# Patient Record
Sex: MALE | Race: WHITE | Employment: OTHER | URBAN - METROPOLITAN AREA
[De-identification: names, ages, dates, MRNs, and addresses within clinical notes are randomized per-mention and may not be internally consistent; named-entity substitution may affect disease eponyms.]

---

## 2017-05-13 ENCOUNTER — HOSPITAL ENCOUNTER (OUTPATIENT)
Dept: MRI IMAGING | Age: 67
Discharge: HOME | End: 2017-05-13
Attending: ORTHOPAEDIC SURGERY
Payer: MEDICARE

## 2017-05-13 DIAGNOSIS — M54.16 LUMBAR RADICULOPATHY: ICD-10-CM

## 2017-05-13 DIAGNOSIS — G60.9 PERIPHERAL NEUROPATHY, IDIOPATHIC: ICD-10-CM

## 2017-05-13 PROCEDURE — 72148 MRI LUMBAR SPINE W/O DYE: CPT

## 2018-02-01 ENCOUNTER — HOSPITAL ENCOUNTER (OUTPATIENT)
Dept: ULTRASOUND IMAGING | Age: 68
Discharge: HOME | End: 2018-02-01
Attending: INTERNAL MEDICINE
Payer: MEDICARE

## 2018-02-01 DIAGNOSIS — K59.03 CONSTIPATION DUE TO OPIOID THERAPY: ICD-10-CM

## 2018-02-01 DIAGNOSIS — R10.9 ABDOMINAL PAIN: ICD-10-CM

## 2018-02-01 DIAGNOSIS — Z12.11 SCREENING FOR COLON CANCER: ICD-10-CM

## 2018-02-01 DIAGNOSIS — T40.2X5A CONSTIPATION DUE TO OPIOID THERAPY: ICD-10-CM

## 2018-02-01 PROCEDURE — 76700 US EXAM ABDOM COMPLETE: CPT

## 2018-04-12 ENCOUNTER — ANESTHESIA (OUTPATIENT)
Dept: ENDOSCOPY | Age: 68
End: 2018-04-12
Payer: MEDICARE

## 2018-04-12 ENCOUNTER — ANESTHESIA EVENT (OUTPATIENT)
Dept: ENDOSCOPY | Age: 68
End: 2018-04-12
Payer: MEDICARE

## 2018-04-12 ENCOUNTER — HOSPITAL ENCOUNTER (OUTPATIENT)
Age: 68
Setting detail: OUTPATIENT SURGERY
Discharge: HOME | End: 2018-04-12
Attending: INTERNAL MEDICINE | Admitting: INTERNAL MEDICINE
Payer: MEDICARE

## 2018-04-12 VITALS
BODY MASS INDEX: 32.28 KG/M2 | DIASTOLIC BLOOD PRESSURE: 84 MMHG | HEART RATE: 69 BPM | OXYGEN SATURATION: 98 % | SYSTOLIC BLOOD PRESSURE: 163 MMHG | HEIGHT: 68 IN | TEMPERATURE: 98.1 F | WEIGHT: 213 LBS | RESPIRATION RATE: 14 BRPM

## 2018-04-12 PROCEDURE — 76040000007: Performed by: INTERNAL MEDICINE

## 2018-04-12 PROCEDURE — 77030027957 HC TBNG IRR ENDOGTR BUSS -B: Performed by: INTERNAL MEDICINE

## 2018-04-12 PROCEDURE — 76060000032 HC ANESTHESIA 0.5 TO 1 HR: Performed by: INTERNAL MEDICINE

## 2018-04-12 PROCEDURE — 74011250636 HC RX REV CODE- 250/636

## 2018-04-12 PROCEDURE — 74011000258 HC RX REV CODE- 258

## 2018-04-12 PROCEDURE — 77030013992 HC SNR POLYP ENDOSC BSC -B: Performed by: INTERNAL MEDICINE

## 2018-04-12 PROCEDURE — 88305 TISSUE EXAM BY PATHOLOGIST: CPT | Performed by: INTERNAL MEDICINE

## 2018-04-12 PROCEDURE — 77030011640 HC PAD GRND REM COVD -A: Performed by: INTERNAL MEDICINE

## 2018-04-12 RX ORDER — SODIUM CHLORIDE 0.9 % (FLUSH) 0.9 %
5-10 SYRINGE (ML) INJECTION EVERY 8 HOURS
Status: DISCONTINUED | OUTPATIENT
Start: 2018-04-12 | End: 2018-04-12 | Stop reason: HOSPADM

## 2018-04-12 RX ORDER — FLUMAZENIL 0.1 MG/ML
0.2 INJECTION INTRAVENOUS
Status: DISCONTINUED | OUTPATIENT
Start: 2018-04-12 | End: 2018-04-12 | Stop reason: HOSPADM

## 2018-04-12 RX ORDER — NALOXONE HYDROCHLORIDE 0.4 MG/ML
0.4 INJECTION, SOLUTION INTRAMUSCULAR; INTRAVENOUS; SUBCUTANEOUS
Status: DISCONTINUED | OUTPATIENT
Start: 2018-04-12 | End: 2018-04-12 | Stop reason: HOSPADM

## 2018-04-12 RX ORDER — SODIUM CHLORIDE 0.9 % (FLUSH) 0.9 %
5-10 SYRINGE (ML) INJECTION AS NEEDED
Status: DISCONTINUED | OUTPATIENT
Start: 2018-04-12 | End: 2018-04-12 | Stop reason: HOSPADM

## 2018-04-12 RX ORDER — MIDAZOLAM HYDROCHLORIDE 1 MG/ML
.25-5 INJECTION, SOLUTION INTRAMUSCULAR; INTRAVENOUS
Status: DISCONTINUED | OUTPATIENT
Start: 2018-04-12 | End: 2018-04-12 | Stop reason: HOSPADM

## 2018-04-12 RX ORDER — FENTANYL CITRATE 50 UG/ML
100 INJECTION, SOLUTION INTRAMUSCULAR; INTRAVENOUS
Status: DISCONTINUED | OUTPATIENT
Start: 2018-04-12 | End: 2018-04-12 | Stop reason: HOSPADM

## 2018-04-12 RX ORDER — ATROPINE SULFATE 0.1 MG/ML
0.5 INJECTION INTRAVENOUS
Status: DISCONTINUED | OUTPATIENT
Start: 2018-04-12 | End: 2018-04-12 | Stop reason: HOSPADM

## 2018-04-12 RX ORDER — SODIUM CHLORIDE 9 MG/ML
INJECTION, SOLUTION INTRAVENOUS
Status: DISCONTINUED | OUTPATIENT
Start: 2018-04-12 | End: 2018-04-12 | Stop reason: HOSPADM

## 2018-04-12 RX ORDER — PROPOFOL 10 MG/ML
INJECTION, EMULSION INTRAVENOUS AS NEEDED
Status: DISCONTINUED | OUTPATIENT
Start: 2018-04-12 | End: 2018-04-12 | Stop reason: HOSPADM

## 2018-04-12 RX ORDER — SODIUM CHLORIDE 9 MG/ML
50 INJECTION, SOLUTION INTRAVENOUS CONTINUOUS
Status: DISCONTINUED | OUTPATIENT
Start: 2018-04-12 | End: 2018-04-12 | Stop reason: HOSPADM

## 2018-04-12 RX ORDER — DEXTROMETHORPHAN/PSEUDOEPHED 2.5-7.5/.8
1.2 DROPS ORAL
Status: DISCONTINUED | OUTPATIENT
Start: 2018-04-12 | End: 2018-04-12 | Stop reason: HOSPADM

## 2018-04-12 RX ORDER — EPINEPHRINE 0.1 MG/ML
1 INJECTION INTRACARDIAC; INTRAVENOUS
Status: DISCONTINUED | OUTPATIENT
Start: 2018-04-12 | End: 2018-04-12 | Stop reason: HOSPADM

## 2018-04-12 RX ADMIN — PROPOFOL 20 MG: 10 INJECTION, EMULSION INTRAVENOUS at 14:01

## 2018-04-12 RX ADMIN — PROPOFOL 20 MG: 10 INJECTION, EMULSION INTRAVENOUS at 13:55

## 2018-04-12 RX ADMIN — PROPOFOL 20 MG: 10 INJECTION, EMULSION INTRAVENOUS at 14:03

## 2018-04-12 RX ADMIN — PROPOFOL 100 MG: 10 INJECTION, EMULSION INTRAVENOUS at 13:53

## 2018-04-12 RX ADMIN — SODIUM CHLORIDE: 9 INJECTION, SOLUTION INTRAVENOUS at 13:30

## 2018-04-12 RX ADMIN — PROPOFOL 30 MG: 10 INJECTION, EMULSION INTRAVENOUS at 13:58

## 2018-04-12 RX ADMIN — PROPOFOL 50 MG: 10 INJECTION, EMULSION INTRAVENOUS at 13:59

## 2018-04-12 NOTE — H&P
[address]  Cindy Ortiz, [address]                 Rock Malcolm is a  77 y.o.   male who presents with no symptoms for screening        Past Medical History:   Diagnosis Date    Back pain     Backache, unspecified 2/8/2010    Blood clots 2004    spine    Cancer [facility]) 2013    prostate    CHF (congestive heart failure) ([facility] 1999    Coronary artery disease     Coronary atherosclerosis of native coronary artery 2/8/2010    DDD (degenerative disc disease)     Essential hypertension, benign 2/8/2010    Gastroenteritis, viral     Heart block     95%    HTN (hypertension), benign     Ill-defined condition     short term memory loss since SDH    Ill-defined condition     urinary incontinence    Ill-defined condition     limited vision R eye    Ill-defined condition 1996    concussion with subdural hematoma    Ill-defined condition 1999 & early 2000s    Mycardial Infarction    Lumbago 2/8/2010    Neuropathy Peripheral     pain in feet    Osteoarthritis     Osteoarthrosis, unspecified whether generalized or localized, unspecified site 2/8/2010    Seizures ([facility] [address] [facility]     1996 after head injury    Skin disease     itching alot    Stroke [facility]) 1996    with head injury    Unspecified hereditary and idiopathic peripheral neuropathy 2/8/2010     Past Surgical History:   Procedure Laterality Date    HX APPENDECTOMY      age 8   Lovette Mince BACK SURGERY  2002    HX BACK SURGERY  2004    HX CATARACT REMOVAL      with lens implant    HX HEART CATHETERIZATION  4474,7758    stents x3    HX PROSTATECTOMY      HX TONSILLECTOMY      as young child   [address]    evacuate SDH    NEUROLOGICAL PROCEDURE UNLISTED      implant neuro stimulator in back for neuropathy in feet     No Known Allergies  Current Facility-Administered Medications   Medication Dose Route Frequency Provider Last Rate Last Dose    0.9% sodium chloride infusion  50 mL/hr IntraVENous CONTINUOUS Kaye Godfrey MD        sodium chloride (NS) flush 5-10 mL  5-10 mL IntraVENous Q8H Kaye Godfrey MD        sodium chloride (NS) flush 5-10 mL  5-10 mL IntraVENous PRN Kaye Godfrey MD        midazolam (VERSED) injection 0.25-5 mg  0.25-5 mg IntraVENous Multiple Kaye Godfrey MD        fentaNYL citrate (PF) injection 100 mcg  100 mcg IntraVENous Multiple Kaye Godfrey MD        naloxone [facility]) injection 0.4 mg  0.4 mg IntraVENous Multiple Kaye Godfrey MD        flumazenil (ROMAZICON) 0.1 mg/mL injection 0.2 mg  0.2 mg IntraVENous Multiple Kaye Godfrey MD        [facility]) 29OR/3.6YI oral drops 80 mg  1.2 mL Oral Alva Godfrey MD        atropine injection 0.5 mg  0.5 mg IntraVENous ONCE PRN Kaye Godfrey MD        EPINEPHrine (ADRENALIN) 0.1 mg/mL syringe 1 mg  1 mg Endoscopically ONCE PRN Kaye Godfrey MD         Facility-Administered Medications Ordered in Other Encounters   Medication Dose Route Frequency Provider Last Rate Last Dose    0.9% sodium chloride infusion   IntraVENous CONTINUOUS Issa Saha CRNA           Visit Vitals    /80    Pulse 62    Temp 98.2 °F (36.8 °C)    Resp 20    Ht 5' 7.75\" (1.721 m)    Wt 96.6 kg (213 lb)    SpO2 100%    BMI 32.63 kg/m2           PHYSICAL EXAM:  General: WD, WN. Alert, cooperative, no acute distress    HEENT: NC, Atraumatic. PERRLA, EOMI. Anicteric sclerae. Mallampati score 2  Lungs:  CTA Bilaterally. No Wheezing/Rhonchi/Rales. Heart:  Regular  rhythm,  No murmur (), No Rubs, No Gallops  Abdomen: Soft, Non distended, Non tender.  +Bowel sounds, no HSM  Extremities: No c/c/e  Neurologic:  CN 2-12 gi, Alert and oriented X 3. No acute neurological distress   Psych:   Good insight. Not anxious nor agitated. Plan:   Endoscopic procedure with MAC.     Kaye Godfrey MD  4/12/2018  1:48 PM

## 2018-04-12 NOTE — IP AVS SNAPSHOT
[facility] [address] [place] [address] 
[phone] Patient: Refugio Lares MRN: [id number] M:[id number] About your hospitalization You were admitted on:  April 12, 2018 You last received care in the:  [facility] ENDOSCOPY You were discharged on:  April 12, 2018 Why you were hospitalized Your primary diagnosis was:  Not on File Follow-up Information None Your Scheduled Appointments Wednesday May 02, 2018 COLONOSCOPY with Depe Aguillon MD  
[facility] ENDOSCOPY (RI OR PRE ASSESSMENT) [address]  
[phone] OP Registration: [facility] Telephone: [phone] Fax: [phone] ** Pt will need to arrive 30 min prior unless appointment prep instructions indicate otherwise** **** Send All ENDO pt to the MAIN Admitting Department, off the [facility] main lobby at [facility] [place]. ****  
  
    
OP Registration: [facility] [employer]- [address] [place] [address] Telephone: [phone] Fax: [phone] ** Pt will need to arrive 30 min prior unless appointment prep instructions indicate otherwise** **** Send All ENDO pt to the MAIN Admitting Department, off the [facility] main lobby at [facility]. **** Discharge Orders None A check alexis indicates which time of day the medication should be taken. My Medications CONTINUE taking these medications Instructions Each Dose to Equal  
 Morning Noon Evening Bedtime  
 acetaminophen 500 mg tablet Commonly known as:  TYLENOL Your last dose was: Your next dose is: Take 500 mg by mouth every six (6) hours as needed for Pain. 500 mg  
    
   
   
   
  
 betamethasone valerate 0.1 % topical cream  
Commonly known as:  Angella Curie Your last dose was: Your next dose is:    
   
   
 APPLY LOCALLY 2 TIMES A DAY  
     
   
   
   
  
 bumetanide 2 mg tablet Commonly known as:  Bao Ferro Your last dose was: Your next dose is: Take 2 mg by mouth daily as needed. 2 mg  
    
   
   
   
  
 calcium-cholecalciferol (d3) 600-125 mg-unit Tab Your last dose was: Your next dose is: Take 1 Tab by mouth nightly. 1 Tab  
    
   
   
   
  
 carvedilol 6.25 mg tablet Commonly known as:  Gopi Soto Your last dose was: Your next dose is: Take 6.25 mg by mouth two (2) times a day. 6.25 mg  
    
   
   
   
  
 diazePAM 5 mg tablet Commonly known as:  VALIUM Your last dose was: Your next dose is: Take 1 Tab by mouth every six (6) hours as needed for Anxiety (spasm). Max Daily Amount: 20 mg.  
 5 mg  
    
   
   
   
  
 diphenhydrAMINE 25 mg tablet Commonly known as:  BENADRYL Your last dose was: Your next dose is: Take 25 mg by mouth every six (6) hours as needed for Sleep. 25 mg  
    
   
   
   
  
 fentaNYL 75 mcg/hr Commonly known as:  Juan M Sow Your last dose was: Your next dose is:    
   
   
 1 Patch by TransDERmal route every seventy-two (72) hours. Max Daily Amount: 1 Patch. 1 Patch FISH OIL 1,000 mg Cap Generic drug:  omega-3 fatty acids-vitamin e Your last dose was: Your next dose is: Take 1,000 g by mouth two (2) times a day. 1000 g  
    
   
   
   
  
 gabapentin 800 mg tablet Commonly known as:  NEURONTIN Your last dose was: Your next dose is: Take 800 mg by mouth two (2) times a day. 800 mg HYDROmorphone 8 mg tablet Commonly known as:  DILAUDID Your last dose was: Your next dose is: Take 1 Tab by mouth every four (4) hours as needed for Pain. Max Daily Amount: 48 mg.  
 8 mg  
    
   
   
   
  
 lisinopril 5 mg tablet Commonly known as:  Helon Estrin  
   
 Your last dose was: Your next dose is: Take  by mouth daily. potassium chloride 20 mEq tablet Commonly known as:  K-DUR, KLOR-CON Your last dose was: Your next dose is: Take 20 mEq by mouth daily as needed. Takes when he takes Bumex 20 mEq  
    
   
   
   
  
 pravastatin 40 mg tablet Commonly known as:  PRAVACHOL Your last dose was: Your next dose is: Take 40 mg by mouth nightly. 40 mg SENNA-MAX PO Your last dose was: Your next dose is: Take 4 Tabs by mouth daily as needed. 4 Tab TRAZODONE PO Your last dose was: Your next dose is: Take 100 mg by mouth nightly as needed. 100 mg Opioid Education Prescription Opioids: What You Need to Know: 
 
Prescription opioids can be used to help relieve moderate-to-severe pain and are often prescribed following a surgery or injury, or for certain health conditions. These medications can be an important part of treatment but also come with serious risks. Opioids are strong pain medicines. Examples include hydrocodone, oxycodone, fentanyl, and morphine. Heroin is an example of an illegal opioid. It is important to work with your health care provider to make sure you are getting the safest, most effective care. WHAT ARE THE RISKS AND SIDE EFFECTS OF OPIOID USE? Prescription opioids carry serious risks of addiction and overdose, especially with prolonged use. An opioid overdose, often marked by slow breathing, can cause sudden death. The use of prescription opioids can have a number of side effects as well, even when taken as directed. · Tolerance-meaning you might need to take more of a medication for the same pain relief · Physical dependence-meaning you have symptoms of withdrawal when the medication is stopped. Withdrawal symptoms can include nausea, sweating, chills, diarrhea, stomach cramps, and muscle aches. Withdrawal can last up to several weeks, depending on which drug you took and how long you took it. · Increased sensitivity to pain · Constipation · Nausea, vomiting, and dry mouth · Sleepiness and dizziness · Confusion · Depression · Low levels of testosterone that can result in lower sex drive, energy, and strength · Itching and sweating RISKS ARE GREATER WITH:      
· History of drug misuse, substance use disorder, or overdose · Mental health conditions (such as depression or anxiety) · Sleep apnea · Older age (72 years or older) · Pregnancy Avoid alcohol while taking prescription opioids. Also, unless specifically advised by your health care provider, medications to avoid include: · Benzodiazepines (such as Xanax or Valium) · Muscle relaxants (such as Soma or Flexeril) · Hypnotics (such as Ambien or Lunesta) · Other prescription opioids KNOW YOUR OPTIONS Talk to your health care provider about ways to manage your pain that don't involve prescription opioids. Some of these options may actually work better and have fewer risks and side effects. Options may include: 
· Pain relievers such as acetaminophen, ibuprofen, and naproxen · Some medications that are also used for depression or seizures · Physical therapy and exercise · Counseling to help patients learn how to cope better with triggers of pain and stress. · Application of heat or cold compress · Massage therapy · Relaxation techniques Be Informed Make sure you know the name of your medication, how much and how often to take it, and its potential risks & side effects. IF YOU ARE PRESCRIBED OPIOIDS FOR PAIN: 
· Never take opioids in greater amounts or more often than prescribed. Remember the goal is not to be pain-free but to manage your pain at a tolerable level. · Follow up with your primary care provider to: · Work together to create a plan on how to manage your pain. · Talk about ways to help manage your pain that don't involve prescription opioids. · Talk about any and all concerns and side effects. · Help prevent misuse and abuse. · Never sell or share prescription opioids · Help prevent misuse and abuse. · Store prescription opioids in a secure place and out of reach of others (this may include visitors, children, friends, and family). · Safely dispose of unused/unwanted prescription opioids: Find your community drug take-back program or your pharmacy mail-back program, or flush them down the toilet, following guidance from the Food and Drug Administration (www.fda.gov/Drugs/ResourcesForYou). · Visit www.cdc.gov/drugoverdose to learn about the risks of opioid abuse and overdose. · If you believe you may be struggling with addiction, tell your health care provider and ask for guidance or call [address] [place] [address] at [phone]. Discharge Instructions [address] [place] [address] [phone] [id number] COLON DISCHARGE INSTRUCTIONS DISCOMFORT: 
Redness at IV site- apply warm compress to area; if redness or soreness persist- contact your physician There may be a slight amount of blood passed from the rectum Gaseous discomfort- walking, belching will help relieve any discomfort You may not operate a vehicle for 12 hours You may not engage in an occupation involving machinery or appliances for rest of today You may not drink alcoholic beverages for at least 12 hours Avoid making any critical decisions for at least 24 hour DIET: 
 Regular diet.  however -  remember your colon is empty and a heavy meal will produce gas.  
 Avoid these foods:  vegetables, fried / greasy foods, carbonated drinks for today ACTIVITY: 
You may resume your normal daily activities it is recommended that you spend the remainder of the day resting -  avoid any strenuous activity. CALL M.D. ANY SIGN OF: Increasing pain, nausea, vomiting Abdominal distension (swelling) New increased bleeding (oral or rectal) Fever (chills) Pain in chest area Bloody discharge from nose or mouth Shortness of breath Follow-up Instructions: 
 Call Dr. Mirza Richards for any questions or problems. Telephone # [phone] Biopsy results will be available in  5 to 7 days Should have a repeat colonoscopy in 3 years Impression:  colon polyp Introducing [facility]! Josephtal Addie introduces [employer] patient portal. Now you can access parts of your medical record, email your doctor's office, and request medication refills online. 1. In your internet browser, go to [email or web address] 2. Click on the First Time User? Click Here link in the Sign In box. You will see the New Member Sign Up page. 3. Enter your [employer] Access Code exactly as it appears below. You will not need to use this code after youve completed the sign-up process. If you do not sign up before the expiration date, you must request a new code. · [employer] Access Code: [email or web address] Expires: 5/1/2018  2:40 PM 
 
4. Enter the last four digits of your Social Security Number (xxxx) and Date of Birth (mm/dd/yyyy) as indicated and click Submit. You will be taken to the next sign-up page. 5. Create a [employer] ID. This will be your [employer] login ID and cannot be changed, so think of one that is secure and easy to remember. 6. Create a [employer] password. You can change your password at any time. 7. Enter your Password Reset Question and Answer. This can be used at a later time if you forget your password. 8. Enter your e-mail address. You will receive e-mail notification when new information is available in [address] 9. Click Sign Up. You can now view and download portions of your medical record. 10. Click the Download Summary menu link to download a portable copy of your medical information. If you have questions, please visit the Frequently Asked Questions section of the [employer] website. Remember, [employer] is NOT to be used for urgent needs. For medical emergencies, dial 911. Now available from your iPhone and Android! Introducing Mulugeta Diaz As a [facility] patient, I wanted to make you aware of our electronic visit tool called Mulugeta Diaz. HernándezSyncing.Net 24/7 allows you to connect within minutes with a medical provider 24 hours a day, seven days a week via a mobile device or tablet or logging into a secure website from your computer. You can access Mulugeta Diaz from anywhere in the [place]. A virtual visit might be right for you when you have a simple condition and feel like you just dont want to get out of bed, or cant get away from work for an appointment, when your regular [facility] provider is not available (evenings, weekends or holidays), or when youre out of town and need minor care. Electronic visits cost only $49 and if the HernándezEBIQUOUS [facility] 24/7 provider determines a prescription is needed to treat your condition, one can be electronically transmitted to a nearby pharmacy*. Please take a moment to enroll today if you have not already done so. The enrollment process is free and takes just a few minutes. To enroll, please download the [employer] 24/7 sharifa to your tablet or phone, or visit [email or web address] to enroll on your computer. And, as an [address] [place] [address] patient with a [employer] account, the results of your visits will be scanned into your electronic medical record and your primary care provider will be able to view the scanned results.    
We urge you to continue to see your regular [facility] provider for your ongoing medical care. And while your primary care provider may not be the one available when you seek a Mulugeta Diaz virtual visit, the peace of mind you get from getting a real diagnosis real time can be priceless. For more information on Mulugeta Diaz, view our Frequently Asked Questions (FAQs) at [email or web address]. Sincerely, 
 
Madhuri Leon MD 
Chief Medical Officer [employer] *:  certain medications cannot be prescribed via Mulugeta Diaz Providers Seen During Your Hospitalization Provider Specialty Primary office phone Melissa Salmeron MD Gastroenterology [phone] Your Primary Care Physician (PCP) Primary Care Physician Office Phone Office Fax Kike Hill [phone] You are allergic to the following No active allergies Recent Documentation Height Weight BMI Smoking Status 1.721 m 96.6 kg 32.63 kg/m2 Former Smoker Emergency Contacts Name Discharge Info Relation Home Work Mobile Nabil 38 CAREGIVER [3] Spouse [3] [phone] Patient Belongings The following personal items are in your possession at time of discharge: 
  Dental Appliances: None  Visual Aid: None Please provide this summary of care documentation to your next provider. Signatures-by signing, you are acknowledging that this After Visit Summary has been reviewed with you and you have received a copy. Patient Signature:  ____________________________________________________________ Date:  ____________________________________________________________  
  
Fayrene Retort Provider Signature:  ____________________________________________________________ Date:  ____________________________________________________________

## 2018-04-12 NOTE — PROCEDURES
[address]  [address]              :  Luana Sierra MD    Referring Provider: Hussain Cordero NP    Sedation:  MAC anesthesia Propofol        Prior to the procedure its objectives, risks, consequences and alternatives were discussed with the patient who then elected to proceed. The patient had the opportunity to ask questions and those questions were answered. A physical exam was performed. The heart, lungs, and mental status were examined prior to the procedure and found to be satisfactory for conscious sedation and for the procedure. Conscious sedation was initiated by the physician. Continuous pulse oximetry and blood pressure monitoring were used throughout the procedure. After appropriate analgesia and rectal exam, the colonoscope was passed into the anus and passed to the cecum without difficulty. The prep was poor. On slow withdrawal of the scope, the cecum was normal. In the ascending colon,there was a 1 cm sessile polyp that was removed with snare and cautery and retrieved. The hepatic flexure, transverse colon, splenic flexure, descending colon, sigmoid and rectum were normal. Retroflexion exam of the rectum was normal. He tolerated the procedure without complication and I recommend a repeat colonoscopy in 5 years. Specimen Polyp, ascending colon    Complications: None. EBL:  None.     Luana Sierra MD  4/12/2018  2:09 PM

## 2018-04-12 NOTE — PERIOP NOTES

## 2018-04-12 NOTE — ANESTHESIA POSTPROCEDURE EVALUATION
Post-Anesthesia Evaluation and Assessment    Patient: Monse Woodall MRN: [id number]  SSN: [id number]    YOB: 1950  Age: 77 y.o. Sex: male       Cardiovascular Function/Vital Signs  Visit Vitals    /84    Pulse 69    Temp 36.7 °C (98.1 °F)    Resp 14    Ht 5' 7.75\" (1.721 m)    Wt 96.6 kg (213 lb)    SpO2 98%    BMI 32.63 kg/m2       Patient is status post MAC anesthesia for Procedure(s):  COLONOSCOPY  ENDOSCOPIC POLYPECTOMY. Nausea/Vomiting: None    Postoperative hydration reviewed and adequate. Pain:  Pain Scale 1: Visual (04/12/18 1416)  Pain Intensity 1: 0 (04/12/18 1416)   Managed    Neurological Status: At baseline    Mental Status and Level of Consciousness: Arousable    Pulmonary Status:   O2 Device: Room air (04/12/18 1416)   Adequate oxygenation and airway patent    Complications related to anesthesia: None    Post-anesthesia assessment completed.  No concerns    Signed By: Cassie Henderson MD     April 12, 2018

## 2018-04-12 NOTE — DISCHARGE INSTRUCTIONS
[address]  [address]          Cammie Coronado  [id number]  1950    COLON DISCHARGE INSTRUCTIONS    DISCOMFORT:  Redness at IV site- apply warm compress to area; if redness or soreness persist- contact your physician  There may be a slight amount of blood passed from the rectum  Gaseous discomfort- walking, belching will help relieve any discomfort  You may not operate a vehicle for 12 hours  You may not engage in an occupation involving machinery or appliances for rest of today  You may not drink alcoholic beverages for at least 12 hours  Avoid making any critical decisions for at least 24 hour  DIET:   Regular diet. - however -  remember your colon is empty and a heavy meal will produce gas. Avoid these foods:  vegetables, fried / greasy foods, carbonated drinks for today         ACTIVITY:  You may resume your normal daily activities it is recommended that you spend the remainder of the day resting -  avoid any strenuous activity. CALL M.D. ANY SIGN OF:   Increasing pain, nausea, vomiting  Abdominal distension (swelling)  New increased bleeding (oral or rectal)  Fever (chills)  Pain in chest area  Bloody discharge from nose or mouth  Shortness of breath     Follow-up Instructions:   Call Dr. Jc Gillespie for any questions or problems.    Telephone # [phone]  Biopsy results will be available in  5 to 7 days  Should have a repeat colonoscopy in 3 years    Impression:  colon polyp

## 2018-04-12 NOTE — ANESTHESIA PREPROCEDURE EVALUATION
Anesthetic History   No history of anesthetic complications            Review of Systems / Medical History  Patient summary reviewed, nursing notes reviewed and pertinent labs reviewed    Pulmonary  Within defined limits                 Neuro/Psych   Within defined limits  seizures  CVA      Comments: Closed head injury Cardiovascular  Within defined limits  Hypertension        Dysrhythmias   CAD and cardiac stents         GI/Hepatic/Renal  Within defined limits              Endo/Other  Within defined limits      Arthritis     Other Findings              Physical Exam    Airway  Mallampati: II  TM Distance: > 6 cm  Neck ROM: normal range of motion   Mouth opening: Normal     Cardiovascular  Regular rate and rhythm,  S1 and S2 normal,  no murmur, click, rub, or gallop             Dental  No notable dental hx       Pulmonary  Breath sounds clear to auscultation               Abdominal  GI exam deferred       Other Findings            Anesthetic Plan    ASA: 3  Anesthesia type: MAC          Induction: Intravenous  Anesthetic plan and risks discussed with: Patient

## 2018-04-12 NOTE — ROUTINE PROCESS
[facility]  1950  [id number]    Situation:  Verbal report received from: Jerrod Duran  Procedure: Procedure(s):  COLONOSCOPY  ENDOSCOPIC POLYPECTOMY    Background:    Preoperative diagnosis: screening  Postoperative diagnosis: colon polyp    :  Dr. Nakul Deshpande  Assistant(s): Endoscopy Technician-1: Sasha Galindo IV  Endoscopy RN-1: Gary Edmondson, JOVITA    Specimens:   ID Type Source Tests Collected by Time Destination   1 : polyp Preservative Colon, Ascending  Rita Butcher MD 4/12/2018 1404 Pathology     H. Pylori  no    Assessment:  Intra-procedure medications   Anesthesia gave intra-procedure sedation and medications, see anesthesia flow sheet yes    Intravenous fluids: NS@ KVO     Vital signs stable     Abdominal assessment: round and soft     Recommendation:  Discharge patient per MD order.   Family or Friend Spouse  Permission to share finding with family or friend yes

## 2020-01-15 ENCOUNTER — ANESTHESIA EVENT (OUTPATIENT)
Dept: ENDOSCOPY | Age: 70
End: 2020-01-15
Payer: MEDICARE

## 2020-01-15 ENCOUNTER — ANESTHESIA (OUTPATIENT)
Dept: ENDOSCOPY | Age: 70
End: 2020-01-15
Payer: MEDICARE

## 2020-01-15 ENCOUNTER — HOSPITAL ENCOUNTER (OUTPATIENT)
Age: 70
Discharge: HOME | End: 2020-01-15
Attending: INTERNAL MEDICINE | Admitting: INTERNAL MEDICINE
Payer: MEDICARE

## 2020-01-15 VITALS
SYSTOLIC BLOOD PRESSURE: 137 MMHG | HEART RATE: 58 BPM | WEIGHT: 158 LBS | OXYGEN SATURATION: 98 % | BODY MASS INDEX: 23.95 KG/M2 | DIASTOLIC BLOOD PRESSURE: 73 MMHG | RESPIRATION RATE: 18 BRPM | TEMPERATURE: 97.8 F | HEIGHT: 68 IN

## 2020-01-15 PROCEDURE — 76060000031 HC ANESTHESIA FIRST 0.5 HR: Performed by: INTERNAL MEDICINE

## 2020-01-15 PROCEDURE — 74011000250 HC RX REV CODE- 250: Performed by: NURSE ANESTHETIST, CERTIFIED REGISTERED

## 2020-01-15 PROCEDURE — 74011250636 HC RX REV CODE- 250/636: Performed by: NURSE ANESTHETIST, CERTIFIED REGISTERED

## 2020-01-15 PROCEDURE — 76040000019: Performed by: INTERNAL MEDICINE

## 2020-01-15 RX ORDER — EPINEPHRINE 0.1 MG/ML
1 INJECTION INTRACARDIAC; INTRAVENOUS
Status: DISCONTINUED | OUTPATIENT
Start: 2020-01-15 | End: 2020-01-15 | Stop reason: HOSPADM

## 2020-01-15 RX ORDER — SODIUM CHLORIDE 0.9 % (FLUSH) 0.9 %
5-40 SYRINGE (ML) INJECTION AS NEEDED
Status: DISCONTINUED | OUTPATIENT
Start: 2020-01-15 | End: 2020-01-15 | Stop reason: HOSPADM

## 2020-01-15 RX ORDER — ATROPINE SULFATE 0.1 MG/ML
0.5 INJECTION INTRAVENOUS
Status: DISCONTINUED | OUTPATIENT
Start: 2020-01-15 | End: 2020-01-15 | Stop reason: HOSPADM

## 2020-01-15 RX ORDER — SODIUM CHLORIDE 0.9 % (FLUSH) 0.9 %
5-40 SYRINGE (ML) INJECTION EVERY 8 HOURS
Status: DISCONTINUED | OUTPATIENT
Start: 2020-01-15 | End: 2020-01-15 | Stop reason: HOSPADM

## 2020-01-15 RX ORDER — SODIUM CHLORIDE 9 MG/ML
50 INJECTION, SOLUTION INTRAVENOUS CONTINUOUS
Status: DISCONTINUED | OUTPATIENT
Start: 2020-01-15 | End: 2020-01-15 | Stop reason: HOSPADM

## 2020-01-15 RX ORDER — SODIUM CHLORIDE 9 MG/ML
INJECTION, SOLUTION INTRAVENOUS
Status: DISCONTINUED | OUTPATIENT
Start: 2020-01-15 | End: 2020-01-15 | Stop reason: HOSPADM

## 2020-01-15 RX ORDER — LIDOCAINE HYDROCHLORIDE 20 MG/ML
INJECTION, SOLUTION EPIDURAL; INFILTRATION; INTRACAUDAL; PERINEURAL AS NEEDED
Status: DISCONTINUED | OUTPATIENT
Start: 2020-01-15 | End: 2020-01-15 | Stop reason: HOSPADM

## 2020-01-15 RX ORDER — PROPOFOL 10 MG/ML
INJECTION, EMULSION INTRAVENOUS AS NEEDED
Status: DISCONTINUED | OUTPATIENT
Start: 2020-01-15 | End: 2020-01-15 | Stop reason: HOSPADM

## 2020-01-15 RX ORDER — NALOXONE HYDROCHLORIDE 0.4 MG/ML
0.4 INJECTION, SOLUTION INTRAMUSCULAR; INTRAVENOUS; SUBCUTANEOUS
Status: DISCONTINUED | OUTPATIENT
Start: 2020-01-15 | End: 2020-01-15 | Stop reason: HOSPADM

## 2020-01-15 RX ORDER — ASPIRIN 81 MG/1
81 TABLET ORAL DAILY
COMMUNITY
End: 2021-09-27

## 2020-01-15 RX ORDER — DEXTROMETHORPHAN/PSEUDOEPHED 2.5-7.5/.8
1.2 DROPS ORAL
Status: DISCONTINUED | OUTPATIENT
Start: 2020-01-15 | End: 2020-01-15 | Stop reason: HOSPADM

## 2020-01-15 RX ORDER — FLUMAZENIL 0.1 MG/ML
0.2 INJECTION INTRAVENOUS
Status: DISCONTINUED | OUTPATIENT
Start: 2020-01-15 | End: 2020-01-15 | Stop reason: HOSPADM

## 2020-01-15 RX ADMIN — PROPOFOL 100 MG: 10 INJECTION, EMULSION INTRAVENOUS at 10:08

## 2020-01-15 RX ADMIN — LIDOCAINE HYDROCHLORIDE 100 MG: 20 INJECTION, SOLUTION EPIDURAL; INFILTRATION; INTRACAUDAL; PERINEURAL at 10:08

## 2020-01-15 RX ADMIN — SODIUM CHLORIDE: 900 INJECTION, SOLUTION INTRAVENOUS at 09:45

## 2020-01-15 NOTE — PERIOP NOTES

## 2020-01-15 NOTE — ANESTHESIA POSTPROCEDURE EVALUATION
Post-Anesthesia Evaluation and Assessment    Patient: Jeison Lu MRN: [id number]  SSN: [id number]    YOB: 1950  Age: 68 y.o. Sex: male      I have evaluated the patient and they are stable and ready for discharge from the PACU. Cardiovascular Function/Vital Signs  Visit Vitals  /73   Pulse (!) 58   Temp 36.6 °C (97.8 °F)   Resp 18   Ht 5' 7.5\" (1.715 m)   Wt 71.7 kg (158 lb)   SpO2 98%   BMI 24.38 kg/m²       Patient is status post MAC anesthesia for Procedure(s):  ESOPHAGOGASTRODUODENOSCOPY (EGD). Nausea/Vomiting: None    Postoperative hydration reviewed and adequate. Pain:  Pain Scale 1: Numeric (0 - 10) (01/15/20 1051)  Pain Intensity 1: 0 (01/15/20 1051)   Managed    Neurological Status: At baseline    Mental Status, Level of Consciousness: Alert and  oriented to person, place, and time    Pulmonary Status:   O2 Device: Room air (01/15/20 1051)   Adequate oxygenation and airway patent    Complications related to anesthesia: None    Post-anesthesia assessment completed. No concerns    Signed By: Geovanna Akhtar MD     January 15, 2020              Procedure(s):  ESOPHAGOGASTRODUODENOSCOPY (EGD).     MAC    <BSHSIANPOST>    Vitals Value Taken Time   /73 1/15/2020 10:51 AM   Temp 36.6 °C (97.8 °F) 1/15/2020 10:42 AM   Pulse 58 1/15/2020 10:51 AM   Resp 18 1/15/2020 10:51 AM   SpO2 98 % 1/15/2020 10:51 AM

## 2020-01-15 NOTE — DISCHARGE INSTRUCTIONS
[address]  [address]           Payam Flowers  [id number]  1950    EGD DISCHARGE INSTRUCTIONS    Discomfort:  Sore throat- throat lozenges or warm salt water gargle  redness at IV site- apply warm compress to area; if redness or soreness persist- contact your physician  Gaseous discomfort- walking, belching will help relieve any discomfort  You may not operate a vehicle for 12 hours  You may not engage in an occupation involving machinery or appliances for rest of today  You may not drink alcoholic beverages for at least 12 hours  Avoid making any critical decisions for at least 24 hour  DIET  You may have anything by mouth- do not eat or drink for two hours. You may eat and drink after you leave. You may resume your regular diet - however -  remember your colon is empty and a heavy meal will produce gas. Avoid these foods:  vegetables, fried / greasy foods, carbonated drinks        ACTIVITY  You may resume your normal daily activities   Spend the remainder of the day resting -  avoid any strenuous activity. CALL M.D. ANY SIGN OF   Increasing pain, nausea, vomiting  Abdominal distension (swelling)  New increased bleeding (oral or rectal)  Fever (chills)  Pain in chest area  Bloody discharge from nose or mouth  Shortness of breath    Follow-up Instructions:   Call Tai Patel for any questions or problems. Telephone # [phone]   Continue same medications.     Impression:  Normal EGD    Sima James MD  1/15/2020  10:17 AM

## 2020-01-15 NOTE — PROCEDURES
[address], [facility]               : Paul Mackey MD    Surgical Assistant: None    Implants: None    Referring Provider: Castro Balderas NP    Sedation:  MAC anesthesia Propofol      Prior to the procedure its objectives, risks, consequences and alternatives were discussed with the patient who then elected to proceed. The patient had the opportunity to ask questions and those questions were answered. A physical exam was performed. The heart, lungs, and mental status were examined prior to the procedure and found to be satisfactory for conscious sedation and for the procedure. Conscious sedation was initiated by the physician. Continuous pulse oximetry and blood pressure monitoring were used throughout the procedure. After appropriate pharyngeal anesthesia, the endoscope was passed into the esophagus without difficulty. The proximal esophagus is normal as is the distal esophagus. The scope was passed into the stomach without difficulty. The fundus, body, antrum, pylorus, bulb and postbulbar area are unremarkable. On slow withdrawal of the scope, no abnormality was noted. Retroflexion revealed normal cardia and fundus. He tolerated the procedure without complications and will be discharged later today. Specimen None    Complications: None. EBL:  None.     Paul Mackey MD  1/15/2020  10:16 AM

## 2020-01-15 NOTE — H&P
[address]  [address] [facility], [address] [place] [address]                 Jeison Lu is a  68 y.o.  male who presents with several months of anorexia, early satiety and 40 pound weight. He had negative colonoscopy in 2018 . Past Medical History:   Diagnosis Date    Back pain     Backache, unspecified 2/8/2010    Blood clots 2004    spine    Cancer [facility]) 2013    prostate    CHF (congestive heart failure) ([facility] [address] 1999    Coronary artery disease     Coronary atherosclerosis of native coronary artery 2/8/2010    DDD (degenerative disc disease)     Essential hypertension, benign 2/8/2010    Gastroenteritis, viral     Heart block     95%    HTN (hypertension), benign     Ill-defined condition     short term memory loss since SDH    Ill-defined condition     urinary incontinence    Ill-defined condition     limited vision R eye    Ill-defined condition 1996    concussion with subdural hematoma    Ill-defined condition 1999 & early 2000s    Mycardial Infarction    Lumbago 2/8/2010    Neuropathy Peripheral     pain in feet    Osteoarthritis     Osteoarthrosis, unspecified whether generalized or localized, unspecified site 2/8/2010    Seizures ([facility] [address]     1996 after head injury    Skin disease     itching alot    Stroke [facility]) 1996    Unspecified hereditary and idiopathic peripheral neuropathy 2/8/2010     Past Surgical History:   Procedure Laterality Date    COLONOSCOPY N/A 4/12/2018    COLONOSCOPY performed by Tutu Medina MD at [facility] ENDOSCOPY    HX APPENDECTOMY      age 8    HX BACK SURGERY  2002    HX BACK SURGERY  2004    HX CATARACT REMOVAL      with lens implant    HX HEART CATHETERIZATION  8130,4855    stents x3    HX PROSTATECTOMY      HX TONSILLECTOMY      as young child   [address]    evacuate SDH    NEUROLOGICAL PROCEDURE UNLISTED      implant neuro stimulator in back for neuropathy in feet.  Pt no longer has this, it was removed. No Known Allergies  Current Facility-Administered Medications   Medication Dose Route Frequency Provider Last Rate Last Dose    0.9% sodium chloride infusion  50 mL/hr IntraVENous CONTINUOUS Natalia De La Rosa MD        sodium chloride (NS) flush 5-40 mL  5-40 mL IntraVENous Q8H Natalia De La Rosa MD        sodium chloride (NS) flush 5-40 mL  5-40 mL IntraVENous PRN Natalia De La Rosa MD        naloxone [facility]) injection 0.4 mg  0.4 mg IntraVENous Multiple Natalia De La Rosa MD        flumazenil (ROMAZICON) 0.1 mg/mL injection 0.2 mg  0.2 mg IntraVENous Alva De La Rosa MD        [facility]) 19WO/1.3SU oral drops 80 mg  1.2 mL Oral Alva De La Rosa MD        atropine injection 0.5 mg  0.5 mg IntraVENous ONCE PRN Natalia De La Rosa MD        EPINEPHrine (ADRENALIN) 0.1 mg/mL syringe 1 mg  1 mg Endoscopically ONCE PRN Natalia De La Rosa MD         Facility-Administered Medications Ordered in Other Encounters   Medication Dose Route Frequency Provider Last Rate Last Dose    0.9% sodium chloride infusion   IntraVENous CONTINUOUS Brianna Primmer CRNA        lidocaine (PF) (XYLOCAINE) 20 mg/mL (2 %) injection   IntraVENous PRN Brianna Primmer, CRNA   100 mg at 01/15/20 1008       Visit Vitals  /74   Pulse 60   Temp 98.2 °F (36.8 °C)   Resp 12   Ht 5' 7.5\" (1.715 m)   Wt 71.7 kg (158 lb)   SpO2 98%   BMI 24.38 kg/m²           PHYSICAL EXAM:  General: WD, WN. Alert, cooperative, no acute distress    HEENT: NC, Atraumatic. PERRLA, EOMI. Anicteric sclerae. Mallampati score 2  Lungs:  CTA Bilaterally. No Wheezing/Rhonchi/Rales. Heart:  Regular  rhythm,  No murmur (), No Rubs, No Gallops  Abdomen: Soft, Non distended, Non tender.  +Bowel sounds, no HSM  Extremities: No c/c/e  Neurologic:  CN 2-12 gi, Alert and oriented X 2  No acute neurological distress , memory loss  Psych:   Good insight. Not anxious nor agitated. Plan:   Endoscopic procedure with DMITRIY Razo MD  1/15/2020  10:09 AM

## 2020-01-15 NOTE — ROUTINE PROCESS
Malcolm Stallings  1950  [id number]    Situation:  Verbal report received from: INESSA Ceja RN  Procedure: Procedure(s):  ESOPHAGOGASTRODUODENOSCOPY (EGD)    Background:    Preoperative diagnosis: HEMO + STOOL  Postoperative diagnosis: Normal EGD    :  Dr. Jazz Hernandez  Assistant(s): Endoscopy Technician-1: Mitzi Pennington IV  Endoscopy RN-1: Lenny De La Fuente RN    Specimens: * No specimens in log *  H. Pylori  no    Assessment:  Intra-procedure medications Anesthesia gave intra-procedure sedation and medications, see anesthesia flow sheet yes    Intravenous fluids: NS@ KVO     Vital signs stable     Abdominal assessment: round and soft     Recommendation:  Discharge patient per MD order.     Family or Friend   Permission to share finding with family or friend yes

## 2020-01-15 NOTE — ANESTHESIA PREPROCEDURE EVALUATION
Relevant Problems   No relevant active problems       Anesthetic History               Review of Systems / Medical History  Patient summary reviewed, nursing notes reviewed and pertinent labs reviewed    Pulmonary                   Neuro/Psych     seizures  CVA  TIA     Cardiovascular    Hypertension        Dysrhythmias   CAD and cardiac stents    Exercise tolerance: >4 METS     GI/Hepatic/Renal                Endo/Other        Arthritis     Other Findings              Physical Exam    Airway  Mallampati: I           Cardiovascular               Dental         Pulmonary                 Abdominal         Other Findings            Anesthetic Plan    ASA: 3  Anesthesia type: MAC          Induction: Intravenous  Anesthetic plan and risks discussed with: Patient

## 2020-05-14 LAB — CREATININE, EXTERNAL: 0.85

## 2020-07-14 VITALS
TEMPERATURE: 97.3 F | BODY MASS INDEX: 21.52 KG/M2 | HEIGHT: 68 IN | SYSTOLIC BLOOD PRESSURE: 138 MMHG | DIASTOLIC BLOOD PRESSURE: 75 MMHG | WEIGHT: 142 LBS

## 2020-07-14 DIAGNOSIS — R31.9 HEMATURIA, UNSPECIFIED TYPE: ICD-10-CM

## 2020-07-14 DIAGNOSIS — R39.15 URGENCY OF URINATION: ICD-10-CM

## 2020-07-14 DIAGNOSIS — N52.9 IMPOTENCE: ICD-10-CM

## 2020-07-14 DIAGNOSIS — C67.9 MALIGNANT NEOPLASM OF URINARY BLADDER, UNSPECIFIED SITE (HCC): ICD-10-CM

## 2020-07-14 DIAGNOSIS — C61 MALIGNANT NEOPLASM OF PROSTATE (HCC): ICD-10-CM

## 2020-07-14 DIAGNOSIS — N31.9 NEUROGENIC DYSFUNCTION OF THE URINARY BLADDER: ICD-10-CM

## 2020-08-20 ENCOUNTER — OP HISTORICAL/CONVERTED ENCOUNTER (OUTPATIENT)
Dept: OTHER | Age: 70
End: 2020-08-20

## 2020-08-24 ENCOUNTER — IP HISTORICAL/CONVERTED ENCOUNTER (OUTPATIENT)
Dept: OTHER | Age: 70
End: 2020-08-24

## 2020-08-24 ENCOUNTER — OFFICE VISIT (OUTPATIENT)
Dept: UROLOGY | Age: 70
End: 2020-08-24

## 2020-09-03 ENCOUNTER — TELEPHONE (OUTPATIENT)
Dept: UROLOGY | Age: 70
End: 2020-09-03

## 2020-09-03 NOTE — TELEPHONE ENCOUNTER
Charline Jacobo RN w/ [facility] called stating they are handling the patients care for his urostomy but the patient is very weak. They suggest he have physical therapy and wanted to see if you would approve for the patient to have that as well.

## 2020-09-04 NOTE — TELEPHONE ENCOUNTER
I did approve PT, it was denied by family per Case Management. Have them fax whatever form they need and I will sign. I think PT is a good idea.

## 2020-09-09 PROBLEM — R39.15 URGENCY OF URINATION: Status: RESOLVED | Noted: 2020-07-14 | Resolved: 2020-09-09

## 2020-09-09 PROBLEM — R31.9 HEMATURIA, UNSPECIFIED: Status: RESOLVED | Noted: 2020-07-14 | Resolved: 2020-09-09

## 2020-09-09 PROBLEM — N52.9 IMPOTENCE: Status: RESOLVED | Noted: 2020-07-14 | Resolved: 2020-09-09

## 2020-09-14 ENCOUNTER — TELEPHONE (OUTPATIENT)
Dept: UROLOGY | Age: 70
End: 2020-09-14

## 2020-09-14 NOTE — TELEPHONE ENCOUNTER
Discussed with patient's wife. She confirms lethargy, inability to ambulate and whisper voice. He is on antidepressants. She does not feel they work. Home health with PT was ordered, but PT is scheduled to start on Wednesday 9/16/2020. We will see patient in office tomorrow.

## 2020-09-14 NOTE — TELEPHONE ENCOUNTER
Petty Ulrich from pts home health [facility] stating pts pcp is very unresponsive and she is very concerned about pt. Said hes on metformin but pcp is not monitoring any labs, also pt is not eating and he is unable to walk. She said he talks in a whisper and seems very depressed.  Wants dr shaikh to give her a call back in regards to his care       [phone]

## 2020-09-15 ENCOUNTER — OFFICE VISIT (OUTPATIENT)
Dept: UROLOGY | Age: 70
End: 2020-09-15
Payer: MEDICARE

## 2020-09-15 VITALS
WEIGHT: 130 LBS | TEMPERATURE: 98.1 F | DIASTOLIC BLOOD PRESSURE: 72 MMHG | HEIGHT: 67 IN | SYSTOLIC BLOOD PRESSURE: 128 MMHG | HEART RATE: 98 BPM | BODY MASS INDEX: 20.4 KG/M2 | OXYGEN SATURATION: 97 %

## 2020-09-15 DIAGNOSIS — F32.A DEPRESSION, UNSPECIFIED DEPRESSION TYPE: ICD-10-CM

## 2020-09-15 DIAGNOSIS — C61 MALIGNANT NEOPLASM OF PROSTATE (HCC): Primary | ICD-10-CM

## 2020-09-15 DIAGNOSIS — C67.9 MALIGNANT NEOPLASM OF URINARY BLADDER, UNSPECIFIED SITE (HCC): ICD-10-CM

## 2020-09-15 DIAGNOSIS — N31.9 NEUROGENIC DYSFUNCTION OF THE URINARY BLADDER: ICD-10-CM

## 2020-09-15 DIAGNOSIS — R53.81 DEBILITY: ICD-10-CM

## 2020-09-15 PROCEDURE — 99024 POSTOP FOLLOW-UP VISIT: CPT | Performed by: UROLOGY

## 2020-09-15 RX ORDER — TRAZODONE HYDROCHLORIDE 100 MG/1
100 TABLET ORAL
COMMUNITY

## 2020-09-15 RX ORDER — BUPROPION HYDROCHLORIDE 150 MG/1
TABLET, EXTENDED RELEASE ORAL 2 TIMES DAILY
COMMUNITY
End: 2021-01-26 | Stop reason: ALTCHOICE

## 2020-09-15 RX ORDER — LEVOTHYROXINE SODIUM 75 UG/1
TABLET ORAL
Status: ON HOLD | COMMUNITY
End: 2021-09-14

## 2020-09-15 RX ORDER — MONTELUKAST SODIUM 10 MG/1
10 TABLET ORAL DAILY
COMMUNITY

## 2020-09-15 RX ORDER — METFORMIN HYDROCHLORIDE 500 MG/1
TABLET ORAL 2 TIMES DAILY WITH MEALS
COMMUNITY
End: 2021-01-26 | Stop reason: ALTCHOICE

## 2020-09-15 NOTE — PROGRESS NOTES
HISTORY OF PRESENT ILLNESS  Tameka Bañuelos is a 65 y.o. male. Chief Complaint   Patient presents with    Post OP Follow Up    Bladder Cancer     He is s/p cystectomy with ileal conduit urinary diversion on 8/24/2020. He had an uncomplicated post-operative course and was discharged on 8/31/2020 with [facility]. 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 PT2bN0. I received a call from the home health nurse yesterday (9/14/2020) regarding the patient's overall progression since discharge. He is not ambulating well, is speaking in a whisper voice, has a decreased appetite and overall appears depressed. This information is confirmed with the patient's wife. He was discharged with [facility] and skilled nursing to help care for the new ostomy and with PT (which does not start apparently until tomorrow, 9/16/2020). He appears to have lost about 10lbs. The wife says he gets depressed after surgeries. He is on antidepressants. He had a brain injury in 1996. Problem List  Date Reviewed: 9/15/2020          Codes Class Noted    Bladder cancer [facility]) ICD-10-CM: C67.9  ICD-9-CM: 188.9  7/14/2020    Overview Addendum 9/9/2020  1:29 PM by Solo Monroe NP     He is s/p TURBT on 6/25/2020. No obvious residual tumor, but tessy and induration at the base. Pathology reads invasive papillary urothelial carcinoma, high grade (bladder) and invasive high grade urothelial carcinoma pT2 (bladder base). He is s/p cystectomy with ileal conduit urinary diversion on 8/24/2020. He had an uncomplicated post-operative course and was discharged on 8/31/2020 with [address] [facility]. 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 PT2bN0.                Malignant neoplasm of prostate [facility]) ICD-10-CM: C61  ICD-9-CM: 230  7/14/2020    Overview Signed 7/14/2020  6:01 PM by Michaela Zaragoza [place]     He is s/p a robotic prostatectomy 10/18/13. He had Corey's 6 disease wtih negative margins and negative nodes. His PSA has been undetectable, last drawn on 5/14/2020. Spondylolisthesis at L2-L3 level ICD-10-CM: M43.16  ICD-9-CM: 756.12  1/25/2016        Lumbar stenosis ICD-10-CM: M48.061  ICD-9-CM: 724.02  1/25/2016        Lumbago ICD-10-CM: M54.5  ICD-9-CM: 724.2  2/8/2010        Unspecified hereditary and idiopathic peripheral neuropathy ICD-10-CM: G60.9  ICD-9-CM: 356.9  2/8/2010        Osteoarthrosis, unspecified whether generalized or localized, unspecified site ICD-10-CM: M19.90  ICD-9-CM: 715.90  2/8/2010        Backache, unspecified ICD-10-CM: M54.9  ICD-9-CM: 724.5  2/8/2010        Congestive heart failure, unspecified ICD-10-CM: I50.9  ICD-9-CM: 428.0  2/8/2010        Essential hypertension, benign ICD-10-CM: I10  ICD-9-CM: 401.1  2/8/2010        Coronary atherosclerosis of native coronary artery ICD-10-CM: I25.10  ICD-9-CM: 414.01  2/8/2010               Review of Systems   HENT:        Speaking in a whisper voice   Gastrointestinal: Negative. Per HPI   Genitourinary:        Per HPI. Still some urethral discharge at night. Musculoskeletal:        Weak, not ambulatory   Neurological: Positive for speech change. Whisper voice   Psychiatric/Behavioral: Positive for depression. All other systems reviewed and are negative. Past Medical History:   Diagnosis Date    Back pain     Backache, unspecified 2/8/2010    Bladder cancer ([facility] [address] 7/14/2020    He is s/p TURBT on 6/25/2020. No obvious residual tumor, but tessy and induration at the base. Pathology reads invasive papillary urothelial carcinoma, high grade (bladder) and invasive high grade urothelial carcinoma pT2 (bladder base). 07- visit He has high grade muscle invasive bladder cancer. I thought it was grossly resected but pathology reveals high grade disease.  He is at high risk o    Blood clots 2004    spine    Cancer [facility]) 2013    prostate    CHF (congestive heart failure) ([facility] [address] 1999    Coronary artery disease     Coronary atherosclerosis of native coronary artery 2/8/2010    DDD (degenerative disc disease)     Diabetes ([facility]     Essential hypertension, benign 2/8/2010    Gastroenteritis, viral     Heart block     95%    Hematuria, unspecified 7/14/2020    He had gross hematuria and clots in his Depends. He notes not pain in the bladder or flank. CT 4/20/20 without renal concerns. Tumor found on cystsocopy on 5/29/2020.  HTN (hypertension), benign     Hypercholesterolemia     Ill-defined condition     short term memory loss since SDH    Ill-defined condition     urinary incontinence    Ill-defined condition     limited vision R eye    Ill-defined condition 1996    concussion with subdural hematoma    Ill-defined condition 1999 & early 2000s    Mycardial Infarction    Impotence 7/14/2020    He has ED. He and his partner are not active. Viagra was no longer helpful.  Lumbago 2/8/2010    Malignant neoplasm of prostate ([facility] 7/14/2020    He is s/p a robotic prostatectomy 10/18/13. He had Marlow's 6 disease wtih negative margins and negative nodes. His PSA has been undetectable, last drawn on 5/14/2020.  Neurogenic dysfunction of the urinary bladder 7/14/2020    He has a hypotonic bladder and is emptying with Valsalva. He has mild ISD and urge incontinence. He has some enuresis.  Neuropathy Peripheral     pain in feet    Osteoarthritis     Osteoarthrosis, unspecified whether generalized or localized, unspecified site 2/8/2010    Seizures ([facility]     1996 after head injury    Skin disease     itching alot    Stroke [facility]) 1996    Unspecified hereditary and idiopathic peripheral neuropathy 2/8/2010    Urgency of urination 7/14/2020    He has occasional urgency. He wears depends for a precaution. He does have an Acticuff clamp. He had urgency and episodic incontinence prior to prostate surgery.  He does not leak with cough or sneeze. He drinks diet Pepsi all day long and less often coffee. He has chronic back problems with back surgery 2004, 1/2016, 12/2017 and 2/2018. He had a spinal stimulator which was removed. He has had thi    Urgency of urination 7/14/2020    He has occasional urgency. He wears depends for a precaution. He does have an Acticuff clamp. He had urgency and episodic incontinence prior to prostate surgery. He does not leak with cough or sneeze. He drinks diet Pepsi all day long and less often coffee. He has chronic back problems with back surgery 2004, 1/2016, 12/2017 and 2/2018. He had a spinal stimulator which was removed. He has had thi      Past Surgical History:   Procedure Laterality Date    COLONOSCOPY N/A 4/12/2018    COLONOSCOPY performed by Filemon Mckeon MD at [address] HX APPENDECTOMY      age 8    [address]  2002    HX BACK SURGERY  2004    HX CATARACT REMOVAL      with lens implant    HX CIRCUMCISION      HX HEART CATHETERIZATION  7390,4993    stents x3    HX PROSTATE SURGERY  08/24/2020    PELVIC LYMPH NODE DISSECTION     HX PROSTATE SURGERY  10/18/2013    PELVIC LYMPH NODE DISSECTION     HX PROSTATECTOMY  10/18/2013    HX TONSILLECTOMY      as young child    HX UROLOGICAL  06/25/2020    TURBT    HX UROLOGICAL  06/25/2020    TURBT    HX UROLOGICAL  05/29/2020    CYSTOSCOPY     HX UROLOGICAL  08/24/2020    ROBOTIC CYSTECTOMY    HX UROLOGICAL  08/24/2020    ILEAL CONDUIT URINARY DIVERSION     NEUROLOGICAL PROCEDURE UNLISTED  1996    evacuate SDH    NEUROLOGICAL PROCEDURE UNLISTED      implant neuro stimulator in back for neuropathy in feet. Pt no longer has this, it was removed. Family History   Adopted: Yes        Physical Exam  Vitals signs reviewed. HENT:      Head: Normocephalic and atraumatic. Eyes:      Extraocular Movements: Extraocular movements intact. Neck:      Musculoskeletal: Normal range of motion.    Cardiovascular:      Rate and Rhythm: Normal rate. Pulmonary:      Effort: Pulmonary effort is normal.      Breath sounds: Normal breath sounds. Abdominal:      General: There is no distension. Palpations: Abdomen is soft. Comments: Ostomy/stoma   Musculoskeletal:      Comments: Not ambulatory   Skin:     General: Skin is warm and dry. Neurological:      Mental Status: He is alert. Motor: Weakness present. Gait: Gait abnormal.   Psychiatric:      Comments: depressed         ASSESSMENT and PLAN  Diagnoses and all orders for this visit:    1. Malignant neoplasm of prostate ([facility] [address]    2. Malignant neoplasm of urinary bladder, unspecified site [facility])  Comments:  S/p cystectomy 2-3 weeks doing well from surgical standpoint. Completely resected. Needs to recover better. Stents remain. 3. Neurogenic dysfunction of the urinary bladder    4. Debility  Comments:  Advised he participate with home PT. Increase PO intake. If not improving, he may need hospitalization and tube feeding. That information seemed to motivate. 5. Depression, unspecified depression type  Comments:  I advised he see his PCP to discuss management of his medications. Follow-up and Dispositions    · Return in about 2 weeks (around 9/29/2020) for NP visit ok - remove visible stent, place on abx.  Betsy Arroyo MD

## 2020-09-22 ENCOUNTER — TELEPHONE (OUTPATIENT)
Dept: UROLOGY | Age: 70
End: 2020-09-22

## 2020-09-22 DIAGNOSIS — C67.9 MALIGNANT NEOPLASM OF URINARY BLADDER, UNSPECIFIED SITE (HCC): Primary | ICD-10-CM

## 2020-09-22 NOTE — TELEPHONE ENCOUNTER
Spoke to wife. Eating some, drinking Ensure with Protein. She feels he looks pale and is dehydrated. Encouraged to go to ER for evaluation and labs. Referral placed to [facility] Nutrition services via [facility] for ongoing evaluation.

## 2020-09-23 NOTE — TELEPHONE ENCOUNTER
Taken to [facility] (closest hospital) diagnosed with UTI. No evidence of dehydration at ER visit. On Keflex. Discussed with wife. PT scheduled, in contact with PCP for antidepressants (on bupropion) but PCP does not want to make changes at this time. He has f/u with me on 10/2/2020. Nutrition services/PT/close follow up with PCP/and consult with Neurologist pending (TBI 1996).

## 2020-10-02 ENCOUNTER — OFFICE VISIT (OUTPATIENT)
Dept: UROLOGY | Age: 70
End: 2020-10-02
Payer: MEDICARE

## 2020-10-02 VITALS — TEMPERATURE: 97.6 F

## 2020-10-02 DIAGNOSIS — C61 MALIGNANT NEOPLASM OF PROSTATE (HCC): ICD-10-CM

## 2020-10-02 DIAGNOSIS — C67.9 MALIGNANT NEOPLASM OF URINARY BLADDER, UNSPECIFIED SITE (HCC): Primary | ICD-10-CM

## 2020-10-02 PROCEDURE — 99024 POSTOP FOLLOW-UP VISIT: CPT | Performed by: NURSE PRACTITIONER

## 2020-10-02 RX ORDER — AMOXICILLIN 250 MG
1 CAPSULE ORAL DAILY
COMMUNITY
End: 2021-01-26 | Stop reason: ALTCHOICE

## 2020-10-02 RX ORDER — CIPROFLOXACIN 500 MG/1
500 TABLET ORAL 2 TIMES DAILY
Qty: 10 TAB | Refills: 0 | Status: SHIPPED | OUTPATIENT
Start: 2020-10-02 | End: 2020-10-07

## 2020-10-02 NOTE — ASSESSMENT & PLAN NOTE
He is s/p prostatectomy in 2013. PSA undetectable on 5/14/2020. Repeat in 1 year- May 2021. Key Oncology Meds     Patient is on no Oncologic meds. Key Pain Meds             fentaNYL (DURAGESIC) 75 mcg/hr (Taking) 1 Patch by TransDERmal route every seventy-two (72) hours. Max Daily Amount: 1 Patch. HYDROmorphone (DILAUDID) 8 mg tablet (Taking) Take 1 Tab by mouth every four (4) hours as needed for Pain. Max Daily Amount: 48 mg.    acetaminophen (TYLENOL) 500 mg tablet (Taking) Take 500 mg by mouth every six (6) hours as needed for Pain.         Lab Results   Component Value Date/Time    Creatinine, External 0.85 05/14/2020

## 2020-10-02 NOTE — PROGRESS NOTES
HISTORY OF PRESENT ILLNESS  Rose Marie Vázquez is a 65 y.o. male. Chief Complaint   Patient presents with    Follow-up     He is here today for f/u from his recent cystectomy and for ureteral stent removal.      His wife has been concerned about his lethargy, inability to ambulate and whisper voice. He is on antidepressants. She does not feel they work. I advised she speak to his PCP again regarding the possible need for adjustments. However, he seems much improved today. He is laughing, joking, participatory in conversation.     Home health with PT was ordered and scheduled to start on Wednesday 9/16/2020. They have been twice to see him. He is ambulating with a walker more frequently now. She took him to [facility] (closest hospital) where he was diagnosed with UTI. She was afraid he was dehydrated because he had not been eating or drinking. However, there was no evidence of dehydration at ER visit. I placed a consult for Nutrition Services to evaluate and help with supplements/protien/calorie intake, etc.  He has been drinking and tolerating his Ensure supplements.     He also had a consult with Neurologist pending (TBI 1996). It seems to be scheduled in December but may be worked in earlier. He has has some recent constipation, though responsive to Senna. I advised he could do Colace daily until he is having regular BM's. Incision sites are well-healed. Stoma is pink and beefy. Problem List  Date Reviewed: 10/2/2020          Codes Class Noted    Bladder cancer [facility]) ICD-10-CM: C67.9  ICD-9-CM: 188.9  7/14/2020    Overview Addendum 9/9/2020  1:29 PM by Blessing Watson NP     He is s/p TURBT on 6/25/2020. No obvious residual tumor, but tessy and induration at the base. Pathology reads invasive papillary urothelial carcinoma, high grade (bladder) and invasive high grade urothelial carcinoma pT2 (bladder base). He is s/p cystectomy with ileal conduit urinary diversion on 8/24/2020.   He had an uncomplicated post-operative course and was discharged on 8/31/2020 with [address] [place] [address] Herb Salvador. 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 PT2bN0. Malignant neoplasm of prostate [facility]) ICD-10-CM: C61  ICD-9-CM: 414  7/14/2020    Overview Signed 7/14/2020  6:01 PM by Fernando Brunson [address] [place], [place] [address]lai     He is s/p a robotic prostatectomy 10/18/13. He had [place]'s 6 disease wtih negative margins and negative nodes. His PSA has been undetectable, last drawn on 5/14/2020. Spondylolisthesis at L2-L3 level ICD-10-CM: M43.16  ICD-9-CM: 756.12  1/25/2016        Lumbar stenosis ICD-10-CM: M48.061  ICD-9-CM: 724.02  1/25/2016        Lumbago ICD-10-CM: M54.5  ICD-9-CM: 724.2  2/8/2010        Unspecified hereditary and idiopathic peripheral neuropathy ICD-10-CM: G60.9  ICD-9-CM: 356.9  2/8/2010        Osteoarthrosis, unspecified whether generalized or localized, unspecified site ICD-10-CM: M19.90  ICD-9-CM: 715.90  2/8/2010        Backache, unspecified ICD-10-CM: M54.9  ICD-9-CM: 724.5  2/8/2010        Congestive heart failure, unspecified ICD-10-CM: I50.9  ICD-9-CM: 428.0  2/8/2010        Essential hypertension, benign ICD-10-CM: I10  ICD-9-CM: 401.1  2/8/2010        Coronary atherosclerosis of native coronary artery ICD-10-CM: I25.10  ICD-9-CM: 414.01  2/8/2010               Review of Systems   Gastrointestinal:        Per HPI   Genitourinary:        Per HPI   Musculoskeletal:        Weak, somewhat deconditioned   All other systems reviewed and are negative. Past Medical History:   Diagnosis Date    Back pain     Backache, unspecified 2/8/2010    Bladder cancer ([facility] [address] 7/14/2020    He is s/p TURBT on 6/25/2020. No obvious residual tumor, but tessy and induration at the base. Pathology reads invasive papillary urothelial carcinoma, high grade (bladder) and invasive high grade urothelial carcinoma pT2 (bladder base).  07- visit He has high grade muscle invasive bladder cancer. I thought it was grossly resected but pathology reveals high grade disease. He is at high risk o    Blood clots 2004    spine    Cancer [facility]) 2013    prostate    CHF (congestive heart failure) ([facility] [address] 1999    Coronary artery disease     Coronary atherosclerosis of native coronary artery 2/8/2010    DDD (degenerative disc disease)     Diabetes ([facility] [address]     Essential hypertension, benign 2/8/2010    Gastroenteritis, viral     Heart block     95%    Hematuria, unspecified 7/14/2020    He had gross hematuria and clots in his Depends. He notes not pain in the bladder or flank. CT 4/20/20 without renal concerns. Tumor found on cystsocopy on 5/29/2020.  HTN (hypertension), benign     Hypercholesterolemia     Ill-defined condition     short term memory loss since SDH    Ill-defined condition     urinary incontinence    Ill-defined condition     limited vision R eye    Ill-defined condition 1996    concussion with subdural hematoma    Ill-defined condition 1999 & early 2000s    Mycardial Infarction    Impotence 7/14/2020    He has ED. He and his partner are not active. Viagra was no longer helpful.  Lumbago 2/8/2010    Malignant neoplasm of prostate ([facility] [address] 7/14/2020    He is s/p a robotic prostatectomy 10/18/13. He had [place]'s 6 disease wtih negative margins and negative nodes. His PSA has been undetectable, last drawn on 5/14/2020.  Neurogenic dysfunction of the urinary bladder 7/14/2020    He has a hypotonic bladder and is emptying with Valsalva. He has mild ISD and urge incontinence. He has some enuresis.     Neuropathy Peripheral     pain in feet    Osteoarthritis     Osteoarthrosis, unspecified whether generalized or localized, unspecified site 2/8/2010    Seizures ([facility] [address]     1996 after head injury    Skin disease     itching alot    Stroke [facility]) 1996    Unspecified hereditary and idiopathic peripheral neuropathy 2/8/2010    Urgency of urination 7/14/2020    He has occasional urgency. He wears depends for a precaution. He does have an Acticuff clamp. He had urgency and episodic incontinence prior to prostate surgery. He does not leak with cough or sneeze. He drinks diet Pepsi all day long and less often coffee. He has chronic back problems with back surgery 2004, 1/2016, 12/2017 and 2/2018. He had a spinal stimulator which was removed. He has had thi    Urgency of urination 7/14/2020    He has occasional urgency. He wears depends for a precaution. He does have an Acticuff clamp. He had urgency and episodic incontinence prior to prostate surgery. He does not leak with cough or sneeze. He drinks diet Pepsi all day long and less often coffee. He has chronic back problems with back surgery 2004, 1/2016, 12/2017 and 2/2018. He had a spinal stimulator which was removed. He has had thi      Past Surgical History:   Procedure Laterality Date    COLONOSCOPY N/A 4/12/2018    COLONOSCOPY performed by Lindsay Crain MD at [address] HX APPENDECTOMY      age 8    [address]  [address]    HX BACK SURGERY  2004    HX CATARACT REMOVAL      with lens implant    HX CIRCUMCISION      HX HEART CATHETERIZATION  2014,[id number]    stents x3    HX PROSTATE SURGERY  08/24/2020    PELVIC LYMPH NODE DISSECTION     HX PROSTATE SURGERY  10/18/2013    PELVIC LYMPH NODE DISSECTION     HX PROSTATECTOMY  10/18/2013    HX TONSILLECTOMY      as young child    HX UROLOGICAL  06/25/2020    TURBT    HX UROLOGICAL  06/25/2020    TURBT    HX UROLOGICAL  05/29/2020    CYSTOSCOPY     HX UROLOGICAL  08/24/2020    ROBOTIC CYSTECTOMY    HX UROLOGICAL  08/24/2020    ILEAL CONDUIT URINARY DIVERSION     NEUROLOGICAL PROCEDURE UNLISTED  1996    evacuate SDH    NEUROLOGICAL PROCEDURE UNLISTED      implant neuro stimulator in back for neuropathy in feet. Pt no longer has this, it was removed.      Family History   Adopted: Yes        Physical Exam  Vitals signs and nursing note reviewed. HENT:      Head: Normocephalic and atraumatic. Right Ear: External ear normal.      Left Ear: External ear normal.      Nose: Nose normal.      Mouth/Throat:      Mouth: Mucous membranes are moist.   Eyes:      Extraocular Movements: Extraocular movements intact. Neck:      Musculoskeletal: Normal range of motion. Cardiovascular:      Rate and Rhythm: Normal rate. Pulmonary:      Effort: Pulmonary effort is normal.      Breath sounds: Normal breath sounds. Abdominal:      General: There is no distension. Palpations: Abdomen is soft. Comments: Stoma beefy and pink, with clear, yellow urine draining. Bag changed and stent removed. Musculoskeletal:      Comments: Weak, deconditioned, now walking with walker, seeing PT   Skin:     General: Skin is warm and dry. Neurological:      Mental Status: He is alert and oriented to person, place, and time. Mental status is at baseline. Psychiatric:         Mood and Affect: Mood normal.       ASSESSMENT and PLAN  Diagnoses and all orders for this visit:    1. Malignant neoplasm of urinary bladder, unspecified site [facility])  Assessment & Plan:  He is s/p cystectomy with ileal conduit urinary diversion on 8/24/2020. 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 PT2bN0. He is recovering well. Ureteral stent removed today. Cipro x 5 days. Plan for follow up in January. We can order surveillance imaging then- CXR and CT abdomen/pelvis. Key Oncology Meds     Patient is on no Oncologic meds. Key Pain Meds             fentaNYL (DURAGESIC) 75 mcg/hr (Taking) 1 Patch by TransDERmal route every seventy-two (72) hours. Max Daily Amount: 1 Patch. HYDROmorphone (DILAUDID) 8 mg tablet (Taking) Take 1 Tab by mouth every four (4) hours as needed for Pain.  Max Daily Amount: 48 mg.    acetaminophen (TYLENOL) 500 mg tablet (Taking) Take 500 mg by mouth every six (6) hours as needed for Pain. Lab Results   Component Value Date/Time    Creatinine, External 0.85 05/14/2020       Orders:  -     ciprofloxacin HCl (CIPRO) 500 mg tablet; Take 1 Tab by mouth two (2) times a day for 5 days. 2. Malignant neoplasm of prostate [facility])  Assessment & Plan:  He is s/p prostatectomy in 2013. PSA undetectable on 5/14/2020. Repeat in 1 year- May 2021. Key Oncology Meds     Patient is on no Oncologic meds. Key Pain Meds             fentaNYL (DURAGESIC) 75 mcg/hr (Taking) 1 Patch by TransDERmal route every seventy-two (72) hours. Max Daily Amount: 1 Patch. HYDROmorphone (DILAUDID) 8 mg tablet (Taking) Take 1 Tab by mouth every four (4) hours as needed for Pain. Max Daily Amount: 48 mg.    acetaminophen (TYLENOL) 500 mg tablet (Taking) Take 500 mg by mouth every six (6) hours as needed for Pain. Lab Results   Component Value Date/Time    Creatinine, External 0.85 05/14/2020              Follow-up and Dispositions    · Return in about 4 months (around 1/24/2021) for order imaging at f/u visit; he is s/p definitive treatment.   Anastasia Chiu NP

## 2020-10-02 NOTE — ASSESSMENT & PLAN NOTE
He is s/p cystectomy with ileal conduit urinary diversion on 8/24/2020. 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 PT2bN0. He is recovering well. Ureteral stent removed today. Cipro x 5 days. Plan for follow up in January. We can order surveillance imaging then- CXR and CT abdomen/pelvis. Key Oncology Meds     Patient is on no Oncologic meds. Key Pain Meds             fentaNYL (DURAGESIC) 75 mcg/hr (Taking) 1 Patch by TransDERmal route every seventy-two (72) hours. Max Daily Amount: 1 Patch. HYDROmorphone (DILAUDID) 8 mg tablet (Taking) Take 1 Tab by mouth every four (4) hours as needed for Pain. Max Daily Amount: 48 mg.    acetaminophen (TYLENOL) 500 mg tablet (Taking) Take 500 mg by mouth every six (6) hours as needed for Pain.         Lab Results   Component Value Date/Time    Creatinine, External 0.85 05/14/2020

## 2020-10-05 DIAGNOSIS — K59.00 CONSTIPATION, UNSPECIFIED CONSTIPATION TYPE: Primary | ICD-10-CM

## 2020-10-05 RX ORDER — FACIAL-BODY WIPES
10 EACH TOPICAL DAILY
Qty: 10 SUPPOSITORY | Refills: 1 | Status: SHIPPED | OUTPATIENT
Start: 2020-10-05 | End: 2020-10-15

## 2020-10-08 ENCOUNTER — TELEPHONE (OUTPATIENT)
Dept: UROLOGY | Age: 70
End: 2020-10-08

## 2020-10-09 NOTE — TELEPHONE ENCOUNTER
If he is not responding to stool softeners or suppositories, I would encourage fiber- Metamucil. However, he is going to need to take that with large amounts of water, which has been a problem. However, I think it is worth a try.   An OTC enema is another option but I would try the fiber first.

## 2020-10-19 ENCOUNTER — TELEPHONE (OUTPATIENT)
Dept: UROLOGY | Age: 70
End: 2020-10-19

## 2020-10-19 NOTE — TELEPHONE ENCOUNTER
Pts wife contacted the office said Gabe Berman removed a stent last time he was seen. Kamille Centeno and now another stent is coming out through that site. .. she wants to know what she needs to do. ..... Kamille Centeno

## 2020-10-21 ENCOUNTER — OFFICE VISIT (OUTPATIENT)
Dept: UROLOGY | Age: 70
End: 2020-10-21
Payer: MEDICARE

## 2020-10-21 VITALS — BODY MASS INDEX: 20.72 KG/M2 | WEIGHT: 132 LBS | HEIGHT: 67 IN | TEMPERATURE: 98.7 F

## 2020-10-21 DIAGNOSIS — C61 MALIGNANT NEOPLASM OF PROSTATE (HCC): ICD-10-CM

## 2020-10-21 DIAGNOSIS — C67.9 MALIGNANT NEOPLASM OF URINARY BLADDER, UNSPECIFIED SITE (HCC): Primary | ICD-10-CM

## 2020-10-21 PROCEDURE — 99024 POSTOP FOLLOW-UP VISIT: CPT | Performed by: NURSE PRACTITIONER

## 2020-10-21 RX ORDER — NYSTATIN 100000 [USP'U]/G
POWDER TOPICAL
Qty: 1 BOTTLE | Refills: 5 | Status: SHIPPED | OUTPATIENT
Start: 2020-10-21 | End: 2020-11-20

## 2020-10-21 RX ORDER — CIPROFLOXACIN 500 MG/1
500 TABLET ORAL 2 TIMES DAILY
Qty: 10 TAB | Refills: 0 | Status: SHIPPED | OUTPATIENT
Start: 2020-10-21 | End: 2020-10-26

## 2020-10-22 NOTE — ASSESSMENT & PLAN NOTE
He is s/p cystectomy and ileal conduit diversion on 8/24/2020. Patho: nested variant of invasive urothelial carcinoma, uninvolved margins and nodes. PT2bN0. Both ureteral stents removed. Abx x  5 days. Plan for follow up in January. Surveillance imaging then. Key Oncology Meds     Patient is on no Oncologic meds. Key Pain Meds             acetaminophen (TYLENOL) 500 mg tablet (Taking) Take 500 mg by mouth every six (6) hours as needed for Pain. fentaNYL (DURAGESIC) 75 mcg/hr 1 Patch by TransDERmal route every seventy-two (72) hours. Max Daily Amount: 1 Patch. HYDROmorphone (DILAUDID) 8 mg tablet Take 1 Tab by mouth every four (4) hours as needed for Pain. Max Daily Amount: 48 mg.         Lab Results   Component Value Date/Time    Creatinine, External 0.85 05/14/2020

## 2020-10-22 NOTE — PROGRESS NOTES
HISTORY OF PRESENT ILLNESS  Julius Alexander is a 65 y.o. male. Chief Complaint   Patient presents with    Follow-up     tube changed     He is here today for f/u from his recent cystectomy and for ureteral stent removal.  He had a stent removed previously. This stent now making it's way out. He seems much improved today. He is laughing, joking, participatory in conversation. He is eating well and having BM regularly now.       Home health with PT was ordered and scheduled to start on Wednesday 9/16/2020. They have been twice to see him. He is ambulating with a walker more frequently now. They would like another referral because his therapy was cut short secondary to his struggles with constipation. He also had a consult with Neurologist pending (TBI 1996). It seems to be scheduled in December but may be worked in earlier. Incision sites are well-healed. Stoma is pink and beefy. Stent is removed now x 2. Bag replaced. Follow-up       Problem List  Date Reviewed: 10/2/2020          Codes Class Noted    Bladder cancer [facility]) ICD-10-CM: C67.9  ICD-9-CM: 188.9  7/14/2020    Overview Addendum 9/9/2020  1:29 PM by Eliu Paul NP     He is s/p TURBT on 6/25/2020. No obvious residual tumor, but tessy and induration at the base. Pathology reads invasive papillary urothelial carcinoma, high grade (bladder) and invasive high grade urothelial carcinoma pT2 (bladder base). He is s/p cystectomy with ileal conduit urinary diversion on 8/24/2020. He had an uncomplicated post-operative course and was discharged on 8/31/2020 with [address] [place] [address]. 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 PT2bN0. Malignant neoplasm of prostate [facility]) ICD-10-CM: C61  ICD-9-CM: 042  7/14/2020    Overview Signed 7/14/2020  6:01 PM by Michaela Su     He is s/p a robotic prostatectomy 10/18/13.  He had [place]'s 6 disease wtih negative margins and negative nodes. His PSA has been undetectable, last drawn on 5/14/2020. Spondylolisthesis at L2-L3 level ICD-10-CM: M43.16  ICD-9-CM: 756.12  1/25/2016        Lumbar stenosis ICD-10-CM: M48.061  ICD-9-CM: 724.02  1/25/2016        Lumbago ICD-10-CM: M54.5  ICD-9-CM: 724.2  2/8/2010        Unspecified hereditary and idiopathic peripheral neuropathy ICD-10-CM: G60.9  ICD-9-CM: 356.9  2/8/2010        Osteoarthrosis, unspecified whether generalized or localized, unspecified site ICD-10-CM: M19.90  ICD-9-CM: 715.90  2/8/2010        Backache, unspecified ICD-10-CM: M54.9  ICD-9-CM: 724.5  2/8/2010        Congestive heart failure, unspecified ICD-10-CM: I50.9  ICD-9-CM: 428.0  2/8/2010        Essential hypertension, benign ICD-10-CM: I10  ICD-9-CM: 401.1  2/8/2010        Coronary atherosclerosis of native coronary artery ICD-10-CM: I25.10  ICD-9-CM: 414.01  2/8/2010               Review of Systems   Gastrointestinal:        Per HPI   Genitourinary:        Per HPI   Musculoskeletal:        Weak, somewhat deconditioned   All other systems reviewed and are negative. Past Medical History:   Diagnosis Date    Back pain     Backache, unspecified 2/8/2010    Bladder cancer ([facility] [address] 7/14/2020    He is s/p TURBT on 6/25/2020. No obvious residual tumor, but tessy and induration at the base. Pathology reads invasive papillary urothelial carcinoma, high grade (bladder) and invasive high grade urothelial carcinoma pT2 (bladder base). 07- visit He has high grade muscle invasive bladder cancer. I thought it was grossly resected but pathology reveals high grade disease.  He is at high risk o    Blood clots 2004    spine    Cancer [facility]) 2013    prostate    CHF (congestive heart failure) ([facility] [address] 1999    Coronary artery disease     Coronary atherosclerosis of native coronary artery 2/8/2010    DDD (degenerative disc disease)     Diabetes ([facility] [address]     Essential hypertension, benign 2/8/2010    Gastroenteritis, viral     Heart block     95%    Hematuria, unspecified 7/14/2020    He had gross hematuria and clots in his Depends. He notes not pain in the bladder or flank. CT 4/20/20 without renal concerns. Tumor found on cystsocopy on 5/29/2020.  HTN (hypertension), benign     Hypercholesterolemia     Ill-defined condition     short term memory loss since SDH    Ill-defined condition     urinary incontinence    Ill-defined condition     limited vision R eye    Ill-defined condition 1996    concussion with subdural hematoma    Ill-defined condition 1999 & early 2000s    Mycardial Infarction    Impotence 7/14/2020    He has ED. He and his partner are not active. Viagra was no longer helpful.  Lumbago 2/8/2010    Malignant neoplasm of prostate ([facility] [address] 7/14/2020    He is s/p a robotic prostatectomy 10/18/13. He had [place]'s 6 disease wtih negative margins and negative nodes. His PSA has been undetectable, last drawn on 5/14/2020.  Neurogenic dysfunction of the urinary bladder 7/14/2020    He has a hypotonic bladder and is emptying with Valsalva. He has mild ISD and urge incontinence. He has some enuresis.  Neuropathy Peripheral     pain in feet    Osteoarthritis     Osteoarthrosis, unspecified whether generalized or localized, unspecified site 2/8/2010    Seizures ([facility] [address]     1996 after head injury    Skin disease     itching alot    Stroke [facility]) 1996    Unspecified hereditary and idiopathic peripheral neuropathy 2/8/2010    Urgency of urination 7/14/2020    He has occasional urgency. He wears depends for a precaution. He does have an Acticuff clamp. He had urgency and episodic incontinence prior to prostate surgery. He does not leak with cough or sneeze. He drinks diet Pepsi all day long and less often coffee. He has chronic back problems with back surgery 2004, 1/2016, 12/2017 and 2/2018. He had a spinal stimulator which was removed.  He has had thi    Urgency of urination 7/14/2020 He has occasional urgency. He wears depends for a precaution. He does have an Acticuff clamp. He had urgency and episodic incontinence prior to prostate surgery. He does not leak with cough or sneeze. He drinks diet Pepsi all day long and less often coffee. He has chronic back problems with back surgery 2004, 1/2016, 12/2017 and 2/2018. He had a spinal stimulator which was removed. He has had thi      Past Surgical History:   Procedure Laterality Date    COLONOSCOPY N/A 4/12/2018    COLONOSCOPY performed by Nader Yeboah MD at [address] HX APPENDECTOMY      age 8    [address] [place]  2002    HX BACK SURGERY  2004    HX CATARACT REMOVAL      with lens implant    HX CIRCUMCISION      HX HEART CATHETERIZATION  4750,6524    stents x3    HX PROSTATE SURGERY  08/24/2020    PELVIC LYMPH NODE DISSECTION     HX PROSTATE SURGERY  10/18/2013    PELVIC LYMPH NODE DISSECTION     HX PROSTATECTOMY  10/18/2013    HX TONSILLECTOMY      as young child    HX UROLOGICAL  06/25/2020    TURBT    HX UROLOGICAL  06/25/2020    TURBT    HX UROLOGICAL  05/29/2020    CYSTOSCOPY     HX UROLOGICAL  08/24/2020    ROBOTIC CYSTECTOMY    HX UROLOGICAL  08/24/2020    ILEAL CONDUIT URINARY DIVERSION     NEUROLOGICAL PROCEDURE UNLISTED  1996    evacuate SDH    NEUROLOGICAL PROCEDURE UNLISTED      implant neuro stimulator in back for neuropathy in feet. Pt no longer has this, it was removed. Family History   Adopted: Yes        Physical Exam  Vitals signs and nursing note reviewed. HENT:      Head: Normocephalic and atraumatic. Right Ear: External ear normal.      Left Ear: External ear normal.      Nose: Nose normal.      Mouth/Throat:      Mouth: Mucous membranes are moist.   Eyes:      Extraocular Movements: Extraocular movements intact. Conjunctiva/sclera: Conjunctivae normal.      Pupils: Pupils are equal, round, and reactive to light. Neck:      Musculoskeletal: Normal range of motion. Cardiovascular:      Rate and Rhythm: Normal rate. Pulmonary:      Effort: Pulmonary effort is normal.      Breath sounds: Normal breath sounds. Abdominal:      General: Abdomen is flat. Bowel sounds are normal. There is no distension. Palpations: Abdomen is soft. Comments: Stoma beefy and pink, with clear, yellow urine draining. Bag changed and stent removed. Musculoskeletal:      Comments: Weak, deconditioned, now walking with walker, seeing PT   Skin:     General: Skin is warm and dry. Neurological:      General: No focal deficit present. Mental Status: He is alert and oriented to person, place, and time. Mental status is at baseline. Psychiatric:         Mood and Affect: Mood normal.         Behavior: Behavior normal.         Thought Content: Thought content normal.         Judgment: Judgment normal.       ASSESSMENT and PLAN  Diagnoses and all orders for this visit:    1. Malignant neoplasm of urinary bladder, unspecified site [facility])  Assessment & Plan:  He is s/p cystectomy and ileal conduit diversion on 8/24/2020. Patho: nested variant of invasive urothelial carcinoma, uninvolved margins and nodes. PT2bN0. Both ureteral stents removed. Abx x  5 days. Plan for follow up in January. Surveillance imaging then. Key Oncology Meds     Patient is on no Oncologic meds. Key Pain Meds             acetaminophen (TYLENOL) 500 mg tablet (Taking) Take 500 mg by mouth every six (6) hours as needed for Pain. fentaNYL (DURAGESIC) 75 mcg/hr 1 Patch by TransDERmal route every seventy-two (72) hours. Max Daily Amount: 1 Patch. HYDROmorphone (DILAUDID) 8 mg tablet Take 1 Tab by mouth every four (4) hours as needed for Pain. Max Daily Amount: 48 mg. Lab Results   Component Value Date/Time    Creatinine, External 0.85 05/14/2020       Orders:  -     ciprofloxacin HCl (CIPRO) 500 mg tablet; Take 1 Tab by mouth two (2) times a day for 5 days.   -     nystatin (MYCOSTATIN) powder; Apply  to affected area daily as needed for Other (around stoma site to irritated areas, dust off and apply bag) for up to 30 days. 2. Malignant neoplasm of prostate [facility])  Assessment & Plan:  Repeat PSA in 5/2021  Key Oncology Meds     Patient is on no Oncologic meds. Key Pain Meds             acetaminophen (TYLENOL) 500 mg tablet (Taking) Take 500 mg by mouth every six (6) hours as needed for Pain. fentaNYL (DURAGESIC) 75 mcg/hr 1 Patch by TransDERmal route every seventy-two (72) hours. Max Daily Amount: 1 Patch. HYDROmorphone (DILAUDID) 8 mg tablet Take 1 Tab by mouth every four (4) hours as needed for Pain. Max Daily Amount: 48 mg.         Lab Results   Component Value Date/Time    Creatinine, External 0.85 05/14/2020                  Gianni Cohen NP

## 2020-10-22 NOTE — ASSESSMENT & PLAN NOTE
Repeat PSA in 5/2021  Key Oncology Meds     Patient is on no Oncologic meds. Key Pain Meds             acetaminophen (TYLENOL) 500 mg tablet (Taking) Take 500 mg by mouth every six (6) hours as needed for Pain. fentaNYL (DURAGESIC) 75 mcg/hr 1 Patch by TransDERmal route every seventy-two (72) hours. Max Daily Amount: 1 Patch. HYDROmorphone (DILAUDID) 8 mg tablet Take 1 Tab by mouth every four (4) hours as needed for Pain. Max Daily Amount: 48 mg.         Lab Results   Component Value Date/Time    Creatinine, External 0.85 05/14/2020

## 2020-12-16 ENCOUNTER — TELEPHONE (OUTPATIENT)
Dept: UROLOGY | Age: 70
End: 2020-12-16

## 2020-12-16 NOTE — TELEPHONE ENCOUNTER
Marco-    Can you (or please give to whoever should) re-fax the home health orders that were signed and faxed on 12/4 to [phone].     Thank you,    Monster

## 2020-12-22 ENCOUNTER — TELEPHONE (OUTPATIENT)
Dept: UROLOGY | Age: 70
End: 2020-12-22

## 2020-12-22 NOTE — TELEPHONE ENCOUNTER
. Claudia Hyde wanted to know if we received a order from [facility] for skin protective wipes for Mr. Claudia Hyde. She said that they faxed over paperwork for it on 12/3/20.

## 2020-12-31 ENCOUNTER — TELEPHONE (OUTPATIENT)
Dept: UROLOGY | Age: 70
End: 2020-12-31

## 2020-12-31 NOTE — TELEPHONE ENCOUNTER
[facility] delivered called about the order to be faxed to them for AMARILIS PERDUE [facility] ID [id number].

## 2021-01-12 ENCOUNTER — TELEPHONE (OUTPATIENT)
Dept: UROLOGY | Age: 71
End: 2021-01-12

## 2021-01-12 NOTE — TELEPHONE ENCOUNTER
Home care delivery called about faxing an order for supplies back to them. She said she faxed it on 12/22 and was awaiting the order approved from us. I do not see any thing in the media from them. [phone] or fax [phone] or e-mail at [email or web address]

## 2021-01-12 NOTE — TELEPHONE ENCOUNTER
. Abida Montelongo called on behalf of Mr. Abida Montelongo due to the fact that she is unable to get Skin Protectant wipes from at home care supply. She said she has been trying to get them since 12/01.

## 2021-01-25 ENCOUNTER — TELEPHONE (OUTPATIENT)
Dept: UROLOGY | Age: 71
End: 2021-01-25

## 2021-01-25 DIAGNOSIS — C61 MALIGNANT NEOPLASM OF PROSTATE (HCC): Primary | ICD-10-CM

## 2021-01-25 NOTE — TELEPHONE ENCOUNTER
Spoke with pts wife to remind pt to have psa drawn prior to apt tomorrow. .. she said she would try to get over there today- order was placed

## 2021-01-26 ENCOUNTER — OFFICE VISIT (OUTPATIENT)
Dept: UROLOGY | Age: 71
End: 2021-01-26
Payer: MEDICARE

## 2021-01-26 VITALS — BODY MASS INDEX: 22.13 KG/M2 | TEMPERATURE: 97.2 F | HEIGHT: 68 IN | WEIGHT: 146 LBS

## 2021-01-26 DIAGNOSIS — C61 MALIGNANT NEOPLASM OF PROSTATE (HCC): ICD-10-CM

## 2021-01-26 DIAGNOSIS — G60.9 HEREDITARY AND IDIOPATHIC PERIPHERAL NEUROPATHY: ICD-10-CM

## 2021-01-26 DIAGNOSIS — C67.9 MALIGNANT NEOPLASM OF URINARY BLADDER, UNSPECIFIED SITE (HCC): Primary | ICD-10-CM

## 2021-01-26 LAB
BILIRUB UR QL STRIP: NEGATIVE
GLUCOSE UR-MCNC: NEGATIVE MG/DL
KETONES P FAST UR STRIP-MCNC: NEGATIVE MG/DL
PH UR STRIP: 6.5 [PH] (ref 4.6–8)
PROT UR QL STRIP: NORMAL
PSA SERPL-MCNC: <0.1 NG/ML (ref 0–4)
SP GR UR STRIP: 1.01 (ref 1–1.03)
UA UROBILINOGEN AMB POC: NORMAL (ref 0.2–1)
URINALYSIS CLARITY POC: CLEAR
URINALYSIS COLOR POC: YELLOW
URINE BLOOD POC: NORMAL
URINE LEUKOCYTES POC: NORMAL
URINE NITRITES POC: POSITIVE

## 2021-01-26 PROCEDURE — 1101F PT FALLS ASSESS-DOCD LE1/YR: CPT | Performed by: UROLOGY

## 2021-01-26 PROCEDURE — 3017F COLORECTAL CA SCREEN DOC REV: CPT | Performed by: UROLOGY

## 2021-01-26 PROCEDURE — G8427 DOCREV CUR MEDS BY ELIG CLIN: HCPCS | Performed by: UROLOGY

## 2021-01-26 PROCEDURE — 99214 OFFICE O/P EST MOD 30 MIN: CPT | Performed by: UROLOGY

## 2021-01-26 PROCEDURE — G8432 DEP SCR NOT DOC, RNG: HCPCS | Performed by: UROLOGY

## 2021-01-26 PROCEDURE — G8420 CALC BMI NORM PARAMETERS: HCPCS | Performed by: UROLOGY

## 2021-01-26 PROCEDURE — G8536 NO DOC ELDER MAL SCRN: HCPCS | Performed by: UROLOGY

## 2021-01-26 PROCEDURE — 81003 URINALYSIS AUTO W/O SCOPE: CPT | Performed by: UROLOGY

## 2021-01-26 PROCEDURE — G8756 NO BP MEASURE DOC: HCPCS | Performed by: UROLOGY

## 2021-01-26 RX ORDER — FLUOXETINE HYDROCHLORIDE 20 MG/1
CAPSULE ORAL DAILY
Status: ON HOLD | COMMUNITY
End: 2021-09-14

## 2021-01-26 NOTE — ASSESSMENT & PLAN NOTE
He has generalized debility related to his neuropathy. He is encouraged to stay active as tolerated.

## 2021-01-26 NOTE — PROGRESS NOTES
HISTORY OF PRESENT ILLNESS  Martha Nuñez is a 65 y.o. male. Chief Complaint   Patient presents with    Follow-up    Bladder Cancer    Prostate Cancer     He is s/p cystectomy with ileal conduit urinary diversion on 8/24/2020. He had an uncomplicated post-operative course and was discharged on 8/31/2020 with [address] [facility]. 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 PT2bN0. Stoma looks pink and viable; urine is clear. He is s/p a robotic prostatectomy 10/18/13. He had Corey's 6 disease wtih negative margins and negative nodes. His PSA has been undetectable, last drawn on 5/14/2020. He worked with PT, but his wife feels that he does not independently engage in his exercises after PT leaves. He is ambulating with a cane at home. He uses a wheelchair when he goes out. They have seen Neurology who thinks he has evidence of old CVA's; nothing new. He was told that his neurologic issues are the main cause for his generalized weakness and coordination issues    Chronic Conditions Addressed Today     1. Bladder cancer [facility]) - Primary     Overview      He is s/p TURBT on 6/25/2020. No obvious residual tumor, but tessy and induration at the base. Pathology reads invasive papillary urothelial carcinoma, high grade (bladder) and invasive high grade urothelial carcinoma pT2 (bladder base). He is s/p cystectomy with ileal conduit urinary diversion on 8/24/2020. He had an uncomplicated post-operative course and was discharged on 8/31/2020 with [address] [facility]. 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 PT2bN0.           2. Malignant neoplasm of prostate ([facility] [address]     Overview      He is s/p a robotic prostatectomy 10/18/13. He had Corey's 6 disease wtih negative margins and negative nodes. His PSA has been undetectable, last drawn on 5/14/2020.                  Review of Systems Gastrointestinal:        Per HPI   Genitourinary:        Per HPI   All other systems reviewed and are negative. Past Medical History:   Diagnosis Date    Back pain     Backache, unspecified 2/8/2010    Bladder cancer ([facility] [address] 7/14/2020    He is s/p TURBT on 6/25/2020. No obvious residual tumor, but tessy and induration at the base. Pathology reads invasive papillary urothelial carcinoma, high grade (bladder) and invasive high grade urothelial carcinoma pT2 (bladder base). 07- visit He has high grade muscle invasive bladder cancer. I thought it was grossly resected but pathology reveals high grade disease. He is at high risk o    Blood clots 2004    spine    Cancer [facility]) 2013    prostate    CHF (congestive heart failure) ([facility] [address] 1999    Coronary artery disease     Coronary atherosclerosis of native coronary artery 2/8/2010    DDD (degenerative disc disease)     Diabetes ([facility] [address]     Essential hypertension, benign 2/8/2010    Gastroenteritis, viral     Heart block     95%    Hematuria, unspecified 7/14/2020    He had gross hematuria and clots in his Depends. He notes not pain in the bladder or flank. CT 4/20/20 without renal concerns. Tumor found on cystsocopy on 5/29/2020.  HTN (hypertension), benign     Hypercholesterolemia     Ill-defined condition     short term memory loss since SDH    Ill-defined condition     urinary incontinence    Ill-defined condition     limited vision R eye    Ill-defined condition 1996    concussion with subdural hematoma    Ill-defined condition 1999 & early 2000s    Mycardial Infarction    Impotence 7/14/2020    He has ED. He and his partner are not active. Viagra was no longer helpful.  Lumbago 2/8/2010    Malignant neoplasm of prostate ([facility] [address] 7/14/2020    He is s/p a robotic prostatectomy 10/18/13. He had [place]'s 6 disease wtih negative margins and negative nodes. His PSA has been undetectable, last drawn on 5/14/2020.     Neurogenic dysfunction of the urinary bladder 7/14/2020    He has a hypotonic bladder and is emptying with Valsalva. He has mild ISD and urge incontinence. He has some enuresis.  Neuropathy Peripheral     pain in feet    Osteoarthritis     Osteoarthrosis, unspecified whether generalized or localized, unspecified site 2/8/2010    Seizures ([address]     1996 after head injury    Skin disease     itching alot    Stroke [facility]) 1996    Unspecified hereditary and idiopathic peripheral neuropathy 2/8/2010    Urgency of urination 7/14/2020    He has occasional urgency. He wears depends for a precaution. He does have an Acticuff clamp. He had urgency and episodic incontinence prior to prostate surgery. He does not leak with cough or sneeze. He drinks diet Pepsi all day long and less often coffee. He has chronic back problems with back surgery 2004, 1/2016, 12/2017 and 2/2018. He had a spinal stimulator which was removed. He has had thi    Urgency of urination 7/14/2020    He has occasional urgency. He wears depends for a precaution. He does have an Acticuff clamp. He had urgency and episodic incontinence prior to prostate surgery. He does not leak with cough or sneeze. He drinks diet Pepsi all day long and less often coffee. He has chronic back problems with back surgery 2004, 1/2016, 12/2017 and 2/2018. He had a spinal stimulator which was removed.  He has had thi      Past Surgical History:   Procedure Laterality Date    COLONOSCOPY N/A 4/12/2018    COLONOSCOPY performed by Rome Mohan MD at [address] HX APPENDECTOMY      age 8    [address]  [address]    HX BACK SURGERY  2004    HX CATARACT REMOVAL      with lens implant    HX CIRCUMCISION      HX HEART CATHETERIZATION  0419,0315    stents x3    HX PROSTATE SURGERY  08/24/2020    PELVIC LYMPH NODE DISSECTION     HX PROSTATE SURGERY  10/18/2013    PELVIC LYMPH NODE DISSECTION     HX PROSTATECTOMY  10/18/2013    HX TONSILLECTOMY      as young child    HX UROLOGICAL 06/25/2020    TURBT    HX UROLOGICAL  06/25/2020    TURBT    HX UROLOGICAL  05/29/2020    CYSTOSCOPY     HX UROLOGICAL  08/24/2020    ROBOTIC CYSTECTOMY    HX UROLOGICAL  08/24/2020    ILEAL CONDUIT URINARY DIVERSION     NEUROLOGICAL PROCEDURE UNLISTED  1996    evacuate SDH    NEUROLOGICAL PROCEDURE UNLISTED      implant neuro stimulator in back for neuropathy in feet. Pt no longer has this, it was removed. Family History   Adopted: Yes        Physical Exam  Vitals signs reviewed. Constitutional:       Appearance: Normal appearance. HENT:      Head: Normocephalic and atraumatic. Eyes:      Extraocular Movements: Extraocular movements intact. Conjunctiva/sclera: Conjunctivae normal.      Pupils: Pupils are equal, round, and reactive to light. Neck:      Musculoskeletal: Normal range of motion. Cardiovascular:      Rate and Rhythm: Normal rate. Pulmonary:      Effort: Pulmonary effort is normal.      Breath sounds: Normal breath sounds. No wheezing. Abdominal:      General: Abdomen is flat. There is no distension. Palpations: Abdomen is soft. Comments: Stoma (pink, viable, flat). Urine is clear, yellow. Musculoskeletal:      Comments: Ambulatory with a cane; wheelchair outside of the home. Skin:     General: Skin is warm and dry. Coloration: Skin is pale. Neurological:      Mental Status: He is alert and oriented to person, place, and time. Mental status is at baseline. Motor: Weakness present. Gait: Gait abnormal (in a temporary wheelchair). Psychiatric:         Mood and Affect: Mood normal.         Behavior: Behavior normal.                     ASSESSMENT and PLAN  Diagnoses and all orders for this visit:    1. Malignant neoplasm of urinary bladder, unspecified site [facility])  Assessment & Plan:  He is s/p cyststectomy with ileal conduit on 8/24/2020. He is due for follow up imaging now; CT abdomen/pelvis WWO.     Orders:  -     CT ABD PELV W WO CONT; Future  -     XR CHEST PA LAT; Future  -     CT ABD PELV W WO CONT; Future  -     XR CHEST PA LAT; Future  -     AMB POC URINALYSIS DIP STICK AUTO W/O MICRO    2. Malignant neoplasm of prostate [facility])  Assessment & Plan:  Repeat PSA due 5/2021. He is s/p prostatectomy on 10/18/13. PSA's have been undetectable. Orders:  -     PSA, DIAGNOSTIC (PROSTATE SPECIFIC AG); Future    3. Hereditary and idiopathic peripheral neuropathy  Assessment & Plan:  He has generalized debility related to his neuropathy. He is encouraged to stay active as tolerated. Follow-up and Dispositions    · Return in about 1 year (around 1/26/2022) for Video visit ok.    Follow-up and Disposition History          Td Ramirez MD

## 2021-01-26 NOTE — ASSESSMENT & PLAN NOTE
He is s/p cyststectomy with ileal conduit on 8/24/2020. He is due for follow up imaging now; CT abdomen/pelvis WWO.

## 2021-01-27 NOTE — PROGRESS NOTES
Undetectable PSA; perfect. Please give patient's wife the result. Can you also let her know I have received no new information or requests for supplies. Thank you.

## 2021-03-03 ENCOUNTER — TELEPHONE (OUTPATIENT)
Dept: UROLOGY | Age: 71
End: 2021-03-03
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
## 2021-04-20 ENCOUNTER — TELEPHONE (OUTPATIENT)
Dept: UROLOGY | Age: 71
End: 2021-04-20

## 2021-04-20 NOTE — TELEPHONE ENCOUNTER
Yes, we can do without contrast if renal function is elevated. Usually the facility calls.   I will send to Sarah Liu to see what is needed (new order? labs?)

## 2021-04-20 NOTE — TELEPHONE ENCOUNTER
If you want to recheck his labs for the CT with & without he would just need a lab order. If you want to just do a CT without contrast, a new order needs put in & scheduling will call his as soon as they receive it.

## 2021-04-20 NOTE — TELEPHONE ENCOUNTER
Pt wife called stating that her  was suppose to get some xrays and CT's done but could not because his kidney count has gone up and she doesn't know if he can get it without or without contrast. She did not know what to do and wanted to speak to either Brent Andersen or Dr. Francisco Romero

## 2021-04-21 DIAGNOSIS — C67.9 MALIGNANT NEOPLASM OF URINARY BLADDER, UNSPECIFIED SITE (HCC): ICD-10-CM

## 2021-04-21 DIAGNOSIS — C61 MALIGNANT NEOPLASM OF PROSTATE (HCC): Primary | ICD-10-CM

## 2021-04-21 NOTE — TELEPHONE ENCOUNTER
We can re order to start. Once the lab order is in I'll just call him to go to [facility]. If they come back normal, we should be able to reschedule.

## 2021-04-21 NOTE — TELEPHONE ENCOUNTER
Preferred is with contrast.    I will re-order labs, we can go from there? Do I need to send them somewhere?

## 2021-05-01 DIAGNOSIS — C61 MALIGNANT NEOPLASM OF PROSTATE (HCC): ICD-10-CM

## 2021-05-21 LAB
BUN SERPL-MCNC: 22 MG/DL (ref 8–27)
BUN/CREAT SERPL: 12 (ref 10–24)
CALCIUM SERPL-MCNC: 10.1 MG/DL (ref 8.6–10.2)
CHLORIDE SERPL-SCNC: 99 MMOL/L (ref 96–106)
CO2 SERPL-SCNC: 22 MMOL/L (ref 20–29)
CREAT SERPL-MCNC: 1.88 MG/DL (ref 0.76–1.27)
GLUCOSE SERPL-MCNC: 88 MG/DL (ref 65–99)
POTASSIUM SERPL-SCNC: 5.7 MMOL/L (ref 3.5–5.2)
PSA SERPL-MCNC: <0.1 NG/ML (ref 0–4)
SODIUM SERPL-SCNC: 137 MMOL/L (ref 134–144)

## 2021-05-24 DIAGNOSIS — C67.9 MALIGNANT NEOPLASM OF URINARY BLADDER, UNSPECIFIED SITE (HCC): Primary | ICD-10-CM

## 2021-05-24 NOTE — PROGRESS NOTES
Please give Ms. Burnie Osler a call and let her know that Mr. White Pay kidney function and potassium is higher than they have been in the past.  We should recheck those values and also hold any potassium replacement if he is taking one. We should also make sure the PCP has a copy of labs. I will re-order.   She should be able to go to any [facility].

## 2021-06-21 ENCOUNTER — TRANSCRIBE ORDER (OUTPATIENT)
Dept: SCHEDULING | Age: 71
End: 2021-06-21

## 2021-06-21 DIAGNOSIS — R80.9 PROTEINURIA: ICD-10-CM

## 2021-06-21 DIAGNOSIS — C67.9 BLADDER CANCER (HCC): ICD-10-CM

## 2021-06-21 DIAGNOSIS — C61 MALIGNANT NEOPLASM OF PROSTATE (HCC): Primary | ICD-10-CM

## 2021-06-21 DIAGNOSIS — R94.4 NONSPECIFIC ABNORMAL RESULTS OF KIDNEY FUNCTION STUDY: ICD-10-CM

## 2021-07-10 ENCOUNTER — HOSPITAL ENCOUNTER (OUTPATIENT)
Dept: CT IMAGING | Age: 71
Discharge: HOME | End: 2021-07-10
Attending: INTERNAL MEDICINE
Payer: MEDICARE

## 2021-07-10 DIAGNOSIS — R94.4 NONSPECIFIC ABNORMAL RESULTS OF KIDNEY FUNCTION STUDY: ICD-10-CM

## 2021-07-10 DIAGNOSIS — C61 MALIGNANT NEOPLASM OF PROSTATE (HCC): ICD-10-CM

## 2021-07-10 DIAGNOSIS — R80.9 PROTEINURIA: ICD-10-CM

## 2021-07-10 DIAGNOSIS — C67.9 BLADDER CANCER (HCC): ICD-10-CM

## 2021-07-10 PROCEDURE — 74176 CT ABD & PELVIS W/O CONTRAST: CPT

## 2021-07-15 ENCOUNTER — TELEPHONE (OUTPATIENT)
Dept: UROLOGY | Age: 71
End: 2021-07-15

## 2021-07-15 NOTE — TELEPHONE ENCOUNTER
Per NP a little bit of hydro may be normal- see if he can do a virtual apt on august 14th at 10am and I will schedule him please

## 2021-07-15 NOTE — TELEPHONE ENCOUNTER
Spoke with patient wife and she said that 8/18 at 10am would be fine for a VV with DR. DAMICO.  The number she wants to use his her cell number which is  [phone]

## 2021-07-15 NOTE — TELEPHONE ENCOUNTER
Spoke with Christie Reyes from [facility] about pt she stated that a CT was done and the results showed pt had hydronephrosis. Dr. Ashtyn Perez just wanted Dr. Angie Arredondo to be aware of it and his wife didn't know if he needed to come in sooner then his appt on 1/2022.  She said she would fax over the CT results for Dr. Angie Arredondo to look over

## 2021-08-15 PROBLEM — N13.30 HYDRONEPHROSIS: Status: ACTIVE | Noted: 2021-08-15

## 2021-08-16 ENCOUNTER — TELEPHONE (OUTPATIENT)
Dept: UROLOGY | Age: 71
End: 2021-08-16

## 2021-08-16 NOTE — TELEPHONE ENCOUNTER
That would be up to Dr. Josee Ybarra. That would be considered a televisit- no video. I'm not sure if he is willing to do that. You would have to check with him or schedule an in-person appointment.

## 2021-08-16 NOTE — TELEPHONE ENCOUNTER
I will follow up with dr. Minnie Jaime when hes back in the office.  Can you let pts wife know - and send it back to me so I remember to ask him pleasee

## 2021-08-16 NOTE — TELEPHONE ENCOUNTER
Oliver if this is a thing. .. I know for hippa reasons the provider has to see them? And if virtual is unavailable they have to come in? What would you advise for this patient. Deep Gonzalez

## 2021-08-17 NOTE — TELEPHONE ENCOUNTER
For you:  I will follow up with dr. Silvina Mojica when hes back in the office.  Can you let pts wife know - and send it back to me so I remember to ask him pleasee

## 2021-08-18 ENCOUNTER — VIRTUAL VISIT (OUTPATIENT)
Dept: UROLOGY | Age: 71
End: 2021-08-18
Payer: MEDICARE

## 2021-08-18 DIAGNOSIS — N18.30 STAGE 3 CHRONIC KIDNEY DISEASE, UNSPECIFIED WHETHER STAGE 3A OR 3B CKD (HCC): ICD-10-CM

## 2021-08-18 DIAGNOSIS — C67.9 MALIGNANT NEOPLASM OF URINARY BLADDER, UNSPECIFIED SITE (HCC): ICD-10-CM

## 2021-08-18 DIAGNOSIS — N13.30 HYDRONEPHROSIS, UNSPECIFIED HYDRONEPHROSIS TYPE: Primary | ICD-10-CM

## 2021-08-18 DIAGNOSIS — C61 MALIGNANT NEOPLASM OF PROSTATE (HCC): ICD-10-CM

## 2021-08-18 PROCEDURE — G8420 CALC BMI NORM PARAMETERS: HCPCS | Performed by: UROLOGY

## 2021-08-18 PROCEDURE — G8427 DOCREV CUR MEDS BY ELIG CLIN: HCPCS | Performed by: UROLOGY

## 2021-08-18 PROCEDURE — G8756 NO BP MEASURE DOC: HCPCS | Performed by: UROLOGY

## 2021-08-18 PROCEDURE — G8432 DEP SCR NOT DOC, RNG: HCPCS | Performed by: UROLOGY

## 2021-08-18 PROCEDURE — 99213 OFFICE O/P EST LOW 20 MIN: CPT | Performed by: UROLOGY

## 2021-08-18 PROCEDURE — G8536 NO DOC ELDER MAL SCRN: HCPCS | Performed by: UROLOGY

## 2021-08-18 RX ORDER — SODIUM BICARBONATE 650 MG/1
650 TABLET ORAL 2 TIMES DAILY
COMMUNITY

## 2021-08-18 RX ORDER — BUDESONIDE, GLYCOPYRROLATE, AND FORMOTEROL FUMARATE 160; 9; 4.8 UG/1; UG/1; UG/1
2 AEROSOL, METERED RESPIRATORY (INHALATION) 2 TIMES DAILY
COMMUNITY

## 2021-08-18 NOTE — PROGRESS NOTES
HISTORY OF PRESENT ILLNESS  Yovanny Graff is a 66 y.o. male. Chief Complaint   Patient presents with    Follow-up    Hydronephrosis     Yovanny Graff, who was evaluated through a synchronous (real-time) audio only encounter, and/or his healthcare decision maker, is aware that it is a billable service, with coverage as determined by his insurance carrier. He provided verbal consent to proceed: Yes, and patient identification was verified. It was conducted pursuant to the emergency declaration under the [address] [place] [address] and the [employer] Act. I was at the office. The patient was at home. He has a history of bladder cancer status post cystectomy and ileal conduit. He has no new symptoms. Has good energy level. He has no flank pain or hematuria. He is trying to hydrate well. CT scan on 7/10/2020 revealed left-sided hydronephrosis without evidence of metastatic disease. He has chronic kidney disease. He is following with a nephrologist, Dr. Berenice Glover. He had an elevated potassium and is no longer on potassium supplements. Chronic Conditions Addressed Today     1. Bladder cancer [facility])     Overview      He is s/p TURBT on 6/25/2020. No obvious residual tumor, but tessy and induration at the base. Pathology reads invasive papillary urothelial carcinoma, high grade (bladder) and invasive high grade urothelial carcinoma pT2 (bladder base). He is s/p cystectomy with ileal conduit urinary diversion on 8/24/2020. He had an uncomplicated post-operative course and was discharged on 8/31/2020 with [address] [place] [address]. 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 PT2bN0. Current Assessment & Plan      He is status post a cystectomy August 2020. CT scan on 7/10/2021 reveals no evidence of metastatic disease.   He has a favorable prognosis. Relevant Orders     NM RENAL SCAN FLOW/FUNC W PHARM SNGL    2. Malignant neoplasm of prostate [facility])     Overview      He is s/p a robotic prostatectomy 10/18/13. He had Clare's 6 disease wtih negative margins and negative nodes. His PSA has been undetectable, last drawn on 5/20/2021. Current Assessment & Plan      He is 8 years status post prostatectomy without evidence of disease. Next PSA due May 2022. 3. Hydronephrosis - Primary     Overview      CT 7/10/21: Moderate left hydronephrosis. Dilation of the ureter leading to the ureteral anastomosis with the ileal conduit. No right hydronephrosis. No stones in either side. Current Assessment & Plan       He has some chronic kidney disease. Hydronephrosis may be physiologic or obstructive. We can evaluate further with a Lasix renogram.          Relevant Orders     NM RENAL SCAN FLOW/FUNC W PHARM SNGL    4. Stage 3 chronic kidney disease ([address]     Current Assessment & Plan       Creatinine in May was 1.88. He is followed by Dr. Berenice Glover. I recheck a BMP.           Relevant Orders     NM RENAL SCAN FLOW/FUNC W PHARM SNGL          Past Medical History:    PMHx (including negatives):  has a past medical history of Back pain, Backache, unspecified (2/8/2010), Bladder cancer ([address] (7/14/2020), Blood clots (2004), Cancer ([address] (2013), CHF (congestive heart failure) ([address] (1999), Coronary artery disease, Coronary atherosclerosis of native coronary artery (2/8/2010), DDD (degenerative disc disease), Diabetes ([address] Essential hypertension, benign (2/8/2010), Gastroenteritis, viral, Heart block, Hematuria, unspecified (7/14/2020), HTN (hypertension), benign, Hypercholesterolemia, Ill-defined condition, Ill-defined condition, Ill-defined condition, Ill-defined condition (1996), Ill-defined condition (1999 & early 2000s), Impotence (7/14/2020), Lumbago (2/8/2010), Malignant neoplasm of prostate ([address] (7/14/2020), Neurogenic dysfunction of the urinary bladder (7/14/2020), Neuropathy Peripheral, Osteoarthritis, Osteoarthrosis, unspecified whether generalized or localized, unspecified site (2/8/2010), Seizures ([facility] Skin disease, Stroke ([facility] (1996), Unspecified hereditary and idiopathic peripheral neuropathy (2/8/2010), Urgency of urination (7/14/2020), and Urgency of urination (7/14/2020). PSurgHx:  has a past surgical history that includes hx heart catheterization (1992,8607); hx appendectomy; hx cataract removal; hx tonsillectomy; colonoscopy (N/A, 4/12/2018); neurological procedure unlisted (1996); hx back surgery (2002); hx back surgery (2004); neurological procedure unlisted; hx circumcision; hx urological (06/25/2020); hx urological (06/25/2020); hx urological (05/29/2020); hx urological (08/24/2020); hx urological (08/24/2020); hx prostatectomy (10/18/2013); hx prostate surgery (08/24/2020); and hx prostate surgery (10/18/2013). PSocHx:  reports that he quit smoking about 22 years ago. He quit after 35.00 years of use. He has never used smokeless tobacco. He reports previous alcohol use. He reports previous drug use. Drugs: Prescription and Opiates. ROS  Physical Exam  Allergies   Allergen Reactions    Lisinopril Unknown (comments)      Prior to Admission medications    Medication Sig Start Date End Date Taking? Authorizing Provider   sodium bicarbonate 650 mg tablet Take  by mouth four (4) times daily. Yes Provider, Historical   budesonide-glycopyr-formoterol (Breztri Aerosphere) 160-9-4.8 mcg/actuation HFAA Take  by inhalation. Yes Provider, Historical   FLUoxetine (PROzac) 20 mg capsule Take  by mouth daily. Yes Provider, Historical   levothyroxine (SYNTHROID) 75 mcg tablet Take  by mouth Daily (before breakfast). Yes Provider, Historical   traZODone (DESYREL) 100 mg tablet Take 100 mg by mouth nightly. Yes Provider, Historical   montelukast (SINGULAIR) 10 mg tablet Take 10 mg by mouth daily.    Yes Provider, Historical   aspirin delayed-release 81 mg tablet Take  by mouth daily. Yes Provider, Historical   carvedilol (COREG) 6.25 mg tablet Take 6.25 mg by mouth two (2) times a day. Yes Provider, Historical   pravastatin (PRAVACHOL) 40 mg tablet Take 40 mg by mouth nightly. Yes Provider, Historical        ASSESSMENT and PLAN  Diagnoses and all orders for this visit:    1. Hydronephrosis, unspecified hydronephrosis type  Assessment & Plan:   He has some chronic kidney disease. Hydronephrosis may be physiologic or obstructive. We can evaluate further with a Lasix renogram.    Orders:  -     NM RENAL SCAN FLOW/FUNC W PHARM SNGL; Future    2. Malignant neoplasm of urinary bladder, unspecified site [facility])  Assessment & Plan:  He is status post a cystectomy August 2020. CT scan on 7/10/2021 reveals no evidence of metastatic disease. He has a favorable prognosis. Orders:  -     NM RENAL SCAN FLOW/FUNC W PHARM SNGL; Future    3. Malignant neoplasm of prostate [facility])  Assessment & Plan:  He is 8 years status post prostatectomy without evidence of disease. Next PSA due May 2022. 4. Stage 3 chronic kidney disease, unspecified whether stage 3a or 3b CKD ([facility]  Assessment & Plan:   Creatinine in May was 1.88. He is followed by Dr. Berenice Glover. I recheck a BMP.     Orders:  -     NM RENAL SCAN FLOW/FUNC W PHARM SNGL; Future         Adriano Mcintyre MD

## 2021-08-18 NOTE — ASSESSMENT & PLAN NOTE
He has some chronic kidney disease. Hydronephrosis may be physiologic or obstructive.   We can evaluate further with a Lasix renogram.

## 2021-08-18 NOTE — ASSESSMENT & PLAN NOTE
He is status post a cystectomy August 2020. CT scan on 7/10/2021 reveals no evidence of metastatic disease. He has a favorable prognosis.

## 2021-08-24 ENCOUNTER — HOSPITAL ENCOUNTER (OUTPATIENT)
Dept: NUCLEAR MEDICINE | Age: 71
Discharge: HOME | End: 2021-08-24
Attending: UROLOGY
Payer: MEDICARE

## 2021-08-24 DIAGNOSIS — N18.30 STAGE 3 CHRONIC KIDNEY DISEASE, UNSPECIFIED WHETHER STAGE 3A OR 3B CKD (HCC): ICD-10-CM

## 2021-08-24 DIAGNOSIS — C67.9 MALIGNANT NEOPLASM OF URINARY BLADDER, UNSPECIFIED SITE (HCC): ICD-10-CM

## 2021-08-24 DIAGNOSIS — N13.30 HYDRONEPHROSIS, UNSPECIFIED HYDRONEPHROSIS TYPE: ICD-10-CM

## 2021-08-24 PROCEDURE — 78708 K FLOW/FUNCT IMAGE W/DRUG: CPT

## 2021-08-24 PROCEDURE — 74011250636 HC RX REV CODE- 250/636: Performed by: UROLOGY

## 2021-08-24 RX ORDER — BETIATIDE 1 MG/1
10.9 INJECTION, POWDER, LYOPHILIZED, FOR SOLUTION INTRAVENOUS
Status: COMPLETED | OUTPATIENT
Start: 2021-08-24 | End: 2021-08-24

## 2021-08-24 RX ORDER — FUROSEMIDE 10 MG/ML
20 INJECTION INTRAMUSCULAR; INTRAVENOUS
Status: COMPLETED | OUTPATIENT
Start: 2021-08-24 | End: 2021-08-24

## 2021-08-24 RX ADMIN — BETIATIDE 10.9 MILLICURIE: 1 INJECTION, POWDER, LYOPHILIZED, FOR SOLUTION INTRAVENOUS at 10:05

## 2021-08-24 RX ADMIN — FUROSEMIDE 20 MG: 10 INJECTION, SOLUTION INTRAMUSCULAR; INTRAVENOUS at 10:08

## 2021-08-25 ENCOUNTER — TELEPHONE (OUTPATIENT)
Dept: UROLOGY | Age: 71
End: 2021-08-25

## 2021-08-25 NOTE — TELEPHONE ENCOUNTER
It is an Xray I Thought it was a ct. I explained he has an appt in 1/25/22 to have it done in Jan. I explained to her that we would call her if Dr Claudia Perry wanted a visit prior to the Jan one. We would also call her in Jan to get the blood work completed.

## 2021-08-25 NOTE — TELEPHONE ENCOUNTER
Those orders are old. Last visit he had a renal scan ordered that was completed yesterday.  He needs a psa prior to next visit

## 2021-08-25 NOTE — TELEPHONE ENCOUNTER
LVM about order for CT already in and the number to call to schedule it. There is a order for KUB in as well. They just need to go to [facility] and have it done.

## 2021-08-31 NOTE — TELEPHONE ENCOUNTER
Spoke with patient wife who said that she needs her husbands orders sent to [facility] because when she called them they said that they don't have it and the Dr. Suleiman Lorenz to put one in

## 2021-09-01 DIAGNOSIS — N13.30 HYDRONEPHROSIS, UNSPECIFIED HYDRONEPHROSIS TYPE: Primary | ICD-10-CM

## 2021-09-01 NOTE — TELEPHONE ENCOUNTER
Orders put in for Central Scheduling to call patient (per angio at [facility]). Patients wife notified & will call to schedule follow up when appointment is made.

## 2021-09-07 ENCOUNTER — HOSPITAL ENCOUNTER (OUTPATIENT)
Dept: INTERVENTIONAL RADIOLOGY/VASCULAR | Age: 71
Discharge: HOME | End: 2021-09-07
Attending: STUDENT IN AN ORGANIZED HEALTH CARE EDUCATION/TRAINING PROGRAM | Admitting: STUDENT IN AN ORGANIZED HEALTH CARE EDUCATION/TRAINING PROGRAM
Payer: MEDICARE

## 2021-09-07 VITALS
DIASTOLIC BLOOD PRESSURE: 56 MMHG | HEART RATE: 55 BPM | WEIGHT: 160 LBS | TEMPERATURE: 98.6 F | RESPIRATION RATE: 21 BRPM | SYSTOLIC BLOOD PRESSURE: 115 MMHG | OXYGEN SATURATION: 98 % | BODY MASS INDEX: 24.33 KG/M2

## 2021-09-07 DIAGNOSIS — N13.30 HYDRONEPHROSIS, UNSPECIFIED HYDRONEPHROSIS TYPE: ICD-10-CM

## 2021-09-07 PROCEDURE — C1769 GUIDE WIRE: HCPCS

## 2021-09-07 PROCEDURE — 50432 PLMT NEPHROSTOMY CATHETER: CPT

## 2021-09-07 PROCEDURE — 77030036609 HC PRPH CRSNG TRIFORCE SET COOK -E

## 2021-09-07 PROCEDURE — C1894 INTRO/SHEATH, NON-LASER: HCPCS

## 2021-09-07 PROCEDURE — 77030020795 HC BG DRN WSTE MRTM -C

## 2021-09-07 PROCEDURE — 77030002996 HC SUT SLK J&J -A

## 2021-09-07 PROCEDURE — 74011000636 HC RX REV CODE- 636: Performed by: STUDENT IN AN ORGANIZED HEALTH CARE EDUCATION/TRAINING PROGRAM

## 2021-09-07 PROCEDURE — 74011000250 HC RX REV CODE- 250: Performed by: STUDENT IN AN ORGANIZED HEALTH CARE EDUCATION/TRAINING PROGRAM

## 2021-09-07 PROCEDURE — C1729 CATH, DRAINAGE: HCPCS

## 2021-09-07 PROCEDURE — 2709999900 HC NON-CHARGEABLE SUPPLY

## 2021-09-07 PROCEDURE — 74011250636 HC RX REV CODE- 250/636: Performed by: STUDENT IN AN ORGANIZED HEALTH CARE EDUCATION/TRAINING PROGRAM

## 2021-09-07 RX ORDER — LIDOCAINE HYDROCHLORIDE 20 MG/ML
20 INJECTION, SOLUTION INFILTRATION; PERINEURAL
Status: COMPLETED | OUTPATIENT
Start: 2021-09-07 | End: 2021-09-07

## 2021-09-07 RX ORDER — LIDOCAINE HYDROCHLORIDE 10 MG/ML
10 INJECTION, SOLUTION EPIDURAL; INFILTRATION; INTRACAUDAL; PERINEURAL
Status: DISCONTINUED | OUTPATIENT
Start: 2021-09-07 | End: 2021-09-07

## 2021-09-07 RX ORDER — MIDAZOLAM HYDROCHLORIDE 1 MG/ML
5 INJECTION, SOLUTION INTRAMUSCULAR; INTRAVENOUS
Status: DISCONTINUED | OUTPATIENT
Start: 2021-09-07 | End: 2021-09-07 | Stop reason: HOSPADM

## 2021-09-07 RX ORDER — SODIUM CHLORIDE 9 MG/ML
500 INJECTION, SOLUTION INTRAVENOUS CONTINUOUS
Status: DISCONTINUED | OUTPATIENT
Start: 2021-09-07 | End: 2021-09-07 | Stop reason: HOSPADM

## 2021-09-07 RX ORDER — LIDOCAINE HYDROCHLORIDE 20 MG/ML
INJECTION, SOLUTION INFILTRATION; PERINEURAL
Status: DISCONTINUED
Start: 2021-09-07 | End: 2021-09-07 | Stop reason: HOSPADM

## 2021-09-07 RX ORDER — CYANOCOBALAMIN 1000 UG/ML
1000 INJECTION, SOLUTION INTRAMUSCULAR; SUBCUTANEOUS ONCE
Status: ON HOLD | COMMUNITY
End: 2021-09-14

## 2021-09-07 RX ORDER — FENTANYL CITRATE 50 UG/ML
200 INJECTION, SOLUTION INTRAMUSCULAR; INTRAVENOUS
Status: DISCONTINUED | OUTPATIENT
Start: 2021-09-07 | End: 2021-09-07 | Stop reason: HOSPADM

## 2021-09-07 RX ORDER — LEVOFLOXACIN 5 MG/ML
750 INJECTION, SOLUTION INTRAVENOUS ONCE
Status: COMPLETED | OUTPATIENT
Start: 2021-09-07 | End: 2021-09-07

## 2021-09-07 RX ADMIN — FENTANYL CITRATE 50 MCG: 0.05 INJECTION, SOLUTION INTRAMUSCULAR; INTRAVENOUS at 11:41

## 2021-09-07 RX ADMIN — MIDAZOLAM HYDROCHLORIDE 0.5 MG: 1 INJECTION, SOLUTION INTRAMUSCULAR; INTRAVENOUS at 12:02

## 2021-09-07 RX ADMIN — FENTANYL CITRATE 25 MCG: 0.05 INJECTION, SOLUTION INTRAMUSCULAR; INTRAVENOUS at 12:12

## 2021-09-07 RX ADMIN — MIDAZOLAM HYDROCHLORIDE 0.5 MG: 1 INJECTION, SOLUTION INTRAMUSCULAR; INTRAVENOUS at 12:31

## 2021-09-07 RX ADMIN — FENTANYL CITRATE 25 MCG: 0.05 INJECTION, SOLUTION INTRAMUSCULAR; INTRAVENOUS at 12:02

## 2021-09-07 RX ADMIN — MIDAZOLAM HYDROCHLORIDE 1 MG: 1 INJECTION, SOLUTION INTRAMUSCULAR; INTRAVENOUS at 11:42

## 2021-09-07 RX ADMIN — IOPAMIDOL 65 ML: 612 INJECTION, SOLUTION INTRAVENOUS at 11:56

## 2021-09-07 RX ADMIN — LEVOFLOXACIN 750 MG: 5 INJECTION, SOLUTION INTRAVENOUS at 11:14

## 2021-09-07 RX ADMIN — SODIUM CHLORIDE 500 ML: 9 INJECTION, SOLUTION INTRAVENOUS at 11:14

## 2021-09-07 RX ADMIN — LIDOCAINE HYDROCHLORIDE 400 MG: 20 INJECTION, SOLUTION INFILTRATION; PERINEURAL at 11:57

## 2021-09-07 NOTE — PROGRESS NOTES
Pt arrives via wheelchair to angio department accompanied by spouse Cesario Gaviria for left ureteral stent procedure. All assessments completed and consent was reviewed. Education given was regarding procedure, moderate sedation, post-procedure care and  management/follow-up. Opportunity for questions was provided and all questions and concerns were addressed. 1400 Dr Shelly Montero placed 8.5 fr left nephrostomy tube. Drainage bag was connected and wife was instructed on tube care and emptying the drain. Cherry-red fluid observed draining into bag. Patient is to return to angio on September 21 to retry ureteral stent placement. Discharge instructions reviewed with patient and wife. Transported via wheelchair to patient discharge area for wife to drive home.

## 2021-09-07 NOTE — H&P
Radiology History and Physical    Patient: Arely Hedrick 66 y.o. male       Chief Complaint: Left hydronephrosis    History of Present Illness: 66year old male with history of bladder cancer and ileal conduit placement. Now with left hydronephrosis. History:    Past Medical History:   Diagnosis Date    Back pain     Backache, unspecified 2/8/2010    Bladder cancer ([address] 7/14/2020    He is s/p TURBT on 6/25/2020. No obvious residual tumor, but tessy and induration at the base. Pathology reads invasive papillary urothelial carcinoma, high grade (bladder) and invasive high grade urothelial carcinoma pT2 (bladder base). 07- visit He has high grade muscle invasive bladder cancer. I thought it was grossly resected but pathology reveals high grade disease. He is at high risk o    Blood clots 2004    spine    Cancer [facility]) 2013    prostate    CHF (congestive heart failure) ([facility] 1999    Coronary artery disease     Coronary atherosclerosis of native coronary artery 2/8/2010    DDD (degenerative disc disease)     Diabetes ([address]     Essential hypertension, benign 2/8/2010    Gastroenteritis, viral     Heart block     95%    Hematuria, unspecified 7/14/2020    He had gross hematuria and clots in his Depends. He notes not pain in the bladder or flank. CT 4/20/20 without renal concerns. Tumor found on cystsocopy on 5/29/2020.  HTN (hypertension), benign     Hypercholesterolemia     Ill-defined condition     short term memory loss since SDH    Ill-defined condition     urinary incontinence    Ill-defined condition     limited vision R eye    Ill-defined condition 1996    concussion with subdural hematoma    Ill-defined condition 1999 & early 2000s    Mycardial Infarction    Impotence 7/14/2020    He has ED. He and his partner are not active. Viagra was no longer helpful.  Lumbago 2/8/2010    Malignant neoplasm of prostate ([address] 7/14/2020    He is s/p a robotic prostatectomy 10/18/13.  He had Corey's 6 disease wtih negative margins and negative nodes. His PSA has been undetectable, last drawn on 2020.  Neurogenic dysfunction of the urinary bladder 2020    He has a hypotonic bladder and is emptying with Valsalva. He has mild ISD and urge incontinence. He has some enuresis.  Neuropathy Peripheral     pain in feet    Osteoarthritis     Osteoarthrosis, unspecified whether generalized or localized, unspecified site 2010    Seizures ([address]      after head injury    Skin disease     itching alot    Stroke [facility])     Unspecified hereditary and idiopathic peripheral neuropathy 2010    Urgency of urination 2020    He has occasional urgency. He wears depends for a precaution. He does have an Acticuff clamp. He had urgency and episodic incontinence prior to prostate surgery. He does not leak with cough or sneeze. He drinks diet Pepsi all day long and less often coffee. He has chronic back problems with back surgery , 2016, 2017 and 2018. He had a spinal stimulator which was removed. He has had thi    Urgency of urination 2020    He has occasional urgency. He wears depends for a precaution. He does have an Acticuff clamp. He had urgency and episodic incontinence prior to prostate surgery. He does not leak with cough or sneeze. He drinks diet Pepsi all day long and less often coffee. He has chronic back problems with back surgery , 2016, 2017 and 2018. He had a spinal stimulator which was removed.  He has had thi     Family History   Adopted: Yes     Social History     Socioeconomic History    Marital status:      Spouse name: Not on file    Number of children: Not on file    Years of education: Not on file    Highest education level: Not on file   Occupational History    Not on file   Tobacco Use    Smoking status: Former Smoker     Years: 35.00     Quit date: 1999     Years since quittin.6    Smokeless tobacco: Never Used   Vaping Use    Vaping Use: Never used   Substance and Sexual Activity    Alcohol use: Not Currently    Drug use: Not Currently     Types: Prescription, Opiates    Sexual activity: Yes     Partners: Female   Other Topics Concern    Not on file   Social History Narrative    Not on file     Social Determinants of Health     Financial Resource Strain:     Difficulty of Paying Living Expenses:    Food Insecurity:     Worried About Running Out of Food in the Last Year:     [address] in the Last Year:    Transportation Needs:     Lack of Transportation (Medical):  Lack of Transportation (Non-Medical):    Physical Activity:     Days of Exercise per Week:     Minutes of Exercise per Session:    Stress:     Feeling of Stress :    Social Connections:     Frequency of Communication with Friends and Family:     Frequency of Social Gatherings with Friends and Family:     Attends Zoro[employer] Services:     Active Member of Clubs or Organizations:     Attends Club or Organization Meetings:     Marital Status:    Intimate Partner Violence:     Fear of Current or Ex-Partner:     Emotionally Abused:     Physically Abused:     Sexually Abused: Allergies:    Allergies   Allergen Reactions    Lisinopril Unknown (comments)       Current Medications:  Current Facility-Administered Medications   Medication Dose Route Frequency    iopamidoL (ISOVUE 300) 61 % contrast injection 100 mL  100 mL IntraCATHeter RAD ONCE    lidocaine (XYLOCAINE) 20 mg/mL (2 %) injection        levoFLOXacin (LEVAQUIN) 750 mg in D5W IVPB  750 mg IntraVENous ONCE    midazolam (VERSED) injection 5 mg  5 mg IntraVENous Rad Multiple    fentaNYL citrate (PF) injection 200 mcg  200 mcg IntraVENous Rad Multiple    0.9% sodium chloride infusion 500 mL  500 mL IntraVENous CONTINUOUS    lidocaine (XYLOCAINE) 20 mg/mL (2 %) injection 400 mg  20 mL SubCUTAneous RAD ONCE        Physical Exam:  Blood pressure (!) 157/68, pulse 63, temperature 98.6 °F (37 °C), resp. rate 18, weight 72.6 kg (160 lb), SpO2 95 %. GENERAL: alert, cooperative, no distress, appears stated age,   LUNG: Nonlabored respiration on room air  HEART: regular rate and rhythm    Alerts:    Hospital Problems  Date Reviewed: 1/26/2021    None          Laboratory:    No results for input(s): HGB, HCT, WBC, PLT, INR, BUN, CREA, K, CRCLT, HGBEXT, HCTEXT, PLTEXT, INREXT in the last 72 hours. No lab exists for component: PTT, PT      Plan of Care/Planned Procedure:  Risks, benefits, and alternatives reviewed with patient and he agrees to proceed with the procedure. Left antegrade renal access with retrograde ureteral stent placement. Possible left nephrostomy tube placement. Deemed appropriate for moderate sedation with versed and fentanyl.     Leti Bay MD  Interventional Radiology  [facility]  11:01 AM, 9/7/2021

## 2021-09-07 NOTE — DISCHARGE INSTRUCTIONS
[address] [facility]  Department of Interventional Radiology  [facility]  [phone]  Nephrostomy Tube Discharge Instructions    General Information:   A nephrostomy is a tube inserted through your skin and into the kidney. The purpose of the tube is to drain urine outside of the body. This is done in the event fo a block or a damaged ureter. Most of these tubes are usually changed every 2-3 months. However, some patients may need to have their tubes changed more often. Home Care Instructions: You can resume your regular diet. Do not drink alcohol, drive, or make any important legal decisions in the next 24 hours. Do not lift anything heavier than a gallon of milk or do anything strenuous for the next 24 hours. You should not shower for 24 hours. You should not bathe or swim while you have this drain in place. Keep the dressing clean and dry. Keep up with how much drainage you get from the tube and report this to your doctor. Call If:   You should call your Physician and/or the Radiology Nurse if you have any bleeding other than a small spot on your bandage. Call if you have any signs of infection, fever, or increased pain at the site of the tube. Call if you should have leakage around the tube, a change in the appearance of the urine draining from the tube, increased pain in the lower back, or no drainage from the tube for 12 hours. Call if the tube has pulled back, or has been pulled out. Follow-Up Instructions:  Please see your ordering doctor as he/she has requested. To Reach Us:  Side effects of sedation medications and other medications used today have been reviewed. Notify us of nausea, itching, hives, dizziness, or anything else out of the ordinary. Should you experience any of these significant changes, please call [phone] between the hours of 7:30 am and 10 pm or [phone] after hours.  After hours, ask the  to page the [address] Technologist, and describe the problem to the technologist.          Patient Signature:  Date: 9/7/2021  Discharging Nurse: Daniel Torres RN

## 2021-09-07 NOTE — PROCEDURES
Interventional Radiology  Procedure Note        9/7/2021 12:50 PM    Patient: Raúl Vázquez     Informed consent obtained    Diagnosis: Left hydronephrosis    Procedure(s): US guided left renal access; antegrade nephrostogram showing severe stenosis. Made numerous attempts with different sheaths and catheters to traverse the stenosis but was unsuccessful. Placed 8Fr nephrostomy tube. Specimens removed:  none    Complications: None    Primary Physician: Shelbie Estrada MD    Recommendations: N/A    Discharge Disposition: patient to return in 2 weeks for repeat attempt at traversing the ureteral stent.     Full dictated report to follow    Shelbie Estrada MD  Interventional Radiology  [facility], [facility].  12:50 PM, 9/7/2021

## 2021-09-08 ENCOUNTER — TELEPHONE (OUTPATIENT)
Dept: UROLOGY | Age: 71
End: 2021-09-08

## 2021-09-08 NOTE — TELEPHONE ENCOUNTER
spouse called stating patient went to [facility], had procedure by Deep Sinha MD but wasn't 100% successful. Will have to go back on 9/21/2021. He has an appointment on 9/115/2021 with Dr. Gaby Quevedo. They want to know if they should keep it or reschedule.

## 2021-09-13 ENCOUNTER — TELEPHONE (OUTPATIENT)
Dept: UROLOGY | Age: 71
End: 2021-09-13

## 2021-09-13 ENCOUNTER — HOSPITAL ENCOUNTER (INPATIENT)
Dept: INTERVENTIONAL RADIOLOGY/VASCULAR | Age: 71
LOS: 9 days | Discharge: HOME | DRG: 673 | End: 2021-09-22
Attending: STUDENT IN AN ORGANIZED HEALTH CARE EDUCATION/TRAINING PROGRAM | Admitting: INTERNAL MEDICINE
Payer: MEDICARE

## 2021-09-13 DIAGNOSIS — N13.30 HYDRONEPHROSIS: ICD-10-CM

## 2021-09-13 PROBLEM — I97.418 ARTERIAL BLEED, INTRAOPERATIVE: Status: ACTIVE | Noted: 2021-09-13

## 2021-09-13 LAB
ALBUMIN SERPL-MCNC: 2.6 G/DL (ref 3.5–5)
ALBUMIN/GLOB SERPL: 0.5 {RATIO} (ref 1.1–2.2)
ALP SERPL-CCNC: 134 U/L (ref 45–117)
ALT SERPL-CCNC: 25 U/L (ref 12–78)
ANION GAP SERPL CALC-SCNC: 10 MMOL/L (ref 5–15)
ANION GAP SERPL CALC-SCNC: 8 MMOL/L (ref 5–15)
AST SERPL-CCNC: 23 U/L (ref 15–37)
BASOPHILS # BLD: 0 K/UL (ref 0–0.1)
BASOPHILS NFR BLD: 0 % (ref 0–1)
BILIRUB SERPL-MCNC: 0.9 MG/DL (ref 0.2–1)
BUN SERPL-MCNC: 25 MG/DL (ref 6–20)
BUN SERPL-MCNC: 27 MG/DL (ref 6–20)
BUN/CREAT SERPL: 14 (ref 12–20)
BUN/CREAT SERPL: 15 (ref 12–20)
CALCIUM SERPL-MCNC: 7.9 MG/DL (ref 8.5–10.1)
CALCIUM SERPL-MCNC: 9.1 MG/DL (ref 8.5–10.1)
CHLORIDE SERPL-SCNC: 103 MMOL/L (ref 97–108)
CHLORIDE SERPL-SCNC: 106 MMOL/L (ref 97–108)
CO2 SERPL-SCNC: 21 MMOL/L (ref 21–32)
CO2 SERPL-SCNC: 24 MMOL/L (ref 21–32)
COMMENT, HOLDF: NORMAL
COVID-19 RAPID TEST, COVR: NOT DETECTED
CREAT SERPL-MCNC: 1.65 MG/DL (ref 0.7–1.3)
CREAT SERPL-MCNC: 1.89 MG/DL (ref 0.7–1.3)
DIFFERENTIAL METHOD BLD: ABNORMAL
EOSINOPHIL # BLD: 0 K/UL (ref 0–0.4)
EOSINOPHIL NFR BLD: 0 % (ref 0–7)
ERYTHROCYTE [DISTWIDTH] IN BLOOD BY AUTOMATED COUNT: 15.3 % (ref 11.5–14.5)
ERYTHROCYTE [DISTWIDTH] IN BLOOD BY AUTOMATED COUNT: 15.3 % (ref 11.5–14.5)
GLOBULIN SER CALC-MCNC: 4.9 G/DL (ref 2–4)
GLUCOSE SERPL-MCNC: 115 MG/DL (ref 65–100)
GLUCOSE SERPL-MCNC: 140 MG/DL (ref 65–100)
HCT VFR BLD AUTO: 30.2 % (ref 36.6–50.3)
HCT VFR BLD AUTO: 31.3 % (ref 36.6–50.3)
HGB BLD-MCNC: 10.1 G/DL (ref 12.1–17)
HGB BLD-MCNC: 9.6 G/DL (ref 12.1–17)
HISTORY CHECKED?,CKHIST: NORMAL
IMM GRANULOCYTES # BLD AUTO: 0.1 K/UL (ref 0–0.04)
IMM GRANULOCYTES NFR BLD AUTO: 1 % (ref 0–0.5)
INR PPP: 1 (ref 0.9–1.1)
LACTATE SERPL-SCNC: 0.7 MMOL/L (ref 0.4–2)
LACTATE SERPL-SCNC: 1 MMOL/L (ref 0.4–2)
LYMPHOCYTES # BLD: 0.6 K/UL (ref 0.8–3.5)
LYMPHOCYTES NFR BLD: 5 % (ref 12–49)
MAGNESIUM SERPL-MCNC: 1.9 MG/DL (ref 1.6–2.4)
MCH RBC QN AUTO: 25.8 PG (ref 26–34)
MCH RBC QN AUTO: 26.2 PG (ref 26–34)
MCHC RBC AUTO-ENTMCNC: 31.8 G/DL (ref 30–36.5)
MCHC RBC AUTO-ENTMCNC: 32.3 G/DL (ref 30–36.5)
MCV RBC AUTO: 81.1 FL (ref 80–99)
MCV RBC AUTO: 81.2 FL (ref 80–99)
MONOCYTES # BLD: 1.1 K/UL (ref 0–1)
MONOCYTES NFR BLD: 9 % (ref 5–13)
NEUTS SEG # BLD: 10.7 K/UL (ref 1.8–8)
NEUTS SEG NFR BLD: 85 % (ref 32–75)
NRBC # BLD: 0 K/UL (ref 0–0.01)
NRBC # BLD: 0 K/UL (ref 0–0.01)
NRBC BLD-RTO: 0 PER 100 WBC
NRBC BLD-RTO: 0 PER 100 WBC
PHOSPHATE SERPL-MCNC: 2.4 MG/DL (ref 2.6–4.7)
PLATELET # BLD AUTO: 296 K/UL (ref 150–400)
PLATELET # BLD AUTO: 361 K/UL (ref 150–400)
PMV BLD AUTO: 10.2 FL (ref 8.9–12.9)
PMV BLD AUTO: 11 FL (ref 8.9–12.9)
POTASSIUM SERPL-SCNC: 4.4 MMOL/L (ref 3.5–5.1)
POTASSIUM SERPL-SCNC: 4.4 MMOL/L (ref 3.5–5.1)
PROT SERPL-MCNC: 7.5 G/DL (ref 6.4–8.2)
PROTHROMBIN TIME: 10.7 SEC (ref 9–11.1)
RBC # BLD AUTO: 3.72 M/UL (ref 4.1–5.7)
RBC # BLD AUTO: 3.86 M/UL (ref 4.1–5.7)
RBC MORPH BLD: ABNORMAL
RBC MORPH BLD: ABNORMAL
SAMPLES BEING HELD,HOLD: NORMAL
SODIUM SERPL-SCNC: 135 MMOL/L (ref 136–145)
SODIUM SERPL-SCNC: 137 MMOL/L (ref 136–145)
SOURCE, COVRS: NORMAL
TROPONIN I SERPL-MCNC: <0.05 NG/ML
WBC # BLD AUTO: 12.5 K/UL (ref 4.1–11.1)
WBC # BLD AUTO: 16.1 K/UL (ref 4.1–11.1)

## 2021-09-13 PROCEDURE — 84484 ASSAY OF TROPONIN QUANT: CPT

## 2021-09-13 PROCEDURE — C1769 GUIDE WIRE: HCPCS

## 2021-09-13 PROCEDURE — 86902 BLOOD TYPE ANTIGEN DONOR EA: CPT

## 2021-09-13 PROCEDURE — 80053 COMPREHEN METABOLIC PANEL: CPT

## 2021-09-13 PROCEDURE — 87040 BLOOD CULTURE FOR BACTERIA: CPT

## 2021-09-13 PROCEDURE — 87077 CULTURE AEROBIC IDENTIFY: CPT

## 2021-09-13 PROCEDURE — 86922 COMPATIBILITY TEST ANTIGLOB: CPT

## 2021-09-13 PROCEDURE — 86920 COMPATIBILITY TEST SPIN: CPT

## 2021-09-13 PROCEDURE — 85610 PROTHROMBIN TIME: CPT

## 2021-09-13 PROCEDURE — 0TP5X0Z REMOVAL OF DRAINAGE DEVICE FROM KIDNEY, EXTERNAL APPROACH: ICD-10-PCS | Performed by: STUDENT IN AN ORGANIZED HEALTH CARE EDUCATION/TRAINING PROGRAM

## 2021-09-13 PROCEDURE — 37244 VASC EMBOLIZE/OCCLUDE BLEED: CPT

## 2021-09-13 PROCEDURE — C1889 IMPLANT/INSERT DEVICE, NOC: HCPCS

## 2021-09-13 PROCEDURE — 74011000250 HC RX REV CODE- 250: Performed by: STUDENT IN AN ORGANIZED HEALTH CARE EDUCATION/TRAINING PROGRAM

## 2021-09-13 PROCEDURE — 77030014021 HC SYR ANGI FIX LOK MRTM -A

## 2021-09-13 PROCEDURE — 86921 COMPATIBILITY TEST INCUBATE: CPT

## 2021-09-13 PROCEDURE — 77030033302 HC HNDL COIL DETACH DISP PENNU -C

## 2021-09-13 PROCEDURE — 77030019698 HC SYR ANGI MDLON MRTM -A

## 2021-09-13 PROCEDURE — 87186 SC STD MICRODIL/AGAR DIL: CPT

## 2021-09-13 PROCEDURE — 65610000006 HC RM INTENSIVE CARE

## 2021-09-13 PROCEDURE — C1894 INTRO/SHEATH, NON-LASER: HCPCS

## 2021-09-13 PROCEDURE — B4171ZZ FLUOROSCOPY OF LEFT RENAL ARTERY USING LOW OSMOLAR CONTRAST: ICD-10-PCS | Performed by: STUDENT IN AN ORGANIZED HEALTH CARE EDUCATION/TRAINING PROGRAM

## 2021-09-13 PROCEDURE — 87147 CULTURE TYPE IMMUNOLOGIC: CPT

## 2021-09-13 PROCEDURE — 85025 COMPLETE CBC W/AUTO DIFF WBC: CPT

## 2021-09-13 PROCEDURE — C1729 CATH, DRAINAGE: HCPCS

## 2021-09-13 PROCEDURE — 99152 MOD SED SAME PHYS/QHP 5/>YRS: CPT

## 2021-09-13 PROCEDURE — 36415 COLL VENOUS BLD VENIPUNCTURE: CPT

## 2021-09-13 PROCEDURE — 86870 RBC ANTIBODY IDENTIFICATION: CPT

## 2021-09-13 PROCEDURE — 83735 ASSAY OF MAGNESIUM: CPT

## 2021-09-13 PROCEDURE — 74011000636 HC RX REV CODE- 636: Performed by: STUDENT IN AN ORGANIZED HEALTH CARE EDUCATION/TRAINING PROGRAM

## 2021-09-13 PROCEDURE — 83605 ASSAY OF LACTIC ACID: CPT

## 2021-09-13 PROCEDURE — 50389 REMOVE RENAL TUBE W/FLUORO: CPT

## 2021-09-13 PROCEDURE — 84100 ASSAY OF PHOSPHORUS: CPT

## 2021-09-13 PROCEDURE — 85018 HEMOGLOBIN: CPT

## 2021-09-13 PROCEDURE — 86901 BLOOD TYPING SEROLOGIC RH(D): CPT

## 2021-09-13 PROCEDURE — 2709999900 HC NON-CHARGEABLE SUPPLY

## 2021-09-13 PROCEDURE — 87635 SARS-COV-2 COVID-19 AMP PRB: CPT

## 2021-09-13 PROCEDURE — 74011250636 HC RX REV CODE- 250/636: Performed by: STUDENT IN AN ORGANIZED HEALTH CARE EDUCATION/TRAINING PROGRAM

## 2021-09-13 PROCEDURE — C1760 CLOSURE DEV, VASC: HCPCS

## 2021-09-13 PROCEDURE — 85027 COMPLETE CBC AUTOMATED: CPT

## 2021-09-13 PROCEDURE — 77030012468 HC VLV BLEEDBK CNTRL ABBT -B

## 2021-09-13 PROCEDURE — 77030002996 HC SUT SLK J&J -A

## 2021-09-13 PROCEDURE — 04VA3DZ RESTRICTION OF LEFT RENAL ARTERY WITH INTRALUMINAL DEVICE, PERCUTANEOUS APPROACH: ICD-10-PCS | Performed by: STUDENT IN AN ORGANIZED HEALTH CARE EDUCATION/TRAINING PROGRAM

## 2021-09-13 PROCEDURE — C1887 CATHETER, GUIDING: HCPCS

## 2021-09-13 RX ORDER — ALBUTEROL SULFATE 0.83 MG/ML
2.5 SOLUTION RESPIRATORY (INHALATION)
Status: DISCONTINUED | OUTPATIENT
Start: 2021-09-13 | End: 2021-09-22 | Stop reason: HOSPADM

## 2021-09-13 RX ORDER — ACETAMINOPHEN 650 MG/1
650 SUPPOSITORY RECTAL
Status: DISCONTINUED | OUTPATIENT
Start: 2021-09-13 | End: 2021-09-22 | Stop reason: HOSPADM

## 2021-09-13 RX ORDER — NALOXONE HYDROCHLORIDE 0.4 MG/ML
0.4 INJECTION, SOLUTION INTRAMUSCULAR; INTRAVENOUS; SUBCUTANEOUS
Status: DISCONTINUED | OUTPATIENT
Start: 2021-09-13 | End: 2021-09-13

## 2021-09-13 RX ORDER — SODIUM CHLORIDE 0.9 % (FLUSH) 0.9 %
5-40 SYRINGE (ML) INJECTION EVERY 8 HOURS
Status: DISCONTINUED | OUTPATIENT
Start: 2021-09-13 | End: 2021-09-22 | Stop reason: HOSPADM

## 2021-09-13 RX ORDER — MIDAZOLAM HYDROCHLORIDE 1 MG/ML
5 INJECTION, SOLUTION INTRAMUSCULAR; INTRAVENOUS
Status: DISCONTINUED | OUTPATIENT
Start: 2021-09-13 | End: 2021-09-13

## 2021-09-13 RX ORDER — LEVOFLOXACIN 5 MG/ML
500 INJECTION, SOLUTION INTRAVENOUS ONCE
Status: COMPLETED | OUTPATIENT
Start: 2021-09-13 | End: 2021-09-13

## 2021-09-13 RX ORDER — SODIUM CHLORIDE 9 MG/ML
25 INJECTION, SOLUTION INTRAVENOUS CONTINUOUS
Status: DISCONTINUED | OUTPATIENT
Start: 2021-09-13 | End: 2021-09-13 | Stop reason: HOSPADM

## 2021-09-13 RX ORDER — FENTANYL CITRATE 50 UG/ML
100 INJECTION, SOLUTION INTRAMUSCULAR; INTRAVENOUS
Status: DISCONTINUED | OUTPATIENT
Start: 2021-09-13 | End: 2021-09-13

## 2021-09-13 RX ORDER — LIDOCAINE HYDROCHLORIDE 20 MG/ML
20 INJECTION, SOLUTION INFILTRATION; PERINEURAL
Status: COMPLETED | OUTPATIENT
Start: 2021-09-13 | End: 2021-09-13

## 2021-09-13 RX ORDER — PROMETHAZINE HYDROCHLORIDE 25 MG/1
12.5 TABLET ORAL
Status: DISCONTINUED | OUTPATIENT
Start: 2021-09-13 | End: 2021-09-22 | Stop reason: HOSPADM

## 2021-09-13 RX ORDER — FLUMAZENIL 0.1 MG/ML
0.5 INJECTION INTRAVENOUS
Status: DISCONTINUED | OUTPATIENT
Start: 2021-09-13 | End: 2021-09-13

## 2021-09-13 RX ORDER — SODIUM CHLORIDE 9 MG/ML
25 INJECTION, SOLUTION INTRAVENOUS
Status: DISCONTINUED | OUTPATIENT
Start: 2021-09-13 | End: 2021-09-13 | Stop reason: HOSPADM

## 2021-09-13 RX ORDER — HYDRALAZINE HYDROCHLORIDE 20 MG/ML
10 INJECTION INTRAMUSCULAR; INTRAVENOUS ONCE
Status: COMPLETED | OUTPATIENT
Start: 2021-09-13 | End: 2021-09-13

## 2021-09-13 RX ORDER — FACIAL-BODY WIPES
10 EACH TOPICAL DAILY PRN
Status: DISCONTINUED | OUTPATIENT
Start: 2021-09-13 | End: 2021-09-22 | Stop reason: HOSPADM

## 2021-09-13 RX ORDER — ONDANSETRON 2 MG/ML
4 INJECTION INTRAMUSCULAR; INTRAVENOUS
Status: DISCONTINUED | OUTPATIENT
Start: 2021-09-13 | End: 2021-09-22 | Stop reason: HOSPADM

## 2021-09-13 RX ORDER — ACETAMINOPHEN 325 MG/1
650 TABLET ORAL
Status: DISCONTINUED | OUTPATIENT
Start: 2021-09-13 | End: 2021-09-22 | Stop reason: HOSPADM

## 2021-09-13 RX ORDER — FENTANYL CITRATE 50 UG/ML
200 INJECTION, SOLUTION INTRAMUSCULAR; INTRAVENOUS
Status: DISCONTINUED | OUTPATIENT
Start: 2021-09-13 | End: 2021-09-13

## 2021-09-13 RX ORDER — SODIUM CHLORIDE 9 MG/ML
250 INJECTION, SOLUTION INTRAVENOUS AS NEEDED
Status: DISCONTINUED | OUTPATIENT
Start: 2021-09-13 | End: 2021-09-22 | Stop reason: HOSPADM

## 2021-09-13 RX ORDER — SODIUM CHLORIDE 0.9 % (FLUSH) 0.9 %
5-40 SYRINGE (ML) INJECTION AS NEEDED
Status: DISCONTINUED | OUTPATIENT
Start: 2021-09-13 | End: 2021-09-22 | Stop reason: HOSPADM

## 2021-09-13 RX ADMIN — FENTANYL CITRATE 25 MCG: 50 INJECTION, SOLUTION INTRAMUSCULAR; INTRAVENOUS at 13:48

## 2021-09-13 RX ADMIN — MIDAZOLAM HYDROCHLORIDE 0.5 MG: 1 INJECTION, SOLUTION INTRAMUSCULAR; INTRAVENOUS at 14:46

## 2021-09-13 RX ADMIN — LIDOCAINE HYDROCHLORIDE 5 ML: 20 INJECTION, SOLUTION INFILTRATION; PERINEURAL at 15:03

## 2021-09-13 RX ADMIN — FENTANYL CITRATE 25 MCG: 50 INJECTION, SOLUTION INTRAMUSCULAR; INTRAVENOUS at 14:17

## 2021-09-13 RX ADMIN — HYDRALAZINE HYDROCHLORIDE 10 MG: 20 INJECTION, SOLUTION INTRAMUSCULAR; INTRAVENOUS at 14:16

## 2021-09-13 RX ADMIN — HYDRALAZINE HYDROCHLORIDE 10 MG: 20 INJECTION, SOLUTION INTRAMUSCULAR; INTRAVENOUS at 14:34

## 2021-09-13 RX ADMIN — LEVOFLOXACIN 500 MG: 500 INJECTION, SOLUTION INTRAVENOUS at 11:23

## 2021-09-13 RX ADMIN — SODIUM CHLORIDE 25 ML/HR: 900 INJECTION, SOLUTION INTRAVENOUS at 11:00

## 2021-09-13 RX ADMIN — MIDAZOLAM HYDROCHLORIDE 0.5 MG: 1 INJECTION, SOLUTION INTRAMUSCULAR; INTRAVENOUS at 14:17

## 2021-09-13 RX ADMIN — FENTANYL CITRATE 25 MCG: 50 INJECTION, SOLUTION INTRAMUSCULAR; INTRAVENOUS at 13:55

## 2021-09-13 RX ADMIN — FENTANYL CITRATE 25 MCG: 50 INJECTION, SOLUTION INTRAMUSCULAR; INTRAVENOUS at 13:42

## 2021-09-13 RX ADMIN — FENTANYL CITRATE 25 MCG: 50 INJECTION, SOLUTION INTRAMUSCULAR; INTRAVENOUS at 14:51

## 2021-09-13 RX ADMIN — IOPAMIDOL 107 ML: 612 INJECTION, SOLUTION INTRAVENOUS at 15:03

## 2021-09-13 RX ADMIN — SODIUM CHLORIDE 25 ML/HR: 900 INJECTION, SOLUTION INTRAVENOUS at 11:23

## 2021-09-13 NOTE — PROGRESS NOTES
Pt arrives via wheelchair to angio department accompanied by wife for left nephrostomy tube change procedure. All assessments completed and consent was reviewed. Education given was regarding procedure, consious sedation, post-procedure care and  management/follow-up. Opportunity for questions was provided and all questions and concerns were addressed.

## 2021-09-13 NOTE — PROGRESS NOTES
This RN spoke with Dr Baldemar Robles, intensivist, on behalf of IR MD Nicki Cervantes. Notified ICU MD pt currently undergoing emergent renal arterial angiogram with embolization r/t renal arterial bleed. Per IR MD pt will need ICU bed.

## 2021-09-13 NOTE — PROGRESS NOTES
Upon initial assessment by Jocelyn Ochoa RN, pt febrile at 101.4, HR > 90 and RR > 30. Pt meets SIRS criteria. MD notified and orders received to draw septic bundle. Blood cultures x 2, lactic acid, CMP, CBC, rapid COVID and hold samples sent to lab. 2 PIV established.

## 2021-09-13 NOTE — PROCEDURES
Interventional Radiology  Procedure Note        9/13/2021 4:28 PM    Patient: Skye Hernandez     Informed consent obtained    Diagnosis: Left ureteral obstruction with left hydronephrosis; arterial bleeding into left collecting system    Procedure(s):   - Injection of contrast in left nephrostomy tube revealed a left renal arteriogram. The tube was slightly retracted and there was immediate return of high volume of pulsatile blood. The tube was immediately pushed back into place in order to tamponade bleeding.  - Left renal arteriogram was performed via right CFA access, showed irregular narrowing of a left 2nd order arterial branch  - The abnormal branch was embolized with coils  - The nephrostomy tube was removed and arteriogram was immediately repeated which showed no active bleeding.  - Right CFA closure with 6Fr angioseal    Specimens removed:  none    Complications: None    Primary Physician: Juan Francisco Montero MD    Recommendations: Needs to return to IR tomorrow for replacement of nephrostomy tube    Discharge Disposition: to ICU for monitoring.     Full dictated report to follow    Juan Francisco Montero MD  Interventional Radiology  [facility], [facility].  4:28 PM, 9/13/2021

## 2021-09-13 NOTE — H&P
Radiology History and Physical    Patient: Abel Black 66 y.o. male       Chief Complaint: Blood in right nephrostomy tube    History of Present Illness: 66year old male with a right nephrostomy tube placed last week after failed attempt at left ureteral stent placement. Has left ureteral obstruction. Blood clots in the nephrostomy tube. Also febrile with borderline tachycardia. History:    Past Medical History:   Diagnosis Date    Back pain     Backache, unspecified 2/8/2010    Bladder cancer ([facility] [address] 7/14/2020    He is s/p TURBT on 6/25/2020. No obvious residual tumor, but tessy and induration at the base. Pathology reads invasive papillary urothelial carcinoma, high grade (bladder) and invasive high grade urothelial carcinoma pT2 (bladder base). 07- visit He has high grade muscle invasive bladder cancer. I thought it was grossly resected but pathology reveals high grade disease. He is at high risk o    Blood clots 2004    spine    Cancer [facility]) 2013    prostate    CHF (congestive heart failure) ([facility] [place] [address] 1999    Coronary artery disease     Coronary atherosclerosis of native coronary artery 2/8/2010    DDD (degenerative disc disease)     Diabetes ([facility] [place] [address]     Essential hypertension, benign 2/8/2010    Gastroenteritis, viral     Heart block     95%    Hematuria, unspecified 7/14/2020    He had gross hematuria and clots in his Depends. He notes not pain in the bladder or flank. CT 4/20/20 without renal concerns. Tumor found on cystsocopy on 5/29/2020.  HTN (hypertension), benign     Hypercholesterolemia     Ill-defined condition     short term memory loss since SDH    Ill-defined condition     urinary incontinence    Ill-defined condition     limited vision R eye    Ill-defined condition 1996    concussion with subdural hematoma    Ill-defined condition 1999 & early 2000s    Mycardial Infarction    Impotence 7/14/2020    He has ED. He and his partner are not active.  Viagra was no longer helpful.  Lumbago 2/8/2010    Malignant neoplasm of prostate ([facility] [address] 7/14/2020    He is s/p a robotic prostatectomy 10/18/13. He had [place]'s 6 disease wtih negative margins and negative nodes. His PSA has been undetectable, last drawn on 5/14/2020.  Neurogenic dysfunction of the urinary bladder 7/14/2020    He has a hypotonic bladder and is emptying with Valsalva. He has mild ISD and urge incontinence. He has some enuresis.  Neuropathy Peripheral     pain in feet    Osteoarthritis     Osteoarthrosis, unspecified whether generalized or localized, unspecified site 2/8/2010    Seizures ([facility] [address]     1996 after head injury    Skin disease     itching alot    Stroke [facility]) 1996    Unspecified hereditary and idiopathic peripheral neuropathy 2/8/2010    Urgency of urination 7/14/2020    He has occasional urgency. He wears depends for a precaution. He does have an Acticuff clamp. He had urgency and episodic incontinence prior to prostate surgery. He does not leak with cough or sneeze. He drinks diet Pepsi all day long and less often coffee. He has chronic back problems with back surgery 2004, 1/2016, 12/2017 and 2/2018. He had a spinal stimulator which was removed. He has had thi    Urgency of urination 7/14/2020    He has occasional urgency. He wears depends for a precaution. He does have an Acticuff clamp. He had urgency and episodic incontinence prior to prostate surgery. He does not leak with cough or sneeze. He drinks diet Pepsi all day long and less often coffee. He has chronic back problems with back surgery 2004, 1/2016, 12/2017 and 2/2018. He had a spinal stimulator which was removed.  He has had thi     Family History   Adopted: Yes     Social History     Socioeconomic History    Marital status:      Spouse name: Not on file    Number of children: Not on file    Years of education: Not on file    Highest education level: Not on file   Occupational History    Not on file   Tobacco Use    Smoking status: Former Smoker     Years: 35.00     Quit date: 1999     Years since quittin.6    Smokeless tobacco: Never Used   Vaping Use    Vaping Use: Never used   Substance and Sexual Activity    Alcohol use: Not Currently    Drug use: Not Currently     Types: Prescription, Opiates    Sexual activity: Yes     Partners: Female   Other Topics Concern    Not on file   Social History Narrative    Not on file     Social Determinants of Health     Financial Resource Strain:     Difficulty of Paying Living Expenses:    Food Insecurity:     Worried About Running Out of Food in the Last Year:     Ran Out of Food in the Last Year:    Transportation Needs:     Lack of Transportation (Medical):  Lack of Transportation (Non-Medical):    Physical Activity:     Days of Exercise per Week:     Minutes of Exercise per Session:    Stress:     Feeling of Stress :    Social Connections:     Frequency of Communication with Friends and Family:     Frequency of Social Gatherings with Friends and Family:     Attends Spiritism Services:     Active Member of Clubs or Organizations:     Attends Club or Organization Meetings:     Marital Status:    Intimate Partner Violence:     Fear of Current or Ex-Partner:     Emotionally Abused:     Physically Abused:     Sexually Abused: Allergies: Allergies   Allergen Reactions    Lisinopril Unknown (comments)       Current Medications:  Current Facility-Administered Medications   Medication Dose Route Frequency    0.9% sodium chloride infusion  25 mL/hr IntraVENous CONTINUOUS    0.9% sodium chloride infusion  25 mL/hr IntraVENous RAD CONTINUOUS    heparin 4000 units/1000 mL (4 units/mL) in NS infusion **FOR ANGIO USE ONLY**   IntraVENous RAD PRN    0.9% sodium chloride infusion 250 mL  250 mL IntraVENous PRN        Physical Exam:  Blood pressure 136/61, pulse 99, temperature 100.1 °F (37.8 °C), resp.  rate 22, height 5' 8\" (1.727 m), weight 72.6 kg (160 lb), SpO2 97 %. GENERAL: alert, cooperative, no distress, appears stated age,   LUNG: Nonlabored respiration on room air  HEART: regular rate and rhythm    Alerts:    Hospital Problems  Date Reviewed: 1/26/2021        Codes Class Noted POA    Arterial bleed, intraoperative ICD-10-CM: I97.418  ICD-9-CM: 998.11  9/13/2021 Unknown              Laboratory:      Recent Labs     09/13/21  1026   HGB 10.1*   HCT 31.3*   WBC 12.5*      INR 1.0   BUN 27*   CREA 1.89*   K 4.4         Plan of Care/Planned Procedure:  Risks, benefits, and alternatives reviewed with patient and he agrees to proceed with the procedure. Left nephrostomy tube exchange and nephrostogram; left renal angiogram with possible embolization    To be admitted due to sepsis and bleeding    Deemed appropriate for moderate sedation with versed and fentanyl.     Jose Guadalupe Garcia MD  Interventional Radiology  [facility]  4:24 PM, 9/13/2021

## 2021-09-13 NOTE — CONSULTS
[facility]    ICU Consult    Name: Lea Hernandez   : 1950   MRN: [id number]   Date: 2021      Subjective:   Progress Note: 2021      Reason for ICU Consult: ICU management     HPI:  Pt is a 67 yo WM with a PMH of prostate cancer, bladder cancer sp TURBT 2020 with ileostomy, he had a recent right nephrostomy tube placed last week after failed attempt for left urethal stent placed du left ureteral obstruction. He was found to have some blood clots in the nephrostomy tube so was taken to IR today for left ureteral obstruction with left hydronephrosis and arterial bleeding into the left collecting system. Pulsatile blood was found during arteriogram, pt was embolized and at high risk for bleeding therefore pt was brought to the ICU for close monitoring post procedure. Plans for left nephrostomy tube placement on the left tomorrow. Interval Events:   2021      POD:  * No surgery found *    S/P:       Assessment and Recommendations:     ICU Problems:  1. Left ureteral obstruction with left hydronephrosis due to arterial bleed sp embolization today   - Post op per IR  - Serial Q4 H and H   - Monitor closely for s/s bleeding  - Plans for OR tomorrow for nephrostomy tube placement  - NPO at [place]   - Transfuse for hgb <7  - CBC pending     2. Leukocytosis   - LA nml, will trend and check in am   - Afebrile   - Will CTM closely   - Procal pending   - Monitor closely for s/s of infection   - Hold on abx for now, will monitor off    3. HTN  - WNL at this time, will resume PTA meds in am       Active Problem List:     Problem List  Date Reviewed: 2021        Codes Class    Arterial bleed, intraoperative ICD-10-CM: I97.418  ICD-9-CM: 998.11         Stage 3 chronic kidney disease ([facility] [address] ICD-10-CM: N18.30  ICD-9-CM: 099. 3         Hydronephrosis ICD-10-CM: N13.30  ICD-9-CM: 937     Overview Signed 8/15/2021 11:31 AM by Jennifer Aden, NP     CT 7/10/21: Moderate left hydronephrosis. Dilation of the ureter leading to the ureteral anastomosis with the ileal conduit. No right hydronephrosis. No stones in either side. Bladder cancer [facility]) ICD-10-CM: C67.9  ICD-9-CM: 188.9     Overview Addendum 9/9/2020  1:29 PM by Jennifer Aden NP     He is s/p TURBT on 6/25/2020. No obvious residual tumor, but tessy and induration at the base. Pathology reads invasive papillary urothelial carcinoma, high grade (bladder) and invasive high grade urothelial carcinoma pT2 (bladder base). He is s/p cystectomy with ileal conduit urinary diversion on 8/24/2020. He had an uncomplicated post-operative course and was discharged on 8/31/2020 with [address] [place] [address]. 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 PT2bN0. Malignant neoplasm of prostate [facility]) ICD-10-CM: C61  ICD-9-CM: 185     Overview Addendum 8/15/2021 11:32 AM by Jennifer Aden NP     He is s/p a robotic prostatectomy 10/18/13. He had [place]ys[place]'s 6 disease wtih negative margins and negative nodes. His PSA has been undetectable, last drawn on 5/20/2021.              Spondylolisthesis at L2-L3 level ICD-10-CM: M43.16  ICD-9-CM: 756.12         Lumbar stenosis ICD-10-CM: M48.061  ICD-9-CM: 724.02         Lumbago ICD-10-CM: M54.5  ICD-9-CM: 724.2         Hereditary and idiopathic peripheral neuropathy ICD-10-CM: G60.9  ICD-9-CM: 356.9         Osteoarthrosis, unspecified whether generalized or localized, unspecified site ICD-10-CM: M19.90  ICD-9-CM: 715.90         Backache, unspecified ICD-10-CM: M54.9  ICD-9-CM: 724.5         Congestive heart failure, unspecified ICD-10-CM: I50.9  ICD-9-CM: 428.0         Essential hypertension, benign ICD-10-CM: I10  ICD-9-CM: 401.1         Coronary atherosclerosis of native coronary artery ICD-10-CM: I25.10  ICD-9-CM: 414.01               Past Medical History:      has a past medical history of Back pain, Backache, unspecified (2/8/2010), Bladder cancer ([facility] [address] (7/14/2020), Blood clots (2004), Cancer [facility]) (2013), CHF (congestive heart failure) ([address] (1999), Coronary artery disease, Coronary atherosclerosis of native coronary artery (2/8/2010), DDD (degenerative disc disease), Diabetes ([address] Essential hypertension, benign (2/8/2010), Gastroenteritis, viral, Heart block, Hematuria, unspecified (7/14/2020), HTN (hypertension), benign, Hypercholesterolemia, Ill-defined condition, Ill-defined condition, Ill-defined condition, Ill-defined condition (1996), Ill-defined condition (1999 & early 2000s), Impotence (7/14/2020), Lumbago (2/8/2010), Malignant neoplasm of prostate ([address] (7/14/2020), Neurogenic dysfunction of the urinary bladder (7/14/2020), Neuropathy Peripheral, Osteoarthritis, Osteoarthrosis, unspecified whether generalized or localized, unspecified site (2/8/2010), Seizures ([address] Skin disease, Stroke ([facility] [address] (1996), Unspecified hereditary and idiopathic peripheral neuropathy (2/8/2010), Urgency of urination (7/14/2020), and Urgency of urination (7/14/2020). Past Surgical History:      has a past surgical history that includes hx heart catheterization (2200,2175); hx appendectomy; hx cataract removal; hx tonsillectomy; colonoscopy (N/A, 4/12/2018); neurological procedure unlisted (1996); hx back surgery (2002); hx back surgery (2004); neurological procedure unlisted; hx circumcision; hx prostatectomy (10/18/2013); hx prostate surgery (08/24/2020); hx prostate surgery (10/18/2013); hx urological (06/25/2020); hx urological (06/25/2020); hx urological (05/29/2020); hx urological (08/24/2020); hx urological (08/24/2020); hx urological (08/26/2021); hx urological (08/26/2021); hx urological (08/26/2021); and ir nephrostomy perc lt plc cath  si (9/7/2021). Home Medications:     Prior to Admission medications    Medication Sig Start Date End Date Taking?  Authorizing Provider   cyanocobalamin (Vitamin B-12) 1,000 mcg/mL injection 1,000 mcg by IntraMUSCular route once. Indications: inadequate vitamin B12    Provider, Historical   sodium bicarbonate 650 mg tablet Take  by mouth four (4) times daily. Provider, Historical   budesonide-glycopyr-formoterol (Breztri Aerosphere) 160-9-4.8 mcg/actuation HFAA Take  by inhalation. Provider, Historical   FLUoxetine (PROzac) 20 mg capsule Take  by mouth daily. Provider, Historical   levothyroxine (SYNTHROID) 75 mcg tablet Take  by mouth Daily (before breakfast). Provider, Historical   traZODone (DESYREL) 100 mg tablet Take 100 mg by mouth nightly. Provider, Historical   montelukast (SINGULAIR) 10 mg tablet Take 10 mg by mouth daily. Provider, Historical   aspirin delayed-release 81 mg tablet Take  by mouth daily. Provider, Historical   carvedilol (COREG) 6.25 mg tablet Take 6.25 mg by mouth two (2) times a day. Provider, Historical   pravastatin (PRAVACHOL) 40 mg tablet Take 40 mg by mouth nightly. Provider, Historical       Allergies/Social/Family History: Allergies   Allergen Reactions    Lisinopril Unknown (comments)      Social History     Tobacco Use    Smoking status: Former Smoker     Years: 35.00     Quit date: 1999     Years since quittin.6    Smokeless tobacco: Never Used   Substance Use Topics    Alcohol use: Not Currently      Family History   Adopted: Yes       Review of Systems:     Review of systems not obtained due to patient factors.     Objective:   Vital Signs:  Visit Vitals  /66 (BP 1 Location: Left upper arm, BP Patient Position: At rest)   Pulse (!) 102   Temp 97.7 °F (36.5 °C)   Resp 23   Ht 5' 8\" (1.727 m)   Wt 72.6 kg (160 lb)   SpO2 99%   BMI 24.33 kg/m²    O2 Flow Rate (L/min): 3 l/min O2 Device: None (Room air) Temp (24hrs), Av.7 °F (37.6 °C), Min:97.7 °F (36.5 °C), Max:101.4 °F (38.6 °C)           Intake/Output:     Intake/Output Summary (Last 24 hours) at 2021 1976  Last data filed at 2021 1717  Gross per 24 hour   Intake -- Output 350 ml   Net -350 ml       Physical Exam:    General:  Alert, cooperative, well noursished, well developed, appears stated age   Eyes:  Sclera anicteric. Pupils equally round and reactive to light. Mouth/Throat: Mucous membranes normal, oral pharynx clear   Neck: Supple   Lungs:   Clear to auscultation bilaterally, good effort   CV:  Regular rate and rhythm,no murmur, click, rub or gallop   Abdomen:   Soft, non-tender. bowel sounds normal. non-distended   Extremities: No cyanosis or edema   Skin: Skin color, texture, turgor normal. no acute rash or lesions   Lymph nodes: Cervical and supraclavicular normal   Musculoskeletal: No swelling or deformity   Lines/Devices:  Intact, no erythema, drainage or tenderness   Psych: Alert and oriented, normal mood affect given the setting       LABS AND  DATA: Personally reviewed  Recent Labs     09/13/21  1026   WBC 12.5*   HGB 10.1*   HCT 31.3*        Recent Labs     09/13/21  1026   *   K 4.4      CO2 24   BUN 27*   CREA 1.89*   *   CA 9.1     Recent Labs     09/13/21  1026   *   TP 7.5   ALB 2.6*   GLOB 4.9*     Recent Labs     09/13/21  1026   INR 1.0   PTP 10.7      No results for input(s): PHI, PCO2I, PO2I, FIO2I in the last 72 hours. No results for input(s): CPK, CKMB, TROIQ, BNPP in the last 72 hours. Hemodynamics:   PAP:   CO:     Wedge:   CI:     CVP:    SVR:       PVR:       Ventilator Settings:  Mode Rate Tidal Volume Pressure FiO2 PEEP                    Peak airway pressure:      Minute ventilation:          MEDS: Reviewed    Chest X-Ray:  CXR Results  (Last 48 hours)    None          SPECIAL EQUIPMENT  None    DISPOSITION  Stay in ICU    CRITICAL CARE CONSULTANT NOTE  I had a face to face encounter with the patient, reviewed and interpreted patient data including clinical events, labs, images, vital signs, I/O's, and examined patient.   I have discussed the case and the plan and management of the patient's care with the consulting services, the bedside nurses and the respiratory therapist.      NOTE OF PERSONAL INVOLVEMENT IN CARE   This patient has a high probability of imminent, clinically significant deterioration, which requires the highest level of preparedness to intervene urgently. I participated in the decision-making and personally managed or directed the management of the following life and organ supporting interventions that required my frequent assessment to treat or prevent imminent deterioration. I personally spent 45 minutes of critical care time. This is time spent at this critically ill patient's bedside actively involved in patient care as well as the coordination of care and discussions with the patient's family. This does not include any procedural time which has been billed separately.     Sayda Linares, DMITRY    [facility]

## 2021-09-14 ENCOUNTER — APPOINTMENT (OUTPATIENT)
Dept: INTERVENTIONAL RADIOLOGY/VASCULAR | Age: 71
DRG: 673 | End: 2021-09-14
Attending: INTERNAL MEDICINE
Payer: MEDICARE

## 2021-09-14 ENCOUNTER — APPOINTMENT (OUTPATIENT)
Dept: ULTRASOUND IMAGING | Age: 71
DRG: 673 | End: 2021-09-14
Attending: RADIOLOGY
Payer: MEDICARE

## 2021-09-14 LAB
ALBUMIN SERPL-MCNC: 2.2 G/DL (ref 3.5–5)
ALBUMIN/GLOB SERPL: 0.6 {RATIO} (ref 1.1–2.2)
ALP SERPL-CCNC: 102 U/L (ref 45–117)
ALT SERPL-CCNC: 15 U/L (ref 12–78)
ANION GAP SERPL CALC-SCNC: 10 MMOL/L (ref 5–15)
APTT PPP: 29.4 SEC (ref 22.1–31)
AST SERPL-CCNC: 6 U/L (ref 15–37)
BILIRUB SERPL-MCNC: 0.7 MG/DL (ref 0.2–1)
BNP SERPL-MCNC: 3180 PG/ML
BUN SERPL-MCNC: 24 MG/DL (ref 6–20)
BUN/CREAT SERPL: 13 (ref 12–20)
CALCIUM SERPL-MCNC: 8 MG/DL (ref 8.5–10.1)
CHLORIDE SERPL-SCNC: 108 MMOL/L (ref 97–108)
CO2 SERPL-SCNC: 22 MMOL/L (ref 21–32)
CREAT SERPL-MCNC: 1.8 MG/DL (ref 0.7–1.3)
ERYTHROCYTE [DISTWIDTH] IN BLOOD BY AUTOMATED COUNT: 15.2 % (ref 11.5–14.5)
GLOBULIN SER CALC-MCNC: 3.4 G/DL (ref 2–4)
GLUCOSE SERPL-MCNC: 114 MG/DL (ref 65–100)
HCT VFR BLD AUTO: 25.4 % (ref 36.6–50.3)
HCT VFR BLD AUTO: 26.5 % (ref 36.6–50.3)
HCT VFR BLD AUTO: 27.2 % (ref 36.6–50.3)
HCT VFR BLD AUTO: 27.6 % (ref 36.6–50.3)
HGB BLD-MCNC: 8 G/DL (ref 12.1–17)
HGB BLD-MCNC: 8.5 G/DL (ref 12.1–17)
HGB BLD-MCNC: 8.5 G/DL (ref 12.1–17)
HGB BLD-MCNC: 8.7 G/DL (ref 12.1–17)
HGB BLD-MCNC: 8.7 G/DL (ref 12.1–17)
HGB BLD-MCNC: 9 G/DL (ref 12.1–17)
LACTATE SERPL-SCNC: 0.7 MMOL/L (ref 0.4–2)
MAGNESIUM SERPL-MCNC: 2.1 MG/DL (ref 1.6–2.4)
MCH RBC QN AUTO: 26.3 PG (ref 26–34)
MCHC RBC AUTO-ENTMCNC: 32 G/DL (ref 30–36.5)
MCV RBC AUTO: 82.2 FL (ref 80–99)
NRBC # BLD: 0 K/UL (ref 0–0.01)
NRBC BLD-RTO: 0 PER 100 WBC
PHOSPHATE SERPL-MCNC: 3.5 MG/DL (ref 2.6–4.7)
PLATELET # BLD AUTO: 291 K/UL (ref 150–400)
PMV BLD AUTO: 9.7 FL (ref 8.9–12.9)
POTASSIUM SERPL-SCNC: 4.2 MMOL/L (ref 3.5–5.1)
PROT SERPL-MCNC: 5.6 G/DL (ref 6.4–8.2)
RBC # BLD AUTO: 3.31 M/UL (ref 4.1–5.7)
SODIUM SERPL-SCNC: 140 MMOL/L (ref 136–145)
THERAPEUTIC RANGE,PTTT: NORMAL SECS (ref 58–77)
WBC # BLD AUTO: 14 K/UL (ref 4.1–11.1)

## 2021-09-14 PROCEDURE — 85730 THROMBOPLASTIN TIME PARTIAL: CPT

## 2021-09-14 PROCEDURE — 77030011229 HC DIL VESL COON COOK -A

## 2021-09-14 PROCEDURE — 85027 COMPLETE CBC AUTOMATED: CPT

## 2021-09-14 PROCEDURE — 77030020795 HC BG DRN WSTE MRTM -C

## 2021-09-14 PROCEDURE — 50432 PLMT NEPHROSTOMY CATHETER: CPT

## 2021-09-14 PROCEDURE — 84100 ASSAY OF PHOSPHORUS: CPT

## 2021-09-14 PROCEDURE — 74011000258 HC RX REV CODE- 258: Performed by: NURSE PRACTITIONER

## 2021-09-14 PROCEDURE — 65610000006 HC RM INTENSIVE CARE

## 2021-09-14 PROCEDURE — 77030002996 HC SUT SLK J&J -A

## 2021-09-14 PROCEDURE — 80053 COMPREHEN METABOLIC PANEL: CPT

## 2021-09-14 PROCEDURE — 74011000250 HC RX REV CODE- 250

## 2021-09-14 PROCEDURE — C1894 INTRO/SHEATH, NON-LASER: HCPCS

## 2021-09-14 PROCEDURE — 83735 ASSAY OF MAGNESIUM: CPT

## 2021-09-14 PROCEDURE — 83605 ASSAY OF LACTIC ACID: CPT

## 2021-09-14 PROCEDURE — 74011250636 HC RX REV CODE- 250/636: Performed by: RADIOLOGY

## 2021-09-14 PROCEDURE — 76770 US EXAM ABDO BACK WALL COMP: CPT

## 2021-09-14 PROCEDURE — 74011000636 HC RX REV CODE- 636: Performed by: INTERNAL MEDICINE

## 2021-09-14 PROCEDURE — 74011250636 HC RX REV CODE- 250/636: Performed by: NURSE PRACTITIONER

## 2021-09-14 PROCEDURE — 74011250637 HC RX REV CODE- 250/637: Performed by: NURSE PRACTITIONER

## 2021-09-14 PROCEDURE — 0T9130Z DRAINAGE OF LEFT KIDNEY WITH DRAINAGE DEVICE, PERCUTANEOUS APPROACH: ICD-10-PCS | Performed by: STUDENT IN AN ORGANIZED HEALTH CARE EDUCATION/TRAINING PROGRAM

## 2021-09-14 PROCEDURE — 83880 ASSAY OF NATRIURETIC PEPTIDE: CPT

## 2021-09-14 PROCEDURE — C1729 CATH, DRAINAGE: HCPCS

## 2021-09-14 PROCEDURE — 36415 COLL VENOUS BLD VENIPUNCTURE: CPT

## 2021-09-14 PROCEDURE — 2709999900 HC NON-CHARGEABLE SUPPLY

## 2021-09-14 PROCEDURE — 85014 HEMATOCRIT: CPT

## 2021-09-14 RX ORDER — FLUMAZENIL 0.1 MG/ML
0.5 INJECTION INTRAVENOUS
Status: DISCONTINUED | OUTPATIENT
Start: 2021-09-14 | End: 2021-09-14

## 2021-09-14 RX ORDER — LEVOTHYROXINE SODIUM 25 UG/1
75 TABLET ORAL
COMMUNITY

## 2021-09-14 RX ORDER — VANCOMYCIN 1.75 GRAM/500 ML IN 0.9 % SODIUM CHLORIDE INTRAVENOUS
1750 ONCE
Status: COMPLETED | OUTPATIENT
Start: 2021-09-14 | End: 2021-09-14

## 2021-09-14 RX ORDER — SODIUM CHLORIDE 9 MG/ML
25 INJECTION, SOLUTION INTRAVENOUS
Status: DISCONTINUED | OUTPATIENT
Start: 2021-09-14 | End: 2021-09-14

## 2021-09-14 RX ORDER — MIDAZOLAM HYDROCHLORIDE 1 MG/ML
5 INJECTION, SOLUTION INTRAMUSCULAR; INTRAVENOUS
Status: DISCONTINUED | OUTPATIENT
Start: 2021-09-14 | End: 2021-09-14

## 2021-09-14 RX ORDER — CALCIUM CARBONATE/VITAMIN D3 600 MG-125
1 TABLET ORAL DAILY
COMMUNITY

## 2021-09-14 RX ORDER — SODIUM CHLORIDE, SODIUM LACTATE, POTASSIUM CHLORIDE, CALCIUM CHLORIDE 600; 310; 30; 20 MG/100ML; MG/100ML; MG/100ML; MG/100ML
75 INJECTION, SOLUTION INTRAVENOUS CONTINUOUS
Status: DISPENSED | OUTPATIENT
Start: 2021-09-14 | End: 2021-09-15

## 2021-09-14 RX ORDER — NALOXONE HYDROCHLORIDE 0.4 MG/ML
0.4 INJECTION, SOLUTION INTRAMUSCULAR; INTRAVENOUS; SUBCUTANEOUS
Status: DISCONTINUED | OUTPATIENT
Start: 2021-09-14 | End: 2021-09-14

## 2021-09-14 RX ORDER — LIDOCAINE HYDROCHLORIDE 20 MG/ML
20 INJECTION, SOLUTION INFILTRATION; PERINEURAL ONCE
Status: COMPLETED | OUTPATIENT
Start: 2021-09-14 | End: 2021-09-14

## 2021-09-14 RX ORDER — FLUOXETINE HYDROCHLORIDE 40 MG/1
40 CAPSULE ORAL DAILY
COMMUNITY
End: 2022-07-29 | Stop reason: ALTCHOICE

## 2021-09-14 RX ORDER — LIDOCAINE HYDROCHLORIDE 20 MG/ML
INJECTION, SOLUTION INFILTRATION; PERINEURAL
Status: COMPLETED
Start: 2021-09-14 | End: 2021-09-14

## 2021-09-14 RX ORDER — FENTANYL CITRATE 50 UG/ML
100 INJECTION, SOLUTION INTRAMUSCULAR; INTRAVENOUS
Status: DISCONTINUED | OUTPATIENT
Start: 2021-09-14 | End: 2021-09-14

## 2021-09-14 RX ADMIN — SODIUM CHLORIDE, POTASSIUM CHLORIDE, SODIUM LACTATE AND CALCIUM CHLORIDE 75 ML/HR: 600; 310; 30; 20 INJECTION, SOLUTION INTRAVENOUS at 17:50

## 2021-09-14 RX ADMIN — LIDOCAINE HYDROCHLORIDE 8 ML: 20 INJECTION, SOLUTION INFILTRATION; PERINEURAL at 15:40

## 2021-09-14 RX ADMIN — ONDANSETRON 4 MG: 2 INJECTION INTRAMUSCULAR; INTRAVENOUS at 09:37

## 2021-09-14 RX ADMIN — ACETAMINOPHEN 650 MG: 650 SUPPOSITORY RECTAL at 00:25

## 2021-09-14 RX ADMIN — Medication 10 ML: at 06:00

## 2021-09-14 RX ADMIN — CEFEPIME HYDROCHLORIDE 2 G: 2 INJECTION, POWDER, FOR SOLUTION INTRAVENOUS at 11:22

## 2021-09-14 RX ADMIN — MIDAZOLAM HYDROCHLORIDE 1 MG: 1 INJECTION, SOLUTION INTRAMUSCULAR; INTRAVENOUS at 15:20

## 2021-09-14 RX ADMIN — FENTANYL CITRATE 50 MCG: 50 INJECTION, SOLUTION INTRAMUSCULAR; INTRAVENOUS at 15:20

## 2021-09-14 RX ADMIN — VANCOMYCIN HYDROCHLORIDE 1750 MG: 10 INJECTION, POWDER, LYOPHILIZED, FOR SOLUTION INTRAVENOUS at 09:20

## 2021-09-14 RX ADMIN — Medication 10 ML: at 00:26

## 2021-09-14 RX ADMIN — IOPAMIDOL 28 ML: 612 INJECTION, SOLUTION INTRAVENOUS at 15:41

## 2021-09-14 NOTE — PROGRESS NOTES
Pharmacist Note - Vancomycin Dosing    Consult provided for this 66 y.o. male for indication of GPC bacteremia. Antibiotic regimen(s): Cefepime + vancomycin  Patient on vancomycin PTA? NO     Recent Labs     21  0433 21  1847 21  1026   WBC 14.0* 16.1* 12.5*   CREA 1.80* 1.65* 1.89*   BUN 24* 25* 27*     Frequency of BMP: Daily until 9/15  Height: 172.7 cm  Weight: 72.6 kg  Est CrCl: ~30-40 ml/min; UO: >1 ml/kg/hr  Temp (24hrs), Av.5 °F (37.5 °C), Min:97.7 °F (36.5 °C), Max:101.7 °F (38.7 °C)    Cultures:   Blood: GPCs in clusters in 3/4 bottles, pending    MRSA Swab ordered (if applicable)? N/A    The plan below is expected to result in a target range of AUC/CARLA 400-600    Therapy will be initiated with a loading dose of 1750 mg IV x 1 to be followed by a maintenance dose of 1000 mg IV every 24 hours. Pharmacy to follow patient daily and order levels / make dose adjustments as appropriate. *Vancomycin has been dosed used Bayesian kinetics software to target an AUC/CARLA of 400-600, which provides adequate exposure for an assumed infection due to MRSA with an CARLA of 1 or less while reducing the risk of nephrotoxicity as seen with traditional trough based dosing goals.

## 2021-09-14 NOTE — H&P
Interventional and Vascular Radiology History and Physical    Patient: Skye Hernandez 66 y.o. male       Chief Complaint: No chief complaint on file. History of Present Illness: left urinary obstruction     History:    Past Medical History:   Diagnosis Date    Back pain     Backache, unspecified 2/8/2010    Bladder cancer ([facility] [address] 7/14/2020    He is s/p TURBT on 6/25/2020. No obvious residual tumor, but tessy and induration at the base. Pathology reads invasive papillary urothelial carcinoma, high grade (bladder) and invasive high grade urothelial carcinoma pT2 (bladder base). 07- visit He has high grade muscle invasive bladder cancer. I thought it was grossly resected but pathology reveals high grade disease. He is at high risk o    Blood clots 2004    spine    Cancer [facility]) 2013    prostate    CHF (congestive heart failure) ([facility] [address] 1999    Coronary artery disease     Coronary atherosclerosis of native coronary artery 2/8/2010    DDD (degenerative disc disease)     Diabetes ([facility] [address]     Essential hypertension, benign 2/8/2010    Gastroenteritis, viral     Heart block     95%    Hematuria, unspecified 7/14/2020    He had gross hematuria and clots in his Depends. He notes not pain in the bladder or flank. CT 4/20/20 without renal concerns. Tumor found on cystsocopy on 5/29/2020.  HTN (hypertension), benign     Hypercholesterolemia     Ill-defined condition     short term memory loss since SDH    Ill-defined condition     urinary incontinence    Ill-defined condition     limited vision R eye    Ill-defined condition 1996    concussion with subdural hematoma    Ill-defined condition 1999 & early 2000s    Mycardial Infarction    Impotence 7/14/2020    He has ED. He and his partner are not active. Viagra was no longer helpful.  Lumbago 2/8/2010    Malignant neoplasm of prostate ([address] 7/14/2020    He is s/p a robotic prostatectomy 10/18/13.  He had Corey's 6 disease wtih negative margins and negative nodes. His PSA has been undetectable, last drawn on 2020.  Neurogenic dysfunction of the urinary bladder 2020    He has a hypotonic bladder and is emptying with Valsalva. He has mild ISD and urge incontinence. He has some enuresis.  Neuropathy Peripheral     pain in feet    Osteoarthritis     Osteoarthrosis, unspecified whether generalized or localized, unspecified site 2010    Seizures ([address]      after head injury    Skin disease     itching alot    Stroke [facility])     Unspecified hereditary and idiopathic peripheral neuropathy 2010    Urgency of urination 2020    He has occasional urgency. He wears depends for a precaution. He does have an Acticuff clamp. He had urgency and episodic incontinence prior to prostate surgery. He does not leak with cough or sneeze. He drinks diet Pepsi all day long and less often coffee. He has chronic back problems with back surgery , 2016, 2017 and 2018. He had a spinal stimulator which was removed. He has had thi    Urgency of urination 2020    He has occasional urgency. He wears depends for a precaution. He does have an Acticuff clamp. He had urgency and episodic incontinence prior to prostate surgery. He does not leak with cough or sneeze. He drinks diet Pepsi all day long and less often coffee. He has chronic back problems with back surgery , 2016, 2017 and 2018. He had a spinal stimulator which was removed.  He has had thi     Family History   Adopted: Yes     Social History     Socioeconomic History    Marital status:      Spouse name: Not on file    Number of children: Not on file    Years of education: Not on file    Highest education level: Not on file   Occupational History    Not on file   Tobacco Use    Smoking status: Former Smoker     Years: 35.00     Quit date: 1999     Years since quittin.6    Smokeless tobacco: Never Used   Vaping Use    Vaping Use: Never used Substance and Sexual Activity    Alcohol use: Not Currently    Drug use: Not Currently     Types: Prescription, Opiates    Sexual activity: Yes     Partners: Female   Other Topics Concern    Not on file   Social History Narrative    Not on file     Social Determinants of Health     Financial Resource Strain:     Difficulty of Paying Living Expenses:    Food Insecurity:     Worried About Running Out of Food in the Last Year:     [address] in the Last Year:    Transportation Needs:     Lack of Transportation (Medical):  Lack of Transportation (Non-Medical):    Physical Activity:     Days of Exercise per Week:     Minutes of Exercise per Session:    Stress:     Feeling of Stress :    Social Connections:     Frequency of Communication with Friends and Family:     Frequency of Social Gatherings with Friends and Family:     Attends Christianity Services:     Active Member of Clubs or Organizations:     Attends Club or Organization Meetings:     Marital Status:    Intimate Partner Violence:     Fear of Current or Ex-Partner:     Emotionally Abused:     Physically Abused:     Sexually Abused: Allergies:    Allergies   Allergen Reactions    Lisinopril Unknown (comments)       Current Medications:  Current Facility-Administered Medications   Medication Dose Route Frequency    Vancomycin - Pharmacy to Dose   Other Rx Dosing/Monitoring    cefepime (MAXIPIME) 2 g in 0.9% sodium chloride (MBP/ADV) 100 mL MBP  2 g IntraVENous Q12H    lidocaine (XYLOCAINE) 20 mg/mL (2 %) injection 400 mg  20 mL SubCUTAneous ONCE    iopamidoL (ISOVUE 300) 61 % contrast injection 100 mL  100 mL IntraCATHeter RAD ONCE    lidocaine (XYLOCAINE) 20 mg/mL (2 %) injection        flumazeniL (ROMAZICON) 0.1 mg/mL injection 0.5 mg  0.5 mg IntraVENous RAD PRN    midazolam (VERSED) injection 5 mg  5 mg IntraVENous Rad Multiple    fentaNYL citrate (PF) injection 100 mcg  100 mcg IntraVENous Rad Multiple    naloxone [facility]) injection 0.4 mg  0.4 mg IntraVENous RAD PRN    0.9% sodium chloride infusion  25 mL/hr IntraVENous RAD CONTINUOUS    [START ON 9/15/2021] vancomycin (VANCOCIN) 1,000 mg in 0.9% sodium chloride 250 mL (VIAL-MATE)  1,000 mg IntraVENous Q24H    0.9% sodium chloride infusion 250 mL  250 mL IntraVENous PRN    sodium chloride (NS) flush 5-40 mL  5-40 mL IntraVENous Q8H    sodium chloride (NS) flush 5-40 mL  5-40 mL IntraVENous PRN    acetaminophen (TYLENOL) tablet 650 mg  650 mg Oral Q6H PRN    Or    acetaminophen (TYLENOL) suppository 650 mg  650 mg Rectal Q6H PRN    promethazine (PHENERGAN) tablet 12.5 mg  12.5 mg Oral Q6H PRN    Or    ondansetron (ZOFRAN) injection 4 mg  4 mg IntraVENous Q6H PRN    albuterol (PROVENTIL VENTOLIN) nebulizer solution 2.5 mg  2.5 mg Nebulization Q4H PRN    bisacodyL (DULCOLAX) suppository 10 mg  10 mg Rectal DAILY PRN        Physical Exam:  Blood pressure (!) 140/71, pulse 92, temperature 99.8 °F (37.7 °C), resp. rate 19, height 5' 8\" (1.727 m), weight 72.6 kg (160 lb), SpO2 100 %. LUNG: clear to auscultation bilaterally, HEART: regular rate and rhythm, S1, S2 normal, no murmur, click, rub or gallop      Alerts:    Hospital Problems  Date Reviewed: 1/26/2021        Codes Class Noted POA    Arterial bleed, intraoperative ICD-10-CM: I97.418  ICD-9-CM: 998.11  9/13/2021 Unknown              Laboratory:      Recent Labs     09/14/21  1253 09/14/21  0820 09/14/21  0433 09/13/21  1847 09/13/21  1026   HGB 8.5*   < > 8.7*   < > 10.1*   HCT 26.5*   < > 27.2*   < > 31.3*   WBC  --   --  14.0*   < > 12.5*   PLT  --   --  291   < > 361   INR  --   --   --   --  1.0   BUN  --   --  24*   < > 27*   CREA  --   --  1.80*   < > 1.89*   K  --   --  4.2   < > 4.4    < > = values in this interval not displayed. Plan of Care/Planned Procedure:  Risks, benefits, and alternatives reviewed with patient and he agrees to proceed with the procedure.  Conscious sedation will be performed with IV fentanyl and versed.  Plan is for left neph tube placement       Keli Palacios MD

## 2021-09-14 NOTE — PROGRESS NOTES
Pt arrives in bed to angio department accompanied by transporter for left neph tube procedure. All assessments completed and consent was reviewed. Education given was regarding procedure, conscious sedation, post-procedure care and  management/follow-up. Opportunity for questions was provided and all questions and concerns were addressed.

## 2021-09-14 NOTE — PROGRESS NOTES
1930: Bedside and Verbal shift change report given to Thedford Ormond, RN (oncoming nurse) by Nel Moura. Vesta Herron RN (offgoing nurse). Report included the following information SBAR, Kardex, ED Summary, Procedure Summary, Intake/Output, MAR, Accordion, Recent Results, Cardiac Rhythm Sinus Tachycardia, Alarm Parameters  and Dual Neuro Assessment. 2025: Updates given to pt's spouse, Nina Osorio, via telephone. 0500: Updates given to pt's spouse, Nina Osorio, via telephone. 0730: Bedside and Verbal shift change report given to Yolie Cruz RN (oncoming nurse) by TRACY Goldman RN (offgoing nurse). Report included the following information SBAR, Kardex, ED Summary, Procedure Summary, Intake/Output, MAR, Accordion, Recent Results, Cardiac Rhythm ST/SR, Alarm Parameters  and Dual Neuro Assessment.

## 2021-09-14 NOTE — PROGRESS NOTES
0730- Bedside shift change report given to Dale Hunt RN (oncoming nurse) by Michelle Hernandez RN (offgoing nurse). Report included the following information SBAR, Kardex, ED Summary, Intake/Output, MAR, Accordion, Recent Results, Med Rec Status, Cardiac Rhythm SR, Alarm Parameters  and Dual Neuro Assessment. SHIFT SUMMARY- Patient is alert & oriented to self and place. VSS after nephrostomy tube placement. Small amount of blood on dressing but not bleeding now, sanguinous drainage in nephrostomy bag, easily flushed with 10mL normal saline (order to flush Q6 hours). Adequate urine output and urostomy draining well.

## 2021-09-14 NOTE — PROGRESS NOTES
Occupational Therapy  09/14/21    Order acknowledged, chart reviewed. Patient MISTY for nephrostomy tube placement, will follow up tomorrow for OT evaluation as able & appropriate.      Thank you,   Mell Burks, OTD, OTR/L

## 2021-09-14 NOTE — PROGRESS NOTES
TRANSFER - OUT REPORT:    Verbal report given to Gregory HUSSEIN(name) on Jori Ramos  being transferred to ICU(unit) for routine post - op       Report consisted of patients Situation, Background, Assessment and   Recommendations(SBAR). Information from the following report(s) SBAR and Procedure Summary was reviewed with the receiving nurse. Lines:   Peripheral IV 09/13/21 Right Arm (Active)   Site Assessment Clean, dry, & intact 09/14/21 0800   Phlebitis Assessment 0 09/14/21 0800   Infiltration Assessment 0 09/14/21 0800   Dressing Status Clean, dry, & intact 09/14/21 0800   Dressing Type Transparent 09/14/21 0800   Hub Color/Line Status Pink;Capped 09/14/21 0800   Action Taken Open ports on tubing capped 09/14/21 0800   Alcohol Cap Used Yes 09/14/21 0800       Peripheral IV 09/13/21 Left Wrist (Active)   Site Assessment Clean, dry, & intact 09/14/21 0800   Phlebitis Assessment 0 09/14/21 0800   Infiltration Assessment 0 09/14/21 0800   Dressing Status Clean, dry, & intact 09/14/21 0800   Dressing Type Transparent 09/14/21 0800   Hub Color/Line Status Blue;Capped 09/14/21 0800   Action Taken Open ports on tubing capped 09/14/21 0800   Alcohol Cap Used Yes 09/14/21 0800        Opportunity for questions and clarification was provided.       Patient transported with:   Monitor  Registered Nurse

## 2021-09-14 NOTE — PROGRESS NOTES
Admission Medication Reconciliation:    Information obtained from:  Patient's wife (Mrs. Malvin Siemens)  RxQuery data available¹:  YES    Comments/Recommendations: Updated PTA meds/reviewed patient's allergies. 1)  Reviewed patient's medication list with his wife who manages his medications at home. Mrs. Malvin Siemens had a list of his current medications available with her. 2)  Medication changes (since last review): Added  - Calcium 600 mg- D3    Adjusted (sig added)  - Aspirin 81 mg- 1 tab PO daily  - Breztri Aerosphere- 2 inhalations BID  - Carvedilol 6.25 mg- 1 tab PO daily  - Sodium bicarbonate 650 mg- 1 tab PO BID  - Fluoxetine 40 mg- 1 cap PO daily    Removed  - Vitamin B12 injection    3)  Mrs. Malvin Siemens confirmed that the patient was taking carvedilol 6.25 mg once daily. She reported that his doctor had reduced his dose. ¹RxQuery pharmacy benefit data reflects medications filled and processed through the patient's insurance, however   this data does NOT capture whether the medication was picked up or is currently being taken by the patient. Allergies:  Lisinopril    Chief Complaint for this Admission:  No chief complaint on file. Prior to Admission Medications:   Prior to Admission Medications   Prescriptions Last Dose Informant Patient Reported? Taking? FLUoxetine (PROzac) 40 mg capsule  Other Yes Yes   Sig: Take 40 mg by mouth daily. aspirin delayed-release 81 mg tablet  Other Yes Yes   Sig: Take 81 mg by mouth daily. budesonide-glycopyr-formoterol (Breztri Aerosphere) 160-9-4.8 mcg/actuation HFAA  Other Yes Yes   Sig: Take 2 Inhalation by inhalation two (2) times a day. calcium-cholecalciferol, d3, (CALCIUM 600 + D) 600-125 mg-unit tab  Other Yes Yes   Sig: Take 1 Tablet by mouth daily. carvedilol (COREG) 6.25 mg tablet  Other Yes Yes   Sig: Take 6.25 mg by mouth daily. levothyroxine (SYNTHROID) 25 mcg tablet  Other Yes Yes   Sig: Take 25 mcg by mouth Daily (before breakfast).    montelukast (SINGULAIR) 10 mg tablet  Other Yes Yes   Sig: Take 10 mg by mouth daily. pravastatin (PRAVACHOL) 40 mg tablet  Other Yes Yes   Sig: Take 40 mg by mouth daily. sodium bicarbonate 650 mg tablet  Other Yes Yes   Sig: Take 650 mg by mouth two (2) times a day. traZODone (DESYREL) 100 mg tablet  Other Yes Yes   Sig: Take 100 mg by mouth nightly. Facility-Administered Medications: None      Please contact the main inpatient pharmacy with any questions or concerns at [phone] and we will direct you to the clinical pharmacist covering this patient's care while in-house.      Reji Jacinto, PharmD Candidate [phone]

## 2021-09-14 NOTE — ROUTINE PROCESS
TRANSFER - OUT REPORT:    Verbal report given to Kristen HUSSEIN(name) on Michelle Murphy  being transferred to ICU(unit) for routine post - op       Report consisted of patients Situation, Background, Assessment and   Recommendations(SBAR). Information from the following report(s) SBAR and Procedure Summary was reviewed with the receiving nurse. Lines:   Peripheral IV 09/13/21 Right Arm (Active)   Site Assessment Clean, dry, & intact 09/14/21 0800   Phlebitis Assessment 0 09/14/21 0800   Infiltration Assessment 0 09/14/21 0800   Dressing Status Clean, dry, & intact 09/14/21 0800   Dressing Type Transparent 09/14/21 0800   Hub Color/Line Status Pink;Capped 09/14/21 0800   Action Taken Open ports on tubing capped 09/14/21 0800   Alcohol Cap Used Yes 09/14/21 0800       Peripheral IV 09/13/21 Left Wrist (Active)   Site Assessment Clean, dry, & intact 09/14/21 0800   Phlebitis Assessment 0 09/14/21 0800   Infiltration Assessment 0 09/14/21 0800   Dressing Status Clean, dry, & intact 09/14/21 0800   Dressing Type Transparent 09/14/21 0800   Hub Color/Line Status Blue;Capped 09/14/21 0800   Action Taken Open ports on tubing capped 09/14/21 0800   Alcohol Cap Used Yes 09/14/21 0800        Opportunity for questions and clarification was provided.       Patient transported with:   Monitor  Registered Nurse

## 2021-09-14 NOTE — PROGRESS NOTES
Reviewed chart for transitions of care, and discussed in rounds. CM met with patient's wife Bebe Blum at bedside to explain role and offer support. Patient is alert and oriented x4, and confirmed demographics. Baseline:   ADLs/IDALS:Independent  Previous Home Health: wife unable to recall the agency name, had [facility] after surgery last year  Previous SNF/IPR:None  ER Contact: Wife Bebe Blum [phone]    Patient lives in a 1 level house with a basement with 7-8  steps to enter. Patient is independent with ADLs/IDALs     Patient uses a cane to ambulate, also has a walker and rollator. Patient's preferred pharmacy is [facility]  located on [address] [facility]. Patient's wife  is expected to transport at discharge. Reason for Admission:  Emergent renal arterial angiogram with embolization r/t renal arterial bleed. RUR Score:   13%                  Plan for utilizing home health:    TBD      PCP: First and Last name:  Isabel Puri NP     Name of Practice:    Are you a current patient: Yes/No: Yes   Approximate date of last visit:    Can you participate in a virtual visit with your PCP: No                    Current Advanced Directive/Advance Care Plan: Full Code      Healthcare Decision Maker:   Click here to complete [address] including selection of the Healthcare Decision Maker Relationship (ie \"Primary\")           Care manager met with patient's wife to introduce self and explain role. Wife confirmed patient's PCP and demographic information. Plan is for patient to have left nephrostomy tube placed today. Per wife patient has become weaker lately. Will have therapy evaluate. Nancy Her RN,Care Management           Care Management Interventions  PCP Verified by CM:  Yes Sally Moy NP)  MyChart Signup: No  Discharge Durable Medical Equipment: No  Physical Therapy Consult: No  Occupational Therapy Consult: No  Speech Therapy Consult: No  Support Systems: Spouse/Significant Other (wife Doe Do [phone])  Confirm Follow Up Transport: Family

## 2021-09-14 NOTE — PROGRESS NOTES
[facility]    ICU Consult    Name: Jenise Mooney   : 1950   MRN: [id number]   Date: 2021      Subjective:   Progress Note: 2021      Reason for ICU Consult: ICU management     HPI:  Pt is a 65 yo WM with a PMH of prostate cancer, bladder cancer sp TURBT 2020 with ileostomy, he had a recent right nephrostomy tube placed last week after failed attempt for left urethal stent placed du left ureteral obstruction. He was found to have some blood clots in the nephrostomy tube so was taken to IR today for left ureteral obstruction with left hydronephrosis and arterial bleeding into the left collecting system. Pulsatile blood was found during arteriogram, pt was embolized and at high risk for bleeding therefore pt was brought to the ICU for close monitoring post procedure. Plans for left nephrostomy tube placement on the left tomorrow. Interval Events:   2021      POD:  * No surgery found *    S/P:       Assessment and Recommendations:     ICU Problems:  1. Left ureteral obstruction with left hydronephrosis due to arterial bleed sp embolization today   - Post op per IR  - Serial Q4 H and H   - Monitor closely for s/s bleeding  - Plans for OR today for nephrostomy tube placement  - NPO at [facility]   - Transfuse for hgb <7  - CBC stable, hgb 8.7    2. Leukocytosis   - LA nml, will trend and check in am   - Afebrile   - Will CTM closely   - Procal pending   - Monitor closely for s/s of infection   - Preliminary BC +3/4 gram + will start vanc, CTM for final and deescalate abx when able   - Cefepime added as well for gram negative coverage     3. HTN  - WNL at this time, will resume PTA meds in am       4.  Generalized weakness   -PT/OT added    Active Problem List:     Problem List  Date Reviewed: 2021        Codes Class    Arterial bleed, intraoperative ICD-10-CM: I97.418  ICD-9-CM: 998.11         Stage 3 chronic kidney disease ([facility] [address] ICD-10-CM: N18.30  ICD-9-CM: 434.9 Hydronephrosis ICD-10-CM: N13.30  ICD-9-CM: 582     Overview Signed 8/15/2021 11:31 AM by Edgardo Wilhelm NP     CT 7/10/21: Moderate left hydronephrosis. Dilation of the ureter leading to the ureteral anastomosis with the ileal conduit. No right hydronephrosis. No stones in either side. Bladder cancer [facility]) ICD-10-CM: C67.9  ICD-9-CM: 188.9     Overview Addendum 9/9/2020  1:29 PM by Edgardo Wilhelm NP     He is s/p TURBT on 6/25/2020. No obvious residual tumor, but tessy and induration at the base. Pathology reads invasive papillary urothelial carcinoma, high grade (bladder) and invasive high grade urothelial carcinoma pT2 (bladder base). He is s/p cystectomy with ileal conduit urinary diversion on 8/24/2020. He had an uncomplicated post-operative course and was discharged on 8/31/2020 with [address]. 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 PT2bN0. Malignant neoplasm of prostate [facility]) ICD-10-CM: C61  ICD-9-CM: 185     Overview Addendum 8/15/2021 11:32 AM by Edgardo Wilhelm NP     He is s/p a robotic prostatectomy 10/18/13. He had Corey's 6 disease wtih negative margins and negative nodes. His PSA has been undetectable, last drawn on 5/20/2021.              Spondylolisthesis at L2-L3 level ICD-10-CM: M43.16  ICD-9-CM: 756.12         Lumbar stenosis ICD-10-CM: M48.061  ICD-9-CM: 724.02         Lumbago ICD-10-CM: M54.5  ICD-9-CM: 724.2         Hereditary and idiopathic peripheral neuropathy ICD-10-CM: G60.9  ICD-9-CM: 356.9         Osteoarthrosis, unspecified whether generalized or localized, unspecified site ICD-10-CM: M19.90  ICD-9-CM: 715.90         Backache, unspecified ICD-10-CM: M54.9  ICD-9-CM: 724.5         Congestive heart failure, unspecified ICD-10-CM: I50.9  ICD-9-CM: 428.0         Essential hypertension, benign ICD-10-CM: I10  ICD-9-CM: 401.1         Coronary atherosclerosis of native coronary artery ICD-10-CM: I25.10  ICD-9-CM: 414.01               Past Medical History:      has a past medical history of Back pain, Backache, unspecified (2/8/2010), Bladder cancer ([facility] [address] (7/14/2020), Blood clots (2004), Cancer ([address] (2013), CHF (congestive heart failure) ([facility] [address] (1999), Coronary artery disease, Coronary atherosclerosis of native coronary artery (2/8/2010), DDD (degenerative disc disease), Diabetes ([address] Essential hypertension, benign (2/8/2010), Gastroenteritis, viral, Heart block, Hematuria, unspecified (7/14/2020), HTN (hypertension), benign, Hypercholesterolemia, Ill-defined condition, Ill-defined condition, Ill-defined condition, Ill-defined condition (1996), Ill-defined condition (1999 & early 2000s), Impotence (7/14/2020), Lumbago (2/8/2010), Malignant neoplasm of prostate ([facility] [address] (7/14/2020), Neurogenic dysfunction of the urinary bladder (7/14/2020), Neuropathy Peripheral, Osteoarthritis, Osteoarthrosis, unspecified whether generalized or localized, unspecified site (2/8/2010), Seizures ([facility] [address] Skin disease, Stroke ([facility] [address] (1996), Unspecified hereditary and idiopathic peripheral neuropathy (2/8/2010), Urgency of urination (7/14/2020), and Urgency of urination (7/14/2020).     Past Surgical History:      has a past surgical history that includes hx heart catheterization (3068,2860); hx appendectomy; hx cataract removal; hx tonsillectomy; colonoscopy (N/A, 4/12/2018); neurological procedure unlisted (1996); hx back surgery (2002); hx back surgery (2004); neurological procedure unlisted; hx circumcision; hx prostatectomy (10/18/2013); hx prostate surgery (08/24/2020); hx prostate surgery (10/18/2013); hx urological (06/25/2020); hx urological (06/25/2020); hx urological (05/29/2020); hx urological (08/24/2020); hx urological (08/24/2020); hx urological (08/26/2021); hx urological (08/26/2021); hx urological (08/26/2021); ir nephrostomy perc lt plc cath  si (9/7/2021); ir remove neph tube rt (9/13/2021); and ir occl natalie [facility] si (2021). Home Medications:     Prior to Admission medications    Medication Sig Start Date End Date Taking? Authorizing Provider   cyanocobalamin (Vitamin B-12) 1,000 mcg/mL injection 1,000 mcg by IntraMUSCular route once. Indications: inadequate vitamin B12    Provider, Historical   sodium bicarbonate 650 mg tablet Take  by mouth four (4) times daily. Provider, Historical   budesonide-glycopyr-formoterol (Breztri Aerosphere) 160-9-4.8 mcg/actuation HFAA Take  by inhalation. Provider, Historical   FLUoxetine (PROzac) 20 mg capsule Take  by mouth daily. Provider, Historical   levothyroxine (SYNTHROID) 75 mcg tablet Take  by mouth Daily (before breakfast). Provider, Historical   traZODone (DESYREL) 100 mg tablet Take 100 mg by mouth nightly. Provider, Historical   montelukast (SINGULAIR) 10 mg tablet Take 10 mg by mouth daily. Provider, Historical   aspirin delayed-release 81 mg tablet Take  by mouth daily. Provider, Historical   carvedilol (COREG) 6.25 mg tablet Take 6.25 mg by mouth two (2) times a day. Provider, Historical   pravastatin (PRAVACHOL) 40 mg tablet Take 40 mg by mouth nightly. Provider, Historical       Allergies/Social/Family History: Allergies   Allergen Reactions    Lisinopril Unknown (comments)      Social History     Tobacco Use    Smoking status: Former Smoker     Years: 35.00     Quit date: 1999     Years since quittin.6    Smokeless tobacco: Never Used   Substance Use Topics    Alcohol use: Not Currently      Family History   Adopted: Yes       Review of Systems:     Review of systems not obtained due to patient factors.     Objective:   Vital Signs:  Visit Vitals  BP (!) 112/52   Pulse 86   Temp 98.2 °F (36.8 °C)   Resp 16   Ht 5' 8\" (1.727 m)   Wt 72.6 kg (160 lb)   SpO2 96%   BMI 24.33 kg/m²    O2 Flow Rate (L/min): 3 l/min O2 Device: None (Room air) Temp (24hrs), Av °F (37.8 °C), Min:97.7 °F (36.5 °C), Max:101.7 °F (38.7 °C)           Intake/Output:     Intake/Output Summary (Last 24 hours) at 9/14/2021 0816  Last data filed at 9/14/2021 0700  Gross per 24 hour   Intake --   Output 1350 ml   Net -1350 ml       Physical Exam:    General:  Alert, cooperative, well noursished, well developed, appears stated age   Eyes:  Sclera anicteric. Pupils equally round and reactive to light. Mouth/Throat: Mucous membranes normal, oral pharynx clear   Neck: Supple   Lungs:   Clear to auscultation bilaterally, good effort   CV:  Regular rate and rhythm,no murmur, click, rub or gallop   Abdomen:   Soft, non-tender. bowel sounds normal. non-distended   Extremities: No cyanosis or edema   Skin: Skin color, texture, turgor normal. no acute rash or lesions   Lymph nodes: Cervical and supraclavicular normal   Musculoskeletal: No swelling or deformity   Lines/Devices:  Intact, no erythema, drainage or tenderness   Psych: Alert and oriented, normal mood affect given the setting       LABS AND  DATA: Personally reviewed  Recent Labs     09/14/21 0433 09/13/21  2317 09/13/21 1847 09/13/21 1847   WBC 14.0*  --   --  16.1*   HGB 8.7* 9.0*   < > 9.6*   HCT 27.2* 27.6*   < > 30.2*     --   --  296    < > = values in this interval not displayed. Recent Labs     09/14/21 0433 09/13/21  1847    137   K 4.2 4.4    106   CO2 22 21   BUN 24* 25*   CREA 1.80* 1.65*   * 115*   CA 8.0* 7.9*   MG 2.1 1.9   PHOS 3.5 2.4*     Recent Labs     09/14/21 0433 09/13/21  1026    134*   TP 5.6* 7.5   ALB 2.2* 2.6*   GLOB 3.4 4.9*     Recent Labs     09/14/21 0433 09/13/21  1026   INR  --  1.0   PTP  --  10.7   APTT 29.4  --       No results for input(s): PHI, PCO2I, PO2I, FIO2I in the last 72 hours.   Recent Labs     09/13/21  1847   TROIQ <0.05       Hemodynamics:   PAP:   CO:     Wedge:   CI:     CVP:    SVR:       PVR:       Ventilator Settings:  Mode Rate Tidal Volume Pressure FiO2 PEEP                    Peak airway pressure:      Minute ventilation:          MEDS: Reviewed    Chest X-Ray:  CXR Results  (Last 48 hours)    None          SPECIAL EQUIPMENT  None    DISPOSITION  Stay in ICU    CRITICAL CARE CONSULTANT NOTE  I had a face to face encounter with the patient, reviewed and interpreted patient data including clinical events, labs, images, vital signs, I/O's, and examined patient. I have discussed the case and the plan and management of the patient's care with the consulting services, the bedside nurses and the respiratory therapist.      NOTE OF PERSONAL INVOLVEMENT IN CARE   This patient has a high probability of imminent, clinically significant deterioration, which requires the highest level of preparedness to intervene urgently. I participated in the decision-making and personally managed or directed the management of the following life and organ supporting interventions that required my frequent assessment to treat or prevent imminent deterioration. I personally spent 45 minutes of critical care time. This is time spent at this critically ill patient's bedside actively involved in patient care as well as the coordination of care and discussions with the patient's family. This does not include any procedural time which has been billed separately.     Marvin Allen NP    [facility]

## 2021-09-15 LAB
ANION GAP SERPL CALC-SCNC: 8 MMOL/L (ref 5–15)
BUN SERPL-MCNC: 30 MG/DL (ref 6–20)
BUN/CREAT SERPL: 16 (ref 12–20)
CALCIUM SERPL-MCNC: 8.4 MG/DL (ref 8.5–10.1)
CHLORIDE SERPL-SCNC: 112 MMOL/L (ref 97–108)
CO2 SERPL-SCNC: 22 MMOL/L (ref 21–32)
CREAT SERPL-MCNC: 1.86 MG/DL (ref 0.7–1.3)
ERYTHROCYTE [DISTWIDTH] IN BLOOD BY AUTOMATED COUNT: 15.5 % (ref 11.5–14.5)
GLUCOSE SERPL-MCNC: 91 MG/DL (ref 65–100)
HCT VFR BLD AUTO: 24.9 % (ref 36.6–50.3)
HCT VFR BLD AUTO: 24.9 % (ref 36.6–50.3)
HCT VFR BLD AUTO: 25.6 % (ref 36.6–50.3)
HGB BLD-MCNC: 7.9 G/DL (ref 12.1–17)
HGB BLD-MCNC: 8 G/DL (ref 12.1–17)
HGB BLD-MCNC: 8.1 G/DL (ref 12.1–17)
LACTATE SERPL-SCNC: 0.8 MMOL/L (ref 0.4–2)
MAGNESIUM SERPL-MCNC: 2.3 MG/DL (ref 1.6–2.4)
MCH RBC QN AUTO: 26.1 PG (ref 26–34)
MCHC RBC AUTO-ENTMCNC: 31.7 G/DL (ref 30–36.5)
MCV RBC AUTO: 82.2 FL (ref 80–99)
NRBC # BLD: 0 K/UL (ref 0–0.01)
NRBC BLD-RTO: 0 PER 100 WBC
PHOSPHATE SERPL-MCNC: 2.6 MG/DL (ref 2.6–4.7)
PLATELET # BLD AUTO: 350 K/UL (ref 150–400)
PMV BLD AUTO: 9.8 FL (ref 8.9–12.9)
POTASSIUM SERPL-SCNC: 4.6 MMOL/L (ref 3.5–5.1)
RBC # BLD AUTO: 3.03 M/UL (ref 4.1–5.7)
SODIUM SERPL-SCNC: 142 MMOL/L (ref 136–145)
WBC # BLD AUTO: 11.5 K/UL (ref 4.1–11.1)

## 2021-09-15 PROCEDURE — 36415 COLL VENOUS BLD VENIPUNCTURE: CPT

## 2021-09-15 PROCEDURE — 97162 PT EVAL MOD COMPLEX 30 MIN: CPT

## 2021-09-15 PROCEDURE — 85018 HEMOGLOBIN: CPT

## 2021-09-15 PROCEDURE — 84100 ASSAY OF PHOSPHORUS: CPT

## 2021-09-15 PROCEDURE — 80048 BASIC METABOLIC PNL TOTAL CA: CPT

## 2021-09-15 PROCEDURE — 74011250637 HC RX REV CODE- 250/637: Performed by: NURSE PRACTITIONER

## 2021-09-15 PROCEDURE — 83605 ASSAY OF LACTIC ACID: CPT

## 2021-09-15 PROCEDURE — 94640 AIRWAY INHALATION TREATMENT: CPT

## 2021-09-15 PROCEDURE — 74011000250 HC RX REV CODE- 250: Performed by: NURSE PRACTITIONER

## 2021-09-15 PROCEDURE — 74011250636 HC RX REV CODE- 250/636: Performed by: NURSE PRACTITIONER

## 2021-09-15 PROCEDURE — 87040 BLOOD CULTURE FOR BACTERIA: CPT

## 2021-09-15 PROCEDURE — 83735 ASSAY OF MAGNESIUM: CPT

## 2021-09-15 PROCEDURE — 97165 OT EVAL LOW COMPLEX 30 MIN: CPT | Performed by: OCCUPATIONAL THERAPIST

## 2021-09-15 PROCEDURE — 85027 COMPLETE CBC AUTOMATED: CPT

## 2021-09-15 PROCEDURE — 65660000000 HC RM CCU STEPDOWN

## 2021-09-15 PROCEDURE — 74011000258 HC RX REV CODE- 258: Performed by: NURSE PRACTITIONER

## 2021-09-15 PROCEDURE — 97535 SELF CARE MNGMENT TRAINING: CPT | Performed by: OCCUPATIONAL THERAPIST

## 2021-09-15 PROCEDURE — 97530 THERAPEUTIC ACTIVITIES: CPT

## 2021-09-15 RX ORDER — SODIUM BICARBONATE 650 MG/1
650 TABLET ORAL 2 TIMES DAILY
Status: DISCONTINUED | OUTPATIENT
Start: 2021-09-15 | End: 2021-09-22 | Stop reason: HOSPADM

## 2021-09-15 RX ORDER — PRAVASTATIN SODIUM 40 MG/1
40 TABLET ORAL
Status: DISCONTINUED | OUTPATIENT
Start: 2021-09-15 | End: 2021-09-22 | Stop reason: HOSPADM

## 2021-09-15 RX ORDER — CARVEDILOL 6.25 MG/1
6.25 TABLET ORAL 2 TIMES DAILY WITH MEALS
Status: DISCONTINUED | OUTPATIENT
Start: 2021-09-15 | End: 2021-09-22 | Stop reason: HOSPADM

## 2021-09-15 RX ORDER — TRAZODONE HYDROCHLORIDE 100 MG/1
100 TABLET ORAL
Status: DISCONTINUED | OUTPATIENT
Start: 2021-09-15 | End: 2021-09-22 | Stop reason: HOSPADM

## 2021-09-15 RX ORDER — BUDESONIDE 0.25 MG/2ML
250 INHALANT ORAL
Status: DISCONTINUED | OUTPATIENT
Start: 2021-09-15 | End: 2021-09-22 | Stop reason: HOSPADM

## 2021-09-15 RX ORDER — MONTELUKAST SODIUM 10 MG/1
10 TABLET ORAL
Status: DISCONTINUED | OUTPATIENT
Start: 2021-09-15 | End: 2021-09-22 | Stop reason: HOSPADM

## 2021-09-15 RX ORDER — GUAIFENESIN 100 MG/5ML
81 LIQUID (ML) ORAL DAILY
Status: DISCONTINUED | OUTPATIENT
Start: 2021-09-15 | End: 2021-09-22 | Stop reason: HOSPADM

## 2021-09-15 RX ORDER — LEVOTHYROXINE SODIUM 25 UG/1
25 TABLET ORAL
Status: DISCONTINUED | OUTPATIENT
Start: 2021-09-15 | End: 2021-09-22 | Stop reason: HOSPADM

## 2021-09-15 RX ORDER — FLUOXETINE HYDROCHLORIDE 20 MG/1
40 CAPSULE ORAL DAILY
Status: DISCONTINUED | OUTPATIENT
Start: 2021-09-15 | End: 2021-09-22 | Stop reason: HOSPADM

## 2021-09-15 RX ADMIN — SODIUM BICARBONATE 650 MG: 650 TABLET ORAL at 18:05

## 2021-09-15 RX ADMIN — Medication 10 ML: at 14:48

## 2021-09-15 RX ADMIN — LEVOTHYROXINE SODIUM 25 MCG: 0.03 TABLET ORAL at 10:12

## 2021-09-15 RX ADMIN — BUDESONIDE 250 MCG: 0.25 INHALANT RESPIRATORY (INHALATION) at 19:57

## 2021-09-15 RX ADMIN — ASPIRIN 81 MG: 81 TABLET, CHEWABLE ORAL at 10:12

## 2021-09-15 RX ADMIN — VANCOMYCIN HYDROCHLORIDE 1000 MG: 1 INJECTION, POWDER, LYOPHILIZED, FOR SOLUTION INTRAVENOUS at 08:31

## 2021-09-15 RX ADMIN — SODIUM BICARBONATE 650 MG: 650 TABLET ORAL at 10:12

## 2021-09-15 RX ADMIN — FLUOXETINE 40 MG: 20 CAPSULE ORAL at 10:12

## 2021-09-15 RX ADMIN — PRAVASTATIN SODIUM 40 MG: 40 TABLET ORAL at 21:00

## 2021-09-15 RX ADMIN — Medication 20 ML: at 22:00

## 2021-09-15 RX ADMIN — CEFEPIME HYDROCHLORIDE 2 G: 2 INJECTION, POWDER, FOR SOLUTION INTRAVENOUS at 10:59

## 2021-09-15 RX ADMIN — CARVEDILOL 6.25 MG: 6.25 TABLET, FILM COATED ORAL at 18:05

## 2021-09-15 RX ADMIN — Medication 10 ML: at 01:31

## 2021-09-15 RX ADMIN — MONTELUKAST 10 MG: 10 TABLET, FILM COATED ORAL at 21:00

## 2021-09-15 RX ADMIN — TRAZODONE HYDROCHLORIDE 100 MG: 50 TABLET ORAL at 21:00

## 2021-09-15 RX ADMIN — CEFEPIME HYDROCHLORIDE 2 G: 2 INJECTION, POWDER, FOR SOLUTION INTRAVENOUS at 01:30

## 2021-09-15 RX ADMIN — CARVEDILOL 6.25 MG: 6.25 TABLET, FILM COATED ORAL at 10:12

## 2021-09-15 NOTE — PROGRESS NOTES
Problem: Mobility Impaired (Adult and Pediatric)  Goal: *Acute Goals and Plan of Care (Insert Text)  Description: FUNCTIONAL STATUS PRIOR TO ADMISSION: Patient with reported worsening mobility status since a surgery on 9/7 and was only able to transfer to the rollator and his wife was pushing him in a seated position. Prior to 9/7 pt was walking with a cane. Pt needed physical assistance up and down the steps to exit the house in wheelchair. HOME SUPPORT PRIOR TO ADMISSION: The patient lived with spouse and son (lives in the basement portion of the home). Physical Therapy Goals  Initiated 9/15/2021  1. Patient will move from supine to sit and sit to supine  in bed with minimal assistance/contact guard assist within 7 day(s). 2.  Patient will transfer from bed to chair and chair to bed with minimal assistance/contact guard assist using the least restrictive device within 7 day(s). 3.  Patient will perform sit to stand with minimal assistance/contact guard assist within 7 day(s). 4.  Patient will ambulate with minimal assistance/contact guard assist for 20 feet with the least restrictive device within 7 day(s). 5.  Patient will ascend/descend 9 stairs with bilateral handrail(s) with moderate assistance  within 7 day(s). Outcome: Progressing Towards Goal   PHYSICAL THERAPY EVALUATION  Patient: Andree Olmedo (70 y.o. male)  Date: 9/15/2021  Primary Diagnosis: Hydronephrosis [N13.30]  Arterial bleed, intraoperative [I97.418]        Precautions:     Fall, Skin      ASSESSMENT  Based on the objective data described below, the patient presents with generalized weakness, decreased functional mobility, impaired balance and gait, decreased tolerance to activity, confusion s/p admission on 9/13 and underwent renal arterial angiogram and embolization. Pt received supine in bed and found to be incontinent of loose stool. Pt required mod A rolling and supine to sit EOB.  Pt performed sit<>stand with mod x 1 and short shuffle steps to the chair with poor eccentric control lowering self into the chair. Pt is at increased risk for falls and will benefit from SNF placement upon discharge to reach highest level of independence and reduce caregiver burden. Current Level of Function Impacting Discharge (mobility/balance): mod A bed mobility, sit<>stand, mod A x 2 short gait shuffle steps    Functional Outcome Measure: The patient scored Total: 30/100 on the Barthel Index which is indicative of 70% impaired ability to care for basic self needs/dependency on others. Other factors to consider for discharge:      Patient will benefit from skilled therapy intervention to address the above noted impairments. PLAN :  Recommendations and Planned Interventions: bed mobility training, transfer training, gait training, therapeutic exercises, neuromuscular re-education, patient and family training/education, and therapeutic activities      Frequency/Duration: Patient will be followed by physical therapy:  5 times a week to address goals. Recommendation for discharge: (in order for the patient to meet his/her long term goals)  Therapy up to 5 days/week in SNF setting    This discharge recommendation:  Has been made in collaboration with the attending provider and/or case management    IF patient discharges home will need the following DME: to be determined (TBD)         SUBJECTIVE:   Patient stated I'm ok.     OBJECTIVE DATA SUMMARY:   HISTORY:    Past Medical History:   Diagnosis Date    Back pain     Backache, unspecified 2/8/2010    Bladder cancer ([facility] 7/14/2020    He is s/p TURBT on 6/25/2020. No obvious residual tumor, but tessy and induration at the base. Pathology reads invasive papillary urothelial carcinoma, high grade (bladder) and invasive high grade urothelial carcinoma pT2 (bladder base). 07- visit He has high grade muscle invasive bladder cancer.  I thought it was grossly resected but pathology reveals high grade disease. He is at high risk o    Blood clots 2004    spine    Cancer ([facility] [address] 2013    prostate    CHF (congestive heart failure) ([facility] [address] 1999    Coronary artery disease     Coronary atherosclerosis of native coronary artery 2/8/2010    DDD (degenerative disc disease)     Diabetes ([facility] [address]     Essential hypertension, benign 2/8/2010    Gastroenteritis, viral     Heart block     95%    Hematuria, unspecified 7/14/2020    He had gross hematuria and clots in his Depends. He notes not pain in the bladder or flank. CT 4/20/20 without renal concerns. Tumor found on cystsocopy on 5/29/2020. HTN (hypertension), benign     Hypercholesterolemia     Ill-defined condition     short term memory loss since SDH    Ill-defined condition     urinary incontinence    Ill-defined condition     limited vision R eye    Ill-defined condition 1996    concussion with subdural hematoma    Ill-defined condition 1999 & early 2000s    Mycardial Infarction    Impotence 7/14/2020    He has ED. He and his partner are not active. Viagra was no longer helpful. Lumbago 2/8/2010    Malignant neoplasm of prostate ([facility] [address] 7/14/2020    He is s/p a robotic prostatectomy 10/18/13. He had [place]'s 6 disease wtih negative margins and negative nodes. His PSA has been undetectable, last drawn on 5/14/2020. Neurogenic dysfunction of the urinary bladder 7/14/2020    He has a hypotonic bladder and is emptying with Valsalva. He has mild ISD and urge incontinence. He has some enuresis. Neuropathy Peripheral     pain in feet    Osteoarthritis     Osteoarthrosis, unspecified whether generalized or localized, unspecified site 2/8/2010    Seizures ([facility] [address]     1996 after head injury    Skin disease     itching alot    Stroke [facility]) 1996    Unspecified hereditary and idiopathic peripheral neuropathy 2/8/2010    Urgency of urination 7/14/2020    He has occasional urgency. He wears depends for a precaution. He does have an Acticuff clamp.  He had urgency and episodic incontinence prior to prostate surgery. He does not leak with cough or sneeze. He drinks diet Pepsi all day long and less often coffee. He has chronic back problems with back surgery 2004, 1/2016, 12/2017 and 2/2018. He had a spinal stimulator which was removed. He has had thi    Urgency of urination 7/14/2020    He has occasional urgency. He wears depends for a precaution. He does have an Acticuff clamp. He had urgency and episodic incontinence prior to prostate surgery. He does not leak with cough or sneeze. He drinks diet Pepsi all day long and less often coffee. He has chronic back problems with back surgery 2004, 1/2016, 12/2017 and 2/2018. He had a spinal stimulator which was removed.  He has had thi     Past Surgical History:   Procedure Laterality Date    COLONOSCOPY N/A 4/12/2018    COLONOSCOPY performed by Car Anders MD at [address]      age 8    HX [address]  2002    HX BACK SURGERY  2004    HX CATARACT REMOVAL      with lens implant    HX CIRCUMCISION      HX HEART CATHETERIZATION  [id number]    stents x3    HX PROSTATE SURGERY  08/24/2020    PELVIC LYMPH NODE DISSECTION     HX PROSTATE SURGERY  10/18/2013    PELVIC LYMPH NODE DISSECTION     HX PROSTATECTOMY  10/18/2013    HX TONSILLECTOMY      as young child    HX UROLOGICAL  06/25/2020    TURBT    HX UROLOGICAL  06/25/2020    TURBT    HX UROLOGICAL  05/29/2020    CYSTOSCOPY     HX UROLOGICAL  08/24/2020    ROBOTIC CYSTECTOMY    HX UROLOGICAL  08/24/2020    ILEAL CONDUIT URINARY DIVERSION     HX UROLOGICAL  08/26/2021    LOOPSCOPY     HX UROLOGICAL  08/26/2021    LOOPOGRAM    HX UROLOGICAL  08/26/2021    URETHRAL CATHERIZATION     IR NEPHROSTOMY PERC LT PLC CATH  SI  9/7/2021    IR NEPHROSTOMY PERC LT PLC CATH  SI  9/14/2021    IR OCCL TXCATH HEMMORAGE W SI  9/13/2021    IR REMOVE NEPH TUBE RT  9/13/2021    NEUROLOGICAL PROCEDURE UNLISTED  1996    evacuate SDH    NEUROLOGICAL PROCEDURE UNLISTED      implant neuro stimulator in back for neuropathy in feet. Pt no longer has this, it was removed. Personal factors and/or comorbidities impacting plan of care:     Home Situation  Home Environment: Private residence  # Steps to Enter: 8  Rails to Enter: Yes  Hand Rails : Bilateral  One/Two Story Residence: Two story, live on 1st floor (basement)  Living Alone: No  Support Systems: Spouse/Significant Other, Child(armida) (wife Michael Madison [phone]; son)  Patient Expects to be Discharged to[de-identified] House  Current DME Used/Available at Home: [place], straight, Walker, rollator, Walker, rolling, Shower chair, Raised toilet seat, Wheelchair (since sx 9/7/21 has been using rollator; transport w/c)  Tub or Shower Type: Tub/Shower combination    EXAMINATION/PRESENTATION/DECISION MAKING:   Critical Behavior:  Neurologic State: Alert  Orientation Level: Oriented to person, Oriented to place, Disoriented to time, Disoriented to situation  Cognition: Decreased attention/concentration, Follows commands  Safety/Judgement: Awareness of environment, Decreased awareness of need for assistance, Decreased awareness of need for safety, Decreased insight into deficits, Fall prevention  Hearing: Auditory  Auditory Impairment: None    Range Of Motion:  AROM: Generally decreased, functional           PROM: Generally decreased, functional           Strength:    Strength: Generally decreased, functional                    Tone & Sensation:   Tone: Normal                              Coordination:  Coordination: Generally decreased, functional  Vision:   Acuity: Able to read clock/calendar on wall without difficulty; Able to read employee name badge without difficulty  Functional Mobility:  Bed Mobility:     Supine to Sit: Moderate assistance; Additional time;Assist x1     Scooting: Moderate assistance; Additional time;Assist x1  Transfers:  Sit to Stand: Moderate assistance; Additional time;Assist x1  Stand to Sit: Moderate assistance; Additional time;Assist x1 (poor control)        Bed to Chair: Moderate assistance; Additional time;Assist x2 (HHA- small, shufling steps)              Balance:   Sitting: Intact  Standing: Impaired; With support  Standing - Static: Fair;Constant support  Standing - Dynamic : Fair;Constant support  Ambulation/Gait Training:  Distance (ft): 2 Feet (ft)  Assistive Device: Gait belt (bilateral HHA)  Ambulation - Level of Assistance: Moderate assistance;Assist x2        Gait Abnormalities: Decreased step clearance;Shuffling gait        Base of Support: Narrowed     Speed/Rita: Shuffled;Pace decreased (<100 feet/min)  Step Length: Right shortened;Left shortened        Functional Measure:  Barthel Index:    Bathin  Bladder: 5  Bowels: 0  Groomin  Dressin  Feedin  Mobility: 5  Stairs: 0  Toilet Use: 5  Transfer (Bed to Chair and Back): 5  Total: 30/100       The Barthel ADL Index: Guidelines  1. The index should be used as a record of what a patient does, not as a record of what a patient could do. 2. The main aim is to establish degree of independence from any help, physical or verbal, however minor and for whatever reason. 3. The need for supervision renders the patient not independent. 4. A patient's performance should be established using the best available evidence. Asking the patient, friends/relatives and nurses are the usual sources, but direct observation and common sense are also important. However direct testing is not needed. 5. Usually the patient's performance over the preceding 24-48 hours is important, but occasionally longer periods will be relevant. 6. Middle categories imply that the patient supplies over 50 per cent of the effort. 7. Use of aids to be independent is allowed. Bakari Matos., Barthel, D.W. ([id number]). Functional evaluation: the Barthel Index. [facility] (14)2. Yaya Lechuga carly EULOGIO Penny, Ninfa Bailey., Soraya Ruvalcaba., Merlin Hippo, [address] [place] [address] ().  Measuring the change indisability after inpatient rehabilitation; comparison of the responsiveness of the Barthel Index and Functional Hooker Measure. Journal of Neurology, Neurosurgery, and Psychiatry, 66(4), 121-249. COLLEEN Broussard.A, ORACIO Mendenhall, & Kimberli Graf M.A. (2004.) Assessment of post-stroke quality of life in cost-effectiveness studies: The usefulness of the Barthel Index and the EuroQoL-5D. Quality of Life Research, 13, 112-51           Based on the above components, the patient evaluation is determined to be of the following complexity level: MEDIUM    Pain Rating:  Pt denied pain    Activity Tolerance:   Good and Fair    After treatment patient left in no apparent distress:   Sitting in chair, Call bell within reach, Bed / chair alarm activated, and Caregiver / family present    COMMUNICATION/EDUCATION:   The patients plan of care was discussed with: Occupational therapist and Registered nurse. Fall prevention education was provided and the patient/caregiver indicated understanding., Patient/family have participated as able in goal setting and plan of care. , and Patient/family agree to work toward stated goals and plan of care.     Thank you for this referral.  Marian Bailey, PT, DPT   Time Calculation: 30 mins

## 2021-09-15 NOTE — PROGRESS NOTES
1930: Bedside and Verbal shift change report given to T. Maryanna Phoenix (oncoming nurse) by Christina Kramer RN (offgoing nurse). Report included the following information SBAR, Kardex, Procedure Summary, Intake/Output, MAR, Accordion, Recent Results, Cardiac Rhythm NSR, Alarm Parameters  and Dual Neuro Assessment. 2145: Updates given to pt's spouse, Idalia Leger, via telephone. 0630: Updates given to pt's spouse, Idalia Leger, via telephone. 0730: Bedside and Verbal shift change report given to Christina Kramer RN (oncoming nurse) by TRACY Carrion RN (offgoing nurse). Report included the following information SBAR, Kardex, Intake/Output, MAR, Accordion, Recent Results, Cardiac Rhythm NSR, Alarm Parameters  and Dual Neuro Assessment.

## 2021-09-15 NOTE — PROGRESS NOTES
[facility] Adult  Hospitalist Group     ICU Transfer/Accept Summary     This patient is being transferred [place] [address] ICU  DATE OF TRANSFER: 9/15/2021       PATIENT ID: Martha Nuñez  MRN: [id number]   YOB: 1950    PRIMARY CARE PROVIDER: Edwar Alba NP   DATE OF ADMISSION: 9/13/2021  9:14 AM    ATTENDING PHYSICIAN: Gaurav Dash MD  CONSULTATIONS:   IP CONSULT TO INTENSIVIST  IP CONSULT TO INFECTIOUS DISEASES    PROCEDURES/SURGERIES:   * No surgery found *    REASON FOR ADMISSION: <principal problem not specified>     HOSPITAL PROBLEM LIST:  Patient Active Problem List   Diagnosis Code    Lumbago M54.5    Hereditary and idiopathic peripheral neuropathy G60.9    Osteoarthrosis, unspecified whether generalized or localized, unspecified site M19.90    Backache, unspecified M54.9    Congestive heart failure, unspecified I50.9    Essential hypertension, benign I10    Coronary atherosclerosis of native coronary artery I25.10    Spondylolisthesis at L2-L3 level M43.16    Lumbar stenosis M48.061    Bladder cancer ([address] C67.9    Malignant neoplasm of prostate ([address] C61    Hydronephrosis N13.30    Stage 3 chronic kidney disease ([address] N18.30    Arterial bleed, intraoperative I97.418         Brief HPI and Hospital Course:      Mr Gemini Hazel is a 67 yo WM with a PMH of prostate cancer, bladder cancer sp TURBT 6/25/2020 with ileostomy, he had a recent right nephrostomy tube placed last week after failed attempt for left urethal stent placed secondary to left ureteral obstruction. He was found to have some blood clots in the nephrostomy tube so was taken to IR 9/13 for left ureteral obstruction with left hydronephrosis and arterial bleeding into the left collecting system. Pulsatile blood was found during arteriogram, pt was embolized and at high risk for bleeding therefore pt was brought to the ICU on 9/13 for close monitoring post procedure.   He then went to IR on 9/14 on for a L nephrostomy tube placement. Blood cultures notable for 3/4 GPC in clusters. He was started on empiric vanc/cefepime. Repeat cultures are ordered and pending. Pt transferred out of ICU on 9/15     Patient is seen and examined in ICU, sitting in chair. He is alert and oriented x2. Explained regarding MRSA bacteremia. He stated that he has metal rods in the back. Denied any new complaints now. Discussed with nursing     Assessment and Plan:    Sepsis   MRSA Bacteremia    L Ureteral obstruction w L hydro   Secondary to arterial bleed s/p embolization on 9/13 with neph tube on 9/14.    - IR following for neph tube   - Blood cx 9/13: 3 of 4 bottles growing MRSA   - Repeat cultures sent 9/15 am.  - Continue IV vanc   - ID consult placed  - Echo ordered      Depression: Continue home meds prozac, trazodone   HLD: Continue statin  Hypothyroidism: Continue synthroid  CKD stage III - cr stable   Hx bladder cancer s/p TURBT 2020, cystectomy and ileal conduit. Hx Prostate ca s/p prostatectomy in 2013     Code status: Full  Dispo: Need SNF               PHYSICAL EXAMINATION:  Visit Vitals  BP (!) 114/53   Pulse 77   Temp 97.7 °F (36.5 °C)   Resp 22   Ht 5' 8\" (1.727 m)   Wt 70.1 kg (154 lb 8.7 oz)   SpO2 99%   BMI 23.50 kg/m²       General:          Alert, cooperative, no distress, ill looking   HEENT:           Atraumatic, MMM            Neck:               Supple, symmetrical,  thyroid: non tender  Lungs:             Clear to auscultation bilaterally. No Wheezing or Rhonchi. No rales. Heart:              Regular  rhythm,  No  murmur   No edema  Abdomen:       Soft, non-tender. Not distended. L neph tube, urostomy RLQ  Extremities:     No cyanosis. No clubbing,  +2 distal pulses  Skin:                Not pale. Not Jaundiced  No rashes   Psych:             Not anxious or agitated.   Neurologic:      Alert, moves all extremities, oriented X 2     Labs:     Recent Labs     09/15/21  0838 09/15/21  0500 09/14/21  0820 09/14/21 0433   WBC  --  11.5*  --  14.0*   HGB 8.1* 7.9*   < > 8.7*   HCT 25.6* 24.9*   < > 27.2*   PLT  --  350  --  291    < > = values in this interval not displayed. Recent Labs     09/15/21  0500 09/14/21 0433 09/13/21  1847    140 137   K 4.6 4.2 4.4   * 108 106   CO2 22 22 21   BUN 30* 24* 25*   CREA 1.86* 1.80* 1.65*   GLU 91 114* 115*   CA 8.4* 8.0* 7.9*   MG 2.3 2.1 1.9   PHOS 2.6 3.5 2.4*     Recent Labs     09/14/21 0433 09/13/21  1026   ALT 15 25    134*   TBILI 0.7 0.9   TP 5.6* 7.5   ALB 2.2* 2.6*   GLOB 3.4 4.9*     Recent Labs     09/14/21 0433 09/13/21  1026   INR  --  1.0   PTP  --  10.7   APTT 29.4  --       No results for input(s): FE, TIBC, PSAT, FERR in the last 72 hours. No results found for: FOL, RBCF   No results for input(s): PH, PCO2, PO2 in the last 72 hours.   Recent Labs     09/13/21  1847   TROIQ <0.05     No results found for: CHOL, CHOLX, CHLST, CHOLV, HDL, HDLP, LDL, LDLC, DLDLP, TGLX, TRIGL, TRIGP, CHHD, CHHDX  No results found for: [facility]  Lab Results   Component Value Date/Time    Color DARK YELLOW 01/11/2016 03:15 PM    Appearance CLEAR 01/11/2016 03:15 PM    Specific gravity 1.025 01/11/2016 03:15 PM    pH (UA) 5.0 01/11/2016 03:15 PM    Protein TRACE (A) 01/11/2016 03:15 PM    Glucose NEGATIVE  01/11/2016 03:15 PM    Ketone NEGATIVE  01/11/2016 03:15 PM    Bilirubin NEGATIVE  01/11/2016 03:15 PM    Urobilinogen 0.2 01/11/2016 03:15 PM    Nitrites NEGATIVE  01/11/2016 03:15 PM    Leukocyte Esterase NEGATIVE  01/11/2016 03:15 PM    Epithelial cells FEW 01/11/2016 03:15 PM    Bacteria NEGATIVE  01/11/2016 03:15 PM    WBC 0-4 01/11/2016 03:15 PM    RBC 0-5 01/11/2016 03:15 PM         CODE STATUS:   Full Code    DNR    Partial    Comfort Care       Signed:   Lindsay Bhatt MD  Date of Service:  9/15/2021  2:49 PM

## 2021-09-15 NOTE — PROGRESS NOTES
[facility]    ICU TEAM Progress Note    Name: Alcon Oakley   : 1950   MRN: [id number]   Date: 9/15/2021      Subjective:   Progress Note: 9/15/2021      Mr Sayda Person is a 67 yo WM with a PMH of prostate cancer, bladder cancer sp TURBT 2020 with ileostomy, he had a recent right nephrostomy tube placed last week after failed attempt for left urethal stent placed secondary to left ureteral obstruction. He was found to have some blood clots in the nephrostomy tube so was taken to IR  for left ureteral obstruction with left hydronephrosis and arterial bleeding into the left collecting system. Pulsatile blood was found during arteriogram, pt was embolized and at high risk for bleeding therefore pt was brought to the ICU on  for close monitoring post procedure. He then went to IR on  on for a L nephrostomy tube placement. Blood cultures notable for 3/4 GPC in clusters. He was started on empiric vanc/cefepime. Repeat cultures are ordered and pending. Active Problem List:     Problem List  Date Reviewed: 2021        Codes Class    Arterial bleed, intraoperative ICD-10-CM: I97.418  ICD-9-CM: 998.11         Stage 3 chronic kidney disease ([facility] [address] ICD-10-CM: N18.30  ICD-9-CM: 866. 3         Hydronephrosis ICD-10-CM: N13.30  ICD-9-CM: 565     Overview Signed 8/15/2021 11:31 AM by Peterson Camacho NP     CT 7/10/21: Moderate left hydronephrosis. Dilation of the ureter leading to the ureteral anastomosis with the ileal conduit. No right hydronephrosis. No stones in either side. Bladder cancer [facility]) ICD-10-CM: C67.9  ICD-9-CM: 188.9     Overview Addendum 2020  1:29 PM by Peterson Camacho NP     He is s/p TURBT on 2020. No obvious residual tumor, but tessy and induration at the base. Pathology reads invasive papillary urothelial carcinoma, high grade (bladder) and invasive high grade urothelial carcinoma pT2 (bladder base).     He is s/p cystectomy with ileal conduit urinary diversion on 8/24/2020. He had an uncomplicated post-operative course and was discharged on 8/31/2020 with [address] [place] [address]. 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 PT2bN0. Malignant neoplasm of prostate [facility]) ICD-10-CM: C61  ICD-9-CM: 185     Overview Addendum 8/15/2021 11:32 AM by Nikki Glover NP     He is s/p a robotic prostatectomy 10/18/13. He had [place]'s 6 disease wtih negative margins and negative nodes. His PSA has been undetectable, last drawn on 5/20/2021.              Spondylolisthesis at L2-L3 level ICD-10-CM: M43.16  ICD-9-CM: 756.12         Lumbar stenosis ICD-10-CM: M48.061  ICD-9-CM: 724.02         Lumbago ICD-10-CM: M54.5  ICD-9-CM: 724.2         Hereditary and idiopathic peripheral neuropathy ICD-10-CM: G60.9  ICD-9-CM: 356.9         Osteoarthrosis, unspecified whether generalized or localized, unspecified site ICD-10-CM: M19.90  ICD-9-CM: 715.90         Backache, unspecified ICD-10-CM: M54.9  ICD-9-CM: 724.5         Congestive heart failure, unspecified ICD-10-CM: I50.9  ICD-9-CM: 428.0         Essential hypertension, benign ICD-10-CM: I10  ICD-9-CM: 401.1         Coronary atherosclerosis of native coronary artery ICD-10-CM: I25.10  ICD-9-CM: 414.01               Past Medical History:      has a past medical history of Back pain, Backache, unspecified (2/8/2010), Bladder cancer ([facility] [address] (7/14/2020), Blood clots (2004), Cancer ([facility] [address] (2013), CHF (congestive heart failure) ([facility] [address] (1999), Coronary artery disease, Coronary atherosclerosis of native coronary artery (2/8/2010), DDD (degenerative disc disease), Diabetes ([facility] [address] Essential hypertension, benign (2/8/2010), Gastroenteritis, viral, Heart block, Hematuria, unspecified (7/14/2020), HTN (hypertension), benign, Hypercholesterolemia, Ill-defined condition, Ill-defined condition, Ill-defined condition, Ill-defined condition (1996), Ill-defined condition (1999 & early 2000s), Impotence (7/14/2020), Lumbago (2/8/2010), Malignant neoplasm of prostate ([facility] [address] (7/14/2020), Neurogenic dysfunction of the urinary bladder (7/14/2020), Neuropathy Peripheral, Osteoarthritis, Osteoarthrosis, unspecified whether generalized or localized, unspecified site (2/8/2010), Seizures ([facility] [address] Skin disease, Stroke ([facility] [address] (1996), Unspecified hereditary and idiopathic peripheral neuropathy (2/8/2010), Urgency of urination (7/14/2020), and Urgency of urination (7/14/2020). Past Surgical History:      has a past surgical history that includes hx heart catheterization (2618,6051); hx appendectomy; hx cataract removal; hx tonsillectomy; colonoscopy (N/A, 4/12/2018); neurological procedure unlisted (1996); hx back surgery (2002); hx back surgery (2004); neurological procedure unlisted; hx circumcision; hx prostatectomy (10/18/2013); hx prostate surgery (08/24/2020); hx prostate surgery (10/18/2013); hx urological (06/25/2020); hx urological (06/25/2020); hx urological (05/29/2020); hx urological (08/24/2020); hx urological (08/24/2020); hx urological (08/26/2021); hx urological (08/26/2021); hx urological (08/26/2021); ir nephrostomy perc lt plc cath  si (9/7/2021); ir remove neph tube rt (9/13/2021); ir occl txcath hemmorage w si (9/13/2021); and ir nephrostomy perc lt plc cath  si (9/14/2021). Home Medications:     Prior to Admission medications    Medication Sig Start Date End Date Taking? Authorizing Provider   FLUoxetine (PROzac) 40 mg capsule Take 40 mg by mouth daily. Yes Provider, Historical   levothyroxine (SYNTHROID) 25 mcg tablet Take 25 mcg by mouth Daily (before breakfast). Yes Provider, Historical   calcium-cholecalciferol, d3, (CALCIUM 600 + D) 600-125 mg-unit tab Take 1 Tablet by mouth daily. Yes Provider, Historical   sodium bicarbonate 650 mg tablet Take 650 mg by mouth two (2) times a day.    Yes Provider, Historical   budesonide-glycopyr-formoterol (Rebbecca Covert) 160-9-4.8 mcg/actuation HFAA Take 2 Inhalation by inhalation two (2) times a day. Yes Provider, Historical   traZODone (DESYREL) 100 mg tablet Take 100 mg by mouth nightly. Yes Provider, Historical   montelukast (SINGULAIR) 10 mg tablet Take 10 mg by mouth daily. Yes Provider, Historical   aspirin delayed-release 81 mg tablet Take 81 mg by mouth daily. Yes Provider, Historical   carvedilol (COREG) 6.25 mg tablet Take 6.25 mg by mouth daily. Yes Provider, Historical   pravastatin (PRAVACHOL) 40 mg tablet Take 40 mg by mouth daily. Yes Provider, Historical       Allergies/Social/Family History:      Allergies   Allergen Reactions    Lisinopril Unknown (comments)      Social History     Tobacco Use    Smoking status: Former Smoker     Years: 35.00     Quit date: 1999     Years since quittin.6    Smokeless tobacco: Never Used   Substance Use Topics    Alcohol use: Not Currently      Family History   Adopted: Yes       Review of Systems:     As per HPI    Objective:   Vital Signs:  Visit Vitals  /68   Pulse 86   Temp 98.2 °F (36.8 °C)   Resp 23   Ht 5' 8\" (1.727 m)   Wt 70.1 kg (154 lb 8.7 oz)   SpO2 96%   BMI 23.50 kg/m²    O2 Flow Rate (L/min): 3 l/min O2 Device: None (Room air) Temp (24hrs), Av.2 °F (37.3 °C), Min:98.2 °F (36.8 °C), Max:100 °F (37.8 °C)           Intake/Output:     Intake/Output Summary (Last 24 hours) at 9/15/2021 0935  Last data filed at 9/15/2021 0800  Gross per 24 hour   Intake 1762.5 ml   Output 1405 ml   Net 357.5 ml       Physical Exam:    General: Awake, alert, oriented  Eye: PERRLA  Neurologic: AO x3, Nonfocal  Neck: Supple, no JVD  Lungs:CTAB  Heart: RRR, 2+ pulses, no JVD  Abdomen:  Soft, nontender, nondistended, L neph tube, urostomy RLQ  Skin: cdi    LABS AND  DATA: Personally reviewed  Recent Labs     09/15/21  0838 09/15/21  0500 21  0820 21  0433   WBC  --  11.5*  --  14.0*   HGB 8.1* 7.9*   < > 8.7*   HCT 25.6* 24.9*   < > 27.2*   PLT  -- 350  --  291    < > = values in this interval not displayed. Recent Labs     09/15/21  0500 09/14/21  0433    140   K 4.6 4.2   * 108   CO2 22 22   BUN 30* 24*   CREA 1.86* 1.80*   GLU 91 114*   CA 8.4* 8.0*   MG 2.3 2.1   PHOS 2.6 3.5     Recent Labs     09/14/21  0433 09/13/21  1026    134*   TP 5.6* 7.5   ALB 2.2* 2.6*   GLOB 3.4 4.9*     Recent Labs     09/14/21  0433 09/13/21  1026   INR  --  1.0   PTP  --  10.7   APTT 29.4  --       No results for input(s): PHI, PCO2I, PO2I, FIO2I in the last 72 hours. Recent Labs     09/13/21  1847   TROIQ <0.05       RA    MEDS: Reviewed    Chest X-Ray: personally reviewed and report checked      Assessment and Plan:     L Ureteral obstruction w L hydro c/b Sepsis: Secondary to arterial bleed s/p embolization on 9/13 with neph tube on 9/14. Admitted to ICU for close monitoring and remained with stable H/H. Started on vanc given blood cultures with GPC. Empiric cefepime added given urine not cutlured. Planned to continue until cultures finalize. Repeat cultures sent 9/15 am.  - Continue vanc/cefepime until cultures finalize  - IR following for neph tube    Depression: Continue home meds    HLD: Continue statin    Hypothyroidism: Continue synthroid    NOTE OF PERSONAL INVOLVEMENT IN CARE   This patient has a high probability of imminent, clinically significant deterioration, which requires the highest level of preparedness to intervene urgently. I participated in the decision-making and personally managed or directed the management of the following life and organ supporting interventions that required my frequent assessment to treat or prevent imminent deterioration.     Level 3      Zuleima Lou NP  [facility]  9/15/2021

## 2021-09-15 NOTE — PROGRESS NOTES
Problem: Self Care Deficits Care Plan (Adult)  Goal: *Acute Goals and Plan of Care (Insert Text)  Description:   FUNCTIONAL STATUS PRIOR TO ADMISSION: Per wife pt was amb with cane and mod I with ADLs until recent sx on 9/7/21. Since then he has been shuffling, poor safety awareness and she has been using rollator to assist him to walk. Pt has been completing dressing without assist and bathing with assist.  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 HOME SUPPORT: The patient lived with wife. Their son lives in their basement and can assist as needed. Occupational Therapy Goals  Initiated 9/15/2021  1. Patient will perform grooming standing at sink with minimal assistance/contact guard assist within 7 day(s). 2.  Patient will perform lower body dressing with minimal assistance/contact guard assist within 7 day(s). 3.  Patient will perform toilet transfers with minimal assistance/contact guard assist within 7 day(s). 4.  Patient will perform all aspects of toileting with minimal assistance/contact guard assist within 7 day(s). 5.  Patient will participate in upper extremity therapeutic exercise/activities with supervision/set-up for 10 minutes within 7 day(s). 6.  Patient will utilize energy conservation techniques during functional activities with verbal and visual cues within 7 day(s). Outcome: Progressing Towards Goal     OCCUPATIONAL THERAPY EVALUATION  Patient: Jori Ramos (43 y.o. male)  Date: 9/15/2021  Primary Diagnosis: Hydronephrosis [N13.30]  Arterial bleed, intraoperative [I97.418]        Precautions: Fall, Skin    ASSESSMENT  Based on the objective data described below, the patient presents with impaired cognition which pt's wife states is worse than baseline, decreased strength, endurance, coordination, balance and safety following arteriogram on 9/13/21 and then went to IR on 9/14 on for a L nephrostomy tube placement.   He requires cognitive and physical assist with ADLs and functional mobility. Recommend SNF at discharge, but wife is not confident pt will agree. If they decline then recommend [facility] therapy with 24/7 assist for safety. Current Level of Function Impacting Discharge (ADLs/self-care): min A UE, mod A LE, max A toileting and mod A functional mobility    Functional Outcome Measure: The patient scored Total: 30/100 on the Barthel Index outcome measure. Other factors to consider for discharge: see above     Patient will benefit from skilled therapy intervention to address the above noted impairments. PLAN :  Recommendations and Planned Interventions: self care training, functional mobility training, therapeutic exercise, balance training, therapeutic activities, cognitive retraining, endurance activities, neuromuscular re-education, patient education, home safety training, and family training/education    Frequency/Duration: Patient will be followed by occupational therapy 5 times a week to address goals. Recommendation for discharge: (in order for the patient to meet his/her long term goals)  Therapy up to 5 days/week in SNF setting or an intensive home health therapy program    This discharge recommendation:  Has not yet been discussed the attending provider and/or case management    IF patient discharges home will need the following DME: TBD       SUBJECTIVE:   Patient stated I am so so.     OBJECTIVE DATA SUMMARY:   HISTORY:   Past Medical History:   Diagnosis Date    Back pain     Backache, unspecified 2/8/2010    Bladder cancer ([facility] [address] 7/14/2020    He is s/p TURBT on 6/25/2020. No obvious residual tumor, but tessy and induration at the base. Pathology reads invasive papillary urothelial carcinoma, high grade (bladder) and invasive high grade urothelial carcinoma pT2 (bladder base). 07- visit He has high grade muscle invasive bladder cancer. I thought it was grossly resected but pathology reveals high grade disease.  He is at high risk o    Blood clots 2004    spine    Cancer ([facility] [address] 2013    prostate    CHF (congestive heart failure) ([facility] [address] 1999    Coronary artery disease     Coronary atherosclerosis of native coronary artery 2/8/2010    DDD (degenerative disc disease)     Diabetes ([facility] [address]     Essential hypertension, benign 2/8/2010    Gastroenteritis, viral     Heart block     95%    Hematuria, unspecified 7/14/2020    He had gross hematuria and clots in his Depends. He notes not pain in the bladder or flank. CT 4/20/20 without renal concerns. Tumor found on cystsocopy on 5/29/2020. HTN (hypertension), benign     Hypercholesterolemia     Ill-defined condition     short term memory loss since SDH    Ill-defined condition     urinary incontinence    Ill-defined condition     limited vision R eye    Ill-defined condition 1996    concussion with subdural hematoma    Ill-defined condition 1999 & early 2000s    Mycardial Infarction    Impotence 7/14/2020    He has ED. He and his partner are not active. Viagra was no longer helpful. Lumbago 2/8/2010    Malignant neoplasm of prostate ([facility] [address] 7/14/2020    He is s/p a robotic prostatectomy 10/18/13. He had Corey's 6 disease wtih negative margins and negative nodes. His PSA has been undetectable, last drawn on 5/14/2020. Neurogenic dysfunction of the urinary bladder 7/14/2020    He has a hypotonic bladder and is emptying with Valsalva. He has mild ISD and urge incontinence. He has some enuresis. Neuropathy Peripheral     pain in feet    Osteoarthritis     Osteoarthrosis, unspecified whether generalized or localized, unspecified site 2/8/2010    Seizures ([facility] [address]     1996 after head injury    Skin disease     itching alot    Stroke [facility]) 1996    Unspecified hereditary and idiopathic peripheral neuropathy 2/8/2010    Urgency of urination 7/14/2020    He has occasional urgency. He wears depends for a precaution. He does have an Acticuff clamp.  He had urgency and episodic incontinence prior to prostate surgery. He does not leak with cough or sneeze. He drinks diet Pepsi all day long and less often coffee. He has chronic back problems with back surgery 2004, 1/2016, 12/2017 and 2/2018. He had a spinal stimulator which was removed. He has had thi    Urgency of urination 7/14/2020    He has occasional urgency. He wears depends for a precaution. He does have an Acticuff clamp. He had urgency and episodic incontinence prior to prostate surgery. He does not leak with cough or sneeze. He drinks diet Pepsi all day long and less often coffee. He has chronic back problems with back surgery 2004, 1/2016, 12/2017 and 2/2018. He had a spinal stimulator which was removed.  He has had thi     Past Surgical History:   Procedure Laterality Date    COLONOSCOPY N/A 4/12/2018    COLONOSCOPY performed by Sara Cat MD at [address]      age 8    HX [address]  2002    HX BACK SURGERY  2004    HX CATARACT REMOVAL      with lens implant    HX CIRCUMCISION      HX HEART CATHETERIZATION  1195,9203    stents x3    HX PROSTATE SURGERY  08/24/2020    PELVIC LYMPH NODE DISSECTION     HX PROSTATE SURGERY  10/18/2013    PELVIC LYMPH NODE DISSECTION     HX PROSTATECTOMY  10/18/2013    HX TONSILLECTOMY      as young child    HX UROLOGICAL  06/25/2020    TURBT    HX UROLOGICAL  06/25/2020    TURBT    HX UROLOGICAL  05/29/2020    CYSTOSCOPY     HX UROLOGICAL  08/24/2020    ROBOTIC CYSTECTOMY    HX UROLOGICAL  08/24/2020    ILEAL CONDUIT URINARY DIVERSION     HX UROLOGICAL  08/26/2021    LOOPSCOPY     HX UROLOGICAL  08/26/2021    LOOPOGRAM    HX UROLOGICAL  08/26/2021    URETHRAL CATHERIZATION     IR NEPHROSTOMY PERC LT PLC CATH  SI  9/7/2021    IR NEPHROSTOMY PERC LT PLC CATH  SI  9/14/2021    IR OCCL TXCATH HEMMORAGE W SI  9/13/2021    IR REMOVE NEPH TUBE RT  9/13/2021    NEUROLOGICAL PROCEDURE UNLISTED  1996    evacuate SDH    NEUROLOGICAL PROCEDURE UNLISTED      implant neuro stimulator in back for neuropathy in feet. Pt no longer has this, it was removed. Expanded or extensive additional review of patient history:     Home Situation  Home Environment: Private residence  # Steps to Enter: 8  Rails to Enter: Yes  Hand Rails : Bilateral  One/Two Story Residence: Two story, live on 1st floor (basement)  Living Alone: No  Support Systems: Spouse/Significant Other, Child(armida) (wife Ora Manriquez [phone]; son)  Patient Expects to be Discharged to[de-identified] House  Current DME Used/Available at Home: Mireya Eduard, straight, Walker, rollator, Walker, rolling, Shower chair, Raised toilet seat, Wheelchair (since sx 9/7/21 has been using rollator; transport w/c)  Tub or Shower Type: Tub/Shower combination    Hand dominance: Right    EXAMINATION OF PERFORMANCE DEFICITS:  Cognitive/Behavioral Status:  Neurologic State: Alert  Orientation Level: Oriented to person;Oriented to place; Disoriented to time;Disoriented to situation  Cognition: Decreased attention/concentration; Follows commands  Perception: Appears intact  Perseveration: No perseveration noted  Safety/Judgement: Awareness of environment;Decreased awareness of need for assistance;Decreased awareness of need for safety;Decreased insight into deficits; Fall prevention    Hearing: Auditory  Auditory Impairment: None    Vision/Perceptual:     Acuity: Able to read clock/calendar on wall without difficulty; Able to read employee name badge without difficulty         Range of Motion:  AROM: Generally decreased, functional  PROM: Generally decreased, functional                      Strength:  Strength: Generally decreased, functional                Coordination:  Coordination: Generally decreased, functional  Fine Motor Skills-Upper: Left Impaired;Right Impaired    Gross Motor Skills-Upper: Left Intact; Right Intact    Tone & Sensation:  Tone: Normal                         Balance:  Sitting: Intact  Standing: Impaired; With support  Standing - Static: Fair;Constant support  Standing - Dynamic : Fair;Constant support    Functional Mobility and Transfers for ADLs:  Bed Mobility:  Supine to Sit: Moderate assistance; Additional time;Assist x1  Scooting: Moderate assistance; Additional time;Assist x1    Transfers:  Sit to Stand: Moderate assistance; Additional time;Assist x1  Stand to Sit: Moderate assistance; Additional time;Assist x1 (poor control)  Bed to Chair: Moderate assistance; Additional time;Assist x2 (HHA- small, shufling steps)  Toilet Transfer : Moderate assistance; Additional time;Assist x2 [facility])  Assistive Device : Gait Belt    ADL Assessment and Intervention:  Feeding: Setup; Additional time (assist to open contaners and cut food)    Oral Facial Hygiene/Grooming: Setup;Supervision; Additional time    Bathing: Moderate assistance; Additional time;Assist x1 (seated- A for LEs, buttocks, imp standing)    Upper Body Dressing: Setup;Supervision; Additional time    Lower Body Dressing: Moderate assistance; Additional time;Assist x1 (A to thread over toes, imp standing)    Toileting: Maximum assistance; Additional time;Assist x1 (A with hygiene and clothing- pt incontinent of bowel)    Cognitive Retraining  Safety/Judgement: Awareness of environment;Decreased awareness of need for assistance;Decreased awareness of need for safety;Decreased insight into deficits; Fall prevention      Functional Measure:  Barthel Index:    Bathin  Bladder: 5  Bowels: 0  Groomin  Dressin  Feedin  Mobility: 5  Stairs: 0  Toilet Use: 5  Transfer (Bed to Chair and Back): 5  Total: 30/100        The Barthel ADL Index: Guidelines  1. The index should be used as a record of what a patient does, not as a record of what a patient could do. 2. The main aim is to establish degree of independence from any help, physical or verbal, however minor and for whatever reason. 3. The need for supervision renders the patient not independent.   4. A patient's performance should be established using the best available evidence. Asking the patient, friends/relatives and nurses are the usual sources, but direct observation and common sense are also important. However direct testing is not needed. 5. Usually the patient's performance over the preceding 24-48 hours is important, but occasionally longer periods will be relevant. 6. Middle categories imply that the patient supplies over 50 per cent of the effort. 7. Use of aids to be independent is allowed. Bertina Aschoff., Barthel, DAdamsW. (1276). Functional evaluation: the Barthel Index. [address] (14)2. Ifeanyi Kirby carly EULOGIO Penny, Mary Araujo., Lalitha Lewis., [place], [address] (). Measuring the change indisability after inpatient rehabilitation; comparison of the responsiveness of the Barthel Index and Functional [place] Measure. Journal of Neurology, Neurosurgery, and Psychiatry, 66(4), 031-018. Donaldo Paz, NAdamsJ.A, ORACIO Mendenhall, & Ora Powell M.A. (2004.) Assessment of post-stroke quality of life in cost-effectiveness studies: The usefulness of the Barthel Index and the EuroQoL-5D. Quality of Life Research, 15, 543-91         Occupational Therapy Evaluation Charge Determination   History Examination Decision-Making   LOW Complexity : Brief history review  HIGH Complexity : 5 or more performance deficits relating to physical, cognitive , or psychosocial skils that result in activity limitations and / or participation restrictions HIGH Complexity : Patient presents with comorbidities that affect occupational performance.  Signifigant modification of tasks or assistance (eg, physical or verbal) with assessment (s) is necessary to enable patient to complete evaluation       Based on the above components, the patient evaluation is determined to be of the following complexity level: LOW   Pain Ratin/10    Activity Tolerance:   Fair and requires frequent rest breaks    After treatment patient left in no apparent distress:    Sitting in chair, Call bell within reach, Bed / chair alarm activated, and Caregiver / family present    COMMUNICATION/EDUCATION:   The patients plan of care was discussed with: Physical therapist and Registered nurse. Home safety education was provided and the patient/caregiver indicated understanding., Patient/family have participated as able in goal setting and plan of care. , and Patient/family agree to work toward stated goals and plan of care. This patients plan of care is appropriate for delegation to [facility].     Thank you for this referral.  Diamond Jensen OT  Time Calculation: 31 mins

## 2021-09-16 LAB
ABO + RH BLD: NORMAL
ANION GAP SERPL CALC-SCNC: 8 MMOL/L (ref 5–15)
ANTIGENS PRESENT RBC DONR: NORMAL
BLD PROD TYP BPU: NORMAL
BLOOD BANK CMNT PATIENT-IMP: NORMAL
BLOOD GROUP ANTIBODIES SERPL: NORMAL
BLOOD GROUP ANTIBODIES SERPL: NORMAL
BPU ID: NORMAL
BUN SERPL-MCNC: 31 MG/DL (ref 6–20)
BUN/CREAT SERPL: 16 (ref 12–20)
CALCIUM SERPL-MCNC: 8.3 MG/DL (ref 8.5–10.1)
CHLORIDE SERPL-SCNC: 106 MMOL/L (ref 97–108)
CO2 SERPL-SCNC: 24 MMOL/L (ref 21–32)
CREAT SERPL-MCNC: 1.92 MG/DL (ref 0.7–1.3)
CROSSMATCH RESULT,%XM: NORMAL
ERYTHROCYTE [DISTWIDTH] IN BLOOD BY AUTOMATED COUNT: 15.6 % (ref 11.5–14.5)
GLUCOSE SERPL-MCNC: 112 MG/DL (ref 65–100)
HCT VFR BLD AUTO: 26.6 % (ref 36.6–50.3)
HGB BLD-MCNC: 8.4 G/DL (ref 12.1–17)
MAGNESIUM SERPL-MCNC: 2.2 MG/DL (ref 1.6–2.4)
MCH RBC QN AUTO: 25.9 PG (ref 26–34)
MCHC RBC AUTO-ENTMCNC: 31.6 G/DL (ref 30–36.5)
MCV RBC AUTO: 82.1 FL (ref 80–99)
NRBC # BLD: 0 K/UL (ref 0–0.01)
NRBC BLD-RTO: 0 PER 100 WBC
PHOSPHATE SERPL-MCNC: 2.1 MG/DL (ref 2.6–4.7)
PLATELET # BLD AUTO: 451 K/UL (ref 150–400)
PMV BLD AUTO: 9.6 FL (ref 8.9–12.9)
POTASSIUM SERPL-SCNC: 3.7 MMOL/L (ref 3.5–5.1)
RBC # BLD AUTO: 3.24 M/UL (ref 4.1–5.7)
SODIUM SERPL-SCNC: 138 MMOL/L (ref 136–145)
SPECIMEN EXP DATE BLD: NORMAL
STATUS OF UNIT,%ST: NORMAL
UNIT DIVISION, %UDIV: 0
WBC # BLD AUTO: 9.3 K/UL (ref 4.1–11.1)

## 2021-09-16 PROCEDURE — 99222 1ST HOSP IP/OBS MODERATE 55: CPT | Performed by: INTERNAL MEDICINE

## 2021-09-16 PROCEDURE — 74011250636 HC RX REV CODE- 250/636: Performed by: NURSE PRACTITIONER

## 2021-09-16 PROCEDURE — 36415 COLL VENOUS BLD VENIPUNCTURE: CPT

## 2021-09-16 PROCEDURE — 85027 COMPLETE CBC AUTOMATED: CPT

## 2021-09-16 PROCEDURE — 65660000000 HC RM CCU STEPDOWN

## 2021-09-16 PROCEDURE — 80048 BASIC METABOLIC PNL TOTAL CA: CPT

## 2021-09-16 PROCEDURE — 83735 ASSAY OF MAGNESIUM: CPT

## 2021-09-16 PROCEDURE — 84100 ASSAY OF PHOSPHORUS: CPT

## 2021-09-16 PROCEDURE — 74011250637 HC RX REV CODE- 250/637: Performed by: NURSE PRACTITIONER

## 2021-09-16 PROCEDURE — 65210000002 HC RM PRIVATE GYN

## 2021-09-16 RX ADMIN — PRAVASTATIN SODIUM 40 MG: 40 TABLET ORAL at 21:19

## 2021-09-16 RX ADMIN — TRAZODONE HYDROCHLORIDE 100 MG: 50 TABLET ORAL at 21:19

## 2021-09-16 RX ADMIN — FLUOXETINE 40 MG: 20 CAPSULE ORAL at 08:47

## 2021-09-16 RX ADMIN — MONTELUKAST 10 MG: 10 TABLET, FILM COATED ORAL at 21:19

## 2021-09-16 RX ADMIN — Medication 10 ML: at 17:08

## 2021-09-16 RX ADMIN — VANCOMYCIN HYDROCHLORIDE 1000 MG: 1 INJECTION, POWDER, LYOPHILIZED, FOR SOLUTION INTRAVENOUS at 09:00

## 2021-09-16 RX ADMIN — Medication 10 ML: at 06:58

## 2021-09-16 RX ADMIN — CARVEDILOL 6.25 MG: 6.25 TABLET, FILM COATED ORAL at 17:08

## 2021-09-16 RX ADMIN — LEVOTHYROXINE SODIUM 25 MCG: 0.03 TABLET ORAL at 06:58

## 2021-09-16 RX ADMIN — SODIUM BICARBONATE 650 MG: 650 TABLET ORAL at 17:08

## 2021-09-16 RX ADMIN — ASPIRIN 81 MG: 81 TABLET, CHEWABLE ORAL at 08:47

## 2021-09-16 RX ADMIN — SODIUM BICARBONATE 650 MG: 650 TABLET ORAL at 08:47

## 2021-09-16 RX ADMIN — CARVEDILOL 6.25 MG: 6.25 TABLET, FILM COATED ORAL at 08:47

## 2021-09-16 NOTE — PROGRESS NOTES
Bedside shift change report given to JOVITA Rosas (oncoming nurse) by Sid Ashraf and Cesar Linda RN (offgoing nurse). Report included the following information SBAR, Kardex, Procedure Summary, Intake/Output, MAR, Recent Results, Med Rec Status and Cardiac Rhythm NSR.

## 2021-09-16 NOTE — PROGRESS NOTES
1930: Bedside and Verbal shift change report given to Mariaelena RN (oncoming nurse) by Nichole Carmona RN (offgoing nurse). Report included the following information SBAR, Kardex, OR Summary, Procedure Summary, Intake/Output, MAR, Recent Results, Cardiac Rhythm NSR and Alarm Parameters . 2125: Updates given to pt wife, Susan Crum, via phone. 0045: TRANSFER - OUT REPORT:    Verbal report given to Sharon RN(name) on Gladys Thorpe  being transferred to 3 N(unit) for routine progression of care       Report consisted of patients Situation, Background, Assessment and   Recommendations(SBAR). Information from the following report(s) SBAR, Kardex, OR Summary, Procedure Summary, Intake/Output, MAR, Recent Results, Cardiac Rhythm NSR and Alarm Parameters  was reviewed with the receiving nurse. Lines:   Peripheral IV 09/13/21 Left Wrist (Active)   Site Assessment Clean, dry, & intact 09/15/21 2000   Phlebitis Assessment 0 09/15/21 2000   Infiltration Assessment 0 09/15/21 2000   Dressing Status Clean, dry, & intact 09/15/21 2000   Dressing Type Transparent;Tape 09/15/21 2000   Hub Color/Line Status Blue;Capped;Flushed 09/15/21 2000   Action Taken Open ports on tubing capped 09/15/21 2000   Alcohol Cap Used Yes 09/15/21 2000       Peripheral IV 09/14/21 Left Hand (Active)   Site Assessment Clean, dry, & intact 09/15/21 2000   Phlebitis Assessment 0 09/15/21 2000   Infiltration Assessment 0 09/15/21 2000   Dressing Status Clean, dry, & intact 09/15/21 2000   Dressing Type Tape;Transparent 09/15/21 2000   Hub Color/Line Status Blue;Capped;Flushed 09/15/21 2000   Action Taken Open ports on tubing capped 09/15/21 2000   Alcohol Cap Used Yes 09/15/21 2000        Opportunity for questions and clarification was provided. Patient transported with:   Monitor  Registered Nurse    6033: Updated wife on pts transfer to new room on 3N.

## 2021-09-16 NOTE — PROGRESS NOTES
[address] [facility] Adult  Hospitalist Group                                                                                          Hospitalist Progress Note  Darion Peterson MD  Answering service: [phone] from in house phone        Date of Service:  2021  NAME:  Giuseppe Bernardo YOB: 1950  MRN:  [id number]      Admission Summary:   Mr Peng Cotto is a 75 yo WM with a PMH of prostate cancer, bladder cancer sp TURBT 2020 with ileostomy, he had a recent right nephrostomy tube placed last week after failed attempt for left urethal stent placed secondary to left ureteral obstruction. He was found to have some blood clots in the nephrostomy tube so was taken to IR  for left ureteral obstruction with left hydronephrosis and arterial bleeding into the left collecting system. Pulsatile blood was found during arteriogram, pt was embolized and at high risk for bleeding therefore pt was brought to the ICU on  for close monitoring post procedure.  He then went to IR on  on for a L nephrostomy tube placement.  Blood cultures notable for 3/4 GPC in clusters. He was started on empiric vanc/cefepime.  Repeat cultures are ordered and pending. Pt transferred out of ICU on 9/15      Patient is seen and examined in ICU, sitting in chair. He is alert and oriented x2. Explained regarding MRSA bacteremia. He stated that he has metal rods in the back. Denied any new complaints now.    Discussed with nursing     Interval history / Subjective:   Patient seen and examined does not speak much does not want to eat either says he is not hungry       Assessment & Plan:     Sepsis   MRSA Bacteremia    L Ureteral obstruction w L hydro   Secondary to arterial bleed s/p embolization on  with neph tube on .    - IR following for neph tube   - Blood cx : 3 of 4 bottles growing MRSA   - Repeat cultures sent 9/15 am.--No growth to date  - Continue IV vanc   - ID consult placed--will follow up recommendation  - Echo ordered --pending     Depression: Continue home meds prozac, trazodone   HLD: Continue statin  Hypothyroidism: Continue synthroid  CKD stage III - cr stable   Hx bladder cancer s/p TURBT 2020, cystectomy and ileal conduit. Hx Prostate ca s/p prostatectomy in 2013        Code status: Full code  DVT prophylaxis: SCD    Care Plan discussed with: Patient/Family and Nurse  Anticipated Disposition: SNF/LTC  Anticipated Discharge: 24 hours to 48 hours     Hospital Problems  Date Reviewed: 1/26/2021        Codes Class Noted POA    Arterial bleed, intraoperative ICD-10-CM: I97.418  ICD-9-CM: 998.11  9/13/2021 Unknown                Review of Systems:   A comprehensive review of systems was negative. Vital Signs:    Last 24hrs VS reviewed since prior progress note. Most recent are:  Visit Vitals  BP (!) 135/90 (BP 1 Location: Right upper arm)   Pulse 69   Temp 97.8 °F (36.6 °C)   Resp 18   Ht 5' 8\" (1.727 m)   Wt 72.3 kg (159 lb 6.3 oz)   SpO2 98%   BMI 24.24 kg/m²         Intake/Output Summary (Last 24 hours) at 9/16/2021 1019  Last data filed at 9/16/2021 0456  Gross per 24 hour   Intake 1260 ml   Output 1230 ml   Net 30 ml        Physical Examination:     I had a face to face encounter with this patient and independently examined them on 9/16/2021 as outlined below:          Constitutional:  No acute distress, cooperative, pleasant    ENT:  Oral mucosa moist, oropharynx benign. Resp:  CTA bilaterally. No wheezing/rhonchi/rales. No accessory muscle use   CV:  Regular rhythm, normal rate, no murmurs, gallops, rubs    GI  Soft, non distended, non tender.   He has a left-sided nephrostomy tube  L neph tube, urostomy RLQ    Musculoskeletal:  No edema, warm, 2+ pulses throughout    Neurologic:  N awake            Data Review:    I personally reviewed  Image and labs      Labs:     Recent Labs     09/16/21  0300 09/15/21  0838 09/15/21  0500 09/15/21  0500   WBC 9.3  --   --  11.5*   HGB 8.4* 8.1* < > 7.9*   HCT 26.6* 25.6*   < > 24.9*   *  --   --  350    < > = values in this interval not displayed. Recent Labs     09/16/21  0300 09/15/21  0500 09/14/21  0433    142 140   K 3.7 4.6 4.2    112* 108   CO2 24 22 22   BUN 31* 30* 24*   CREA 1.92* 1.86* 1.80*   * 91 114*   CA 8.3* 8.4* 8.0*   MG 2.2 2.3 2.1   PHOS 2.1* 2.6 3.5     Recent Labs     09/14/21  0433 09/13/21  1026   ALT 15 25    134*   TBILI 0.7 0.9   TP 5.6* 7.5   ALB 2.2* 2.6*   GLOB 3.4 4.9*     Recent Labs     09/14/21  0433 09/13/21  1026   INR  --  1.0   PTP  --  10.7   APTT 29.4  --       No results for input(s): FE, TIBC, PSAT, FERR in the last 72 hours. No results found for: FOL, RBCF   No results for input(s): PH, PCO2, PO2 in the last 72 hours.   Recent Labs     09/13/21  1847   TROIQ <0.05     No results found for: CHOL, CHOLX, CHLST, CHOLV, HDL, HDLP, LDL, LDLC, DLDLP, TGLX, TRIGL, TRIGP, CHHD, CHHDX  No results found for: [facility]  Lab Results   Component Value Date/Time    Color DARK YELLOW 01/11/2016 03:15 PM    Appearance CLEAR 01/11/2016 03:15 PM    Specific gravity 1.025 01/11/2016 03:15 PM    pH (UA) 5.0 01/11/2016 03:15 PM    Protein TRACE (A) 01/11/2016 03:15 PM    Glucose NEGATIVE  01/11/2016 03:15 PM    Ketone NEGATIVE  01/11/2016 03:15 PM    Bilirubin NEGATIVE  01/11/2016 03:15 PM    Urobilinogen 0.2 01/11/2016 03:15 PM    Nitrites NEGATIVE  01/11/2016 03:15 PM    Leukocyte Esterase NEGATIVE  01/11/2016 03:15 PM    Epithelial cells FEW 01/11/2016 03:15 PM    Bacteria NEGATIVE  01/11/2016 03:15 PM    WBC 0-4 01/11/2016 03:15 PM    RBC 0-5 01/11/2016 03:15 PM         Medications Reviewed:     Current Facility-Administered Medications   Medication Dose Route Frequency    FLUoxetine (PROzac) capsule 40 mg  40 mg Oral DAILY    levothyroxine (SYNTHROID) tablet 25 mcg  25 mcg Oral 6am    sodium bicarbonate tablet 650 mg  650 mg Oral BID    budesonide (PULMICORT) 250 mcg/2ml nebulizer susp 250 mcg Nebulization BID RT    traZODone (DESYREL) tablet 100 mg  100 mg Oral QHS    montelukast (SINGULAIR) tablet 10 mg  10 mg Oral QHS    aspirin chewable tablet 81 mg  81 mg Oral DAILY    carvediloL (COREG) tablet 6.25 mg  6.25 mg Oral BID WITH MEALS    pravastatin (PRAVACHOL) tablet 40 mg  40 mg Oral QHS    Vancomycin - Pharmacy to Dose   Other Rx Dosing/Monitoring    vancomycin (VANCOCIN) 1,000 mg in 0.9% sodium chloride 250 mL (VIAL-MATE)  1,000 mg IntraVENous Q24H    0.9% sodium chloride infusion 250 mL  250 mL IntraVENous PRN    sodium chloride (NS) flush 5-40 mL  5-40 mL IntraVENous Q8H    sodium chloride (NS) flush 5-40 mL  5-40 mL IntraVENous PRN    acetaminophen (TYLENOL) tablet 650 mg  650 mg Oral Q6H PRN    Or    acetaminophen (TYLENOL) suppository 650 mg  650 mg Rectal Q6H PRN    promethazine (PHENERGAN) tablet 12.5 mg  12.5 mg Oral Q6H PRN    Or    ondansetron (ZOFRAN) injection 4 mg  4 mg IntraVENous Q6H PRN    albuterol (PROVENTIL VENTOLIN) nebulizer solution 2.5 mg  2.5 mg Nebulization Q4H PRN    bisacodyL (DULCOLAX) suppository 10 mg  10 mg Rectal DAILY PRN     ______________________________________________________________________  EXPECTED LENGTH OF STAY: 3d 12h  ACTUAL LENGTH OF STAY:          3                 Mattie Ceron MD

## 2021-09-16 NOTE — PROGRESS NOTES
Problem: Pressure Injury - Risk of  Goal: *Prevention of pressure injury  Description: Document Nabeel Scale and appropriate interventions in the flowsheet.   Outcome: Progressing Towards Goal  Note: Pressure Injury Interventions:  Sensory Interventions: Assess changes in LOC, Assess need for specialty bed, Keep linens dry and wrinkle-free, Maintain/enhance activity level    Moisture Interventions: Check for incontinence Q2 hours and as needed, Internal/External urinary devices, Minimize layers    Activity Interventions: Assess need for specialty bed, Increase time out of bed, PT/OT evaluation    Mobility Interventions: Assess need for specialty bed, HOB 30 degrees or less, Pressure redistribution bed/mattress (bed type), PT/OT evaluation    Nutrition Interventions: Document food/fluid/supplement intake, Offer support with meals,snacks and hydration    Friction and Shear Interventions: HOB 30 degrees or less

## 2021-09-16 NOTE — CONSULTS
Infectious Disease Consult Note    Reason for Consult: MRSA bactremia  Date of Consultation: September 16, 2021  Date of Admission: 9/13/2021  Referring Physician: Dr Liz Plata       HPI: Patient is not much verbal and very limited history from the patient. Most history was obtained from discussion with the patient's wife was in the room and from chart review. Patient gave me permission to discuss his medical care with his wife    Mr. Thierry Greenberg is a 64-year-old gentleman with a history of prostate cancer, degenerative joint disease and stenosis of the spine, thoracic laminectomy T10-T11 with dorsal column stimulator in 2010, lumbo sacral fusion, L2-3 lumbar lateral interbody fusion, posterior fusion with pedicle screws removal of the stimulator and lead via laminectomy 2016, bladder cancer status post TURBT in 2020 with ileostomy, recent nephrostogram and left nephrostomy placement on the left on 9/7/2021, indwelling left-sided nephrostomy tube likely eroded into second order branch of the left renal artery status post embolization using coils/removal of the nephrostomy tube on 9/13/2021, another left percutaneous nephrostomy tube placement on 9/14/2020. He was admitted to the ICU postprocedure for monitoring on 9/14/2021. He was found to have MRSA bacteremia on blood cultures from 9/13/2021 with a vancomycin of 1. Patient is a very poor historian and does not talk much at all. His wife tells me that this is not uncommon for him. Patient's wife tells me that he was somewhat more active at home prior to all of these procedures and ambulating with a walker. But lately he has not been ambulating much. She also says that he has some gait shuffling and disturbances that comes on after every procedure. His appetite has not been great. She denies him reporting any history of fevers chills at home prior to presentation in the hospital for his procedures. He denies any cough or upper respiratory symptoms.   He denies any back pain. He denies any diarrhea. He denies any rash. He denies any focal joint pain of his extremities. He denies any lower back pain. From chart review he was febrile up to 101.7 on 9/14/2021. On labs he had leukocytosis that is trending down. His renal function shows a BUN that has been ranging between 24-31 in the last few days and a creatinine of 1.8-1.9. Repeat blood cultures from 9/15/2021 is pending. Rapid COVID-19 test on 9/13/2021 was undetected. He has been started on IV vancomycin on 9/14/2021. Past Medical History:  Past Medical History:   Diagnosis Date    Back pain     Backache, unspecified 2/8/2010    Bladder cancer ([facility] [address] 7/14/2020    He is s/p TURBT on 6/25/2020. No obvious residual tumor, but tessy and induration at the base. Pathology reads invasive papillary urothelial carcinoma, high grade (bladder) and invasive high grade urothelial carcinoma pT2 (bladder base). 07- visit He has high grade muscle invasive bladder cancer. I thought it was grossly resected but pathology reveals high grade disease. He is at high risk o    Blood clots 2004    spine    Cancer [facility]) 2013    prostate    CHF (congestive heart failure) ([facility] [address] 1999    Coronary artery disease     Coronary atherosclerosis of native coronary artery 2/8/2010    DDD (degenerative disc disease)     Diabetes ([facility] [address]     Essential hypertension, benign 2/8/2010    Gastroenteritis, viral     Heart block     95%    Hematuria, unspecified 7/14/2020    He had gross hematuria and clots in his Depends. He notes not pain in the bladder or flank. CT 4/20/20 without renal concerns. Tumor found on cystsocopy on 5/29/2020.     HTN (hypertension), benign     Hypercholesterolemia     Ill-defined condition     short term memory loss since SDH    Ill-defined condition     urinary incontinence    Ill-defined condition     limited vision R eye    Ill-defined condition 1996    concussion with subdural hematoma    Ill-defined condition 1999 & early 2000s    Mycardial Infarction    Impotence 7/14/2020    He has ED. He and his partner are not active. Viagra was no longer helpful.  Lumbago 2/8/2010    Malignant neoplasm of prostate ([facility] [address] 7/14/2020    He is s/p a robotic prostatectomy 10/18/13. 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 His PSA has been undetectable, last drawn on 5/14/2020.  Neurogenic dysfunction of the urinary bladder 7/14/2020    He has a hypotonic bladder and is emptying with Valsalva. He has mild ISD and urge incontinence. He has some enuresis.  Neuropathy Peripheral     pain in feet    Osteoarthritis     Osteoarthrosis, unspecified whether generalized or localized, unspecified site 2/8/2010    Seizures ([facility] [address]     1996 after head injury    Skin disease     itching alot    Stroke [facility]) 1996    Unspecified hereditary and idiopathic peripheral neuropathy 2/8/2010    Urgency of urination 7/14/2020    He has occasional urgency. He wears depends for a precaution. He does have an Acticuff clamp. He had urgency and episodic incontinence prior to prostate surgery. He does not leak with cough or sneeze. He drinks diet Pepsi all day long and less often coffee. He has chronic back problems with back surgery 2004, 1/2016, 12/2017 and 2/2018. He had a spinal stimulator which was removed. He has had thi    Urgency of urination 7/14/2020    He has occasional urgency. He wears depends for a precaution. He does have an Acticuff clamp. He had urgency and episodic incontinence prior to prostate surgery. He does not leak with cough or sneeze. He drinks diet Pepsi all day long and less often coffee. He has chronic back problems with back surgery 2004, 1/2016, 12/2017 and 2/2018. He had a spinal stimulator which was removed.  He has had thi         Surgical History:  Past Surgical History:   Procedure Laterality Date    COLONOSCOPY N/A 4/12/2018    COLONOSCOPY performed by Omar Prado MD at [address] HX APPENDECTOMY      age 8    [address] SURGERY  2002    HX BACK SURGERY  2004    HX CATARACT REMOVAL      with lens implant    HX CIRCUMCISION      HX HEART CATHETERIZATION  4495,0049    stents x3    HX PROSTATE SURGERY  08/24/2020    PELVIC LYMPH NODE DISSECTION     HX PROSTATE SURGERY  10/18/2013    PELVIC LYMPH NODE DISSECTION     HX PROSTATECTOMY  10/18/2013    HX TONSILLECTOMY      as young child    HX UROLOGICAL  06/25/2020    TURBT    HX UROLOGICAL  06/25/2020    TURBT    HX UROLOGICAL  05/29/2020    CYSTOSCOPY     HX UROLOGICAL  08/24/2020    ROBOTIC CYSTECTOMY    HX UROLOGICAL  08/24/2020    ILEAL CONDUIT URINARY DIVERSION     HX UROLOGICAL  08/26/2021    LOOPSCOPY     HX UROLOGICAL  08/26/2021    LOOPOGRAM    HX UROLOGICAL  08/26/2021    URETHRAL CATHERIZATION     IR NEPHROSTOMY PERC LT PLC CATH  SI  9/7/2021    IR NEPHROSTOMY PERC LT PLC CATH  SI  9/14/2021    IR OCCL Franck Redd W SI  9/13/2021    IR REMOVE NEPH TUBE RT  9/13/2021    NEUROLOGICAL PROCEDURE UNLISTED  1996    evacuate SDH    NEUROLOGICAL PROCEDURE UNLISTED      implant neuro stimulator in back for neuropathy in feet. Pt no longer has this, it was removed. Family History:   Family History   Adopted: Yes         Social History:     · Living Situation: at home wt wife  · Tobacco:ex smoker   · Alcohol:denied   · Illicit Drugs:denied   · Travel History denied    Allergies: Allergies   Allergen Reactions    Lisinopril Unknown (comments)         Review of Systems:         Medications:  No current facility-administered medications on file prior to encounter. Current Outpatient Medications on File Prior to Encounter   Medication Sig Dispense Refill    FLUoxetine (PROzac) 40 mg capsule Take 40 mg by mouth daily.  levothyroxine (SYNTHROID) 25 mcg tablet Take 25 mcg by mouth Daily (before breakfast).       calcium-cholecalciferol, d3, (CALCIUM 600 + D) 600-125 mg-unit tab Take 1 Tablet by mouth daily.  sodium bicarbonate 650 mg tablet Take 650 mg by mouth two (2) times a day.  budesonide-glycopyr-formoterol (Breztri Aerosphere) 160-9-4.8 mcg/actuation HFAA Take 2 Inhalation by inhalation two (2) times a day.  traZODone (DESYREL) 100 mg tablet Take 100 mg by mouth nightly.  montelukast (SINGULAIR) 10 mg tablet Take 10 mg by mouth daily.  aspirin delayed-release 81 mg tablet Take 81 mg by mouth daily.  carvedilol (COREG) 6.25 mg tablet Take 6.25 mg by mouth daily.  pravastatin (PRAVACHOL) 40 mg tablet Take 40 mg by mouth daily.              Physical Exam:    Vitals:   Patient Vitals for the past 24 hrs:   Temp Pulse Resp BP SpO2   09/16/21 1400 -- 74 -- -- --   09/16/21 1200 -- 69 -- -- --   09/16/21 1156 98.6 °F (37 °C) 71 24 139/68 95 %   09/16/21 1000 -- 69 -- -- --   09/16/21 0845 97.8 °F (36.6 °C) 76 18 (!) 135/90 --   09/16/21 0800 -- 68 -- -- --   09/16/21 0601 -- 69 -- -- --   09/16/21 0456 98.4 °F (36.9 °C) 73 20 136/61 --   09/16/21 0403 -- 75 -- -- --   09/16/21 0156 -- 74 -- -- --   09/16/21 0111 98.3 °F (36.8 °C) 75 23 126/64 98 %   09/16/21 0000 97.2 °F (36.2 °C) 70 20 (!) 111/56 98 %   09/15/21 2300 -- 69 27 (!) 118/57 99 %   09/15/21 2200 -- 74 (!) 31 110/60 99 %   09/15/21 2100 -- 73 25 106/60 100 %   09/15/21 2000 97.3 °F (36.3 °C) 77 27 110/62 98 %   09/15/21 1900 -- 77 28 115/60 99 %   09/15/21 1800 -- 76 27 126/67 96 %   09/15/21 1700 -- 73 26 121/61 96 %   09/15/21 1600 98.1 °F (36.7 °C) 77 23 (!) 109/55 100 %   ·   · GEN: NAD  · HEENT: No scleral icterus, edentulous, no thrush  · CV: S1, S2 heard regularly,  · Lungs diminished breath  · Abdomen: soft, non , ileostomy tender  · Genitourinary: Nephrostomy on the left  · Extremities: no edema  · Neuro: Alert, oriented to self, follows commands, not much verball   · Skin: no rash on face or extremities, abdomen  · Psych: Flat affect  · MSK no erythema noted of the lumbar, nontender to palpation      Labs:   Recent Results (from the past 24 hour(s))   CBC W/O DIFF    Collection Time: 09/16/21  3:00 AM   Result Value Ref Range    WBC 9.3 4.1 - 11.1 K/uL    RBC 3.24 (L) 4.10 - 5.70 M/uL    HGB 8.4 (L) 12.1 - 17.0 g/dL    HCT 26.6 (L) 36.6 - 50.3 %    MCV 82.1 80.0 - 99.0 FL    MCH 25.9 (L) 26.0 - 34.0 PG    MCHC 31.6 30.0 - 36.5 g/dL    RDW 15.6 (H) 11.5 - 14.5 %    PLATELET 124 (H) 826 - 400 K/uL    MPV 9.6 8.9 - 12.9 FL    NRBC 0.0 0  WBC    ABSOLUTE NRBC 0.00 0.00 - 7.21 K/uL   METABOLIC PANEL, BASIC    Collection Time: 09/16/21  3:00 AM   Result Value Ref Range    Sodium 138 136 - 145 mmol/L    Potassium 3.7 3.5 - 5.1 mmol/L    Chloride 106 97 - 108 mmol/L    CO2 24 21 - 32 mmol/L    Anion gap 8 5 - 15 mmol/L    Glucose 112 (H) 65 - 100 mg/dL    BUN 31 (H) 6 - 20 MG/DL    Creatinine 1.92 (H) 0.70 - 1.30 MG/DL    BUN/Creatinine ratio 16 12 - 20      GFR est AA 42 (L) >60 ml/min/1.73m2    GFR est non-AA 35 (L) >60 ml/min/1.73m2    Calcium 8.3 (L) 8.5 - 10.1 MG/DL   MAGNESIUM    Collection Time: 09/16/21  3:00 AM   Result Value Ref Range    Magnesium 2.2 1.6 - 2.4 mg/dL   PHOSPHORUS    Collection Time: 09/16/21  3:00 AM   Result Value Ref Range    Phosphorus 2.1 (L) 2.6 - 4.7 MG/DL       Microbiology Data:       Blood: 9/13/21  Susceptibility    Staphylococcus aureus Methcillin Resistant    Antibiotic Interpretation Value Method Comment   Clindamycin ($) Resistant >=4 ug/mL CARLA    Daptomycin ($$$$$) Susceptible 0.5 ug/mL CARLA    Erythromycin ($$$$) Resistant >=8 ug/mL CARLA    Gentamicin ($) Susceptible <=0.5 ug/mL CARLA    Linezolid ($$$$$) Susceptible 2 ug/mL CARLA    Oxacillin Resistant >=4 ug/mL CARLA    Rifampin ($$$$) Susceptible <=0.5 ug/mL CARLA    Tetracycline Susceptible 2 ug/mL CARLA    Trimeth/Sulfa Susceptible <=10 ug/mL CARLA    Vancomycin ($) Susceptible 1 ug/mL CARLA    Ciprofloxacin ($) Resistant >=8 ug/mL CARLA    Levofloxacin ($) Resistant >=8 ug/mL CARLA    Doxycycline ($$) Susceptible 1 ug/mL CARLA               Pathology Results: 4/12/2018  Ascending colon, polyp, polypectomy:        Tubular adenoma     Imaging:     US retroperitoneum 9/14/21  FINDINGS:      The kidneys are normal in echotexture.     The right kidney measures 11.9 cm in length. No focal lesion or hydronephrosis.     The left kidney measures 11.3 cm in length. Mild hydronephrosis. No focal  abnormality     The visualized abdominal aorta and common iliac arteries are normal in caliber. The visualized inferior vena cava is unremarkable.       The urinary bladder is not visualized.     IMPRESSION     Mild left-sided hydronephrosis.           7/10/21  FINDINGS:   LOWER THORAX: Left lung base atelectasis. Normal heart size. Trace pericardial  effusion. Diffuse coronary calcifications. LIVER: No mass. BILIARY TREE: Gallbladder is within normal limits. CBD is not dilated. SPLEEN: within normal limits. PANCREAS: No focal abnormality. ADRENALS: Unremarkable. KIDNEYS/URETERS: Moderate left hydronephrosis. Dilation of the ureter leading to  the ureteral anastomosis with the ileal conduit. No right hydronephrosis. No  stones in either side. STOMACH: Unremarkable. SMALL BOWEL: No dilatation or wall thickening. COLON: No dilatation or wall thickening. APPENDIX: Nonvisualized  PERITONEUM: No ascites or pneumoperitoneum. RETROPERITONEUM: No lymphadenopathy or aortic aneurysm. REPRODUCTIVE ORGANS: Status post cystoprostatectomy. URINARY BLADDER: Surgically absent. BONES: Thoracosacral fusion with laminectomies from L2 through L5. Superior  endplate compression deformity L2 appears chronic. ABDOMINAL WALL: Right lower quadrant ileostomy leading to an ileal conduit. ADDITIONAL COMMENTS: N/A     IMPRESSION  1. Moderate left hydroureteronephrosis leading to the ureteral-ileal conduit  anastomosis. Correlation with prior imaging is recommended to determine whether  this is chronic or acute.   2.  Status post cystoprostatectomy. No evidence of metastatic disease in the  abdomen and pelvis. Procedures:   9/14/21  Successful placement of 10.2 Iranian left percutaneous nephrostomy tube. 9/13/21  IMPRESSION  Nephrostogram and arteriograms show that the indwelling left-sided nephrostomy  tube is likely eroded into a second order branch of the left renal artery. This  eroded arterial branch was successfully embolized using coils. Post embolization  arteriograms confirm no additional active arterial branches, and no active  extravasation. The indwelling nephrostomy tube was removed. The patient will  return to the IR department tomorrow for replacement of the nephrostomy tube. 9/7/21  IMPRESSION  Antegrade nephrostogram confirms partial occlusion or stenosis of the distal  left ureter. Attempts to traverse the stenosis were unsuccessful. An 8 Iranian  percutaneous nephrostomy tube was left in place. The patient will return in 2  weeks for another attempt at crossing the stenosis.         Assessment / Plan:         1) MRSA bacteremia 9/13/2021  Recent nephrostomy tube placement and nephrostogram  Repeat blood culture 9/15/2021 pending  On IV vancomycin  If renal function worsens further recommend changing to IV daptomycin  Check tte  Antibiotic final duration to be determined but will likely need at least 2 weeks of IV antibiotics once bacteremia clears  If TTE negative and no concern for osteomyelitis or hardware infection in the spine, plan for 2 weeks of antibiotics  If back pain recommend checking imaging to assess for osteomyelitis, hardware infection if still present  Renally dose antibiotics per pharmacy    2) history of bladder cancer  Status post TURBT and ileostomy    3) degenerative disc disease of the spine  Status postthoracic laminectomy T10-T11 with dorsal column stimulator in 2010, lumbo sacral fusion, L2-3 lumbar lateral interbody fusion, posterior fusion with pedicle screws removal of the stimulator and lead via laminectomy 2016,          Thank for the opportunity to participate in the care of this patient. Please contact with questions or concerns.

## 2021-09-16 NOTE — PROGRESS NOTES
TRANSFER - IN REPORT:    Verbal report received from Mariaelena RN (name) on Charlie Beat  being received from ICU (unit) for routine progression of care      Report consisted of patients Situation, Background, Assessment and   Recommendations(SBAR). Information from the following report(s) SBAR, Kardex, Procedure Summary, Intake/Output, MAR, Recent Results, Med Rec Status and Cardiac Rhythm NSR was reviewed with the receiving nurse. Opportunity for questions and clarification was provided. Assessment completed upon patients arrival to unit and care assumed. Lalo Rodriguez

## 2021-09-17 ENCOUNTER — APPOINTMENT (OUTPATIENT)
Dept: NON INVASIVE DIAGNOSTICS | Age: 71
DRG: 673 | End: 2021-09-17
Attending: INTERNAL MEDICINE
Payer: MEDICARE

## 2021-09-17 LAB
ANION GAP SERPL CALC-SCNC: 7 MMOL/L (ref 5–15)
BUN SERPL-MCNC: 28 MG/DL (ref 6–20)
BUN/CREAT SERPL: 17 (ref 12–20)
CALCIUM SERPL-MCNC: 8.3 MG/DL (ref 8.5–10.1)
CHLORIDE SERPL-SCNC: 106 MMOL/L (ref 97–108)
CO2 SERPL-SCNC: 23 MMOL/L (ref 21–32)
CREAT SERPL-MCNC: 1.64 MG/DL (ref 0.7–1.3)
ERYTHROCYTE [DISTWIDTH] IN BLOOD BY AUTOMATED COUNT: 15.1 % (ref 11.5–14.5)
GLUCOSE SERPL-MCNC: 106 MG/DL (ref 65–100)
HCT VFR BLD AUTO: 28.5 % (ref 36.6–50.3)
HGB BLD-MCNC: 8.7 G/DL (ref 12.1–17)
LACTATE SERPL-SCNC: 0.9 MMOL/L (ref 0.4–2)
MAGNESIUM SERPL-MCNC: 2.2 MG/DL (ref 1.6–2.4)
MCH RBC QN AUTO: 25.5 PG (ref 26–34)
MCHC RBC AUTO-ENTMCNC: 30.5 G/DL (ref 30–36.5)
MCV RBC AUTO: 83.6 FL (ref 80–99)
NRBC # BLD: 0 K/UL (ref 0–0.01)
NRBC BLD-RTO: 0 PER 100 WBC
PHOSPHATE SERPL-MCNC: 2.5 MG/DL (ref 2.6–4.7)
PLATELET # BLD AUTO: 431 K/UL (ref 150–400)
PMV BLD AUTO: 10 FL (ref 8.9–12.9)
POTASSIUM SERPL-SCNC: 3.5 MMOL/L (ref 3.5–5.1)
RBC # BLD AUTO: 3.41 M/UL (ref 4.1–5.7)
SODIUM SERPL-SCNC: 136 MMOL/L (ref 136–145)
VANCOMYCIN SERPL-MCNC: 15.1 UG/ML
WBC # BLD AUTO: 9.8 K/UL (ref 4.1–11.1)

## 2021-09-17 PROCEDURE — 83605 ASSAY OF LACTIC ACID: CPT

## 2021-09-17 PROCEDURE — 80202 ASSAY OF VANCOMYCIN: CPT

## 2021-09-17 PROCEDURE — 84100 ASSAY OF PHOSPHORUS: CPT

## 2021-09-17 PROCEDURE — 80048 BASIC METABOLIC PNL TOTAL CA: CPT

## 2021-09-17 PROCEDURE — 99232 SBSQ HOSP IP/OBS MODERATE 35: CPT | Performed by: INTERNAL MEDICINE

## 2021-09-17 PROCEDURE — 74011000250 HC RX REV CODE- 250: Performed by: NURSE PRACTITIONER

## 2021-09-17 PROCEDURE — 94640 AIRWAY INHALATION TREATMENT: CPT

## 2021-09-17 PROCEDURE — 74011250637 HC RX REV CODE- 250/637: Performed by: NURSE PRACTITIONER

## 2021-09-17 PROCEDURE — 83735 ASSAY OF MAGNESIUM: CPT

## 2021-09-17 PROCEDURE — 65660000000 HC RM CCU STEPDOWN

## 2021-09-17 PROCEDURE — 36415 COLL VENOUS BLD VENIPUNCTURE: CPT

## 2021-09-17 PROCEDURE — 93306 TTE W/DOPPLER COMPLETE: CPT | Performed by: SPECIALIST

## 2021-09-17 PROCEDURE — 93306 TTE W/DOPPLER COMPLETE: CPT

## 2021-09-17 PROCEDURE — 85027 COMPLETE CBC AUTOMATED: CPT

## 2021-09-17 PROCEDURE — 74011250636 HC RX REV CODE- 250/636: Performed by: NURSE PRACTITIONER

## 2021-09-17 RX ADMIN — BUDESONIDE 250 MCG: 0.25 INHALANT RESPIRATORY (INHALATION) at 07:55

## 2021-09-17 RX ADMIN — LEVOTHYROXINE SODIUM 25 MCG: 0.03 TABLET ORAL at 05:34

## 2021-09-17 RX ADMIN — ASPIRIN 81 MG: 81 TABLET, CHEWABLE ORAL at 10:13

## 2021-09-17 RX ADMIN — ACETAMINOPHEN 650 MG: 325 TABLET ORAL at 16:14

## 2021-09-17 RX ADMIN — FLUOXETINE 40 MG: 20 CAPSULE ORAL at 10:12

## 2021-09-17 RX ADMIN — SODIUM BICARBONATE 650 MG: 650 TABLET ORAL at 17:47

## 2021-09-17 RX ADMIN — CARVEDILOL 6.25 MG: 6.25 TABLET, FILM COATED ORAL at 17:46

## 2021-09-17 RX ADMIN — SODIUM BICARBONATE 650 MG: 650 TABLET ORAL at 10:12

## 2021-09-17 RX ADMIN — PRAVASTATIN SODIUM 40 MG: 40 TABLET ORAL at 21:46

## 2021-09-17 RX ADMIN — BUDESONIDE 250 MCG: 0.25 INHALANT RESPIRATORY (INHALATION) at 20:17

## 2021-09-17 RX ADMIN — CARVEDILOL 6.25 MG: 6.25 TABLET, FILM COATED ORAL at 10:13

## 2021-09-17 RX ADMIN — TRAZODONE HYDROCHLORIDE 100 MG: 50 TABLET ORAL at 21:46

## 2021-09-17 RX ADMIN — Medication 10 ML: at 16:17

## 2021-09-17 RX ADMIN — MONTELUKAST 10 MG: 10 TABLET, FILM COATED ORAL at 21:46

## 2021-09-17 RX ADMIN — Medication 10 ML: at 21:46

## 2021-09-17 RX ADMIN — ACETAMINOPHEN 650 MG: 325 TABLET ORAL at 22:39

## 2021-09-17 RX ADMIN — VANCOMYCIN HYDROCHLORIDE 1000 MG: 1 INJECTION, POWDER, LYOPHILIZED, FOR SOLUTION INTRAVENOUS at 10:13

## 2021-09-17 NOTE — PROGRESS NOTES
Infectious Disease Progress Note          HPI: patient was sleeping when I went to see him  Had a difficult night and delirious last night     Physical Exam:    Vitals:   Patient Vitals for the past 24 hrs:   Temp Pulse Resp BP SpO2   09/17/21 0814 97.7 °F (36.5 °C) 72 18 124/62 99 %   09/17/21 0609 -- 73 -- -- --   09/17/21 0400 98 °F (36.7 °C) 67 18 (!) 147/70 100 %   09/17/21 0211 -- 69 -- -- --   09/16/21 2342 97.8 °F (36.6 °C) 74 20 (!) 144/63 94 %   09/16/21 2208 -- 73 -- -- --   09/16/21 2002 -- 74 -- -- --   09/16/21 1828 -- 78 -- -- --   09/16/21 1604 98.6 °F (37 °C) 74 18 132/68 98 %   09/16/21 1400 -- 74 -- -- --   09/16/21 1200 -- 69 -- -- --   09/16/21 1156 98.6 °F (37 °C) 71 24 139/68 95 %     · GEN: NAD  · CV: S1, S2 heard regularly,  · Lungs diminished anteriorly  · Abdomen: soft, non , ileostomy tender  · Genitourinary: Nephrostomy on the left  · Extremities: no edema      Labs:   Recent Results (from the past 24 hour(s))   LACTIC ACID    Collection Time: 09/17/21  4:25 AM   Result Value Ref Range    Lactic acid 0.9 0.4 - 2.0 MMOL/L   MAGNESIUM    Collection Time: 09/17/21  4:25 AM   Result Value Ref Range    Magnesium 2.2 1.6 - 2.4 mg/dL   PHOSPHORUS    Collection Time: 09/17/21  4:25 AM   Result Value Ref Range    Phosphorus 2.5 (L) 2.6 - 4.7 MG/DL   CBC W/O DIFF    Collection Time: 09/17/21  4:25 AM   Result Value Ref Range    WBC 9.8 4.1 - 11.1 K/uL    RBC 3.41 (L) 4.10 - 5.70 M/uL    HGB 8.7 (L) 12.1 - 17.0 g/dL    HCT 28.5 (L) 36.6 - 50.3 %    MCV 83.6 80.0 - 99.0 FL    MCH 25.5 (L) 26.0 - 34.0 PG    MCHC 30.5 30.0 - 36.5 g/dL    RDW 15.1 (H) 11.5 - 14.5 %    PLATELET 473 (H) 638 - 400 K/uL    MPV 10.0 8.9 - 12.9 FL    NRBC 0.0 0  WBC    ABSOLUTE NRBC 0.00 0.00 - 3.21 K/uL   METABOLIC PANEL, BASIC    Collection Time: 09/17/21  4:25 AM   Result Value Ref Range    Sodium 136 136 - 145 mmol/L    Potassium 3.5 3.5 - 5.1 mmol/L    Chloride 106 97 - 108 mmol/L    CO2 23 21 - 32 mmol/L Anion gap 7 5 - 15 mmol/L    Glucose 106 (H) 65 - 100 mg/dL    BUN 28 (H) 6 - 20 MG/DL    Creatinine 1.64 (H) 0.70 - 1.30 MG/DL    BUN/Creatinine ratio 17 12 - 20      GFR est AA 50 (L) >60 ml/min/1.73m2    GFR est non-AA 41 (L) >60 ml/min/1.73m2    Calcium 8.3 (L) 8.5 - 10.1 MG/DL   VANCOMYCIN, RANDOM    Collection Time: 09/17/21  4:25 AM   Result Value Ref Range    Vancomycin, random 15.1 UG/ML       Microbiology Data:       Blood: 9/13/21  Susceptibility    Staphylococcus aureus Methcillin Resistant    Antibiotic Interpretation Value Method Comment   Clindamycin ($) Resistant >=4 ug/mL CARLA    Daptomycin ($$$$$) Susceptible 0.5 ug/mL CARLA    Erythromycin ($$$$) Resistant >=8 ug/mL CARLA    Gentamicin ($) Susceptible <=0.5 ug/mL CARLA    Linezolid ($$$$$) Susceptible 2 ug/mL CARLA    Oxacillin Resistant >=4 ug/mL CARLA    Rifampin ($$$$) Susceptible <=0.5 ug/mL CARLA    Tetracycline Susceptible 2 ug/mL CARLA    Trimeth/Sulfa Susceptible <=10 ug/mL CARLA    Vancomycin ($) Susceptible 1 ug/mL CARLA    Ciprofloxacin ($) Resistant >=8 ug/mL CARLA    Levofloxacin ($) Resistant >=8 ug/mL CARLA    Doxycycline ($$) Susceptible 1 ug/mL CARLA               Pathology Results: 4/12/2018  Ascending colon, polyp, polypectomy:        Tubular adenoma     Imaging:     US retroperitoneum 9/14/21  FINDINGS:      The kidneys are normal in echotexture.     The right kidney measures 11.9 cm in length. No focal lesion or hydronephrosis.     The left kidney measures 11.3 cm in length. Mild hydronephrosis. No focal  abnormality     The visualized abdominal aorta and common iliac arteries are normal in caliber. The visualized inferior vena cava is unremarkable.       The urinary bladder is not visualized.     IMPRESSION     Mild left-sided hydronephrosis.           7/10/21  FINDINGS:   LOWER THORAX: Left lung base atelectasis. Normal heart size. Trace pericardial  effusion. Diffuse coronary calcifications. LIVER: No mass.   BILIARY TREE: Gallbladder is within normal limits. CBD is not dilated. SPLEEN: within normal limits. PANCREAS: No focal abnormality. ADRENALS: Unremarkable. KIDNEYS/URETERS: Moderate left hydronephrosis. Dilation of the ureter leading to  the ureteral anastomosis with the ileal conduit. No right hydronephrosis. No  stones in either side. STOMACH: Unremarkable. SMALL BOWEL: No dilatation or wall thickening. COLON: No dilatation or wall thickening. APPENDIX: Nonvisualized  PERITONEUM: No ascites or pneumoperitoneum. RETROPERITONEUM: No lymphadenopathy or aortic aneurysm. REPRODUCTIVE ORGANS: Status post cystoprostatectomy. URINARY BLADDER: Surgically absent. BONES: Thoracosacral fusion with laminectomies from L2 through L5. Superior  endplate compression deformity L2 appears chronic. ABDOMINAL WALL: Right lower quadrant ileostomy leading to an ileal conduit. ADDITIONAL COMMENTS: N/A     IMPRESSION  1. Moderate left hydroureteronephrosis leading to the ureteral-ileal conduit  anastomosis. Correlation with prior imaging is recommended to determine whether  this is chronic or acute. 2.  Status post cystoprostatectomy. No evidence of metastatic disease in the  abdomen and pelvis. Procedures:   9/14/21  Successful placement of 10.2 Mongolian left percutaneous nephrostomy tube. 9/13/21  IMPRESSION  Nephrostogram and arteriograms show that the indwelling left-sided nephrostomy  tube is likely eroded into a second order branch of the left renal artery. This  eroded arterial branch was successfully embolized using coils. Post embolization  arteriograms confirm no additional active arterial branches, and no active  extravasation. The indwelling nephrostomy tube was removed. The patient will  return to the IR department tomorrow for replacement of the nephrostomy tube. 9/7/21  IMPRESSION  Antegrade nephrostogram confirms partial occlusion or stenosis of the distal  left ureter. Attempts to traverse the stenosis were unsuccessful.  An 8 Kiswahili  percutaneous nephrostomy tube was left in place. The patient will return in 2  weeks for another attempt at crossing the stenosis. Assessment / Plan:         1) MRSA bacteremia 9/13/2021  Recent nephrostomy tube placement and nephrostogram  Repeat blood culture 9/15/2021 so far negative  On IV vancomycin  Check tte  Antibiotic final duration to be determined but will likely need at least 2 weeks of IV antibiotics once bacteremia clears  If TTE negative and no concern for osteomyelitis or hardware infection in the spine, plan for 2 weeks of antibiotics  If back pain recommend checking imaging to assess for osteomyelitis, hardware infection if still present  Renally dose antibiotics per pharmacy    2) history of bladder cancer  Status post TURBT and ileostomy    3) degenerative disc disease of the spine  Status postthoracic laminectomy T10-T11 with dorsal column stimulator in 2010, lumbo sacral fusion, L2-3 lumbar lateral interbody fusion, posterior fusion with pedicle screws removal of the stimulator and lead via laminectomy 2016,          Thank for the opportunity to participate in the care of this patient. Please contact with questions or concerns.        Leia Coon DO  11:23 AM

## 2021-09-17 NOTE — PROGRESS NOTES
2130- pt became confused and wants to leave-wants to speak to his wife to take him home -wife coming to watch the pt tonite

## 2021-09-17 NOTE — ACP (ADVANCE CARE PLANNING)
Advance Care Planning     Advance Care Planning (ACP) Physician/NP/PA Conversation      Date of Conversation: 9/13/2021  Conducted with: Healthcare Decision Maker: Next of Kin by law (only applies in absence of a Healthcare Power of  or Legal Guardian)    Healthcare Decision Maker:     Primary Decision Maker: Rosa Mahan - Spouse - [phone]  Click here to [address] including selection of the Healthcare Decision Maker Relationship (ie \"Primary\")      Today we discussed Healthcare Decision Makers. The patient is considering options. Care Preferences:    Hospitalization: \"If your health worsens and it becomes clear that your chance of recovery is unlikely, what would be your preference regarding hospitalization? \"  The patient would prefer hospitalization. Ventilation: \"If you were unable to breathe on your own and your chance of recovery was unlikely, what would be your preference about the use of a ventilator (breathing machine) if it was available to you? \"   The patient is unsure. Resuscitation: \"In the event your heart stopped as a result of an underlying serious health condition, would you want attempts to be made to restart your heart, or would you prefer a natural death? \"   The patient is unsure.     Additional topics discussed: treatment goals    Conversation Outcomes / Follow-Up Plan:   ACP in process - information provided, considering goals and options  Reviewed DNR/DNI and patient elects Full Code (Attempt Resuscitation)     Length of Voluntary ACP Conversation in minutes:  16 minutes    Verónica Carbone MD

## 2021-09-17 NOTE — PROGRESS NOTES
Sleeping peacefully in bed, wife at bedside. VSS, no c/o pain. 0782- Offered breakfast, refused saying he did not want to eat right now. Asked if there was something that sounded good to eat, he said no.    -1015- Urostomy pouch changed, stoma beefy red, no skin breakdown. Bathed, gown changed, linen changed  - 1030- IV infiltrated, abx paused. -1045- 22G IV placed in right forearm. abx restarted. 1130- VSS, PT on unit to work with Mr. Denisse Chaudhry    1230- resting comfortably in bed, did not want to eat lunch yet, lunch at bedside, encouraged to eat.

## 2021-09-17 NOTE — PROGRESS NOTES
Occupational Therapy Note:    Chart reviewed and pt cleared by RN for therapy. Noted in chart pt was confused last night and RN stating pt very fatigued this am. Pt received supine in bed fast asleep. Pt rousing to name and nodding yes/no but did not open eyes to therapist despite multiple attempts to engage. Will follow up later as able.     Thank you,  Pastor Gomez, OTR/L

## 2021-09-17 NOTE — PROGRESS NOTES
[facility]  Hospitalist Group                                                                                          Hospitalist Progress Note  Jaclyn Mckeon MD  Answering service: [phone] from in house phone        Date of Service:  2021  NAME:  Jori Ramos  :  1950  MRN:  [id number]      Admission Summary:   Mr Renetta Perry is a 75 yo WM with a PMH of prostate cancer, bladder cancer sp TURBT 2020 with ileostomy, he had a recent right nephrostomy tube placed last week after failed attempt for left urethal stent placed secondary to left ureteral obstruction. He was found to have some blood clots in the nephrostomy tube so was taken to IR  for left ureteral obstruction with left hydronephrosis and arterial bleeding into the left collecting system. Pulsatile blood was found during arteriogram, pt was embolized and at high risk for bleeding therefore pt was brought to the ICU on  for close monitoring post procedure.  He then went to IR on  on for a L nephrostomy tube placement.  Blood cultures notable for 3/4 GPC in clusters. He was started on empiric vanc/cefepime.  Repeat cultures are ordered and pending. Pt transferred out of ICU on 9/15      Patient is seen and examined in ICU, sitting in chair. He is alert and oriented x2. Explained regarding MRSA bacteremia. He stated that he has metal rods in the back. Denied any new complaints now.    Discussed with nursing     Interval history / Subjective:   Patient seen and examined discussed with patient's wife at bedside  Patient had a history of subdural hematoma x2 surgery long time back after that patient does not talk much  He can walk with a cane or a walker at home  Discussed with her about future plan of IV antibiotics procedure on next week with urology as well as advanced directive and a separate discussion       Assessment & Plan:     Sepsis  From MRSA Bacteremia    L Ureteral obstruction w L hydro   Secondary to arterial bleed s/p embolization on 9/13 with neph tube on 9/14.    - IR following for neph tube -plan for internalization next week per urology  - Blood cx 9/13: 3 of 4 bottles growing MRSA -need at least 2 weeks of antibiotics get PAVAN if positive for vegetation will require even more longer IV antibiotics  - Repeat cultures sent 9/15 am.--No growth to date  - Continue IV vanc follow creatinine stable now if worsen change to daptomycin  - ID consult placed--will follow up recommendation  - Echo ordered --pending     Depression: Continue home meds prozac, trazodone   HLD: Continue statin  Hypothyroidism: Continue synthroid  CKD stage III - cr stable   Hx bladder cancer s/p TURBT 2020, cystectomy and ileal conduit. Hx Prostate ca s/p prostatectomy in 2013   Hypertension continue beta-blocker and aspirin       Code status: Full code  DVT prophylaxis: SCD    Care Plan discussed with: Patient/Family and Nurse  Anticipated Disposition: SNF/LTC  Anticipated Discharge: 24 hours to 48 hours     Hospital Problems  Date Reviewed: 1/26/2021        Codes Class Noted POA    Arterial bleed, intraoperative ICD-10-CM: I97.418  ICD-9-CM: 998.11  9/13/2021 Unknown                Review of Systems:   A comprehensive review of systems was negative. Vital Signs:    Last 24hrs VS reviewed since prior progress note.  Most recent are:  Visit Vitals  /62 (BP 1 Location: Right upper arm, BP Patient Position: At rest)   Pulse 72   Temp 97.7 °F (36.5 °C)   Resp 18   Ht 5' 8\" (1.727 m)   Wt 72.4 kg (159 lb 9.8 oz)   SpO2 99%   BMI 24.27 kg/m²         Intake/Output Summary (Last 24 hours) at 9/17/2021 1141  Last data filed at 9/17/2021 1154  Gross per 24 hour   Intake 480 ml   Output 1570 ml   Net -1090 ml        Physical Examination:     I had a face to face encounter with this patient and independently examined them on 9/17/2021 as outlined below:          Constitutional:  No acute distress   ENT: MMM   Resp: CTA bilaterally. No wheezing/rhonchi/rales. No accessory muscle use   CV:  Regular rhythm, normal rate, no murmurs, gallops, rubs    GI  Soft, non distended, non tender. He has a left-sided nephrostomy tube  L neph tube, urostomy RLQ    Musculoskeletal:  No edema, warm, 2+ pulses throughout    Neurologic:  N awake            Data Review:    I personally reviewed  Image and labs      Labs:     Recent Labs     09/17/21 0425 09/16/21  0300   WBC 9.8 9.3   HGB 8.7* 8.4*   HCT 28.5* 26.6*   * 451*     Recent Labs     09/17/21 0425 09/16/21  0300 09/15/21  0500    138 142   K 3.5 3.7 4.6    106 112*   CO2 23 24 22   BUN 28* 31* 30*   CREA 1.64* 1.92* 1.86*   * 112* 91   CA 8.3* 8.3* 8.4*   MG 2.2 2.2 2.3   PHOS 2.5* 2.1* 2.6     No results for input(s): ALT, AP, TBIL, TBILI, TP, ALB, GLOB, GGT, AML, LPSE in the last 72 hours. No lab exists for component: SGOT, GPT, AMYP, HLPSE  No results for input(s): INR, PTP, APTT, INREXT, INREXT in the last 72 hours. No results for input(s): FE, TIBC, PSAT, FERR in the last 72 hours. No results found for: FOL, RBCF   No results for input(s): PH, PCO2, PO2 in the last 72 hours. No results for input(s): CPK, CKNDX, TROIQ in the last 72 hours.     No lab exists for component: CPKMB  No results found for: CHOL, CHOLX, CHLST, CHOLV, HDL, HDLP, LDL, LDLC, DLDLP, TGLX, TRIGL, TRIGP, CHHD, CHHDX  No results found for: [facility]  Lab Results   Component Value Date/Time    Color DARK YELLOW 01/11/2016 03:15 PM    Appearance CLEAR 01/11/2016 03:15 PM    Specific gravity 1.025 01/11/2016 03:15 PM    pH (UA) 5.0 01/11/2016 03:15 PM    Protein TRACE (A) 01/11/2016 03:15 PM    Glucose NEGATIVE  01/11/2016 03:15 PM    Ketone NEGATIVE  01/11/2016 03:15 PM    Bilirubin NEGATIVE  01/11/2016 03:15 PM    Urobilinogen 0.2 01/11/2016 03:15 PM    Nitrites NEGATIVE  01/11/2016 03:15 PM    Leukocyte Esterase NEGATIVE  01/11/2016 03:15 PM    Epithelial cells FEW 01/11/2016 03:15 PM    Bacteria NEGATIVE  01/11/2016 03:15 PM    WBC 0-4 01/11/2016 03:15 PM    RBC 0-5 01/11/2016 03:15 PM         Medications Reviewed:     Current Facility-Administered Medications   Medication Dose Route Frequency    FLUoxetine (PROzac) capsule 40 mg  40 mg Oral DAILY    levothyroxine (SYNTHROID) tablet 25 mcg  25 mcg Oral 6am    sodium bicarbonate tablet 650 mg  650 mg Oral BID    budesonide (PULMICORT) 250 mcg/2ml nebulizer susp  250 mcg Nebulization BID RT    traZODone (DESYREL) tablet 100 mg  100 mg Oral QHS    montelukast (SINGULAIR) tablet 10 mg  10 mg Oral QHS    aspirin chewable tablet 81 mg  81 mg Oral DAILY    carvediloL (COREG) tablet 6.25 mg  6.25 mg Oral BID WITH MEALS    pravastatin (PRAVACHOL) tablet 40 mg  40 mg Oral QHS    Vancomycin - Pharmacy to Dose   Other Rx Dosing/Monitoring    vancomycin (VANCOCIN) 1,000 mg in 0.9% sodium chloride 250 mL (VIAL-MATE)  1,000 mg IntraVENous Q24H    0.9% sodium chloride infusion 250 mL  250 mL IntraVENous PRN    sodium chloride (NS) flush 5-40 mL  5-40 mL IntraVENous Q8H    sodium chloride (NS) flush 5-40 mL  5-40 mL IntraVENous PRN    acetaminophen (TYLENOL) tablet 650 mg  650 mg Oral Q6H PRN    Or    acetaminophen (TYLENOL) suppository 650 mg  650 mg Rectal Q6H PRN    promethazine (PHENERGAN) tablet 12.5 mg  12.5 mg Oral Q6H PRN    Or    ondansetron (ZOFRAN) injection 4 mg  4 mg IntraVENous Q6H PRN    albuterol (PROVENTIL VENTOLIN) nebulizer solution 2.5 mg  2.5 mg Nebulization Q4H PRN    bisacodyL (DULCOLAX) suppository 10 mg  10 mg Rectal DAILY PRN     ______________________________________________________________________  EXPECTED LENGTH OF STAY: 3d 12h  ACTUAL LENGTH OF STAY:          4                 Александр Champagne MD

## 2021-09-18 LAB
ANION GAP SERPL CALC-SCNC: 6 MMOL/L (ref 5–15)
BUN SERPL-MCNC: 28 MG/DL (ref 6–20)
BUN/CREAT SERPL: 16 (ref 12–20)
CALCIUM SERPL-MCNC: 9.2 MG/DL (ref 8.5–10.1)
CHLORIDE SERPL-SCNC: 106 MMOL/L (ref 97–108)
CO2 SERPL-SCNC: 26 MMOL/L (ref 21–32)
CREAT SERPL-MCNC: 1.73 MG/DL (ref 0.7–1.3)
ERYTHROCYTE [DISTWIDTH] IN BLOOD BY AUTOMATED COUNT: 15.2 % (ref 11.5–14.5)
GLUCOSE SERPL-MCNC: 96 MG/DL (ref 65–100)
HCT VFR BLD AUTO: 28.8 % (ref 36.6–50.3)
HGB BLD-MCNC: 8.9 G/DL (ref 12.1–17)
LACTATE SERPL-SCNC: 1.1 MMOL/L (ref 0.4–2)
MAGNESIUM SERPL-MCNC: 2.2 MG/DL (ref 1.6–2.4)
MCH RBC QN AUTO: 25.4 PG (ref 26–34)
MCHC RBC AUTO-ENTMCNC: 30.9 G/DL (ref 30–36.5)
MCV RBC AUTO: 82.3 FL (ref 80–99)
NRBC # BLD: 0 K/UL (ref 0–0.01)
NRBC BLD-RTO: 0 PER 100 WBC
PHOSPHATE SERPL-MCNC: 3 MG/DL (ref 2.6–4.7)
PLATELET # BLD AUTO: 486 K/UL (ref 150–400)
PMV BLD AUTO: 9.5 FL (ref 8.9–12.9)
POTASSIUM SERPL-SCNC: 3.5 MMOL/L (ref 3.5–5.1)
RBC # BLD AUTO: 3.5 M/UL (ref 4.1–5.7)
SODIUM SERPL-SCNC: 138 MMOL/L (ref 136–145)
WBC # BLD AUTO: 11.8 K/UL (ref 4.1–11.1)

## 2021-09-18 PROCEDURE — 36415 COLL VENOUS BLD VENIPUNCTURE: CPT

## 2021-09-18 PROCEDURE — 83735 ASSAY OF MAGNESIUM: CPT

## 2021-09-18 PROCEDURE — 94640 AIRWAY INHALATION TREATMENT: CPT

## 2021-09-18 PROCEDURE — 65660000000 HC RM CCU STEPDOWN

## 2021-09-18 PROCEDURE — 74011000250 HC RX REV CODE- 250: Performed by: NURSE PRACTITIONER

## 2021-09-18 PROCEDURE — 85027 COMPLETE CBC AUTOMATED: CPT

## 2021-09-18 PROCEDURE — 83605 ASSAY OF LACTIC ACID: CPT

## 2021-09-18 PROCEDURE — 94760 N-INVAS EAR/PLS OXIMETRY 1: CPT

## 2021-09-18 PROCEDURE — 84100 ASSAY OF PHOSPHORUS: CPT

## 2021-09-18 PROCEDURE — 74011250636 HC RX REV CODE- 250/636: Performed by: NURSE PRACTITIONER

## 2021-09-18 PROCEDURE — 74011250637 HC RX REV CODE- 250/637: Performed by: NURSE PRACTITIONER

## 2021-09-18 PROCEDURE — 80048 BASIC METABOLIC PNL TOTAL CA: CPT

## 2021-09-18 RX ORDER — LANOLIN ALCOHOL/MO/W.PET/CERES
3 CREAM (GRAM) TOPICAL
Status: DISCONTINUED | OUTPATIENT
Start: 2021-09-18 | End: 2021-09-22 | Stop reason: HOSPADM

## 2021-09-18 RX ADMIN — SODIUM BICARBONATE 650 MG: 650 TABLET ORAL at 17:04

## 2021-09-18 RX ADMIN — PRAVASTATIN SODIUM 40 MG: 40 TABLET ORAL at 22:28

## 2021-09-18 RX ADMIN — SODIUM BICARBONATE 650 MG: 650 TABLET ORAL at 08:24

## 2021-09-18 RX ADMIN — BUDESONIDE 250 MCG: 0.25 INHALANT RESPIRATORY (INHALATION) at 09:34

## 2021-09-18 RX ADMIN — CARVEDILOL 6.25 MG: 6.25 TABLET, FILM COATED ORAL at 08:24

## 2021-09-18 RX ADMIN — FLUOXETINE 40 MG: 20 CAPSULE ORAL at 08:24

## 2021-09-18 RX ADMIN — Medication 10 ML: at 22:28

## 2021-09-18 RX ADMIN — VANCOMYCIN HYDROCHLORIDE 1000 MG: 1 INJECTION, POWDER, LYOPHILIZED, FOR SOLUTION INTRAVENOUS at 09:00

## 2021-09-18 RX ADMIN — BUDESONIDE 250 MCG: 0.25 INHALANT RESPIRATORY (INHALATION) at 20:02

## 2021-09-18 RX ADMIN — LEVOTHYROXINE SODIUM 25 MCG: 0.03 TABLET ORAL at 05:14

## 2021-09-18 RX ADMIN — Medication 10 ML: at 05:11

## 2021-09-18 RX ADMIN — CARVEDILOL 6.25 MG: 6.25 TABLET, FILM COATED ORAL at 16:59

## 2021-09-18 RX ADMIN — TRAZODONE HYDROCHLORIDE 100 MG: 50 TABLET ORAL at 22:28

## 2021-09-18 RX ADMIN — ASPIRIN 81 MG: 81 TABLET, CHEWABLE ORAL at 08:24

## 2021-09-18 RX ADMIN — MONTELUKAST 10 MG: 10 TABLET, FILM COATED ORAL at 22:28

## 2021-09-18 RX ADMIN — Medication 10 ML: at 15:03

## 2021-09-18 NOTE — PROGRESS NOTES
Day #5 of Vancomycin  Indication:  MRSA bacteremia  Current regimen:  1000 mg IV Q 24 hr  Abx regimen:  Monotherapy  ID Following ?: YES  Concomitant nephrotoxic drugs (requires more frequent monitoring): Contrast agents  Frequency of BMP?: Daily through     Recent Labs     21  0502 21  0425 21  0300   WBC 11.8* 9.8 9.3   CREA 1.73* 1.64* 1.92*   BUN 28* 28* 31*     Est CrCl: 35-40 ml/min; UO: 0.4 ml/kg/hr  Temp (24hrs), Av °F (36.7 °C), Min:97.6 °F (36.4 °C), Max:98.5 °F (36.9 °C)    Cultures:    Blood: MRSA (by antigen detection) in 3/4 bottles, pending  9/15 Blood: NG x3 days; prelim    MRSA Swab ordered (if applicable)? N/A    Goal target range AUC/CARLA 400-600    Recent level history:  Date/Time Dose & Interval Measured Level (mcg/mL) Associated AUC/CARLA Dose Adjustment     @ 0425 1000 mg Q24H 15.1  477 continue                                         Plan: Continue current regimen. Plan for TTE - if (-)ve for vegetation and no osteo or spinal hardware infection, 2 weeks duration. Will re-assess regimen with a level qweekly or sooner if clinically indicated.

## 2021-09-18 NOTE — PROGRESS NOTES
Problem: Pressure Injury - Risk of  Goal: *Prevention of pressure injury  Description: Document Nabeel Scale and appropriate interventions in the flowsheet. Outcome: Progressing Towards Goal  Note: Pressure Injury Interventions:  Sensory Interventions: Assess changes in LOC, Keep linens dry and wrinkle-free, Minimize linen layers, Pressure redistribution bed/mattress (bed type)    Moisture Interventions: Absorbent underpads, Internal/External urinary devices, Minimize layers    Activity Interventions: Increase time out of bed, Pressure redistribution bed/mattress(bed type)    Mobility Interventions: HOB 30 degrees or less, Pressure redistribution bed/mattress (bed type), PT/OT evaluation    Nutrition Interventions: Document food/fluid/supplement intake    Friction and Shear Interventions: Apply protective barrier, creams and emollients, HOB 30 degrees or less, Minimize layers                Problem: Falls - Risk of  Goal: *Absence of Falls  Description: Document Sherly Fall Risk and appropriate interventions in the flowsheet.   Outcome: Progressing Towards Goal  Note: Fall Risk Interventions:  Mobility Interventions: Bed/chair exit alarm, Communicate number of staff needed for ambulation/transfer, Patient to call before getting OOB    Mentation Interventions: Adequate sleep, hydration, pain control, Bed/chair exit alarm, Reorient patient, Update white board    Medication Interventions: Bed/chair exit alarm, Evaluate medications/consider consulting pharmacy, Patient to call before getting OOB, Teach patient to arise slowly    Elimination Interventions: Bed/chair exit alarm, Call light in reach, Patient to call for help with toileting needs, Stay With Me (per policy), Toileting schedule/hourly rounds

## 2021-09-18 NOTE — PROGRESS NOTES
Bedside and Verbal shift change report given to Cuca Wilson RN (oncoming nurse) by Elinor Stewart RN (offgoing nurse). Report included the following information SBAR, Kardex, ED Summary, Procedure Summary, Intake/Output, MAR, Recent Results and Cardiac Rhythm NSR.

## 2021-09-18 NOTE — PROGRESS NOTES
[address] [facility] Adult  Hospitalist Group                                                                                          Hospitalist Progress Note  Nina Cabrera MD  Answering service: [phone] from in house phone        Date of Service:  2021  NAME:  Charito Jacobs  :  1950  MRN:  [id number]      Admission Summary:   Mr Susan Be is a 77 yo WM with a PMH of prostate cancer, bladder cancer sp TURBT 2020 with ileostomy, he had a recent right nephrostomy tube placed last week after failed attempt for left urethal stent placed secondary to left ureteral obstruction. He was found to have some blood clots in the nephrostomy tube so was taken to IR  for left ureteral obstruction with left hydronephrosis and arterial bleeding into the left collecting system. Pulsatile blood was found during arteriogram, pt was embolized and at high risk for bleeding therefore pt was brought to the ICU on  for close monitoring post procedure.  He then went to IR on  on for a L nephrostomy tube placement.  Blood cultures notable for 3/4 GPC in clusters. He was started on empiric vanc/cefepime.  Repeat cultures are ordered and pending. Pt transferred out of ICU on 9/15      Patient is seen and examined in ICU, sitting in chair. He is alert and oriented x2. Explained regarding MRSA bacteremia. He stated that he has metal rods in the back. Denied any new complaints now.    Discussed with nursing     Interval history / Subjective:   Patient seen and examined he is more alert and awake today  Awaiting internalization procedure of nephrostomy tube next Tuesday  Getting IV antibiotics for MRSA bacteremia will need IV antibiotics for at least 2 weeks  Currently on vancomycin creatinine is stable discussed with wife at bedside yesterday agreed to the plan       Assessment & Plan:     Sepsis  From MRSA Bacteremia    L Ureteral obstruction w L hydro   Secondary to arterial bleed s/p embolization on 9/13 with neph tube on 9/14.    - IR following for neph tube -plan for internalization next week per urology  - Blood cx 9/13: 3 of 4 bottles growing MRSA -need at least 2 weeks of antibiotics get PAVAN if positive for vegetation will require even more longer IV antibiotics  - Repeat cultures sent 9/15 am.--No growth to date  - Continue IV vanc follow creatinine stable now if worsen change to daptomycin  - ID consult placed--will follow up recommendation  - Echo ordered --9/17 no vegetation noted     Depression: Continue home meds prozac, trazodone   HLD: Continue statin  Hypothyroidism: Continue synthroid  CKD stage III - cr stable   Hx bladder cancer s/p TURBT 2020, cystectomy and ileal conduit. Hx Prostate ca s/p prostatectomy in 2013   Hypertension continue beta-blocker and aspirin       Code status: Full code  DVT prophylaxis: SCD    Care Plan discussed with: Patient/Family and Nurse  Anticipated Disposition: SNF/LTC  Anticipated Discharge: 24 hours to 48 hours     Hospital Problems  Date Reviewed: 1/26/2021        Codes Class Noted POA    Arterial bleed, intraoperative ICD-10-CM: I97.418  ICD-9-CM: 998.11  9/13/2021 Unknown                Review of Systems:   A comprehensive review of systems was negative. Vital Signs:    Last 24hrs VS reviewed since prior progress note. Most recent are:  Visit Vitals  BP (!) 147/68 (BP 1 Location: Right arm, BP Patient Position: At rest)   Pulse 73   Temp 98 °F (36.7 °C)   Resp 20   Ht 5' 8\" (1.727 m)   Wt 69.3 kg (152 lb 12.5 oz)   SpO2 98%   BMI 23.23 kg/m²         Intake/Output Summary (Last 24 hours) at 9/18/2021 1151  Last data filed at 9/18/2021 0510  Gross per 24 hour   Intake 240 ml   Output 590 ml   Net -350 ml        Physical Examination:     I had a face to face encounter with this patient and independently examined them on 9/18/2021 as outlined below:          Constitutional:  No acute distress   ENT: MMM   Resp:  CTA bilaterally.  No wheezing/rhonchi/rales. No accessory muscle use   CV:  Regular rhythm, normal rate, no murmurs, gallops, rubs    GI  Soft, non distended, non tender. He has a left-sided nephrostomy tube  L neph tube, urostomy RLQ    Musculoskeletal:  No edema, warm, 2+ pulses throughout    Neurologic:  N awake            Data Review:    I personally reviewed  Image and labs      Labs:     Recent Labs     09/18/21  0502 09/17/21  0425   WBC 11.8* 9.8   HGB 8.9* 8.7*   HCT 28.8* 28.5*   * 431*     Recent Labs     09/18/21  0502 09/17/21  0425 09/16/21  0300    136 138   K 3.5 3.5 3.7    106 106   CO2 26 23 24   BUN 28* 28* 31*   CREA 1.73* 1.64* 1.92*   GLU 96 106* 112*   CA 9.2 8.3* 8.3*   MG 2.2 2.2 2.2   PHOS 3.0 2.5* 2.1*     No results for input(s): ALT, AP, TBIL, TBILI, TP, ALB, GLOB, GGT, AML, LPSE in the last 72 hours. No lab exists for component: SGOT, GPT, AMYP, HLPSE  No results for input(s): INR, PTP, APTT, INREXT, INREXT in the last 72 hours. No results for input(s): FE, TIBC, PSAT, FERR in the last 72 hours. No results found for: FOL, RBCF   No results for input(s): PH, PCO2, PO2 in the last 72 hours. No results for input(s): CPK, CKNDX, TROIQ in the last 72 hours.     No lab exists for component: CPKMB  No results found for: CHOL, CHOLX, CHLST, CHOLV, HDL, HDLP, LDL, LDLC, DLDLP, TGLX, TRIGL, TRIGP, CHHD, CHHDX  No results found for: [facility]  Lab Results   Component Value Date/Time    Color DARK YELLOW 01/11/2016 03:15 PM    Appearance CLEAR 01/11/2016 03:15 PM    Specific gravity 1.025 01/11/2016 03:15 PM    pH (UA) 5.0 01/11/2016 03:15 PM    Protein TRACE (A) 01/11/2016 03:15 PM    Glucose NEGATIVE  01/11/2016 03:15 PM    Ketone NEGATIVE  01/11/2016 03:15 PM    Bilirubin NEGATIVE  01/11/2016 03:15 PM    Urobilinogen 0.2 01/11/2016 03:15 PM    Nitrites NEGATIVE  01/11/2016 03:15 PM    Leukocyte Esterase NEGATIVE  01/11/2016 03:15 PM    Epithelial cells FEW 01/11/2016 03:15 PM    Bacteria NEGATIVE  01/11/2016 03:15 PM    WBC 0-4 01/11/2016 03:15 PM    RBC 0-5 01/11/2016 03:15 PM         Medications Reviewed:     Current Facility-Administered Medications   Medication Dose Route Frequency    FLUoxetine (PROzac) capsule 40 mg  40 mg Oral DAILY    levothyroxine (SYNTHROID) tablet 25 mcg  25 mcg Oral 6am    sodium bicarbonate tablet 650 mg  650 mg Oral BID    budesonide (PULMICORT) 250 mcg/2ml nebulizer susp  250 mcg Nebulization BID RT    traZODone (DESYREL) tablet 100 mg  100 mg Oral QHS    montelukast (SINGULAIR) tablet 10 mg  10 mg Oral QHS    aspirin chewable tablet 81 mg  81 mg Oral DAILY    carvediloL (COREG) tablet 6.25 mg  6.25 mg Oral BID WITH MEALS    pravastatin (PRAVACHOL) tablet 40 mg  40 mg Oral QHS    Vancomycin - Pharmacy to Dose   Other Rx Dosing/Monitoring    vancomycin (VANCOCIN) 1,000 mg in 0.9% sodium chloride 250 mL (VIAL-MATE)  1,000 mg IntraVENous Q24H    0.9% sodium chloride infusion 250 mL  250 mL IntraVENous PRN    sodium chloride (NS) flush 5-40 mL  5-40 mL IntraVENous Q8H    sodium chloride (NS) flush 5-40 mL  5-40 mL IntraVENous PRN    acetaminophen (TYLENOL) tablet 650 mg  650 mg Oral Q6H PRN    Or    acetaminophen (TYLENOL) suppository 650 mg  650 mg Rectal Q6H PRN    promethazine (PHENERGAN) tablet 12.5 mg  12.5 mg Oral Q6H PRN    Or    ondansetron (ZOFRAN) injection 4 mg  4 mg IntraVENous Q6H PRN    albuterol (PROVENTIL VENTOLIN) nebulizer solution 2.5 mg  2.5 mg Nebulization Q4H PRN    bisacodyL (DULCOLAX) suppository 10 mg  10 mg Rectal DAILY PRN     ______________________________________________________________________  EXPECTED LENGTH OF STAY: 3d 12h  ACTUAL LENGTH OF STAY:          5                 Shayla Collier MD

## 2021-09-18 NOTE — PROGRESS NOTES
ID follow up    TTE  Echo Findings    Left Ventricle Normal cavity size. Mild concentric hypertrophy. Moderate hypokinesis of the apical septal wall(s). The calculated EF is 50%. Visually measured ejection fraction. Segmentally reduced systolic function. Low normal systolic function. Left Atrium Normal cavity size. Right Ventricle Normal cavity size, wall thickness and global systolic function. Right Atrium Normal cavity size. Interatrial Septum No interatrial shunt visualized on color doppler. Aortic Valve Normal valve structure, no stenosis and no regurgitation. Mitral Valve Normal valve structure and no stenosis. Mild regurgitation. Tricuspid Valve Normal valve structure, no stenosis and no regurgitation. Pulmonic Valve Pulmonic valve not well visualized, but normal doppler findings. No stenosis. Aorta Normal aortic root. Pulmonary Artery Normal pulmonary arteries. IVC/Hepatic Veins Normal structure.    Pericardium         MRS bacteremia 9/13/21  Blood cx negative 9/15/21  Plan for nephrostomy tube internalization next week  Cr  Stable  On iv vancomycin   If cr worsens, change to daptomycin iv   Dose by levels by pharmacy       Austin Coon,   11:10 AM

## 2021-09-19 LAB
ANION GAP SERPL CALC-SCNC: 6 MMOL/L (ref 5–15)
BACTERIA SPEC CULT: ABNORMAL
BUN SERPL-MCNC: 23 MG/DL (ref 6–20)
BUN/CREAT SERPL: 15 (ref 12–20)
CALCIUM SERPL-MCNC: 8.3 MG/DL (ref 8.5–10.1)
CHLORIDE SERPL-SCNC: 107 MMOL/L (ref 97–108)
CO2 SERPL-SCNC: 24 MMOL/L (ref 21–32)
CREAT SERPL-MCNC: 1.49 MG/DL (ref 0.7–1.3)
ERYTHROCYTE [DISTWIDTH] IN BLOOD BY AUTOMATED COUNT: 15.4 % (ref 11.5–14.5)
GLUCOSE SERPL-MCNC: 104 MG/DL (ref 65–100)
HCT VFR BLD AUTO: 27.8 % (ref 36.6–50.3)
HGB BLD-MCNC: 8.7 G/DL (ref 12.1–17)
LACTATE SERPL-SCNC: 0.9 MMOL/L (ref 0.4–2)
MAGNESIUM SERPL-MCNC: 2.1 MG/DL (ref 1.6–2.4)
MCH RBC QN AUTO: 25.6 PG (ref 26–34)
MCHC RBC AUTO-ENTMCNC: 31.3 G/DL (ref 30–36.5)
MCV RBC AUTO: 81.8 FL (ref 80–99)
NRBC # BLD: 0 K/UL (ref 0–0.01)
NRBC BLD-RTO: 0 PER 100 WBC
PHOSPHATE SERPL-MCNC: 2.5 MG/DL (ref 2.6–4.7)
PLATELET # BLD AUTO: 502 K/UL (ref 150–400)
PMV BLD AUTO: 9.4 FL (ref 8.9–12.9)
POTASSIUM SERPL-SCNC: 3.6 MMOL/L (ref 3.5–5.1)
RBC # BLD AUTO: 3.4 M/UL (ref 4.1–5.7)
SERVICE CMNT-IMP: ABNORMAL
SODIUM SERPL-SCNC: 137 MMOL/L (ref 136–145)
WBC # BLD AUTO: 12.6 K/UL (ref 4.1–11.1)

## 2021-09-19 PROCEDURE — 83605 ASSAY OF LACTIC ACID: CPT

## 2021-09-19 PROCEDURE — 85027 COMPLETE CBC AUTOMATED: CPT

## 2021-09-19 PROCEDURE — 36415 COLL VENOUS BLD VENIPUNCTURE: CPT

## 2021-09-19 PROCEDURE — 84100 ASSAY OF PHOSPHORUS: CPT

## 2021-09-19 PROCEDURE — 80048 BASIC METABOLIC PNL TOTAL CA: CPT

## 2021-09-19 PROCEDURE — 83735 ASSAY OF MAGNESIUM: CPT

## 2021-09-19 PROCEDURE — 74011250636 HC RX REV CODE- 250/636: Performed by: NURSE PRACTITIONER

## 2021-09-19 PROCEDURE — 74011250637 HC RX REV CODE- 250/637: Performed by: NURSE PRACTITIONER

## 2021-09-19 PROCEDURE — 74011250637 HC RX REV CODE- 250/637: Performed by: INTERNAL MEDICINE

## 2021-09-19 PROCEDURE — 65660000000 HC RM CCU STEPDOWN

## 2021-09-19 PROCEDURE — 74011000250 HC RX REV CODE- 250: Performed by: NURSE PRACTITIONER

## 2021-09-19 PROCEDURE — 94640 AIRWAY INHALATION TREATMENT: CPT

## 2021-09-19 RX ADMIN — SODIUM BICARBONATE 650 MG: 650 TABLET ORAL at 08:52

## 2021-09-19 RX ADMIN — BUDESONIDE 250 MCG: 0.25 INHALANT RESPIRATORY (INHALATION) at 21:57

## 2021-09-19 RX ADMIN — BUDESONIDE 250 MCG: 0.25 INHALANT RESPIRATORY (INHALATION) at 08:17

## 2021-09-19 RX ADMIN — FLUOXETINE 40 MG: 20 CAPSULE ORAL at 08:52

## 2021-09-19 RX ADMIN — MONTELUKAST 10 MG: 10 TABLET, FILM COATED ORAL at 21:59

## 2021-09-19 RX ADMIN — VANCOMYCIN HYDROCHLORIDE 1000 MG: 1 INJECTION, POWDER, LYOPHILIZED, FOR SOLUTION INTRAVENOUS at 09:01

## 2021-09-19 RX ADMIN — Medication 10 ML: at 22:00

## 2021-09-19 RX ADMIN — CARVEDILOL 6.25 MG: 6.25 TABLET, FILM COATED ORAL at 08:53

## 2021-09-19 RX ADMIN — CARVEDILOL 6.25 MG: 6.25 TABLET, FILM COATED ORAL at 17:03

## 2021-09-19 RX ADMIN — SODIUM BICARBONATE 650 MG: 650 TABLET ORAL at 17:03

## 2021-09-19 RX ADMIN — PRAVASTATIN SODIUM 40 MG: 40 TABLET ORAL at 21:59

## 2021-09-19 RX ADMIN — ASPIRIN 81 MG: 81 TABLET, CHEWABLE ORAL at 08:52

## 2021-09-19 RX ADMIN — Medication 3 MG: at 21:59

## 2021-09-19 RX ADMIN — Medication 10 ML: at 07:21

## 2021-09-19 RX ADMIN — LEVOTHYROXINE SODIUM 25 MCG: 0.03 TABLET ORAL at 07:20

## 2021-09-19 RX ADMIN — TRAZODONE HYDROCHLORIDE 100 MG: 50 TABLET ORAL at 21:59

## 2021-09-19 RX ADMIN — Medication 10 ML: at 17:04

## 2021-09-19 NOTE — PROGRESS NOTES
Bedside shift change report given to [id number] (oncoming nurse) by BASILIO Menard (offgoing nurse). Report included the following information SBAR, Kardex, Procedure Summary, MAR and Recent Results.

## 2021-09-19 NOTE — PROGRESS NOTES
Bedside and Verbal shift change report given to Cuca Wilson RN (oncoming nurse) by [facility], RN (offgoing nurse). Report included the following information SBAR, Kardex, ED Summary, Procedure Summary, Intake/Output, MAR, Recent Results and Cardiac Rhythm NSR.

## 2021-09-19 NOTE — PROGRESS NOTES
Day #6 of Vancomycin  Indication:  MRSA bacteremia  Current regimen:  1000 mg IV Q 24 hr  Abx regimen:  Monotherapy  ID Following ?: YES  Concomitant nephrotoxic drugs (requires more frequent monitoring): Contrast agents  Frequency of BMP?: Daily    Recent Labs     21  0532 21  0502 21  0425   WBC 12.6* 11.8* 9.8   CREA 1.49* 1.73* 1.64*   BUN 23* 28* 28*     Est CrCl: 40-45 ml/min; UO: 1 ml/kg/hr  Temp (24hrs), Av.6 °F (37 °C), Min:98.1 °F (36.7 °C), Max:98.9 °F (37.2 °C)    Cultures:    Blood: MRSA (by antigen detection) in 3/4 bottles, final  9/15 Blood: NG x4 days; prelim    MRSA Swab ordered (if applicable)? N/A    Goal target range AUC/CARLA 400-600    Recent level history:  Date/Time Dose & Interval Measured Level (mcg/mL) Associated AUC/CARLA Dose Adjustment     @ 0425 1000 mg Q24H 15.1  477 continue                                         Plan: Continue current regimen. TTE (-)ve for vegetation. Scr continues to downtrend. Will re-assess regimen with a level qweekly or sooner if clinically indicated.

## 2021-09-19 NOTE — PROGRESS NOTES
Problem: Patient Education: Go to Patient Education Activity  Goal: Patient/Family Education  Outcome: Progressing Towards Goal     Problem: Falls - Risk of  Goal: *Absence of Falls  Description: Document Paulmiri Cerrato Fall Risk and appropriate interventions in the flowsheet.   Outcome: Progressing Towards Goal  Note: Fall Risk Interventions:  Mobility Interventions: Bed/chair exit alarm, Communicate number of staff needed for ambulation/transfer, Patient to call before getting OOB, Utilize walker, cane, or other assistive device    Mentation Interventions: Adequate sleep, hydration, pain control    Medication Interventions: Bed/chair exit alarm, Evaluate medications/consider consulting pharmacy, Patient to call before getting OOB    Elimination Interventions: Bed/chair exit alarm, Call light in reach, Patient to call for help with toileting needs, Stay With Me (per policy)

## 2021-09-19 NOTE — PROGRESS NOTES
[address] [facility] Adult  Hospitalist Group                                                                                          Hospitalist Progress Note  Valery Benites MD  Answering service: [phone] from in house phone        Date of Service:  2021  NAME:  Ranjan Estes  :  1950  MRN:  [id number]      Admission Summary:   Mr Nilo Gonzales is a 75 yo WM with a PMH of prostate cancer, bladder cancer sp TURBT 2020 with ileostomy, he had a recent right nephrostomy tube placed last week after failed attempt for left urethal stent placed secondary to left ureteral obstruction. He was found to have some blood clots in the nephrostomy tube so was taken to IR  for left ureteral obstruction with left hydronephrosis and arterial bleeding into the left collecting system. Pulsatile blood was found during arteriogram, pt was embolized and at high risk for bleeding therefore pt was brought to the ICU on  for close monitoring post procedure.  He then went to IR on  on for a L nephrostomy tube placement.  Blood cultures notable for 3/4 GPC in clusters. He was started on empiric vanc/cefepime.  Repeat cultures are ordered and pending. Pt transferred out of ICU on 9/15      Patient is seen and examined in ICU, sitting in chair. He is alert and oriented x2. Explained regarding MRSA bacteremia. He stated that he has metal rods in the back. Denied any new complaints now.    Discussed with nursing     Interval history / Subjective:   Patient seen and examined no overnight issues  Awaiting internalization procedure of nephrostomy tube next Tuesday  Getting IV antibiotics for MRSA bacteremia will need IV antibiotics for at least 2 weeks creatinine stable discussed with ID       Assessment & Plan:     Sepsis  From MRSA Bacteremia    L Ureteral obstruction w L hydro   Secondary to arterial bleed s/p embolization on  with neph tube on .    - IR following for neph tube -plan for internalization next week per urology  - Blood cx 9/13: 3 of 4 bottles growing MRSA -need at least 2 weeks of antibiotics get PAVAN if positive for vegetation will require even more longer IV antibiotics  - Repeat cultures sent 9/15 am.--No growth to date  - Continue IV vanc follow creatinine stable now if worsen change to daptomycin  - ID consult placed--will follow up recommendation  - Echo ordered --9/17 no vegetation noted     Depression: Continue home meds prozac, trazodone   HLD: Continue statin  Hypothyroidism: Continue synthroid  CKD stage III - cr stable   Hx bladder cancer s/p TURBT 2020, cystectomy and ileal conduit. Hx Prostate ca s/p prostatectomy in 2013   Hypertension continue beta-blocker and aspirin       Code status: Full code  DVT prophylaxis: SCD    Care Plan discussed with: Patient/Family and Nurse  Anticipated Disposition: SNF/LTC  Anticipated Discharge: 24 hours to 48 hours     Hospital Problems  Date Reviewed: 1/26/2021        Codes Class Noted POA    Arterial bleed, intraoperative ICD-10-CM: I97.418  ICD-9-CM: 998.11  9/13/2021 Unknown                Review of Systems:   A comprehensive review of systems was negative. Vital Signs:    Last 24hrs VS reviewed since prior progress note. Most recent are:  Visit Vitals  BP (!) 148/67 (BP 1 Location: Right arm, BP Patient Position: At rest)   Pulse 76   Temp 98.8 °F (37.1 °C)   Resp 18   Ht 5' 8\" (1.727 m)   Wt 67.9 kg (149 lb 11.1 oz)   SpO2 99%   BMI 22.76 kg/m²         Intake/Output Summary (Last 24 hours) at 9/19/2021 1109  Last data filed at 9/19/2021 0441  Gross per 24 hour   Intake 1470 ml   Output 1550 ml   Net -80 ml        Physical Examination:     I had a face to face encounter with this patient and independently examined them on 9/19/2021 as outlined below:          Constitutional:  No acute distress   ENT: MMM   Resp:  CTA bilaterally. No wheezing/rhonchi/rales.  No accessory muscle use   CV:  Regular rhythm, normal rate, no murmurs, gallops, rubs    GI  Soft, non distended, non tender. He has a left-sided nephrostomy tube  L neph tube, urostomy RLQ    Musculoskeletal:  No edema, warm, 2+ pulses throughout    Neurologic:  N awake            Data Review:    I personally reviewed  Image and labs      Labs:     Recent Labs     09/19/21  0532 09/18/21  0502   WBC 12.6* 11.8*   HGB 8.7* 8.9*   HCT 27.8* 28.8*   * 486*     Recent Labs     09/19/21  0532 09/18/21  0502 09/17/21  0425    138 136   K 3.6 3.5 3.5    106 106   CO2 24 26 23   BUN 23* 28* 28*   CREA 1.49* 1.73* 1.64*   * 96 106*   CA 8.3* 9.2 8.3*   MG 2.1 2.2 2.2   PHOS 2.5* 3.0 2.5*     No results for input(s): ALT, AP, TBIL, TBILI, TP, ALB, GLOB, GGT, AML, LPSE in the last 72 hours. No lab exists for component: SGOT, GPT, AMYP, HLPSE  No results for input(s): INR, PTP, APTT, INREXT, INREXT in the last 72 hours. No results for input(s): FE, TIBC, PSAT, FERR in the last 72 hours. No results found for: FOL, RBCF   No results for input(s): PH, PCO2, PO2 in the last 72 hours. No results for input(s): CPK, CKNDX, TROIQ in the last 72 hours.     No lab exists for component: CPKMB  No results found for: CHOL, CHOLX, CHLST, CHOLV, HDL, HDLP, LDL, LDLC, DLDLP, TGLX, TRIGL, TRIGP, CHHD, CHHDX  No results found for: [facility]  Lab Results   Component Value Date/Time    Color DARK YELLOW 01/11/2016 03:15 PM    Appearance CLEAR 01/11/2016 03:15 PM    Specific gravity 1.025 01/11/2016 03:15 PM    pH (UA) 5.0 01/11/2016 03:15 PM    Protein TRACE (A) 01/11/2016 03:15 PM    Glucose NEGATIVE  01/11/2016 03:15 PM    Ketone NEGATIVE  01/11/2016 03:15 PM    Bilirubin NEGATIVE  01/11/2016 03:15 PM    Urobilinogen 0.2 01/11/2016 03:15 PM    Nitrites NEGATIVE  01/11/2016 03:15 PM    Leukocyte Esterase NEGATIVE  01/11/2016 03:15 PM    Epithelial cells FEW 01/11/2016 03:15 PM    Bacteria NEGATIVE  01/11/2016 03:15 PM    WBC 0-4 01/11/2016 03:15 PM    RBC 0-5 01/11/2016 03:15 PM         Medications Reviewed:     Current Facility-Administered Medications   Medication Dose Route Frequency    melatonin tablet 3 mg  3 mg Oral QHS PRN    FLUoxetine (PROzac) capsule 40 mg  40 mg Oral DAILY    levothyroxine (SYNTHROID) tablet 25 mcg  25 mcg Oral 6am    sodium bicarbonate tablet 650 mg  650 mg Oral BID    budesonide (PULMICORT) 250 mcg/2ml nebulizer susp  250 mcg Nebulization BID RT    traZODone (DESYREL) tablet 100 mg  100 mg Oral QHS    montelukast (SINGULAIR) tablet 10 mg  10 mg Oral QHS    aspirin chewable tablet 81 mg  81 mg Oral DAILY    carvediloL (COREG) tablet 6.25 mg  6.25 mg Oral BID WITH MEALS    pravastatin (PRAVACHOL) tablet 40 mg  40 mg Oral QHS    Vancomycin - Pharmacy to Dose   Other Rx Dosing/Monitoring    vancomycin (VANCOCIN) 1,000 mg in 0.9% sodium chloride 250 mL (VIAL-MATE)  1,000 mg IntraVENous Q24H    0.9% sodium chloride infusion 250 mL  250 mL IntraVENous PRN    sodium chloride (NS) flush 5-40 mL  5-40 mL IntraVENous Q8H    sodium chloride (NS) flush 5-40 mL  5-40 mL IntraVENous PRN    acetaminophen (TYLENOL) tablet 650 mg  650 mg Oral Q6H PRN    Or    acetaminophen (TYLENOL) suppository 650 mg  650 mg Rectal Q6H PRN    promethazine (PHENERGAN) tablet 12.5 mg  12.5 mg Oral Q6H PRN    Or    ondansetron (ZOFRAN) injection 4 mg  4 mg IntraVENous Q6H PRN    albuterol (PROVENTIL VENTOLIN) nebulizer solution 2.5 mg  2.5 mg Nebulization Q4H PRN    bisacodyL (DULCOLAX) suppository 10 mg  10 mg Rectal DAILY PRN     ______________________________________________________________________  EXPECTED LENGTH OF STAY: 3d 12h  ACTUAL LENGTH OF STAY:          6                 Renetta Sosa MD

## 2021-09-20 LAB
ANION GAP SERPL CALC-SCNC: 4 MMOL/L (ref 5–15)
BACTERIA SPEC CULT: NORMAL
BUN SERPL-MCNC: 26 MG/DL (ref 6–20)
BUN/CREAT SERPL: 16 (ref 12–20)
CALCIUM SERPL-MCNC: 9.1 MG/DL (ref 8.5–10.1)
CHLORIDE SERPL-SCNC: 106 MMOL/L (ref 97–108)
CO2 SERPL-SCNC: 25 MMOL/L (ref 21–32)
CREAT SERPL-MCNC: 1.64 MG/DL (ref 0.7–1.3)
GLUCOSE SERPL-MCNC: 100 MG/DL (ref 65–100)
LACTATE SERPL-SCNC: 1 MMOL/L (ref 0.4–2)
MAGNESIUM SERPL-MCNC: 2 MG/DL (ref 1.6–2.4)
PHOSPHATE SERPL-MCNC: 2.7 MG/DL (ref 2.6–4.7)
POTASSIUM SERPL-SCNC: 3.8 MMOL/L (ref 3.5–5.1)
SERVICE CMNT-IMP: NORMAL
SODIUM SERPL-SCNC: 135 MMOL/L (ref 136–145)

## 2021-09-20 PROCEDURE — 74011250636 HC RX REV CODE- 250/636: Performed by: NURSE PRACTITIONER

## 2021-09-20 PROCEDURE — 84100 ASSAY OF PHOSPHORUS: CPT

## 2021-09-20 PROCEDURE — 74011000250 HC RX REV CODE- 250: Performed by: NURSE PRACTITIONER

## 2021-09-20 PROCEDURE — 74011250637 HC RX REV CODE- 250/637: Performed by: NURSE PRACTITIONER

## 2021-09-20 PROCEDURE — 83735 ASSAY OF MAGNESIUM: CPT

## 2021-09-20 PROCEDURE — 83605 ASSAY OF LACTIC ACID: CPT

## 2021-09-20 PROCEDURE — 94760 N-INVAS EAR/PLS OXIMETRY 1: CPT

## 2021-09-20 PROCEDURE — 80048 BASIC METABOLIC PNL TOTAL CA: CPT

## 2021-09-20 PROCEDURE — 99232 SBSQ HOSP IP/OBS MODERATE 35: CPT | Performed by: INTERNAL MEDICINE

## 2021-09-20 PROCEDURE — 36415 COLL VENOUS BLD VENIPUNCTURE: CPT

## 2021-09-20 PROCEDURE — 94640 AIRWAY INHALATION TREATMENT: CPT

## 2021-09-20 PROCEDURE — 65660000000 HC RM CCU STEPDOWN

## 2021-09-20 RX ADMIN — CARVEDILOL 6.25 MG: 6.25 TABLET, FILM COATED ORAL at 17:05

## 2021-09-20 RX ADMIN — CARVEDILOL 6.25 MG: 6.25 TABLET, FILM COATED ORAL at 08:35

## 2021-09-20 RX ADMIN — MONTELUKAST 10 MG: 10 TABLET, FILM COATED ORAL at 22:54

## 2021-09-20 RX ADMIN — ASPIRIN 81 MG: 81 TABLET, CHEWABLE ORAL at 08:35

## 2021-09-20 RX ADMIN — LEVOTHYROXINE SODIUM 25 MCG: 0.03 TABLET ORAL at 06:15

## 2021-09-20 RX ADMIN — Medication 10 ML: at 17:05

## 2021-09-20 RX ADMIN — Medication 10 ML: at 06:15

## 2021-09-20 RX ADMIN — VANCOMYCIN HYDROCHLORIDE 1000 MG: 1 INJECTION, POWDER, LYOPHILIZED, FOR SOLUTION INTRAVENOUS at 08:43

## 2021-09-20 RX ADMIN — FLUOXETINE 40 MG: 20 CAPSULE ORAL at 08:35

## 2021-09-20 RX ADMIN — TRAZODONE HYDROCHLORIDE 100 MG: 50 TABLET ORAL at 22:55

## 2021-09-20 RX ADMIN — PRAVASTATIN SODIUM 40 MG: 40 TABLET ORAL at 22:54

## 2021-09-20 RX ADMIN — SODIUM BICARBONATE 650 MG: 650 TABLET ORAL at 17:05

## 2021-09-20 RX ADMIN — BUDESONIDE 250 MCG: 0.25 INHALANT RESPIRATORY (INHALATION) at 07:55

## 2021-09-20 RX ADMIN — ACETAMINOPHEN 650 MG: 325 TABLET ORAL at 23:02

## 2021-09-20 RX ADMIN — SODIUM BICARBONATE 650 MG: 650 TABLET ORAL at 08:35

## 2021-09-20 RX ADMIN — Medication 10 ML: at 22:53

## 2021-09-20 RX ADMIN — BUDESONIDE 250 MCG: 0.25 INHALANT RESPIRATORY (INHALATION) at 20:43

## 2021-09-20 NOTE — PROGRESS NOTES
Infectious Disease Progress Note          HPI: patient seen  Denied any particular symptoms  Minimally verbal       Physical Exam:    Vitals:   Patient Vitals for the past 24 hrs:   Temp Pulse Resp BP SpO2   09/20/21 1210 -- 66 -- -- --   09/20/21 1142 97.9 °F (36.6 °C) 69 20 (!) 153/70 99 %   09/20/21 1029 -- 62 -- -- --   09/20/21 0800 98.6 °F (37 °C) 76 18 (!) 160/75 99 %   09/20/21 0553 -- 66 -- -- --   09/20/21 0443 97.9 °F (36.6 °C) 63 16 103/87 99 %   09/20/21 0354 -- 64 -- -- --   09/20/21 0158 -- 70 -- -- --   09/20/21 0015 97.9 °F (36.6 °C) 76 22 139/70 100 %   09/19/21 2152 -- 82 -- -- --   09/19/21 2003 98.5 °F (36.9 °C) 83 22 112/61 99 %   09/19/21 2002 -- 80 -- -- --   09/19/21 1810 -- 80 -- -- --   09/19/21 1616 98.6 °F (37 °C) 84 22 (!) 141/64 98 %   09/19/21 1404 -- 76 -- -- --     · GEN: NAD  · CV: S1, S2 heard regularly,  · Lungs diminished anteriorly  · Abdomen: soft, non , ileostomy, non  tender  · Genitourinary: Nephrostomy on the left  · Extremities: no edema      Labs:   Recent Results (from the past 24 hour(s))   LACTIC ACID    Collection Time: 09/20/21  6:18 AM   Result Value Ref Range    Lactic acid 1.0 0.4 - 2.0 MMOL/L   MAGNESIUM    Collection Time: 09/20/21  6:18 AM   Result Value Ref Range    Magnesium 2.0 1.6 - 2.4 mg/dL   PHOSPHORUS    Collection Time: 09/20/21  6:18 AM   Result Value Ref Range    Phosphorus 2.7 2.6 - 4.7 MG/DL   METABOLIC PANEL, BASIC    Collection Time: 09/20/21  6:18 AM   Result Value Ref Range    Sodium 135 (L) 136 - 145 mmol/L    Potassium 3.8 3.5 - 5.1 mmol/L    Chloride 106 97 - 108 mmol/L    CO2 25 21 - 32 mmol/L    Anion gap 4 (L) 5 - 15 mmol/L    Glucose 100 65 - 100 mg/dL    BUN 26 (H) 6 - 20 MG/DL    Creatinine 1.64 (H) 0.70 - 1.30 MG/DL    BUN/Creatinine ratio 16 12 - 20      GFR est AA 50 (L) >60 ml/min/1.73m2    GFR est non-AA 41 (L) >60 ml/min/1.73m2    Calcium 9.1 8.5 - 10.1 MG/DL       Microbiology Data:       Blood: 9/13/21  Susceptibility    Staphylococcus aureus Methcillin Resistant    Antibiotic Interpretation Value Method Comment   Clindamycin ($) Resistant >=4 ug/mL CARLA    Daptomycin ($$$$$) Susceptible 0.5 ug/mL CARLA    Erythromycin ($$$$) Resistant >=8 ug/mL CARLA    Gentamicin ($) Susceptible <=0.5 ug/mL CARLA    Linezolid ($$$$$) Susceptible 2 ug/mL CARLA    Oxacillin Resistant >=4 ug/mL CARLA    Rifampin ($$$$) Susceptible <=0.5 ug/mL CARLA    Tetracycline Susceptible 2 ug/mL CARLA    Trimeth/Sulfa Susceptible <=10 ug/mL CARLA    Vancomycin ($) Susceptible 1 ug/mL CARLA    Ciprofloxacin ($) Resistant >=8 ug/mL CARLA    Levofloxacin ($) Resistant >=8 ug/mL CARLA    Doxycycline ($$) Susceptible 1 ug/mL CARLA               Pathology Results: 4/12/2018  Ascending colon, polyp, polypectomy:        Tubular adenoma     Imaging:     US retroperitoneum 9/14/21  FINDINGS:      The kidneys are normal in echotexture.     The right kidney measures 11.9 cm in length. No focal lesion or hydronephrosis.     The left kidney measures 11.3 cm in length. Mild hydronephrosis. No focal  abnormality     The visualized abdominal aorta and common iliac arteries are normal in caliber. The visualized inferior vena cava is unremarkable.       The urinary bladder is not visualized.     IMPRESSION     Mild left-sided hydronephrosis.           7/10/21  FINDINGS:   LOWER THORAX: Left lung base atelectasis. Normal heart size. Trace pericardial  effusion. Diffuse coronary calcifications. LIVER: No mass. BILIARY TREE: Gallbladder is within normal limits. CBD is not dilated. SPLEEN: within normal limits. PANCREAS: No focal abnormality. ADRENALS: Unremarkable. KIDNEYS/URETERS: Moderate left hydronephrosis. Dilation of the ureter leading to  the ureteral anastomosis with the ileal conduit. No right hydronephrosis. No  stones in either side. STOMACH: Unremarkable. SMALL BOWEL: No dilatation or wall thickening. COLON: No dilatation or wall thickening.   APPENDIX: Nonvisualized  PERITONEUM: No ascites or pneumoperitoneum. RETROPERITONEUM: No lymphadenopathy or aortic aneurysm. REPRODUCTIVE ORGANS: Status post cystoprostatectomy. URINARY BLADDER: Surgically absent. BONES: Thoracosacral fusion with laminectomies from L2 through L5. Superior  endplate compression deformity L2 appears chronic. ABDOMINAL WALL: Right lower quadrant ileostomy leading to an ileal conduit. ADDITIONAL COMMENTS: N/A     IMPRESSION  1. Moderate left hydroureteronephrosis leading to the ureteral-ileal conduit  anastomosis. Correlation with prior imaging is recommended to determine whether  this is chronic or acute. 2.  Status post cystoprostatectomy. No evidence of metastatic disease in the  abdomen and pelvis. Procedures:   9/14/21  Successful placement of 10.2 Mozambican left percutaneous nephrostomy tube. 9/13/21  IMPRESSION  Nephrostogram and arteriograms show that the indwelling left-sided nephrostomy  tube is likely eroded into a second order branch of the left renal artery. This  eroded arterial branch was successfully embolized using coils. Post embolization  arteriograms confirm no additional active arterial branches, and no active  extravasation. The indwelling nephrostomy tube was removed. The patient will  return to the IR department tomorrow for replacement of the nephrostomy tube. 9/7/21  IMPRESSION  Antegrade nephrostogram confirms partial occlusion or stenosis of the distal  left ureter. Attempts to traverse the stenosis were unsuccessful. An 8 Moz[place]ican  percutaneous nephrostomy tube was left in place. The patient will return in 2  weeks for another attempt at crossing the stenosis. TTE  Echo Findings    Left Ventricle Normal cavity size. Mild concentric hypertrophy. Moderate hypokinesis of the apical septal wall(s). The calculated EF is 50%. Visually measured ejection fraction. Segmentally reduced systolic function. Low normal systolic function.    Left Atrium Normal cavity size. Right Ventricle Normal cavity size, wall thickness and global systolic function. Right Atrium Normal cavity size. Interatrial Septum No interatrial shunt visualized on color doppler. Aortic Valve Normal valve structure, no stenosis and no regurgitation. Mitral Valve Normal valve structure and no stenosis. Mild regurgitation. Tricuspid Valve Normal valve structure, no stenosis and no regurgitation. Pulmonic Valve Pulmonic valve not well visualized, but normal doppler findings. No stenosis. Aorta Normal aortic root. Pulmonary Artery Normal pulmonary arteries. IVC/Hepatic Veins Normal structure. Pericardium       Assessment / Plan:         1) MRSA bacteremia 9/13/2021  Recent nephrostomy tube placement and nephrostogram  Repeat blood culture 9/15/2021 so far negative  On IV vancomycin  TTE noted   Plan for IV Vancomycin till 9/29/21. If renal function worsens, change IV Vancomycin to daptomycin . If back pain recommend checking imaging to assess for osteomyelitis, hardware infection if still present  Renally dose antibiotics per pharmacy    2) history of bladder cancer  Status post TURBT and ileostomy    3) degenerative disc disease of the spine  Status postthoracic laminectomy T10-T11 with dorsal column stimulator in 2010, lumbo sacral fusion, L2-3 lumbar lateral interbody fusion, posterior fusion with pedicle screws removal of the stimulator and lead via laminectomy 2016,  Denied pain   Check imaging if back pain         Thank for the opportunity to participate in the care of this patient. Please contact with questions or concerns.        Wash Rasp, DO  1:57 PM

## 2021-09-20 NOTE — PROGRESS NOTES
Transition of Care  1. RUR 14% Low  2. Disposition Home with family and home health  3. DME rollator, walker, wheelchair, shower chair, raised toilet seat, and bedside commode. 4. Transportation Family   5. Follow Up PCP, Specialist      CM spoke with patient's wife (patient has been non-verbal) who confirmed plans to have patient DC home. CM advised that home health would need to be arranged and patient will need to DC on IV ABX. CM to initiate home health referrals via allscripts. CM to monitor. Transition of Care Plan:     The Plan for Transition of Care is related to the following treatment goals: Home Health    The Patient and/or patient representative was provided with a choice of provider and agrees  with the discharge plan. Yes [x] No []    A Freedom of choice list was provided with basic dialogue that supports the patient's individualized plan of care/goals and shares the quality data associated with the providers.        Yes [x] No []        Cristina Garcia MS    3:58 [facility] has accepted patient for Artur HUNTER, PT. CM to update AVS.    Cristina Garcia, MS

## 2021-09-20 NOTE — PROGRESS NOTES
Problem: Pressure Injury - Risk of  Goal: *Prevention of pressure injury  Description: Document Nabeel Scale and appropriate interventions in the flowsheet. Outcome: Progressing Towards Goal  Note: Pressure Injury Interventions:  Sensory Interventions: Assess changes in LOC, Check visual cues for pain, Keep linens dry and wrinkle-free, Minimize linen layers, Maintain/enhance activity level, Pressure redistribution bed/mattress (bed type)    Moisture Interventions: Minimize layers    Activity Interventions: Increase time out of bed, Pressure redistribution bed/mattress(bed type)    Mobility Interventions: HOB 30 degrees or less, Pressure redistribution bed/mattress (bed type)    Nutrition Interventions: Document food/fluid/supplement intake    Friction and Shear Interventions: HOB 30 degrees or less, Minimize layers                Problem: Falls - Risk of  Goal: *Absence of Falls  Description: Document Sherly Fall Risk and appropriate interventions in the flowsheet.   Outcome: Progressing Towards Goal  Note: Fall Risk Interventions:  Mobility Interventions: Bed/chair exit alarm, Communicate number of staff needed for ambulation/transfer, Patient to call before getting OOB, Utilize walker, cane, or other assistive device    Mentation Interventions: Bed/chair exit alarm, Evaluate medications/consider consulting pharmacy, Family/sitter at bedside, Reorient patient    Medication Interventions: Bed/chair exit alarm, Evaluate medications/consider consulting pharmacy, Patient to call before getting OOB    Elimination Interventions: Bed/chair exit alarm, Call light in reach, Elevated toilet seat, Patient to call for help with toileting needs, Stay With Me (per policy)              Problem: Patient Education: Go to Patient Education Activity  Goal: Patient/Family Education  Outcome: Progressing Towards Goal     Problem: Risk for Spread of Infection  Goal: Prevent transmission of infectious organism to others  Description: Prevent the transmission of infectious organisms to other patients, staff members, and visitors.   Outcome: Progressing Towards Goal

## 2021-09-20 NOTE — PROGRESS NOTES
Bedside and Verbal shift change report given to Cuca Wilson RN (oncoming nurse) by Ishmael Humphries RN (offgoing nurse). Report included the following information SBAR, Kardex, ED Summary, Procedure Summary, Intake/Output, MAR, Recent Results and Cardiac Rhythm NSR.

## 2021-09-20 NOTE — PROGRESS NOTES
Bedside shift change report given to [facility] (oncoming nurse) by BASILIO Menard (offgoing nurse). Report included the following information SBAR, Kardex, Procedure Summary, MAR and Recent Results.

## 2021-09-20 NOTE — PROGRESS NOTES
Faculty or Preceptor Review of Student Work    9/20/2021  - Shift times - 0700 to 1300    The student documentation of patient care for Juve Ferreira has been reviewed and approved. All medications have been administered under the direct supervision of the faculty or preceptor.     Echo Ivy RN

## 2021-09-20 NOTE — PROGRESS NOTES
[facility]                                                                                          Hospitalist Progress Note  Gm Orta MD  Answering service: [phone] OR [phone] from in house phone        Date of Service:  2021  NAME:  Jerald Arana  :  1950  MRN:  [id number]      Admission Summary:   Mr Karen Benson is a 75 yo WM with a PMH of prostate cancer, bladder cancer sp TURBT 2020 with ileostomy, he had a recent right nephrostomy tube placed last week after failed attempt for left urethal stent placed secondary to left ureteral obstruction. He was found to have some blood clots in the nephrostomy tube so was taken to IR  for left ureteral obstruction with left hydronephrosis and arterial bleeding into the left collecting system. Pulsatile blood was found during arteriogram, pt was embolized and at high risk for bleeding therefore pt was brought to the ICU on  for close monitoring post procedure.  He then went to IR on  on for a L nephrostomy tube placement.  Blood cultures notable for 3/4 GPC in clusters. He was started on empiric vanc/cefepime.  Repeat cultures are ordered and pending. Pt transferred out of ICU on 9/15      Patient is seen and examined in ICU, sitting in chair. He is alert and oriented x2. Explained regarding MRSA bacteremia. He stated that he has metal rods in the back. Denied any new complaints now.    Discussed with nursing     Interval history / Subjective:   Patient seen and examined no overnight issues resting comfortably  Awaiting internalization procedure of nephrostomy tube next Tuesday  Getting IV antibiotics for MRSA bacteremia on IV vancomycin creatinine trending up may need to switch to Dapto       Assessment & Plan:     Sepsis  From MRSA Bacteremia    L Ureteral obstruction w L hydro   Secondary to arterial bleed s/p embolization on  with neph tube on .    - IR following for neph tube -plan for internalization tomorrow per IR  - Blood cx 9/13: 3 of 4 bottles growing MRSA -need at least 2 weeks of antibiotics   - Repeat cultures sent 9/15 am.--No growth to date  - Continue IV vanc follow creatinine s worsening slightly continue to monitor Vanco by trough  - ID consult placed--will follow up recommendation  - Echo ordered --9/17 no vegetation noted     Depression: Continue home meds prozac, trazodone   HLD: Continue statin  Hypothyroidism: Continue synthroid  CKD stage III - cr stable   Hx bladder cancer s/p TURBT 2020, cystectomy and ileal conduit. Hx Prostate ca s/p prostatectomy in 2013   Hypertension continue beta-blocker and aspirin    Code status: Full code  DVT prophylaxis: SCD    Care Plan discussed with: Patient/Family and Nurse  Anticipated Disposition: SNF/LTC  Anticipated Discharge: 24 hours to 48 hours     Hospital Problems  Date Reviewed: 1/26/2021        Codes Class Noted POA    Arterial bleed, intraoperative ICD-10-CM: I97.418  ICD-9-CM: 998.11  9/13/2021 Unknown                Review of Systems:   A comprehensive review of systems was negative. Vital Signs:    Last 24hrs VS reviewed since prior progress note. Most recent are:  Visit Vitals  BP (!) 153/70 (BP 1 Location: Right upper arm, BP Patient Position: At rest)   Pulse 69   Temp 97.9 °F (36.6 °C)   Resp 20   Ht 5' 8\" (1.727 m)   Wt 69.4 kg (153 lb)   SpO2 99%   BMI 23.26 kg/m²         Intake/Output Summary (Last 24 hours) at 9/20/2021 1155  Last data filed at 9/20/2021 0843  Gross per 24 hour   Intake 480 ml   Output 685 ml   Net -205 ml        Physical Examination:     I had a face to face encounter with this patient and independently examined them on 9/20/2021 as outlined below:          Constitutional:  No acute distress   ENT: MMM   Resp:  CTA bilaterally. CV:  Regular rhythm, normal rate, no murmurs, gallops, rubs    GI  Soft, non distended, non tender.   He has a left-sided nephrostomy tube  L neph tube, urostomy RLQ Musculoskeletal:  No edema, warm, 2+ pulses throughout    Neurologic:  N awake            Data Review:    I personally reviewed  Image and labs      Labs:     Recent Labs     09/19/21  0532 09/18/21  0502   WBC 12.6* 11.8*   HGB 8.7* 8.9*   HCT 27.8* 28.8*   * 486*     Recent Labs     09/20/21  0618 09/19/21  0532 09/18/21  0502   * 137 138   K 3.8 3.6 3.5    107 106   CO2 25 24 26   BUN 26* 23* 28*   CREA 1.64* 1.49* 1.73*    104* 96   CA 9.1 8.3* 9.2   MG 2.0 2.1 2.2   PHOS 2.7 2.5* 3.0     No results for input(s): ALT, AP, TBIL, TBILI, TP, ALB, GLOB, GGT, AML, LPSE in the last 72 hours. No lab exists for component: SGOT, GPT, AMYP, HLPSE  No results for input(s): INR, PTP, APTT, INREXT, INREXT in the last 72 hours. No results for input(s): FE, TIBC, PSAT, FERR in the last 72 hours. No results found for: FOL, RBCF   No results for input(s): PH, PCO2, PO2 in the last 72 hours. No results for input(s): CPK, CKNDX, TROIQ in the last 72 hours.     No lab exists for component: CPKMB  No results found for: CHOL, CHOLX, CHLST, CHOLV, HDL, HDLP, LDL, LDLC, DLDLP, TGLX, TRIGL, TRIGP, CHHD, CHHDX  No results found for: [facility]  Lab Results   Component Value Date/Time    Color DARK YELLOW 01/11/2016 03:15 PM    Appearance CLEAR 01/11/2016 03:15 PM    Specific gravity 1.025 01/11/2016 03:15 PM    pH (UA) 5.0 01/11/2016 03:15 PM    Protein TRACE (A) 01/11/2016 03:15 PM    Glucose NEGATIVE  01/11/2016 03:15 PM    Ketone NEGATIVE  01/11/2016 03:15 PM    Bilirubin NEGATIVE  01/11/2016 03:15 PM    Urobilinogen 0.2 01/11/2016 03:15 PM    Nitrites NEGATIVE  01/11/2016 03:15 PM    Leukocyte Esterase NEGATIVE  01/11/2016 03:15 PM    Epithelial cells FEW 01/11/2016 03:15 PM    Bacteria NEGATIVE  01/11/2016 03:15 PM    WBC 0-4 01/11/2016 03:15 PM    RBC 0-5 01/11/2016 03:15 PM         Medications Reviewed:     Current Facility-Administered Medications   Medication Dose Route Frequency    [START ON 9/21/2021] Vancomycin level 9/21 w/ AM labs   Other ONCE    melatonin tablet 3 mg  3 mg Oral QHS PRN    FLUoxetine (PROzac) capsule 40 mg  40 mg Oral DAILY    levothyroxine (SYNTHROID) tablet 25 mcg  25 mcg Oral 6am    sodium bicarbonate tablet 650 mg  650 mg Oral BID    budesonide (PULMICORT) 250 mcg/2ml nebulizer susp  250 mcg Nebulization BID RT    traZODone (DESYREL) tablet 100 mg  100 mg Oral QHS    montelukast (SINGULAIR) tablet 10 mg  10 mg Oral QHS    aspirin chewable tablet 81 mg  81 mg Oral DAILY    carvediloL (COREG) tablet 6.25 mg  6.25 mg Oral BID WITH MEALS    pravastatin (PRAVACHOL) tablet 40 mg  40 mg Oral QHS    Vancomycin - Pharmacy to Dose   Other Rx Dosing/Monitoring    vancomycin (VANCOCIN) 1,000 mg in 0.9% sodium chloride 250 mL (VIAL-MATE)  1,000 mg IntraVENous Q24H    0.9% sodium chloride infusion 250 mL  250 mL IntraVENous PRN    sodium chloride (NS) flush 5-40 mL  5-40 mL IntraVENous Q8H    sodium chloride (NS) flush 5-40 mL  5-40 mL IntraVENous PRN    acetaminophen (TYLENOL) tablet 650 mg  650 mg Oral Q6H PRN    Or    acetaminophen (TYLENOL) suppository 650 mg  650 mg Rectal Q6H PRN    promethazine (PHENERGAN) tablet 12.5 mg  12.5 mg Oral Q6H PRN    Or    ondansetron (ZOFRAN) injection 4 mg  4 mg IntraVENous Q6H PRN    albuterol (PROVENTIL VENTOLIN) nebulizer solution 2.5 mg  2.5 mg Nebulization Q4H PRN    bisacodyL (DULCOLAX) suppository 10 mg  10 mg Rectal DAILY PRN     ______________________________________________________________________  EXPECTED LENGTH OF STAY: 3d 12h  ACTUAL LENGTH OF STAY:          7                 Tania Alcantar MD

## 2021-09-21 ENCOUNTER — HOSPITAL ENCOUNTER (OUTPATIENT)
Dept: INTERVENTIONAL RADIOLOGY/VASCULAR | Age: 71
Discharge: HOME | End: 2021-09-21
Attending: NURSE PRACTITIONER
Payer: MEDICARE

## 2021-09-21 LAB
ANION GAP SERPL CALC-SCNC: 3 MMOL/L (ref 5–15)
BUN SERPL-MCNC: 23 MG/DL (ref 6–20)
BUN/CREAT SERPL: 15 (ref 12–20)
CALCIUM SERPL-MCNC: 8.9 MG/DL (ref 8.5–10.1)
CHLORIDE SERPL-SCNC: 107 MMOL/L (ref 97–108)
CK SERPL-CCNC: 73 U/L (ref 39–308)
CO2 SERPL-SCNC: 26 MMOL/L (ref 21–32)
COMMENT, HOLDF: NORMAL
CREAT SERPL-MCNC: 1.55 MG/DL (ref 0.7–1.3)
GLUCOSE SERPL-MCNC: 101 MG/DL (ref 65–100)
LACTATE SERPL-SCNC: 1.2 MMOL/L (ref 0.4–2)
MAGNESIUM SERPL-MCNC: 3.1 MG/DL (ref 1.6–2.4)
PHOSPHATE SERPL-MCNC: 2.6 MG/DL (ref 2.6–4.7)
POTASSIUM SERPL-SCNC: 3.7 MMOL/L (ref 3.5–5.1)
SAMPLES BEING HELD,HOLD: NORMAL
SODIUM SERPL-SCNC: 136 MMOL/L (ref 136–145)
VANCOMYCIN SERPL-MCNC: 14.2 UG/ML

## 2021-09-21 PROCEDURE — 80048 BASIC METABOLIC PNL TOTAL CA: CPT

## 2021-09-21 PROCEDURE — 83605 ASSAY OF LACTIC ACID: CPT

## 2021-09-21 PROCEDURE — 74011250637 HC RX REV CODE- 250/637: Performed by: NURSE PRACTITIONER

## 2021-09-21 PROCEDURE — 82550 ASSAY OF CK (CPK): CPT

## 2021-09-21 PROCEDURE — 74011250636 HC RX REV CODE- 250/636: Performed by: STUDENT IN AN ORGANIZED HEALTH CARE EDUCATION/TRAINING PROGRAM

## 2021-09-21 PROCEDURE — 94640 AIRWAY INHALATION TREATMENT: CPT

## 2021-09-21 PROCEDURE — 2709999900 HC NON-CHARGEABLE SUPPLY

## 2021-09-21 PROCEDURE — 74011000250 HC RX REV CODE- 250: Performed by: STUDENT IN AN ORGANIZED HEALTH CARE EDUCATION/TRAINING PROGRAM

## 2021-09-21 PROCEDURE — 84100 ASSAY OF PHOSPHORUS: CPT

## 2021-09-21 PROCEDURE — C1729 CATH, DRAINAGE: HCPCS

## 2021-09-21 PROCEDURE — 65660000000 HC RM CCU STEPDOWN

## 2021-09-21 PROCEDURE — 74011000250 HC RX REV CODE- 250: Performed by: NURSE PRACTITIONER

## 2021-09-21 PROCEDURE — C1894 INTRO/SHEATH, NON-LASER: HCPCS

## 2021-09-21 PROCEDURE — 36415 COLL VENOUS BLD VENIPUNCTURE: CPT

## 2021-09-21 PROCEDURE — 74011000258 HC RX REV CODE- 258: Performed by: INTERNAL MEDICINE

## 2021-09-21 PROCEDURE — 74011250636 HC RX REV CODE- 250/636: Performed by: NURSE PRACTITIONER

## 2021-09-21 PROCEDURE — C1769 GUIDE WIRE: HCPCS

## 2021-09-21 PROCEDURE — 80202 ASSAY OF VANCOMYCIN: CPT

## 2021-09-21 PROCEDURE — 77030036609 HC PRPH CRSNG TRIFORCE SET COOK -E

## 2021-09-21 PROCEDURE — 0T25X0Z CHANGE DRAINAGE DEVICE IN KIDNEY, EXTERNAL APPROACH: ICD-10-PCS | Performed by: STUDENT IN AN ORGANIZED HEALTH CARE EDUCATION/TRAINING PROGRAM

## 2021-09-21 PROCEDURE — 83735 ASSAY OF MAGNESIUM: CPT

## 2021-09-21 PROCEDURE — 74011000636 HC RX REV CODE- 636: Performed by: STUDENT IN AN ORGANIZED HEALTH CARE EDUCATION/TRAINING PROGRAM

## 2021-09-21 PROCEDURE — 77030002996 HC SUT SLK J&J -A

## 2021-09-21 PROCEDURE — 74011250636 HC RX REV CODE- 250/636: Performed by: INTERNAL MEDICINE

## 2021-09-21 RX ORDER — SODIUM CHLORIDE 9 MG/ML
25 INJECTION, SOLUTION INTRAVENOUS
Status: DISCONTINUED | OUTPATIENT
Start: 2021-09-21 | End: 2021-09-21 | Stop reason: HOSPADM

## 2021-09-21 RX ORDER — MIDAZOLAM HYDROCHLORIDE 1 MG/ML
5 INJECTION, SOLUTION INTRAMUSCULAR; INTRAVENOUS
Status: DISCONTINUED | OUTPATIENT
Start: 2021-09-21 | End: 2021-09-21 | Stop reason: HOSPADM

## 2021-09-21 RX ORDER — NALOXONE HYDROCHLORIDE 0.4 MG/ML
0.4 INJECTION, SOLUTION INTRAMUSCULAR; INTRAVENOUS; SUBCUTANEOUS
Status: DISCONTINUED | OUTPATIENT
Start: 2021-09-21 | End: 2021-09-21 | Stop reason: HOSPADM

## 2021-09-21 RX ORDER — FENTANYL CITRATE 50 UG/ML
100 INJECTION, SOLUTION INTRAMUSCULAR; INTRAVENOUS
Status: DISCONTINUED | OUTPATIENT
Start: 2021-09-21 | End: 2021-09-21 | Stop reason: HOSPADM

## 2021-09-21 RX ORDER — FLUMAZENIL 0.1 MG/ML
0.5 INJECTION INTRAVENOUS
Status: DISCONTINUED | OUTPATIENT
Start: 2021-09-21 | End: 2021-09-21 | Stop reason: HOSPADM

## 2021-09-21 RX ORDER — LIDOCAINE HYDROCHLORIDE 20 MG/ML
20 INJECTION, SOLUTION INFILTRATION; PERINEURAL
Status: COMPLETED | OUTPATIENT
Start: 2021-09-21 | End: 2021-09-21

## 2021-09-21 RX ADMIN — IOPAMIDOL 45 ML: 612 INJECTION, SOLUTION INTRAVENOUS at 11:35

## 2021-09-21 RX ADMIN — LIDOCAINE HYDROCHLORIDE 5 ML: 20 INJECTION, SOLUTION INFILTRATION; PERINEURAL at 11:33

## 2021-09-21 RX ADMIN — MIDAZOLAM HYDROCHLORIDE 1 MG: 1 INJECTION, SOLUTION INTRAMUSCULAR; INTRAVENOUS at 10:37

## 2021-09-21 RX ADMIN — Medication 10 ML: at 22:07

## 2021-09-21 RX ADMIN — FENTANYL CITRATE 50 MCG: 50 INJECTION INTRAMUSCULAR; INTRAVENOUS at 10:37

## 2021-09-21 RX ADMIN — CARVEDILOL 6.25 MG: 6.25 TABLET, FILM COATED ORAL at 18:22

## 2021-09-21 RX ADMIN — DAPTOMYCIN 600 MG: 500 INJECTION, POWDER, LYOPHILIZED, FOR SOLUTION INTRAVENOUS at 15:52

## 2021-09-21 RX ADMIN — TRAZODONE HYDROCHLORIDE 100 MG: 50 TABLET ORAL at 22:07

## 2021-09-21 RX ADMIN — MONTELUKAST 10 MG: 10 TABLET, FILM COATED ORAL at 22:07

## 2021-09-21 RX ADMIN — FENTANYL CITRATE 25 MCG: 50 INJECTION INTRAMUSCULAR; INTRAVENOUS at 10:44

## 2021-09-21 RX ADMIN — Medication 10 ML: at 05:07

## 2021-09-21 RX ADMIN — Medication 10 ML: at 13:32

## 2021-09-21 RX ADMIN — VANCOMYCIN HYDROCHLORIDE 1000 MG: 1 INJECTION, POWDER, LYOPHILIZED, FOR SOLUTION INTRAVENOUS at 08:42

## 2021-09-21 RX ADMIN — SODIUM BICARBONATE 650 MG: 650 TABLET ORAL at 18:22

## 2021-09-21 RX ADMIN — MIDAZOLAM HYDROCHLORIDE 1 MG: 1 INJECTION, SOLUTION INTRAMUSCULAR; INTRAVENOUS at 10:44

## 2021-09-21 RX ADMIN — LEVOTHYROXINE SODIUM 25 MCG: 0.03 TABLET ORAL at 05:07

## 2021-09-21 RX ADMIN — FLUOXETINE 40 MG: 20 CAPSULE ORAL at 13:31

## 2021-09-21 RX ADMIN — ASPIRIN 81 MG: 81 TABLET, CHEWABLE ORAL at 13:30

## 2021-09-21 RX ADMIN — BUDESONIDE 250 MCG: 0.25 INHALANT RESPIRATORY (INHALATION) at 20:06

## 2021-09-21 RX ADMIN — SODIUM BICARBONATE 650 MG: 650 TABLET ORAL at 13:31

## 2021-09-21 NOTE — PROGRESS NOTES
ID follow up     Went to see patient but he was off the floor earlier   Today  Received message that unable to internalize the nephrostomy tube  Given fluctuations in Cr , concerned about outpatient Vancomycin dosing, and levels   Will switch to daptomycin IV. Check CK and hold statin while on daptomycin.  Resume statin per primary physician once daptomycin stopped   Recommend ensuring he can tolerate daptomycin before discharge     IV abx on discharge   Daptomycin IV 8 mg/KG daily once ( adjust dose renally ) till 9/29/21  Weekly labs on Thursdays for cbc wt diff, cmp, ck and fax to Dr Maddie Valero line inserted for IV antibiotics once IV therapy completed   Call follow up in office in 4 weeks   Rony Coon DO  1:11 PM

## 2021-09-21 NOTE — PROCEDURES
Interventional Radiology  Procedure Note        9/21/2021 12:03 PM    Patient: Angeles Jordan     Informed consent obtained    Diagnosis: Left ureteral obstruction    Procedure(s): Left ureterogram showing obstruction of the distal left ureter; Failed attempt at traversing the left distal ureter. 10Fr left nephrostomy tube was placed.     Specimens removed:  none    Complications: None    Primary Physician: Maria Alejandra Orozco MD    Recommendations: N/A    Discharge Disposition: return to floor    Full dictated report to follow    Maria Alejandra Orozco MD  Interventional Radiology  [facility]  12:03 PM, 9/21/2021

## 2021-09-21 NOTE — PROGRESS NOTES
Faculty or Preceptor Review of Student Work    9/21/2021  - Shift times - 0700 to 1300    The student documentation of patient care for Sheridan Lis has been reviewed and approved. All medications have been administered under the direct supervision of the faculty or preceptor.     Merrill Wang RN

## 2021-09-21 NOTE — PROGRESS NOTES
Bedside and Verbal shift change report given to [facility] (oncoming nurse) by Marah España RN (offgoing nurse). Report included the following information SBAR, Kardex, Intake/Output, MAR, Accordion, Recent Results and Cardiac Rhythm NSR.

## 2021-09-21 NOTE — PROGRESS NOTES
Occupational Therapy  9/21/2021    Chart reviewed. Pt currently in IR for L nephrostomy tube. Will follow.  Ligia Valero OT

## 2021-09-21 NOTE — ROUTINE PROCESS
TRANSFER - OUT REPORT:    Verbal report given to Tiny RN(name) on Michelle Murphy  being transferred to (unit) for routine post - op       Report consisted of patients Situation, Background, Assessment and   Recommendations(SBAR). Information from the following report(s) SBAR and Procedure Summary was reviewed with the receiving nurse. Lines:   Peripheral IV 09/19/21 Left Hand (Active)   Site Assessment Clean, dry, & intact 09/21/21 0353   Phlebitis Assessment 0 09/21/21 0353   Infiltration Assessment 0 09/21/21 0353   Dressing Status Clean, dry, & intact 09/21/21 0353   Dressing Type Tape;Transparent 09/21/21 0353   Hub Color/Line Status Blue;Capped 09/21/21 0353   Action Taken Open ports on tubing capped 09/21/21 0353   Alcohol Cap Used Yes 09/21/21 0353        Opportunity for questions and clarification was provided.       Patient transported with:   Transporter

## 2021-09-21 NOTE — PROGRESS NOTES
Physical therapy    Attempted to see pt this morning, prepping for transport for sx this morning. Will defer PT at this time an will follow up post sx.      Professionally,     Jarod Sr, PTA

## 2021-09-21 NOTE — PROGRESS NOTES
[address] [facility] Adult  Hospitalist Group                                                                                          Hospitalist Progress Note  Gm Orta MD  Answering service: [phone] from in house phone        Date of Service:  2021  NAME:  Jerald Arana  :  1950  MRN:  [id number]      Admission Summary:   Mr Karen Benson is a 75 yo WM with a PMH of prostate cancer, bladder cancer sp TURBT 2020 with ileostomy, he had a recent right nephrostomy tube placed last week after failed attempt for left urethal stent placed secondary to left ureteral obstruction. He was found to have some blood clots in the nephrostomy tube so was taken to IR  for left ureteral obstruction with left hydronephrosis and arterial bleeding into the left collecting system. Pulsatile blood was found during arteriogram, pt was embolized and at high risk for bleeding therefore pt was brought to the ICU on  for close monitoring post procedure.  He then went to IR on  on for a L nephrostomy tube placement.  Blood cultures notable for 3/4 GPC in clusters. He was started on empiric vanc/cefepime.  Repeat cultures are ordered and pending. Pt transferred out of ICU on 9/15      Patient is seen and examined in ICU, sitting in chair. He is alert and oriented x2. Explained regarding MRSA bacteremia. He stated that he has metal rods in the back. Denied any new complaints now.    Discussed with nursing     Interval history / Subjective:   Patient seen and examined patient will get the internalization of the nephrostomy tube today per IR  Getting IV antibiotics for MRSA bacteremia on IV vancomycin  Trough level and creatinine within normal limit patient will need IV antibiotics at discharge discussed with  and RN       Assessment & Plan:     Sepsis  From MRSA Bacteremia    L Ureteral obstruction w L hydro   Secondary to arterial bleed s/p embolization on  with neph tube on 9/14.    - IR following for neph tube -plan for internalization today  - Blood cx 9/13: 3 of 4 bottles growing MRSA -need at least 2 weeks of antibiotics   - Repeat cultures sent 9/15 am.--No growth to date  - Continue IV vanc follow creatinine s worsening slightly continue to monitor Vanco by trough  - ID consult placed--will follow up recommendation  - Echo ordered --9/17 no vegetation noted     Depression: Continue home meds prozac, trazodone   HLD: Continue statin  Hypothyroidism: Continue synthroid  CKD stage III - cr stable   Hx bladder cancer s/p TURBT 2020, cystectomy and ileal conduit. Hx Prostate ca s/p prostatectomy in 2013   Hypertension continue beta-blocker and aspirin    Code status: Full code  DVT prophylaxis: SCD    Care Plan discussed with: Patient/Family and Nurse  Anticipated Disposition: SNF/LTC  Anticipated Discharge: 24 hours to 48 hours     Will be ready for discharge after the procedure will need home IV antibiotics     Hospital Problems  Date Reviewed: 1/26/2021        Codes Class Noted POA    Arterial bleed, intraoperative ICD-10-CM: E51.533  ICD-9-CM: 998.11  9/13/2021 Unknown                Review of Systems:   A comprehensive review of systems was negative. Vital Signs:    Last 24hrs VS reviewed since prior progress note. Most recent are:  Visit Vitals  BP (!) 140/85   Pulse 70   Temp 98.6 °F (37 °C)   Resp 16   Ht 5' 8\" (1.727 m)   Wt 69.6 kg (153 lb 7 oz)   SpO2 100%   BMI 23.33 kg/m²         Intake/Output Summary (Last 24 hours) at 9/21/2021 1051  Last data filed at 9/21/2021 0509  Gross per 24 hour   Intake 980 ml   Output 1710 ml   Net -730 ml        Physical Examination:     I had a face to face encounter with this patient and independently examined them on 9/21/2021 as outlined below:          Constitutional:  No acute distress   ENT: MMM   Resp:  CTA bilaterally. CV:  Regular rhythm, normal rate, no murmurs, gallops, rubs    GI  Soft, non distended, non tender.   He has a left-sided nephrostomy tube  L neph tube, urostomy RLQ    Musculoskeletal:  No edema, warm, 2+ pulses throughout    Neurologic:  N awake            Data Review:    I personally reviewed  Image and labs      Labs:     Recent Labs     09/19/21  0532   WBC 12.6*   HGB 8.7*   HCT 27.8*   *     Recent Labs     09/21/21  0547 09/20/21  0618 09/19/21  0532    135* 137   K 3.7 3.8 3.6    106 107   CO2 26 25 24   BUN 23* 26* 23*   CREA 1.55* 1.64* 1.49*   * 100 104*   CA 8.9 9.1 8.3*   MG 3.1* 2.0 2.1   PHOS 2.6 2.7 2.5*     No results for input(s): ALT, AP, TBIL, TBILI, TP, ALB, GLOB, GGT, AML, LPSE in the last 72 hours. No lab exists for component: SGOT, GPT, AMYP, HLPSE  No results for input(s): INR, PTP, APTT, INREXT, INREXT in the last 72 hours. No results for input(s): FE, TIBC, PSAT, FERR in the last 72 hours. No results found for: FOL, RBCF   No results for input(s): PH, PCO2, PO2 in the last 72 hours. No results for input(s): CPK, CKNDX, TROIQ in the last 72 hours.     No lab exists for component: CPKMB  No results found for: CHOL, CHOLX, CHLST, CHOLV, HDL, HDLP, LDL, LDLC, DLDLP, TGLX, TRIGL, TRIGP, CHHD, CHHDX  No results found for: [facility]  Lab Results   Component Value Date/Time    Color DARK YELLOW 01/11/2016 03:15 PM    Appearance CLEAR 01/11/2016 03:15 PM    Specific gravity 1.025 01/11/2016 03:15 PM    pH (UA) 5.0 01/11/2016 03:15 PM    Protein TRACE (A) 01/11/2016 03:15 PM    Glucose NEGATIVE  01/11/2016 03:15 PM    Ketone NEGATIVE  01/11/2016 03:15 PM    Bilirubin NEGATIVE  01/11/2016 03:15 PM    Urobilinogen 0.2 01/11/2016 03:15 PM    Nitrites NEGATIVE  01/11/2016 03:15 PM    Leukocyte Esterase NEGATIVE  01/11/2016 03:15 PM    Epithelial cells FEW 01/11/2016 03:15 PM    Bacteria NEGATIVE  01/11/2016 03:15 PM    WBC 0-4 01/11/2016 03:15 PM    RBC 0-5 01/11/2016 03:15 PM         Medications Reviewed:     Current Facility-Administered Medications   Medication Dose Route Frequency    flumazeniL (ROMAZICON) 0.1 mg/mL injection 0.5 mg  0.5 mg IntraVENous RAD PRN    midazolam (VERSED) injection 5 mg  5 mg IntraVENous Rad Multiple    fentaNYL citrate (PF) injection 100 mcg  100 mcg IntraVENous Rad Multiple    naloxone (NARCAN) injection 0.4 mg  0.4 mg IntraVENous RAD PRN    0.9% sodium chloride infusion  25 mL/hr IntraVENous RAD CONTINUOUS    melatonin tablet 3 mg  3 mg Oral QHS PRN    FLUoxetine (PROzac) capsule 40 mg  40 mg Oral DAILY    levothyroxine (SYNTHROID) tablet 25 mcg  25 mcg Oral 6am    sodium bicarbonate tablet 650 mg  650 mg Oral BID    budesonide (PULMICORT) 250 mcg/2ml nebulizer susp  250 mcg Nebulization BID RT    traZODone (DESYREL) tablet 100 mg  100 mg Oral QHS    montelukast (SINGULAIR) tablet 10 mg  10 mg Oral QHS    aspirin chewable tablet 81 mg  81 mg Oral DAILY    carvediloL (COREG) tablet 6.25 mg  6.25 mg Oral BID WITH MEALS    pravastatin (PRAVACHOL) tablet 40 mg  40 mg Oral QHS    Vancomycin - Pharmacy to Dose   Other Rx Dosing/Monitoring    vancomycin (VANCOCIN) 1,000 mg in 0.9% sodium chloride 250 mL (VIAL-MATE)  1,000 mg IntraVENous Q24H    0.9% sodium chloride infusion 250 mL  250 mL IntraVENous PRN    sodium chloride (NS) flush 5-40 mL  5-40 mL IntraVENous Q8H    sodium chloride (NS) flush 5-40 mL  5-40 mL IntraVENous PRN    acetaminophen (TYLENOL) tablet 650 mg  650 mg Oral Q6H PRN    Or    acetaminophen (TYLENOL) suppository 650 mg  650 mg Rectal Q6H PRN    promethazine (PHENERGAN) tablet 12.5 mg  12.5 mg Oral Q6H PRN    Or    ondansetron (ZOFRAN) injection 4 mg  4 mg IntraVENous Q6H PRN    albuterol (PROVENTIL VENTOLIN) nebulizer solution 2.5 mg  2.5 mg Nebulization Q4H PRN    bisacodyL (DULCOLAX) suppository 10 mg  10 mg Rectal DAILY PRN     Facility-Administered Medications Ordered in Other Encounters   Medication Dose Route Frequency    lidocaine (XYLOCAINE) 20 mg/mL (2 %) injection 400 mg  20 mL SubCUTAneous RAD ONCE    iopamidoL (ISOVUE 300) 61 % contrast injection 200 mL  200 mL IntraCATHeter RAD ONCE     ______________________________________________________________________  EXPECTED LENGTH OF STAY: 3d 12h  ACTUAL LENGTH OF STAY:          [facility] MD

## 2021-09-21 NOTE — PROGRESS NOTES
Pharmacist Note - Vancomycin Dosing  Therapy day 8  Indication: MRSA bacteremia  Current regimen: 1000 mg every 24 hours    Recent Labs     09/21/21  0547 09/20/21  0618 09/19/21  0532   WBC  --   --  12.6*   CREA 1.55* 1.64* 1.49*   BUN 23* 26* 23*       A random vancomycin level of 14.2 mcg/mL was obtained and from this level, the patient's AUC24 is calculated to be 446 with the current regimen. Goal target range AUC/CARLA 400-600      Plan: Continue current regimen. Pharmacy will continue to monitor this patient daily for changes in clinical status and renal function. *Random vancomycin levels are used to calculate AUC/CARLA, this level should not be interpreted as a trough. Vancomycin has been dosed using Bayesian kinetics software to target an AUC24:CARLA of 400-600, which provides adequate exposure for as assumed infection due to MRSA with an CARLA of 1 or less while reducing the risk of nephrotoxicity as seen with traditional trough based dosing goals.

## 2021-09-21 NOTE — H&P
Radiology History and Physical    Patient: Eliseo Hines 66 y.o. male       Chief Complaint: Ureteral obstruction    History of Present Illness: 66year old male with ileal conduit, prior bladder cancer. Has left ureteral obstruction requiring nephrostomy tube placement. Prior attempt to traverse the ureteral obstruction failed. Presents today for repeat attempt at antegrade ureteral access for retrograde ureteral stent placement. History:    Past Medical History:   Diagnosis Date    Back pain     Backache, unspecified 2/8/2010    Bladder cancer ([facility] [address] 7/14/2020    He is s/p TURBT on 6/25/2020. No obvious residual tumor, but tessy and induration at the base. Pathology reads invasive papillary urothelial carcinoma, high grade (bladder) and invasive high grade urothelial carcinoma pT2 (bladder base). 07- visit He has high grade muscle invasive bladder cancer. I thought it was grossly resected but pathology reveals high grade disease. He is at high risk o    Blood clots 2004    spine    Cancer [facility]) 2013    prostate    CHF (congestive heart failure) ([facility] [address] 1999    Coronary artery disease     Coronary atherosclerosis of native coronary artery 2/8/2010    DDD (degenerative disc disease)     Diabetes ([facility] [address]     Essential hypertension, benign 2/8/2010    Gastroenteritis, viral     Heart block     95%    Hematuria, unspecified 7/14/2020    He had gross hematuria and clots in his Depends. He notes not pain in the bladder or flank. CT 4/20/20 without renal concerns. Tumor found on cystsocopy on 5/29/2020.  HTN (hypertension), benign     Hypercholesterolemia     Ill-defined condition     short term memory loss since SDH    Ill-defined condition     urinary incontinence    Ill-defined condition     limited vision R eye    Ill-defined condition 1996    concussion with subdural hematoma    Ill-defined condition 1999 & early 2000s    Mycardial Infarction    Impotence 7/14/2020    He has ED.  He and his partner are not active. Viagra was no longer helpful.  Lumbago 2/8/2010    Malignant neoplasm of prostate ([facility] [address] 7/14/2020    He is s/p a robotic prostatectomy 10/18/13. He had [place]'s 6 disease wtih negative margins and negative nodes. His PSA has been undetectable, last drawn on 5/14/2020.  Neurogenic dysfunction of the urinary bladder 7/14/2020    He has a hypotonic bladder and is emptying with Valsalva. He has mild ISD and urge incontinence. He has some enuresis.  Neuropathy Peripheral     pain in feet    Osteoarthritis     Osteoarthrosis, unspecified whether generalized or localized, unspecified site 2/8/2010    Seizures ([facility] [address]     1996 after head injury    Skin disease     itching alot    Stroke [facility]) 1996    Unspecified hereditary and idiopathic peripheral neuropathy 2/8/2010    Urgency of urination 7/14/2020    He has occasional urgency. He wears depends for a precaution. He does have an Acticuff clamp. He had urgency and episodic incontinence prior to prostate surgery. He does not leak with cough or sneeze. He drinks diet Pepsi all day long and less often coffee. He has chronic back problems with back surgery 2004, 1/2016, 12/2017 and 2/2018. He had a spinal stimulator which was removed. He has had thi    Urgency of urination 7/14/2020    He has occasional urgency. He wears depends for a precaution. He does have an Acticuff clamp. He had urgency and episodic incontinence prior to prostate surgery. He does not leak with cough or sneeze. He drinks diet Pepsi all day long and less often coffee. He has chronic back problems with back surgery 2004, 1/2016, 12/2017 and 2/2018. He had a spinal stimulator which was removed.  He has had thi     Family History   Adopted: Yes     Social History     Socioeconomic History    Marital status:      Spouse name: Not on file    Number of children: Not on file    Years of education: Not on file    Highest education level: Not on file Occupational History    Not on file   Tobacco Use    Smoking status: Former Smoker     Years: 35.00     Quit date: 1999     Years since quittin.7    Smokeless tobacco: Never Used   Vaping Use    Vaping Use: Never used   Substance and Sexual Activity    Alcohol use: Not Currently    Drug use: Not Currently     Types: Prescription, Opiates    Sexual activity: Yes     Partners: Female   Other Topics Concern    Not on file   Social History Narrative    Not on file     Social Determinants of Health     Financial Resource Strain:     Difficulty of Paying Living Expenses:    Food Insecurity:     Worried About Running Out of Food in the Last Year:     Ran Out of Food in the Last Year:    Transportation Needs:     Lack of Transportation (Medical):  Lack of Transportation (Non-Medical):    Physical Activity:     Days of Exercise per Week:     Minutes of Exercise per Session:    Stress:     Feeling of Stress :    Social Connections:     Frequency of Communication with Friends and Family:     Frequency of Social Gatherings with Friends and Family:     Attends Rastafarian Services:     Active Member of Clubs or Organizations:     Attends Club or Organization Meetings:     Marital Status:    Intimate Partner Violence:     Fear of Current or Ex-Partner:     Emotionally Abused:     Physically Abused:     Sexually Abused: Allergies: Allergies   Allergen Reactions    Lisinopril Unknown (comments)       Current Medications:  Current Facility-Administered Medications   Medication Dose Route Frequency    lidocaine (XYLOCAINE) 20 mg/mL (2 %) injection 400 mg  20 mL SubCUTAneous RAD ONCE    iopamidoL (ISOVUE 300) 61 % contrast injection 200 mL  200 mL IntraCATHeter RAD ONCE     No current outpatient medications on file.      Facility-Administered Medications Ordered in Other Encounters   Medication Dose Route Frequency    flumazeniL (ROMAZICON) 0.1 mg/mL injection 0.5 mg  0.5 mg IntraVENous RAD PRN    midazolam (VERSED) injection 5 mg  5 mg IntraVENous Rad Multiple    fentaNYL citrate (PF) injection 100 mcg  100 mcg IntraVENous Rad Multiple    naloxone (NARCAN) injection 0.4 mg  0.4 mg IntraVENous RAD PRN    0.9% sodium chloride infusion  25 mL/hr IntraVENous RAD CONTINUOUS    melatonin tablet 3 mg  3 mg Oral QHS PRN    FLUoxetine (PROzac) capsule 40 mg  40 mg Oral DAILY    levothyroxine (SYNTHROID) tablet 25 mcg  25 mcg Oral 6am    sodium bicarbonate tablet 650 mg  650 mg Oral BID    budesonide (PULMICORT) 250 mcg/2ml nebulizer susp  250 mcg Nebulization BID RT    traZODone (DESYREL) tablet 100 mg  100 mg Oral QHS    montelukast (SINGULAIR) tablet 10 mg  10 mg Oral QHS    aspirin chewable tablet 81 mg  81 mg Oral DAILY    carvediloL (COREG) tablet 6.25 mg  6.25 mg Oral BID WITH MEALS    pravastatin (PRAVACHOL) tablet 40 mg  40 mg Oral QHS    Vancomycin - Pharmacy to Dose   Other Rx Dosing/Monitoring    vancomycin (VANCOCIN) 1,000 mg in 0.9% sodium chloride 250 mL (VIAL-MATE)  1,000 mg IntraVENous Q24H    0.9% sodium chloride infusion 250 mL  250 mL IntraVENous PRN    sodium chloride (NS) flush 5-40 mL  5-40 mL IntraVENous Q8H    sodium chloride (NS) flush 5-40 mL  5-40 mL IntraVENous PRN    acetaminophen (TYLENOL) tablet 650 mg  650 mg Oral Q6H PRN    Or    acetaminophen (TYLENOL) suppository 650 mg  650 mg Rectal Q6H PRN    promethazine (PHENERGAN) tablet 12.5 mg  12.5 mg Oral Q6H PRN    Or    ondansetron (ZOFRAN) injection 4 mg  4 mg IntraVENous Q6H PRN    albuterol (PROVENTIL VENTOLIN) nebulizer solution 2.5 mg  2.5 mg Nebulization Q4H PRN    bisacodyL (DULCOLAX) suppository 10 mg  10 mg Rectal DAILY PRN        Physical Exam:  There were no vitals taken for this visit.   GENERAL: alert, cooperative, no distress, appears stated age,   LUNG: Nonlabored respiration on room air  HEART: regular rate and rhythm    Alerts:    Hospital Problems  Date Reviewed: 1/26/2021 None          Laboratory:      Recent Labs     09/21/21  0547 09/20/21  0618 09/19/21  0532   HGB  --   --  8.7*   HCT  --   --  27.8*   WBC  --   --  12.6*   PLT  --   --  502*   BUN 23*   < > 23*   CREA 1.55*   < > 1.49*   K 3.7   < > 3.6    < > = values in this interval not displayed. Plan of Care/Planned Procedure:  Risks, benefits, and alternatives reviewed with patient and he agrees to proceed with the procedure. Antegrade ureterogram and access for placement of retrograde ureteral stent. Deemed appropriate for moderate sedation with versed and fentanyl.     Shelia Olvera MD  Interventional Radiology  [facility]  10:11 AM, 9/21/2021

## 2021-09-21 NOTE — PROGRESS NOTES
Transition of Care  1. RUR 15% Low  2. Disposition Home with Home Health and IV ABX  3. DME rollator, walker, wheelchair, shower chair, raised toilet seat, and bedside commode  4. Transportation Family   5. Follow Up PCP, Specialist      CM received final IV ABX order. Referral placed in allscripts to [place] [id number] for review and assessment of cost. [facility] has accepted patient for SN, PT/OT. CM to monitor.      Karly MendesMS

## 2021-09-22 VITALS
TEMPERATURE: 98.8 F | HEIGHT: 68 IN | WEIGHT: 153.44 LBS | BODY MASS INDEX: 23.26 KG/M2 | OXYGEN SATURATION: 99 % | DIASTOLIC BLOOD PRESSURE: 63 MMHG | SYSTOLIC BLOOD PRESSURE: 134 MMHG | RESPIRATION RATE: 22 BRPM | HEART RATE: 72 BPM

## 2021-09-22 PROBLEM — I97.418 ARTERIAL BLEED, INTRAOPERATIVE: Status: RESOLVED | Noted: 2021-09-13 | Resolved: 2021-09-22

## 2021-09-22 LAB
ANION GAP SERPL CALC-SCNC: 5 MMOL/L (ref 5–15)
ANION GAP SERPL CALC-SCNC: 8 MMOL/L (ref 5–15)
BASOPHILS # BLD: 0 K/UL (ref 0–0.1)
BASOPHILS NFR BLD: 0 % (ref 0–1)
BUN SERPL-MCNC: 23 MG/DL (ref 6–20)
BUN SERPL-MCNC: 23 MG/DL (ref 6–20)
BUN/CREAT SERPL: 14 (ref 12–20)
BUN/CREAT SERPL: 15 (ref 12–20)
CALCIUM SERPL-MCNC: 8.3 MG/DL (ref 8.5–10.1)
CALCIUM SERPL-MCNC: 8.5 MG/DL (ref 8.5–10.1)
CHLORIDE SERPL-SCNC: 103 MMOL/L (ref 97–108)
CHLORIDE SERPL-SCNC: 104 MMOL/L (ref 97–108)
CO2 SERPL-SCNC: 25 MMOL/L (ref 21–32)
CO2 SERPL-SCNC: 26 MMOL/L (ref 21–32)
CREAT SERPL-MCNC: 1.52 MG/DL (ref 0.7–1.3)
CREAT SERPL-MCNC: 1.69 MG/DL (ref 0.7–1.3)
DIFFERENTIAL METHOD BLD: ABNORMAL
EOSINOPHIL # BLD: 0.2 K/UL (ref 0–0.4)
EOSINOPHIL NFR BLD: 2 % (ref 0–7)
ERYTHROCYTE [DISTWIDTH] IN BLOOD BY AUTOMATED COUNT: 15.3 % (ref 11.5–14.5)
GLUCOSE SERPL-MCNC: 101 MG/DL (ref 65–100)
GLUCOSE SERPL-MCNC: 135 MG/DL (ref 65–100)
HCT VFR BLD AUTO: 25.2 % (ref 36.6–50.3)
HGB BLD-MCNC: 8 G/DL (ref 12.1–17)
IMM GRANULOCYTES # BLD AUTO: 0.1 K/UL (ref 0–0.04)
IMM GRANULOCYTES NFR BLD AUTO: 1 % (ref 0–0.5)
LACTATE SERPL-SCNC: 1 MMOL/L (ref 0.4–2)
LYMPHOCYTES # BLD: 0.8 K/UL (ref 0.8–3.5)
LYMPHOCYTES NFR BLD: 7 % (ref 12–49)
MAGNESIUM SERPL-MCNC: 1.9 MG/DL (ref 1.6–2.4)
MCH RBC QN AUTO: 26 PG (ref 26–34)
MCHC RBC AUTO-ENTMCNC: 31.7 G/DL (ref 30–36.5)
MCV RBC AUTO: 81.8 FL (ref 80–99)
MONOCYTES # BLD: 0.9 K/UL (ref 0–1)
MONOCYTES NFR BLD: 8 % (ref 5–13)
NEUTS SEG # BLD: 10.1 K/UL (ref 1.8–8)
NEUTS SEG NFR BLD: 82 % (ref 32–75)
NRBC # BLD: 0 K/UL (ref 0–0.01)
NRBC BLD-RTO: 0 PER 100 WBC
PHOSPHATE SERPL-MCNC: 2.6 MG/DL (ref 2.6–4.7)
PLATELET # BLD AUTO: 494 K/UL (ref 150–400)
PMV BLD AUTO: 10.3 FL (ref 8.9–12.9)
POTASSIUM SERPL-SCNC: 3.7 MMOL/L (ref 3.5–5.1)
POTASSIUM SERPL-SCNC: 4 MMOL/L (ref 3.5–5.1)
RBC # BLD AUTO: 3.08 M/UL (ref 4.1–5.7)
SODIUM SERPL-SCNC: 135 MMOL/L (ref 136–145)
SODIUM SERPL-SCNC: 136 MMOL/L (ref 136–145)
WBC # BLD AUTO: 12.1 K/UL (ref 4.1–11.1)

## 2021-09-22 PROCEDURE — 80048 BASIC METABOLIC PNL TOTAL CA: CPT

## 2021-09-22 PROCEDURE — 83735 ASSAY OF MAGNESIUM: CPT

## 2021-09-22 PROCEDURE — 84100 ASSAY OF PHOSPHORUS: CPT

## 2021-09-22 PROCEDURE — 83605 ASSAY OF LACTIC ACID: CPT

## 2021-09-22 PROCEDURE — 85025 COMPLETE CBC W/AUTO DIFF WBC: CPT

## 2021-09-22 PROCEDURE — 97530 THERAPEUTIC ACTIVITIES: CPT

## 2021-09-22 PROCEDURE — 36415 COLL VENOUS BLD VENIPUNCTURE: CPT

## 2021-09-22 PROCEDURE — 74011250637 HC RX REV CODE- 250/637: Performed by: NURSE PRACTITIONER

## 2021-09-22 PROCEDURE — 36573 INSJ PICC RS&I 5 YR+: CPT | Performed by: HOSPITALIST

## 2021-09-22 PROCEDURE — 05HY33Z INSERTION OF INFUSION DEVICE INTO UPPER VEIN, PERCUTANEOUS APPROACH: ICD-10-PCS | Performed by: INTERNAL MEDICINE

## 2021-09-22 PROCEDURE — 97116 GAIT TRAINING THERAPY: CPT

## 2021-09-22 PROCEDURE — 74011250636 HC RX REV CODE- 250/636: Performed by: INTERNAL MEDICINE

## 2021-09-22 PROCEDURE — 74011000258 HC RX REV CODE- 258: Performed by: INTERNAL MEDICINE

## 2021-09-22 RX ADMIN — FLUOXETINE 40 MG: 20 CAPSULE ORAL at 08:21

## 2021-09-22 RX ADMIN — ACETAMINOPHEN 650 MG: 325 TABLET ORAL at 14:12

## 2021-09-22 RX ADMIN — DAPTOMYCIN 600 MG: 500 INJECTION, POWDER, LYOPHILIZED, FOR SOLUTION INTRAVENOUS at 16:26

## 2021-09-22 RX ADMIN — ASPIRIN 81 MG: 81 TABLET, CHEWABLE ORAL at 08:20

## 2021-09-22 RX ADMIN — Medication 10 ML: at 05:31

## 2021-09-22 RX ADMIN — LEVOTHYROXINE SODIUM 25 MCG: 0.03 TABLET ORAL at 05:31

## 2021-09-22 RX ADMIN — CARVEDILOL 6.25 MG: 6.25 TABLET, FILM COATED ORAL at 08:21

## 2021-09-22 RX ADMIN — SODIUM BICARBONATE 650 MG: 650 TABLET ORAL at 08:26

## 2021-09-22 NOTE — PROGRESS NOTES
Bedside and Verbal shift change report given to Summer (oncoming nurse) by Tony Khoury (offgoing nurse). Report included the following information SBAR, Kardex and MAR.

## 2021-09-22 NOTE — PROGRESS NOTES
Hospital follow-up PCP transitional care appointment has been scheduled with Dr. Lev Ritchie for Thursday, 9/30/21 at 2:40 p.m. Pending patient discharge.   Arnaldo Sierra, Care Management Specialist.

## 2021-09-22 NOTE — PROGRESS NOTES
Problem: Self Care Deficits Care Plan (Adult)  Goal: *Acute Goals and Plan of Care (Insert Text)  Description:   FUNCTIONAL STATUS PRIOR TO ADMISSION: Per wife pt was amb with cane and mod I with ADLs until recent sx on 9/7/21. Since then he has been shuffling, poor safety awareness and she has been using rollator to assist him to walk. Pt has been completing dressing without assist and bathing with assist.  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 HOME SUPPORT: The patient lived with wife. Their son lives in their basement and can assist as needed. Occupational Therapy Goals: continue all goals 9/22/21  Initiated 9/15/2021  1. Patient will perform grooming standing at sink with minimal assistance/contact guard assist within 7 day(s). 2.  Patient will perform lower body dressing with minimal assistance/contact guard assist within 7 day(s). 3.  Patient will perform toilet transfers with minimal assistance/contact guard assist within 7 day(s). 4.  Patient will perform all aspects of toileting with minimal assistance/contact guard assist within 7 day(s). 5.  Patient will participate in upper extremity therapeutic exercise/activities with supervision/set-up for 10 minutes within 7 day(s). 6.  Patient will utilize energy conservation techniques during functional activities with verbal and visual cues within 7 day(s). Outcome: Progressing Towards Goal    OCCUPATIONAL THERAPY TREATMENT/WEEKLY RE-EVALUATION  Patient: Jerald Arana (57 y.o. male)  Date: 9/22/2021  Diagnosis: Hydronephrosis [N13.30]  Arterial bleed, intraoperative [I97.418] <principal problem not specified>       Precautions: Fall, Skin  Chart, occupational therapy assessment, plan of care, and goals were reviewed. ASSESSMENT  Based on the objective data described below, pt presents with delayed processing, flat affect, decreased safety awareness, generalized weakness and risk for falls.  Pt with anticipated discharge home today, wife stated pt will be discharging home as he is not receptive to rehab at discharge. He required mod A for trunk management for supine to sit with increased cueing/coaxing for OOB activity. Upmost min A and cues to stand and utilize RW. Wife present during session and reported necessary DME for home ADLs to include shower chair, tub transfer bench and BSC. If discharging back home, recommend HHOT, PT and 24 hour A for physical transfers and mobility for fall prevention. Current Level of Function Impacting Discharge (ADLs): mod A bed mobility, min A to stand, memory loss, hx of TBI    Other factors to consider for discharge: lives with supportive wife and son         PLAN :  Goals have been updated based on progression since last assessment. Patient continues to benefit from skilled intervention to address the above impairments. Continue to follow patient 5 times a week to address goals. Recommend with staff: OOB to bathroom with RW A x 1, gait belt, chair for meals    Recommend next OT session: standing ADLs in bathroom, dressing    Recommendation for discharge: (in order for the patient to meet his/her long term goals)  Occupational therapy at least 2 days/week in the home AND ensure assist and/or supervision for safety with IADLs, ADLs     This discharge recommendation:  Has been made in collaboration with the attending provider and/or case management    Equipment recommendations for successful discharge (if) home: none       SUBJECTIVE:   Patient stated no.  (when asked if he wanted to get into chair to finish lunch)    OBJECTIVE DATA SUMMARY:   Cognitive/Behavioral Status:  Neurologic State: Alert; Appropriate for age  Orientation Level: Oriented X4;Appropriate for age  Cognition: Follows commands; Appropriate safety awareness  Perception: Appears intact  Perseveration: No perseveration noted  Safety/Judgement: Decreased awareness of need for safety;Decreased awareness of need for assistance    Functional Mobility and Transfers for ADLs:  Bed Mobility:  Supine to Sit: Moderate assistance  Scooting: Moderate assistance    Transfers:  Sit to Stand: Minimum assistance     Bed to Chair: Minimum assistance    Balance:  Sitting: Impaired; Without support  Sitting - Static: Fair (occasional)  Sitting - Dynamic: Fair (occasional)  Standing: Impaired; With support  Standing - Static: Constant support; Fair  Standing - Dynamic : Constant support; Fair    ADL Intervention:     Verbal cue for hand placement with little to no carryover. Pt pulled up on RW to stand, required A for RW management for transfers. Cognitive Retraining  Safety/Judgement: Decreased awareness of need for safety;Decreased awareness of need for assistance    Pain:  none    Activity Tolerance:    WNL    After treatment patient left in no apparent distress:   Supine in bed, Call bell within reach, Bed / chair alarm activated, Caregiver / family present, and Side rails x 3    COMMUNICATION/COLLABORATION:   The patients plan of care was discussed with: Physical Therapist, Registered Nurse, and     Ligia Valero OT  Time Calculation: 26 mins

## 2021-09-22 NOTE — PROGRESS NOTES
Patient A and Ox3, VSS, NAD or c/o pain at time of discharge. RN asked patient's wife if she felt she was capable of taking care of patient at home, considering he has been requiring a 2 person assist with ambulation and toileting. Patient's wife stated her son lives with them and will be helping, says she and son can \"handle\" patient's care at home. RN educated patient on fall and safety precautions for home. RN also reviewed all discharge instructions with patient and patient's wife-all questions answered. Patient taken to discharge lobby in wheelchair by Tatyana Crystal CNA and [facility] RN.

## 2021-09-22 NOTE — PROGRESS NOTES
Transition of Care  1. RUR 13% Low  2. Disposition Home with Family Assistance  3. DME Rollator, walker, wheelchair, shower chair, raised toilet seat, and bedside commode  4. Transportation Family  5. Follow Up PCP, Specialist        Patient is planned for DC today. [employer] estimated cost of Dapto at home to be over $100. Patient and wife have decided on [facility]NF declined. CM notified ID physician via perfect serve and left order form on chart. Patient is currently getting picc placed. Wife to transport home if [facility] can be set up today. CM to monitor. Bethanie Barrios MS    1:59 [facility] order form faxed for scheduling, CM to follow up. Bethanie Barrios MS    2:20 Patient is scheduled for [facility] tomorrow at [place] [address]. CM informed patient/wife and attending. There are no further CM needs at this time. Medicare pt has received, reviewed, and signed 2nd IM letter informing them of their right to appeal the discharge. Signed copy has been placed on pt bedside chart. Bethanei Barrios MS    3:44 [facility] has verified that patient can not receive home health therapy due to going to [facility]. CM has updated attending, patient/wife. Prescription for outpatient therapy will be provided.      Bethanie Barrios MS

## 2021-09-22 NOTE — PROGRESS NOTES
Problem: Mobility Impaired (Adult and Pediatric)  Goal: *Acute Goals and Plan of Care (Insert Text)  Description: FUNCTIONAL STATUS PRIOR TO ADMISSION: Patient with reported worsening mobility status since a surgery on 9/7 and was only able to transfer to the rollator and his wife was pushing him in a seated position. Prior to 9/7 pt was walking with a cane. Pt needed physical assistance up and down the steps to exit the house in wheelchair. HOME SUPPORT PRIOR TO ADMISSION: The patient lived with spouse and son (lives in the basement portion of the home). Physical Therapy Goals  Initiated 9/15/2021  1. Patient will move from supine to sit and sit to supine  in bed with minimal assistance/contact guard assist within 7 day(s). 2.  Patient will transfer from bed to chair and chair to bed with minimal assistance/contact guard assist using the least restrictive device within 7 day(s). 3.  Patient will perform sit to stand with minimal assistance/contact guard assist within 7 day(s). 4.  Patient will ambulate with minimal assistance/contact guard assist for 20 feet with the least restrictive device within 7 day(s). 5.  Patient will ascend/descend 9 stairs with bilateral handrail(s) with moderate assistance  within 7 day(s). Outcome: Progressing Towards Goal   PHYSICAL THERAPY TREATMENT- Weekly  Patient: Jerald Arana (89 y.o. male)  Date: 9/22/2021  Diagnosis: Hydronephrosis [N13.30]  Arterial bleed, intraoperative [I97.418] <principal problem not specified>       Precautions: Fall, Skin  Chart, physical therapy assessment, plan of care and goals were reviewed. ASSESSMENT  Patient continues with skilled PT services and is progressing towards goals. He continues to require physical assistance for all functional mobility. Patient requires assistance to achieve supine to sit with HOB laid flat. He ambulates 15 ft with about 4-5 standing rest breaks CGA and the use of RW.  Wheelchair ride is provided to patient who ascends and descends 3 steps with 1 hand rail and Min Ax2. He demonstrates intermittent posterior LOB requiring Min A when attempting to rush. Current Level of Function Impacting Discharge (mobility/balance): Min Ax2    Other factors to consider for discharge: medical stability, decreased strength/endurance, increased risk for falls, decreased balance         PLAN :  Patient continues to benefit from skilled intervention to address the above impairments. Continue treatment per established plan of care. to address goals. Recommendation for discharge: (in order for the patient to meet his/her long term goals)  [facility] PT with 24/7 assistance ----- Patient declines SNF rehab    This discharge recommendation:  Has been made in collaboration with the attending provider and/or case management    IF patient discharges home will need the following DME: patient owns DME required for discharge       SUBJECTIVE:   Patient stated Jacinto Arteaga.     OBJECTIVE DATA SUMMARY:   Critical Behavior:  Neurologic State: Alert, Appropriate for age  Orientation Level: Oriented X4, Appropriate for age  Cognition: Follows commands, Appropriate safety awareness  Safety/Judgement: Awareness of environment, Decreased awareness of need for assistance, Decreased awareness of need for safety, Decreased insight into deficits, Fall prevention  Functional Mobility Training:  Bed Mobility:     Supine to Sit: Moderate assistance     Scooting: Moderate assistance        Transfers:  Sit to Stand: Minimum assistance  Stand to Sit: Minimum assistance        Bed to Chair: Minimum assistance                    Balance:  Sitting: Impaired; Without support  Sitting - Static: Fair (occasional)  Sitting - Dynamic: Fair (occasional)  Standing: Impaired; With support  Standing - Static: Constant support; Fair  Standing - Dynamic : Constant support; Fair  Ambulation/Gait Training:  Distance (ft): 15 Feet (ft)  Assistive Device: Walker, rolling;Gait belt  Ambulation - Level of Assistance: Minimal assistance        Gait Abnormalities: Decreased step clearance;Shuffling gait        Base of Support: Narrowed     Speed/Rita: Shuffled;Pace decreased (<100 feet/min)  Step Length: Right shortened;Left shortened                    Stairs:  Number of Stairs Trained: 3  Stairs - Level of Assistance: Moderate assistance   Rail Use: Left     Therapeutic Exercises:     Pain Rating:      Activity Tolerance:   Poor and requires frequent rest breaks    After treatment patient left in no apparent distress:   Supine in bed, Call bell within reach, Caregiver / family present, and Side rails x 3    COMMUNICATION/COLLABORATION:   The patients plan of care was discussed with: Occupational therapist and Registered nurse.      Miranda Pinto, PT   Time Calculation: 28 mins

## 2021-09-22 NOTE — DISCHARGE INSTRUCTIONS
Discharge Instructions       PATIENT ID: Soraya Chaudhry  MRN: [id number]   YOB: 1950    DATE OF ADMISSION: 9/13/2021  9:14 AM    DATE OF DISCHARGE: 9/22/2021    PRIMARY CARE PROVIDER: Bryson Pizano NP     ATTENDING PHYSICIAN: Wilder Garcia MD  DISCHARGING PROVIDER: Lewis Garcia MD    To contact this individual call [phone] and ask the  to page. If unavailable ask to be transferred the Adult Hospitalist Department. DISCHARGE DIAGNOSES MRSA bacteremia    CONSULTATIONS: IP CONSULT TO INTENSIVIST  IP CONSULT TO INFECTIOUS DISEASES    PROCEDURES/SURGERIES: * No surgery found *    PENDING TEST RESULTS:   At the time of discharge the following test results are still pending:     FOLLOW UP APPOINTMENTS:   Follow-up Information     Follow up With Specialties Details Why Contact Info    [address] [place] [address]  [address] Pizano NP Nurse Practitioner In 1 week  [facility]  [phone]             ADDITIONAL CARE RECOMMENDATIONS:  IV abx as per Dr. Delores Rahman as out pt infusion center  Please f/u with Dr. Michelle Shay  ( urology ) for the nephrostomy internalization procedure  Stopped Pavastatin - cholesterol medication as you are on Daptomicin and restart when abx course is done     DIET: Cardiac Diet and Diabetic Diet    ACTIVITY: Activity as tolerated    WOUND CARE:     EQUIPMENT needed:       Radiology      DISCHARGE MEDICATIONS:   See Medication Reconciliation Form    · It is important that you take the medication exactly as they are prescribed. · Keep your medication in the bottles provided by the pharmacist and keep a list of the medication names, dosages, and times to be taken in your wallet. · Do not take other medications without consulting your doctor. NOTIFY YOUR PHYSICIAN FOR ANY OF THE FOLLOWING:   Fever over 101 degrees for 24 hours.    Chest pain, shortness of breath, fever, chills, nausea, vomiting, diarrhea, change in mentation, falling, weakness, bleeding. Severe pain or pain not relieved by medications. Or, any other signs or symptoms that you may have questions about.       DISPOSITION:   x Home With:   OT  PT  HH  RN       SNF/Inpatient Rehab/LTAC    Independent/assisted living    Hospice    Other:     CDMP Checked:   Yes x     PROBLEM LIST Updated:  Yes x       Signed:   Quynh Rodriguez MD  9/22/2021  2:25 PM

## 2021-09-22 NOTE — DISCHARGE SUMMARY
Discharge Summary       PATIENT ID: Tori Flores  MRN: [id number]   YOB: 1950    DATE OF ADMISSION: 9/13/2021  9:14 AM    DATE OF DISCHARGE: 9/22/21   PRIMARY CARE PROVIDER: Jhonathan Aden NP     ATTENDING PHYSICIAN: Reanna Brice  DISCHARGING PROVIDER: Beverlee Gowers, MD    To contact this individual call [phone] and ask the  to page. If unavailable ask to be transferred the Adult Hospitalist Department. CONSULTATIONS: IP CONSULT TO INTENSIVIST  IP CONSULT TO INFECTIOUS DISEASES    PROCEDURES/SURGERIES: * No surgery found *    ADMITTING DIAGNOSES & HOSPITAL COURSE:   Mr Irish Dickerson is a 77 yo WM with a PMH of prostate cancer, bladder cancer sp TURBT 6/25/2020 with ileostomy, he had a recent right nephrostomy tube placed last week after failed attempt for left urethal stent placed secondary to left ureteral obstruction. He was found to have some blood clots in the nephrostomy tube so was taken to IR 9/13 for left ureteral obstruction with left hydronephrosis and arterial bleeding into the left collecting system. Pulsatile blood was found during arteriogram, pt was embolized and at high risk for bleeding therefore pt was brought to the ICU on 9/13 for close monitoring post procedure.  He then went to IR on 9/14 on for a L nephrostomy tube placement.  Blood cultures notable for 3/4 GPC in clusters.  He was started on empiric vanc/cefepime.  Repeat cultures are ordered and pending.  Pt transferred out of ICU on 9/15       Assessment & Plan:      Sepsis  From MRSA Bacteremia    L Ureteral obstruction w L hydro   Secondary to arterial bleed s/p embolization on 9/13 with neph tube on 9/14.    -Patient discharged on IV daptomycin to get it till 9/29 for MRSA bacteremia  -Internalization for the nephrostomy tube was attempted by interventional radiology was unsuccessful discussed with patient's wife at bedside will follow up with Dr. Charlie Bravo urologist as an outpatient after finishing the course of antibiotics     Depression: Continue home meds prozac, trazodone   HLD: Holding statin due to daptomycin can resume after finishing daptomycin  Hypothyroidism: Continue synthroid  CKD stage III - cr stable   Hx bladder cancer s/p TURBT 2020, cystectomy and ileal conduit. Hx Prostate ca s/p prostatectomy in 2013   Hypertension continue beta-blocker and aspirin       DISCHARGE DIAGNOSES / PLAN:      1. Discharge home with outpatient infusion center follow-up for completing antibiotics course     ADDITIONAL CARE RECOMMENDATIONS:        PENDING TEST RESULTS:   At the time of discharge the following test results are still pending:     FOLLOW UP APPOINTMENTS:    Follow-up Information     Follow up With Specialties Details Why Contact Info    [address] [place] [address] [address] Christy NP Nurse Practitioner In 1 week  [address] on 9/23/2021 IV Antibiotic @ 3:00 pm [address] [facility] [address], [address]  [phone]             DIET: Cardiac Diet    ACTIVITY: Activity as tolerated    WOUND CARE:     EQUIPMENT needed:       DISCHARGE MEDICATIONS:  Current Discharge Medication List      CONTINUE these medications which have NOT CHANGED    Details   FLUoxetine (PROzac) 40 mg capsule Take 40 mg by mouth daily. levothyroxine (SYNTHROID) 25 mcg tablet Take 25 mcg by mouth Daily (before breakfast). calcium-cholecalciferol, d3, (CALCIUM 600 + D) 600-125 mg-unit tab Take 1 Tablet by mouth daily. sodium bicarbonate 650 mg tablet Take 650 mg by mouth two (2) times a day. budesonide-glycopyr-formoterol (Breztri Aerosphere) 160-9-4.8 mcg/actuation HFAA Take 2 Inhalation by inhalation two (2) times a day. traZODone (DESYREL) 100 mg tablet Take 100 mg by mouth nightly.       montelukast (SINGULAIR) 10 mg tablet Take 10 mg by mouth daily. aspirin delayed-release 81 mg tablet Take 81 mg by mouth daily. carvedilol (COREG) 6.25 mg tablet Take 6.25 mg by mouth daily. STOP taking these medications       pravastatin (PRAVACHOL) 40 mg tablet Comments:   Reason for Stopping:                 NOTIFY YOUR PHYSICIAN FOR ANY OF THE FOLLOWING:   Fever over 101 degrees for 24 hours. Chest pain, shortness of breath, fever, chills, nausea, vomiting, diarrhea, change in mentation, falling, weakness, bleeding. Severe pain or pain not relieved by medications. Or, any other signs or symptoms that you may have questions about. DISPOSITION:  x  Home With:   OT  PT  HH  RN       Long term SNF/Inpatient Rehab    Independent/assisted living    Hospice    Other:       PATIENT CONDITION AT DISCHARGE:     Functional status    Poor    x Deconditioned     Independent      Cognition   x  Lucid     Forgetful     Dementia      Catheters/lines (plus indication)    Montero     PICC     PEG     nephrostomy     Code status   x  Full code     DNR      PHYSICAL EXAMINATION AT DISCHARGE:  General:          Alert, cooperative, no distress, appears stated age. HEENT:           Atraumatic, anicteric sclerae, pink conjunctivae                          No oral ulcers, mucosa moist, throat clear, dentition fair  Neck:               Supple, symmetrical  Lungs:             Clear to auscultation bilaterally. No Wheezing or Rhonchi. No rales. Chest wall:      No tenderness  No Accessory muscle use. Heart:              Regular  rhythm,  No  murmur   No edema  Abdomen:        Soft, non-tender. Not distended. Bowel sounds normal  Extremities:     No cyanosis. No clubbing,                            Skin turgor normal, Capillary refill normal  Skin:                Not pale. Not Jaundiced  No rashes   Psych:             Not anxious or agitated.   Neurologic:      Alert, moves all extremities, answers questions appropriately and responds to commands       CHRONIC MEDICAL DIAGNOSES:  Problem List as of 9/22/2021 Date Reviewed: 1/26/2021        Codes Class Noted - Resolved    Stage 3 chronic kidney disease ([facility] [address] ICD-10-CM: N18.30  ICD-9-CM: 585.3  8/18/2021 - Present        Hydronephrosis ICD-10-CM: N13.30  ICD-9-CM: 895  8/15/2021 - Present    Overview Signed 8/15/2021 11:31 AM by Angelina Rowley NP     CT 7/10/21: Moderate left hydronephrosis. Dilation of the ureter leading to the ureteral anastomosis with the ileal conduit. No right hydronephrosis. No stones in either side. Bladder cancer [facility]) ICD-10-CM: C67.9  ICD-9-CM: 188.9  7/14/2020 - Present    Overview Addendum 9/9/2020  1:29 PM by Angelina Rowley NP     He is s/p TURBT on 6/25/2020. No obvious residual tumor, but tessy and induration at the base. Pathology reads invasive papillary urothelial carcinoma, high grade (bladder) and invasive high grade urothelial carcinoma pT2 (bladder base). He is s/p cystectomy with ileal conduit urinary diversion on 8/24/2020. He had an uncomplicated post-operative course and was discharged on 8/31/2020 with [address] [place] [address]. 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 PT2bN0. Malignant neoplasm of prostate [facility]) ICD-10-CM: C61  ICD-9-CM: 185  7/14/2020 - Present    Overview Addendum 8/15/2021 11:32 AM by Angelina Rowley NP     He is s/p a robotic prostatectomy 10/18/13. He had Corey's 6 disease wtih negative margins and negative nodes. His PSA has been undetectable, last drawn on 5/20/2021.              Spondylolisthesis at L2-L3 level ICD-10-CM: M43.16  ICD-9-CM: 756.12  1/25/2016 - Present        Lumbar stenosis ICD-10-CM: M48.061  ICD-9-CM: 724.02  1/25/2016 - Present        Lumbago ICD-10-CM: M54.5  ICD-9-CM: 724.2  2/8/2010 - Present        Hereditary and idiopathic peripheral neuropathy ICD-10-CM: G60.9  ICD-9-CM: 356.9  2/8/2010 - Present Osteoarthrosis, unspecified whether generalized or localized, unspecified site ICD-10-CM: M19.90  ICD-9-CM: 715.90  2/8/2010 - Present        Backache, unspecified ICD-10-CM: M54.9  ICD-9-CM: 724.5  2/8/2010 - Present        Congestive heart failure, unspecified ICD-10-CM: I50.9  ICD-9-CM: 428.0  2/8/2010 - Present        Essential hypertension, benign ICD-10-CM: I10  ICD-9-CM: 401.1  2/8/2010 - Present        Coronary atherosclerosis of native coronary artery ICD-10-CM: I25.10  ICD-9-CM: 414.01  2/8/2010 - Present        RESOLVED: Arterial bleed, intraoperative ICD-10-CM: I97.418  ICD-9-CM: 998.11  9/13/2021 - 9/22/2021        RESOLVED: Hematuria, unspecified ICD-10-CM: R31.9  ICD-9-CM: 599.70  7/14/2020 - 9/9/2020    Overview Signed 7/14/2020  5:59 PM by Woody Barbour [facility]     He had gross hematuria and clots in his Depends. He notes not pain in the bladder or flank. CT 4/20/20 without renal concerns. Tumor found on cystsocopy on 5/29/2020. RESOLVED: Impotence ICD-10-CM: N52.9  ICD-9-CM: 607.84  7/14/2020 - 9/9/2020    Overview Signed 7/14/2020  5:59 PM by Woody Barbour [facility]     He has ED. He and his partner are not active. Viagra was no longer helpful. RESOLVED: Neurogenic dysfunction of the urinary bladder ICD-10-CM: N31.9  ICD-9-CM: 596.54  7/14/2020 - 9/15/2020    Overview Addendum 9/9/2020  1:30 PM by Violette Meckel, NP     He has a hypotonic bladder and is emptying with Valsalva. He has mild ISD and urge incontinence. He has some enuresis. He is now s/p cystectomy with urinary diversion on 8/24/2020. RESOLVED: Urgency of urination ICD-10-CM: R39.15  ICD-9-CM: 788.63  7/14/2020 - 9/9/2020    Overview Signed 7/14/2020  6:01 PM by Woody Barbour, [address] [place], [place] [address]     He has occasional urgency. He wears depends for a precaution. He does have an Acticuff clamp. He had urgency and episodic incontinence prior to prostate surgery. He does not leak with cough or sneeze.  He drinks diet Pepsi all day long and less often coffee. He has chronic back problems with back surgery 2004, 1/2016, 12/2017 and 2/2018. He had a spinal stimulator which was removed. He has had thigh pain and burning in his legs. He has been on chronic pain medication. He can consider bladder relaxants after cystoscopy.                    Greater than 30  minutes were spent with the patient on counseling and coordination of care    Signed:   Beverlee Gowers, MD  9/22/2021  2:31 PM

## 2021-09-22 NOTE — PROGRESS NOTES
PICC Placement Note    PRE-PROCEDURE VERIFICATION  Correct Procedure: yes  Correct Site:  yes  Temperature: Temp: 98.9 °F (37.2 °C), Temperature Source: Temp Source: Oral  Recent Labs     09/22/21  1352 09/22/21  0518   BUN  --  23*   CREA  --  1.52*   *  --    WBC 12.1*  --      Allergies: Lisinopril  Education materials for PICC Care given: yes. See Patient Education activity for further details. PICC Booklet placed at bedside: yes    PROCEDURE DETAIL  Consent was obtained and all questions were answered related to risks and benefits. A double lumen PICC line was inserted, as a sterile procedure using ultrasound and modified Seldinger technique for Home IV Therapy. The following documentation is in addition to the PICC properties in the lines/airways flowsheet :  Lot #: [id number]  Lidocaine 1% administered intradermally :yes  Internal Catheter Total Length: 40 (cm)  Vein Selection for PICC:right basilic  Central Line Bundle followed yes  Complication Related to Insertion:no    The placement was verified by EKG, MAX P WAVE @ 40 (cm). PER EKG PICC TIP @ C/A junction.          Line is okay to use: yes    Carlyn Motta, RN

## 2021-09-22 NOTE — PROGRESS NOTES
[facility]  Hospitalist Group                                                                                          Hospitalist Progress Note  James Maguire MD  Answering service: [phone] from in house phone        Date of Service:  2021  NAME:  Marshall Shine  :  1950  MRN:  [id number]      Admission Summary:   Mr Letty Mckeon is a 75 yo WM with a PMH of prostate cancer, bladder cancer sp TURBT 2020 with ileostomy, he had a recent right nephrostomy tube placed last week after failed attempt for left urethal stent placed secondary to left ureteral obstruction. He was found to have some blood clots in the nephrostomy tube so was taken to IR  for left ureteral obstruction with left hydronephrosis and arterial bleeding into the left collecting system. Pulsatile blood was found during arteriogram, pt was embolized and at high risk for bleeding therefore pt was brought to the ICU on  for close monitoring post procedure.  He then went to IR on  on for a L nephrostomy tube placement.  Blood cultures notable for 3/4 GPC in clusters. He was started on empiric vanc/cefepime.  Repeat cultures are ordered and pending.    Pt transferred out of ICU on 9/15       Interval history / Subjective:   Patient seen and examined will go home on IV antibiotics internalization of the nephrostomy tube was attempted yesterday by IR unable to do it will be following with urology as an outpatient     Assessment & Plan:     Sepsis  From MRSA Bacteremia    L Ureteral obstruction w L hydro   Secondary to arterial bleed s/p embolization on  with neph tube on .    - IR following for neph tube -plan for internalization today  - Blood cx : 3 of 4 bottles growing MRSA -need at least 2 weeks of antibiotics   - Repeat cultures sent 9/15 am.--No growth to date  - Continue IV vanc follow creatinine s worsening slightly continue to monitor Vanco by trough  - Echo ordered -- no vegetation noted  -IV antibiotics on daptomycin till 9/29 line ordered awaiting infusion set up may be able to discharge today afternoon     Depression: Continue home meds prozac, trazodone   HLD: Continue statin  Hypothyroidism: Continue synthroid  CKD stage III - cr stable   Hx bladder cancer s/p TURBT 2020, cystectomy and ileal conduit. Hx Prostate ca s/p prostatectomy in 2013   Hypertension continue beta-blocker and aspirin    Code status: Full code  DVT prophylaxis: SCD    Care Plan discussed with: Patient/Family and Nurse  Anticipated Disposition: SNF/LTC  Anticipated Discharge: today       Hospital Problems  Date Reviewed: 1/26/2021        Codes Class Noted POA    Arterial bleed, intraoperative ICD-10-CM: I97.418  ICD-9-CM: 998.11  9/13/2021 Unknown                Review of Systems:   A comprehensive review of systems was negative. Vital Signs:    Last 24hrs VS reviewed since prior progress note. Most recent are:  Visit Vitals  BP (!) 147/70 (BP 1 Location: Right upper arm, BP Patient Position: At rest)   Pulse 75   Temp 98.6 °F (37 °C)   Resp 20   Ht 5' 8\" (1.727 m)   Wt 69.6 kg (153 lb 7 oz)   SpO2 97%   BMI 23.33 kg/m²         Intake/Output Summary (Last 24 hours) at 9/22/2021 1157  Last data filed at 9/22/2021 0524  Gross per 24 hour   Intake --   Output 825 ml   Net -825 ml        Physical Examination:     I had a face to face encounter with this patient and independently examined them on 9/22/2021 as outlined below:          Constitutional:  No acute distress   ENT: MMM   Resp:  CTA bilaterally. CV:  Regular rhythm, normal rate, no murmurs, gallops, rubs    GI  Soft, non distended, non tender.   He has a left-sided nephrostomy tube  L neph tube, urostomy RLQ    Musculoskeletal:  No edema, warm, 2+ pulses throughout    Neurologic:  N awake            Data Review:    I personally reviewed  Image and labs      Labs:     No results for input(s): WBC, HGB, HCT, PLT, HGBEXT, HCTEXT, PLTEXT, HGBEXT, HCTEXT, PLTEXT in the last 72 hours. Recent Labs     09/22/21  0518 09/21/21  0547 09/20/21  0618    136 135*   K 3.7 3.7 3.8    107 106   CO2 25 26 25   BUN 23* 23* 26*   CREA 1.52* 1.55* 1.64*   * 101* 100   CA 8.5 8.9 9.1   MG 1.9 3.1* 2.0   PHOS  --  2.6 2.7     No results for input(s): ALT, AP, TBIL, TBILI, TP, ALB, GLOB, GGT, AML, LPSE in the last 72 hours. No lab exists for component: SGOT, GPT, AMYP, HLPSE  No results for input(s): INR, PTP, APTT, INREXT, INREXT in the last 72 hours. No results for input(s): FE, TIBC, PSAT, FERR in the last 72 hours. No results found for: FOL, RBCF   No results for input(s): PH, PCO2, PO2 in the last 72 hours.   Recent Labs     09/21/21  1523   CPK 73     No results found for: CHOL, CHOLX, CHLST, CHOLV, HDL, HDLP, LDL, LDLC, DLDLP, TGLX, TRIGL, TRIGP, CHHD, CHHDX  No results found for: [facility]  Lab Results   Component Value Date/Time    Color DARK YELLOW 01/11/2016 03:15 PM    Appearance CLEAR 01/11/2016 03:15 PM    Specific gravity 1.025 01/11/2016 03:15 PM    pH (UA) 5.0 01/11/2016 03:15 PM    Protein TRACE (A) 01/11/2016 03:15 PM    Glucose NEGATIVE  01/11/2016 03:15 PM    Ketone NEGATIVE  01/11/2016 03:15 PM    Bilirubin NEGATIVE  01/11/2016 03:15 PM    Urobilinogen 0.2 01/11/2016 03:15 PM    Nitrites NEGATIVE  01/11/2016 03:15 PM    Leukocyte Esterase NEGATIVE  01/11/2016 03:15 PM    Epithelial cells FEW 01/11/2016 03:15 PM    Bacteria NEGATIVE  01/11/2016 03:15 PM    WBC 0-4 01/11/2016 03:15 PM    RBC 0-5 01/11/2016 03:15 PM         Medications Reviewed:     Current Facility-Administered Medications   Medication Dose Route Frequency    DAPTOmycin (CUBICIN) 600 mg in 0.9% sodium chloride 50 mL IVPB RF formulation  600 mg IntraVENous Q24H    melatonin tablet 3 mg  3 mg Oral QHS PRN    FLUoxetine (PROzac) capsule 40 mg  40 mg Oral DAILY    levothyroxine (SYNTHROID) tablet 25 mcg  25 mcg Oral 6am    sodium bicarbonate tablet 650 mg  650 mg Oral BID    budesonide (PULMICORT) 250 mcg/2ml nebulizer susp  250 mcg Nebulization BID RT    traZODone (DESYREL) tablet 100 mg  100 mg Oral QHS    montelukast (SINGULAIR) tablet 10 mg  10 mg Oral QHS    aspirin chewable tablet 81 mg  81 mg Oral DAILY    carvediloL (COREG) tablet 6.25 mg  6.25 mg Oral BID WITH MEALS    [Held by provider] pravastatin (PRAVACHOL) tablet 40 mg  40 mg Oral QHS    0.9% sodium chloride infusion 250 mL  250 mL IntraVENous PRN    sodium chloride (NS) flush 5-40 mL  5-40 mL IntraVENous Q8H    sodium chloride (NS) flush 5-40 mL  5-40 mL IntraVENous PRN    acetaminophen (TYLENOL) tablet 650 mg  650 mg Oral Q6H PRN    Or    acetaminophen (TYLENOL) suppository 650 mg  650 mg Rectal Q6H PRN    promethazine (PHENERGAN) tablet 12.5 mg  12.5 mg Oral Q6H PRN    Or    ondansetron (ZOFRAN) injection 4 mg  4 mg IntraVENous Q6H PRN    albuterol (PROVENTIL VENTOLIN) nebulizer solution 2.5 mg  2.5 mg Nebulization Q4H PRN    bisacodyL (DULCOLAX) suppository 10 mg  10 mg Rectal DAILY PRN     ______________________________________________________________________  EXPECTED LENGTH OF STAY: 3d 12h  ACTUAL LENGTH OF STAY:          9                 Александр Champagne MD

## 2021-09-23 ENCOUNTER — HOSPITAL ENCOUNTER (OUTPATIENT)
Dept: INFUSION THERAPY | Age: 71
Discharge: HOME | End: 2021-09-23
Payer: MEDICARE

## 2021-09-23 ENCOUNTER — TELEPHONE (OUTPATIENT)
Dept: UROLOGY | Age: 71
End: 2021-09-23

## 2021-09-23 VITALS
HEART RATE: 74 BPM | TEMPERATURE: 98 F | OXYGEN SATURATION: 97 % | RESPIRATION RATE: 18 BRPM | SYSTOLIC BLOOD PRESSURE: 128 MMHG | DIASTOLIC BLOOD PRESSURE: 71 MMHG

## 2021-09-23 PROCEDURE — 96374 THER/PROPH/DIAG INJ IV PUSH: CPT

## 2021-09-23 PROCEDURE — 74011250636 HC RX REV CODE- 250/636: Performed by: INTERNAL MEDICINE

## 2021-09-23 PROCEDURE — 74011000250 HC RX REV CODE- 250: Performed by: INTERNAL MEDICINE

## 2021-09-23 RX ORDER — SODIUM CHLORIDE 0.9 % (FLUSH) 0.9 %
10 SYRINGE (ML) INJECTION AS NEEDED
Status: DISCONTINUED | OUTPATIENT
Start: 2021-09-23 | End: 2021-09-24 | Stop reason: HOSPADM

## 2021-09-23 RX ORDER — HEPARIN 100 UNIT/ML
500 SYRINGE INTRAVENOUS AS NEEDED
Status: DISCONTINUED | OUTPATIENT
Start: 2021-09-23 | End: 2021-09-24 | Stop reason: HOSPADM

## 2021-09-23 RX ADMIN — Medication 500 UNITS: at 14:20

## 2021-09-23 RX ADMIN — DAPTOMYCIN 550 MG: 500 INJECTION, POWDER, LYOPHILIZED, FOR SOLUTION INTRAVENOUS at 14:17

## 2021-09-23 RX ADMIN — Medication 10 ML: at 14:19

## 2021-09-23 RX ADMIN — Medication 500 UNITS: at 14:21

## 2021-09-23 RX ADMIN — Medication 10 ML: at 14:20

## 2021-09-23 RX ADMIN — Medication 10 ML: at 14:15

## 2021-09-23 NOTE — TELEPHONE ENCOUNTER
I have no clue what pt is talking about. .. I dont see a op note or anything. Do you know anything about this pt ?

## 2021-09-23 NOTE — PROGRESS NOTES
[address] [facility] @ [facility] VISIT NOTE    8811 Patient arrives for Daily IV Daptomycin without acute problems. Please see connect care for complete assessment and education provided. Vital signs stable throughout and prior to discharge, Pt. Tolerated treatment well and discharged without incident. Patient is aware of next [facility] appointment on 9/24/2021. Appointment card given to patient. PICC line to RUE flushed well with positive blood return in both ports. Medications Verified by Meme Butt RN via Versium[employer]:  1. Daptomycin 550mg IV push  2.  NS flush  3. Heparin flush    VITAL SIGNS Patient Vitals for the past 12 hrs:   Temp Pulse Resp BP SpO2   09/23/21 1422 98 °F (36.7 °C) 74 18 128/71    09/23/21 1302 99.6 °F (37.6 °C) 78 18 119/70 97 %       LAB WORK Lab results pending, please see [facility] for results. No results found for this or any previous visit (from the past 12 hour(s)).

## 2021-09-23 NOTE — TELEPHONE ENCOUNTER
See note from 9/8/21; patient was advised to keep upcoming appointment at that time, but was later cancelled as he was still in hospital.    Patient needs new appointment with Dr. Justus Chan. Can be a VV. Needs to be scheduled soon.

## 2021-09-23 NOTE — TELEPHONE ENCOUNTER
pts wife called wanting to have Dr. Shanta Enciso review patients chart. Attempt to have stent put in kidney failed. Needs advisement from Dr. Shanta Enciso. Please advise.

## 2021-09-24 ENCOUNTER — HOSPITAL ENCOUNTER (OUTPATIENT)
Dept: INFUSION THERAPY | Age: 71
Discharge: HOME | End: 2021-09-24
Payer: MEDICARE

## 2021-09-24 VITALS
SYSTOLIC BLOOD PRESSURE: 119 MMHG | DIASTOLIC BLOOD PRESSURE: 74 MMHG | HEART RATE: 75 BPM | OXYGEN SATURATION: 97 % | TEMPERATURE: 99 F | RESPIRATION RATE: 18 BRPM

## 2021-09-24 PROCEDURE — 96374 THER/PROPH/DIAG INJ IV PUSH: CPT

## 2021-09-24 PROCEDURE — 74011000250 HC RX REV CODE- 250: Performed by: INTERNAL MEDICINE

## 2021-09-24 PROCEDURE — 74011250636 HC RX REV CODE- 250/636: Performed by: INTERNAL MEDICINE

## 2021-09-24 RX ORDER — SODIUM CHLORIDE 0.9 % (FLUSH) 0.9 %
10 SYRINGE (ML) INJECTION AS NEEDED
Status: DISCONTINUED | OUTPATIENT
Start: 2021-09-24 | End: 2021-09-25 | Stop reason: HOSPADM

## 2021-09-24 RX ORDER — HEPARIN 100 UNIT/ML
500 SYRINGE INTRAVENOUS AS NEEDED
Status: DISCONTINUED | OUTPATIENT
Start: 2021-09-24 | End: 2021-09-25 | Stop reason: HOSPADM

## 2021-09-24 RX ADMIN — Medication 500 UNITS: at 15:11

## 2021-09-24 RX ADMIN — DAPTOMYCIN 550 MG: 500 INJECTION, POWDER, LYOPHILIZED, FOR SOLUTION INTRAVENOUS at 15:09

## 2021-09-24 RX ADMIN — Medication 500 UNITS: at 15:12

## 2021-09-24 RX ADMIN — Medication 10 ML: at 15:12

## 2021-09-24 RX ADMIN — Medication 10 ML: at 15:11

## 2021-09-24 RX ADMIN — Medication 10 ML: at 15:08

## 2021-09-24 NOTE — PROGRESS NOTES
[address] [facility] VISIT NOTE    1056 Patient arrives for Daily IV Daptomycin without acute problems. Please see connect care for complete assessment and education provided. Vital signs stable throughout and prior to discharge, Pt. Tolerated treatment well and discharged without incident. Patient is aware of next [facility] appointment on 9/25/2021. Appointment card given to patient. Medications Verified by Leonie Berg RN via Freebase:  1. Daptomycin 550mg IV Push  2. NS Flush  3. Heparin Flush    VITAL SIGNS Patient Vitals for the past 12 hrs:   Temp Pulse Resp BP SpO2   09/24/21 1450 99 °F (37.2 °C) 75 18 119/74 97 %       LAB WORK Lab results pending, please see [facility] for results. No results found for this or any previous visit (from the past 12 hour(s)).

## 2021-09-25 ENCOUNTER — HOSPITAL ENCOUNTER (OUTPATIENT)
Dept: INFUSION THERAPY | Age: 71
Discharge: HOME | End: 2021-09-25
Payer: MEDICARE

## 2021-09-25 VITALS
DIASTOLIC BLOOD PRESSURE: 67 MMHG | HEART RATE: 75 BPM | TEMPERATURE: 97.5 F | SYSTOLIC BLOOD PRESSURE: 145 MMHG | RESPIRATION RATE: 18 BRPM

## 2021-09-25 PROCEDURE — 74011250636 HC RX REV CODE- 250/636

## 2021-09-25 PROCEDURE — 74011250636 HC RX REV CODE- 250/636: Performed by: INTERNAL MEDICINE

## 2021-09-25 PROCEDURE — 74011000250 HC RX REV CODE- 250: Performed by: INTERNAL MEDICINE

## 2021-09-25 PROCEDURE — 96374 THER/PROPH/DIAG INJ IV PUSH: CPT

## 2021-09-25 RX ORDER — HEPARIN 100 UNIT/ML
500 SYRINGE INTRAVENOUS AS NEEDED
Status: DISCONTINUED | OUTPATIENT
Start: 2021-09-25 | End: 2021-09-26 | Stop reason: HOSPADM

## 2021-09-25 RX ORDER — SODIUM CHLORIDE 0.9 % (FLUSH) 0.9 %
5-10 SYRINGE (ML) INJECTION AS NEEDED
Status: DISCONTINUED | OUTPATIENT
Start: 2021-09-25 | End: 2021-09-26 | Stop reason: HOSPADM

## 2021-09-25 RX ORDER — SODIUM CHLORIDE 9 MG/ML
25 INJECTION, SOLUTION INTRAVENOUS ONCE
Status: DISPENSED | OUTPATIENT
Start: 2021-09-25 | End: 2021-09-25

## 2021-09-25 RX ADMIN — HEPARIN 500 UNITS: 100 SYRINGE at 10:11

## 2021-09-25 RX ADMIN — Medication 10 ML: at 10:11

## 2021-09-25 RX ADMIN — HEPARIN 500 UNITS: 100 SYRINGE at 10:10

## 2021-09-25 RX ADMIN — Medication 10 ML: at 10:10

## 2021-09-25 RX ADMIN — DAPTOMYCIN 550 MG: 500 INJECTION, POWDER, LYOPHILIZED, FOR SOLUTION INTRAVENOUS at 10:09

## 2021-09-25 NOTE — PROGRESS NOTES
[facility] Short Visit Progress Note    1000 Pt admit to [facility] for Daptomycin via wheelchair in stable condition but weak accompanied by supportive wife. Assessment completed. No new concerns voiced. Visit Vitals  BP (!) 145/67 (BP 1 Location: Left upper arm)   Pulse 75   Temp 97.5 °F (36.4 °C)   Resp 18       Double lumen PICC line in the right arm and both flushed with positive blood return. Lines flushed, heparinized and capped. Medications:  Medications Administered     DAPTOmycin (CUBICIN) 550 mg in sterile water (preservative free) 11 mL IV syringe RF formulation     Admin Date  09/25/2021 Action  Given Dose  550 mg Route  IntraVENous Administered By  Renna Opitz, RN          heparin (porcine) pf 500 Units     Admin Date  09/25/2021 Action  Given Dose  500 Units Route  IntraVENous Administered By  Renna Opitz, RN           Admin Date  09/25/2021 Action  Given Dose  500 Units Route  IntraVENous Administered By  Renna Opitz, RN          sodium chloride (NS) flush 5-10 mL     Admin Date  09/25/2021 Action  Given Dose  10 mL Route  IntraVENous Administered By  Renna Opitz, RN           Admin Date  09/25/2021 Action  Given Dose  10 mL Route  IntraVENous Administered By  Renna Opitz, RN                6716 Pt tolerated treatment well. D/c home via wheelchair in no distress. Pt aware of next appointment scheduled for 9/26/21.

## 2021-09-26 ENCOUNTER — HOSPITAL ENCOUNTER (OUTPATIENT)
Age: 71
Setting detail: OBSERVATION
LOS: 1 days | Discharge: HOME HEALTH | End: 2021-09-27
Attending: STUDENT IN AN ORGANIZED HEALTH CARE EDUCATION/TRAINING PROGRAM | Admitting: HOSPITALIST
Payer: MEDICARE

## 2021-09-26 ENCOUNTER — HOSPITAL ENCOUNTER (OUTPATIENT)
Dept: INFUSION THERAPY | Age: 71
Discharge: HOME | End: 2021-09-26
Payer: MEDICARE

## 2021-09-26 VITALS
TEMPERATURE: 97.3 F | DIASTOLIC BLOOD PRESSURE: 71 MMHG | HEART RATE: 79 BPM | SYSTOLIC BLOOD PRESSURE: 148 MMHG | RESPIRATION RATE: 18 BRPM

## 2021-09-26 DIAGNOSIS — L76.82 BLEEDING AT INSERTION SITE: Primary | ICD-10-CM

## 2021-09-26 DIAGNOSIS — D64.9 ANEMIA, UNSPECIFIED TYPE: ICD-10-CM

## 2021-09-26 PROBLEM — T83.098A MALFUNCTION OF NEPHROSTOMY TUBE (HCC): Status: ACTIVE | Noted: 2021-09-26

## 2021-09-26 LAB
ANION GAP SERPL CALC-SCNC: 4 MMOL/L (ref 5–15)
BASOPHILS # BLD: 0 K/UL (ref 0–0.1)
BASOPHILS NFR BLD: 0 % (ref 0–1)
BUN SERPL-MCNC: 25 MG/DL (ref 6–20)
BUN/CREAT SERPL: 14 (ref 12–20)
CALCIUM SERPL-MCNC: 8.5 MG/DL (ref 8.5–10.1)
CHLORIDE SERPL-SCNC: 103 MMOL/L (ref 97–108)
CO2 SERPL-SCNC: 28 MMOL/L (ref 21–32)
COMMENT, HOLDF: NORMAL
CREAT SERPL-MCNC: 1.76 MG/DL (ref 0.7–1.3)
DIFFERENTIAL METHOD BLD: ABNORMAL
EOSINOPHIL # BLD: 0.4 K/UL (ref 0–0.4)
EOSINOPHIL NFR BLD: 3 % (ref 0–7)
ERYTHROCYTE [DISTWIDTH] IN BLOOD BY AUTOMATED COUNT: 15.3 % (ref 11.5–14.5)
GLUCOSE SERPL-MCNC: 129 MG/DL (ref 65–100)
HCT VFR BLD AUTO: 23.2 % (ref 36.6–50.3)
HGB BLD-MCNC: 7.3 G/DL (ref 12.1–17)
IMM GRANULOCYTES # BLD AUTO: 0.1 K/UL (ref 0–0.04)
IMM GRANULOCYTES NFR BLD AUTO: 1 % (ref 0–0.5)
LYMPHOCYTES # BLD: 1.1 K/UL (ref 0.8–3.5)
LYMPHOCYTES NFR BLD: 9 % (ref 12–49)
MCH RBC QN AUTO: 25.3 PG (ref 26–34)
MCHC RBC AUTO-ENTMCNC: 31.5 G/DL (ref 30–36.5)
MCV RBC AUTO: 80.3 FL (ref 80–99)
MONOCYTES # BLD: 0.9 K/UL (ref 0–1)
MONOCYTES NFR BLD: 8 % (ref 5–13)
NEUTS SEG # BLD: 9.2 K/UL (ref 1.8–8)
NEUTS SEG NFR BLD: 79 % (ref 32–75)
NRBC # BLD: 0 K/UL (ref 0–0.01)
NRBC BLD-RTO: 0 PER 100 WBC
PLATELET # BLD AUTO: 570 K/UL (ref 150–400)
PMV BLD AUTO: 9.1 FL (ref 8.9–12.9)
POTASSIUM SERPL-SCNC: 3.9 MMOL/L (ref 3.5–5.1)
RBC # BLD AUTO: 2.89 M/UL (ref 4.1–5.7)
SAMPLES BEING HELD,HOLD: NORMAL
SODIUM SERPL-SCNC: 135 MMOL/L (ref 136–145)
WBC # BLD AUTO: 11.8 K/UL (ref 4.1–11.1)

## 2021-09-26 PROCEDURE — 74011000258 HC RX REV CODE- 258: Performed by: INTERNAL MEDICINE

## 2021-09-26 PROCEDURE — 99285 EMERGENCY DEPT VISIT HI MDM: CPT

## 2021-09-26 PROCEDURE — 36415 COLL VENOUS BLD VENIPUNCTURE: CPT

## 2021-09-26 PROCEDURE — 80048 BASIC METABOLIC PNL TOTAL CA: CPT

## 2021-09-26 PROCEDURE — 74011250637 HC RX REV CODE- 250/637: Performed by: HOSPITALIST

## 2021-09-26 PROCEDURE — 99218 HC RM OBSERVATION: CPT

## 2021-09-26 PROCEDURE — 96374 THER/PROPH/DIAG INJ IV PUSH: CPT

## 2021-09-26 PROCEDURE — 74011250636 HC RX REV CODE- 250/636: Performed by: INTERNAL MEDICINE

## 2021-09-26 PROCEDURE — 85025 COMPLETE CBC W/AUTO DIFF WBC: CPT

## 2021-09-26 RX ORDER — CARVEDILOL 6.25 MG/1
6.25 TABLET ORAL DAILY
Status: DISCONTINUED | OUTPATIENT
Start: 2021-09-27 | End: 2021-09-27 | Stop reason: HOSPADM

## 2021-09-26 RX ORDER — SODIUM BICARBONATE 650 MG/1
650 TABLET ORAL 2 TIMES DAILY
Status: DISCONTINUED | OUTPATIENT
Start: 2021-09-27 | End: 2021-09-27 | Stop reason: HOSPADM

## 2021-09-26 RX ORDER — POLYETHYLENE GLYCOL 3350 17 G/17G
17 POWDER, FOR SOLUTION ORAL DAILY PRN
Status: DISCONTINUED | OUTPATIENT
Start: 2021-09-26 | End: 2021-09-27 | Stop reason: HOSPADM

## 2021-09-26 RX ORDER — SODIUM CHLORIDE 0.9 % (FLUSH) 0.9 %
5-40 SYRINGE (ML) INJECTION EVERY 8 HOURS
Status: DISCONTINUED | OUTPATIENT
Start: 2021-09-26 | End: 2021-09-27 | Stop reason: HOSPADM

## 2021-09-26 RX ORDER — ONDANSETRON 4 MG/1
4 TABLET, ORALLY DISINTEGRATING ORAL
Status: DISCONTINUED | OUTPATIENT
Start: 2021-09-26 | End: 2021-09-27 | Stop reason: HOSPADM

## 2021-09-26 RX ORDER — ACETAMINOPHEN 650 MG/1
650 SUPPOSITORY RECTAL
Status: DISCONTINUED | OUTPATIENT
Start: 2021-09-26 | End: 2021-09-27 | Stop reason: HOSPADM

## 2021-09-26 RX ORDER — LEVOTHYROXINE SODIUM 25 UG/1
25 TABLET ORAL
Status: DISCONTINUED | OUTPATIENT
Start: 2021-09-27 | End: 2021-09-27 | Stop reason: HOSPADM

## 2021-09-26 RX ORDER — SODIUM CHLORIDE 0.9 % (FLUSH) 0.9 %
5-40 SYRINGE (ML) INJECTION AS NEEDED
Status: DISCONTINUED | OUTPATIENT
Start: 2021-09-26 | End: 2021-09-27 | Stop reason: HOSPADM

## 2021-09-26 RX ORDER — ACETAMINOPHEN 325 MG/1
650 TABLET ORAL
Status: DISCONTINUED | OUTPATIENT
Start: 2021-09-26 | End: 2021-09-27 | Stop reason: HOSPADM

## 2021-09-26 RX ORDER — FLUOXETINE HYDROCHLORIDE 20 MG/1
40 CAPSULE ORAL DAILY
Status: DISCONTINUED | OUTPATIENT
Start: 2021-09-27 | End: 2021-09-27 | Stop reason: HOSPADM

## 2021-09-26 RX ORDER — MONTELUKAST SODIUM 10 MG/1
10 TABLET ORAL DAILY
Status: DISCONTINUED | OUTPATIENT
Start: 2021-09-27 | End: 2021-09-27 | Stop reason: HOSPADM

## 2021-09-26 RX ORDER — TRAZODONE HYDROCHLORIDE 100 MG/1
100 TABLET ORAL
Status: DISCONTINUED | OUTPATIENT
Start: 2021-09-26 | End: 2021-09-27 | Stop reason: HOSPADM

## 2021-09-26 RX ORDER — ONDANSETRON 2 MG/ML
4 INJECTION INTRAMUSCULAR; INTRAVENOUS
Status: DISCONTINUED | OUTPATIENT
Start: 2021-09-26 | End: 2021-09-27 | Stop reason: HOSPADM

## 2021-09-26 RX ADMIN — DAPTOMYCIN 550 MG: 500 INJECTION, POWDER, LYOPHILIZED, FOR SOLUTION INTRAVENOUS at 09:47

## 2021-09-26 RX ADMIN — Medication 10 ML: at 22:29

## 2021-09-26 RX ADMIN — TRAZODONE HYDROCHLORIDE 100 MG: 100 TABLET ORAL at 23:34

## 2021-09-26 NOTE — PROGRESS NOTES
OPIC Short Note                       Date: 2021    Name: Abel Black    MRN: [id number]         : 1950    0945 Pt admit to [facility] for dapto ambulatory in stable condition. Assessment completed. No new concerns voiced. Patient denies SOB, fever, cough, general not feeling well. Patient denies recent exposure to someone who has tested positive for COVID-19. Patient denies having contact with anyone who has a pending COVID test.    Mr. Sri Bernstein vitals were reviewed prior to treatment. Patient Vitals for the past 12 hrs:   Temp Pulse Resp BP   21 0945 97.3 °F (36.3 °C) 79 18 (!) 148/71       PICC with positive blood return flushed, heparinized and capped per protocol. Medications given:   Medications Administered     DAPTOmycin (CUBICIN) 550 mg in 0.9% sodium chloride 11 mL IV Syringe     Admin Date  2021 Action  Given Dose  550 mg Route  IntraVENous Administered By  Roseline Alfaro, RN                   4051 Pt tolerated treatment well. D/c home ambulatory in no distress.     Future Appointments   Date Time Provider Katharina Tong   2021 10:30 AM H1 IRAIS [facility]ACK [facility]   2021  3:00 PM B4 PEDS [facility]   2021  8:20 AM MD TRISTIN Borges   2021  3:00 PM B4 PEDS [facility]   2021  3:00 PM B4 PEDS [facility]   2022 10:00 AM Emanuel Beltran MD [facility] Burnett Dasen, RN  2021  9:52 AM

## 2021-09-27 ENCOUNTER — HOSPITAL ENCOUNTER (OUTPATIENT)
Dept: INFUSION THERAPY | Age: 71
Discharge: HOME | End: 2021-09-27

## 2021-09-27 ENCOUNTER — APPOINTMENT (OUTPATIENT)
Dept: INTERVENTIONAL RADIOLOGY/VASCULAR | Age: 71
End: 2021-09-27
Attending: INTERNAL MEDICINE
Payer: MEDICARE

## 2021-09-27 VITALS
DIASTOLIC BLOOD PRESSURE: 73 MMHG | HEIGHT: 69 IN | BODY MASS INDEX: 22.73 KG/M2 | OXYGEN SATURATION: 98 % | RESPIRATION RATE: 18 BRPM | SYSTOLIC BLOOD PRESSURE: 161 MMHG | HEART RATE: 70 BPM | TEMPERATURE: 98.1 F | WEIGHT: 153.44 LBS

## 2021-09-27 LAB
ANION GAP SERPL CALC-SCNC: 2 MMOL/L (ref 5–15)
BUN SERPL-MCNC: 25 MG/DL (ref 6–20)
BUN/CREAT SERPL: 15 (ref 12–20)
CALCIUM SERPL-MCNC: 8.5 MG/DL (ref 8.5–10.1)
CHLORIDE SERPL-SCNC: 106 MMOL/L (ref 97–108)
CO2 SERPL-SCNC: 28 MMOL/L (ref 21–32)
CREAT SERPL-MCNC: 1.68 MG/DL (ref 0.7–1.3)
ERYTHROCYTE [DISTWIDTH] IN BLOOD BY AUTOMATED COUNT: 15.3 % (ref 11.5–14.5)
GLUCOSE SERPL-MCNC: 89 MG/DL (ref 65–100)
HCT VFR BLD AUTO: 22.9 % (ref 36.6–50.3)
HGB BLD-MCNC: 7.1 G/DL (ref 12.1–17)
MCH RBC QN AUTO: 25 PG (ref 26–34)
MCHC RBC AUTO-ENTMCNC: 31 G/DL (ref 30–36.5)
MCV RBC AUTO: 80.6 FL (ref 80–99)
NRBC # BLD: 0 K/UL (ref 0–0.01)
NRBC BLD-RTO: 0 PER 100 WBC
PLATELET # BLD AUTO: 514 K/UL (ref 150–400)
PMV BLD AUTO: 9.4 FL (ref 8.9–12.9)
POTASSIUM SERPL-SCNC: 4 MMOL/L (ref 3.5–5.1)
RBC # BLD AUTO: 2.84 M/UL (ref 4.1–5.7)
SODIUM SERPL-SCNC: 136 MMOL/L (ref 136–145)
WBC # BLD AUTO: 9.3 K/UL (ref 4.1–11.1)

## 2021-09-27 PROCEDURE — 96374 THER/PROPH/DIAG INJ IV PUSH: CPT

## 2021-09-27 PROCEDURE — 85027 COMPLETE CBC AUTOMATED: CPT

## 2021-09-27 PROCEDURE — 74011250636 HC RX REV CODE- 250/636: Performed by: HOSPITALIST

## 2021-09-27 PROCEDURE — 36415 COLL VENOUS BLD VENIPUNCTURE: CPT

## 2021-09-27 PROCEDURE — 80048 BASIC METABOLIC PNL TOTAL CA: CPT

## 2021-09-27 PROCEDURE — 74011000258 HC RX REV CODE- 258: Performed by: HOSPITALIST

## 2021-09-27 PROCEDURE — 99218 HC RM OBSERVATION: CPT

## 2021-09-27 PROCEDURE — 74011250637 HC RX REV CODE- 250/637: Performed by: HOSPITALIST

## 2021-09-27 RX ORDER — HEPARIN SODIUM (PORCINE) LOCK FLUSH IV SOLN 100 UNIT/ML 100 UNIT/ML
500 SOLUTION INTRAVENOUS AS NEEDED
Status: CANCELLED | OUTPATIENT
Start: 2021-09-27 | End: 2021-09-28

## 2021-09-27 RX ORDER — SODIUM CHLORIDE 9 MG/ML
10 INJECTION, SOLUTION INTRAVENOUS CONTINUOUS
Status: CANCELLED | OUTPATIENT
Start: 2021-09-27 | End: 2021-09-28

## 2021-09-27 RX ORDER — SODIUM CHLORIDE 0.9 % (FLUSH) 0.9 %
10 SYRINGE (ML) INJECTION AS NEEDED
Status: CANCELLED | OUTPATIENT
Start: 2021-09-27

## 2021-09-27 RX ADMIN — Medication 10 ML: at 06:00

## 2021-09-27 RX ADMIN — Medication 5 ML: at 14:00

## 2021-09-27 RX ADMIN — SODIUM BICARBONATE 650 MG: 650 TABLET ORAL at 09:54

## 2021-09-27 RX ADMIN — LEVOTHYROXINE SODIUM 25 MCG: 0.03 TABLET ORAL at 06:08

## 2021-09-27 RX ADMIN — FLUOXETINE 40 MG: 20 CAPSULE ORAL at 09:53

## 2021-09-27 RX ADMIN — ONDANSETRON 4 MG: 4 TABLET, ORALLY DISINTEGRATING ORAL at 09:54

## 2021-09-27 RX ADMIN — CARVEDILOL 6.25 MG: 6.25 TABLET, FILM COATED ORAL at 09:54

## 2021-09-27 RX ADMIN — ACETAMINOPHEN 650 MG: 325 TABLET ORAL at 09:53

## 2021-09-27 RX ADMIN — SODIUM CHLORIDE 550 MG: 9 INJECTION, SOLUTION INTRAVENOUS at 09:58

## 2021-09-27 RX ADMIN — MONTELUKAST 10 MG: 10 TABLET, FILM COATED ORAL at 09:54

## 2021-09-27 NOTE — PROGRESS NOTES
Patient arrived from ED via stretcher. No complaints of pain. Vital signs WNL. Dressing reinforced with tape and drainage marked. Nephrostomy and ileostomy fluid drained and charted. Patient does not know a lot of his own medical history and is only alert to person and place. Was unable to complete the admission questions. Will contact wife in AM. Oriented patient to room and how to use call bell. Patient informed to call before getting out of bed in order to ensure safety. Bed is in lowest position, call bell within reach, door remains open for visualization of patient during rounds.

## 2021-09-27 NOTE — PROGRESS NOTES
Transition of Care: home with resuming his OPIC at [facility] on 9/28 and 9/29 (appts on AVS) and then home health with [facility]    Transport Plan: in car with wife    RUR: 14%    Main contact is wife- Shalom Lucero- [phone]    CM noted discharge order and [facility] order    [phone]: this CM met with patient and wife at bedside; they would like to use [facility]; this CM called Gema Cole at [facility] to inquire; she stated to send the referral via [employer] and fax it to their office; this CM sent referral via [employer] and faxed to [phone]    Transition of Care Plan:     The Plan for Transition of Care is related to the following treatment goals: home health    The Patient and/or patient representative wife was provided with a choice of provider and agrees  with the discharge plan. Yes [x] No []    A Freedom of choice list was provided with basic dialogue that supports the patient's individualized plan of care/goals and shares the quality data associated with the providers. Yes [x] No []        Observation notice provided in writing to patient and/or caregiver as well as verbal explanation of the policy. Patients who are in outpatient status also receive the Observation notice. Patient has received notice and or patient representative has received via secure email, fax, or certified mail based on patient representative's preference.      CM following  Kimo Sosa RN, CRM

## 2021-09-27 NOTE — ED PROVIDER NOTES
Patient is a 77-year-old male with complex past medical history, presents by EMS with complaints of bleeding around his left nephrostomy dressing. Patient was recently hospitalized for left hydronephrosis and left ureteral obstruction, status post removal of a left nephrostomy tube that had eroded into an artery, causing bleeding which was embolized by IR, then a new left nephrostomy tube was placed the following day on September 14. All this is per the medical record. Patient is a poor story given previous history of stroke. Wife is reportedly on the way. Patient denies any current pain. Drainage from Incision          Past Medical History:   Diagnosis Date    Back pain     Backache, unspecified 2/8/2010    Bladder cancer ([facility] [address] 7/14/2020    He is s/p TURBT on 6/25/2020. No obvious residual tumor, but tessy and induration at the base. Pathology reads invasive papillary urothelial carcinoma, high grade (bladder) and invasive high grade urothelial carcinoma pT2 (bladder base). 07- visit He has high grade muscle invasive bladder cancer. I thought it was grossly resected but pathology reveals high grade disease. He is at high risk o    Blood clots 2004    spine    Cancer [facility]) 2013    prostate    CHF (congestive heart failure) ([facility] [address] 1999    Coronary artery disease     Coronary atherosclerosis of native coronary artery 2/8/2010    DDD (degenerative disc disease)     Diabetes ([facility] [address]     Essential hypertension, benign 2/8/2010    Gastroenteritis, viral     Heart block     95%    Hematuria, unspecified 7/14/2020    He had gross hematuria and clots in his Depends. He notes not pain in the bladder or flank. CT 4/20/20 without renal concerns. Tumor found on cystsocopy on 5/29/2020.     HTN (hypertension), benign     Hypercholesterolemia     Ill-defined condition     short term memory loss since SDH    Ill-defined condition     urinary incontinence    Ill-defined condition     limited vision R eye    Ill-defined condition 1996    concussion with subdural hematoma    Ill-defined condition 1999 & early 2000s    Mycardial Infarction    Impotence 7/14/2020    He has ED. He and his partner are not active. Viagra was no longer helpful.  Lumbago 2/8/2010    Malignant neoplasm of prostate ([facility] [address] 7/14/2020    He is s/p a robotic prostatectomy 10/18/13. He had Corey's 6 disease wtih negative margins and negative nodes. His PSA has been undetectable, last drawn on 5/14/2020.  Neurogenic dysfunction of the urinary bladder 7/14/2020    He has a hypotonic bladder and is emptying with Valsalva. He has mild ISD and urge incontinence. He has some enuresis.  Neuropathy Peripheral     pain in feet    Osteoarthritis     Osteoarthrosis, unspecified whether generalized or localized, unspecified site 2/8/2010    Seizures ([facility] [address]     1996 after head injury    Skin disease     itching alot    Stroke [facility]) 1996    Unspecified hereditary and idiopathic peripheral neuropathy 2/8/2010    Urgency of urination 7/14/2020    He has occasional urgency. He wears depends for a precaution. He does have an Acticuff clamp. He had urgency and episodic incontinence prior to prostate surgery. He does not leak with cough or sneeze. He drinks diet Pepsi all day long and less often coffee. He has chronic back problems with back surgery 2004, 1/2016, 12/2017 and 2/2018. He had a spinal stimulator which was removed. He has had thi    Urgency of urination 7/14/2020    He has occasional urgency. He wears depends for a precaution. He does have an Acticuff clamp. He had urgency and episodic incontinence prior to prostate surgery. He does not leak with cough or sneeze. He drinks diet Pepsi all day long and less often coffee. He has chronic back problems with back surgery 2004, 1/2016, 12/2017 and 2/2018. He had a spinal stimulator which was removed.  He has had thi       Past Surgical History:   Procedure Laterality Date    COLONOSCOPY N/A 2018    COLONOSCOPY performed by Ofelia Priest MD at [address] HX APPENDECTOMY      age 8    [address] SURG[address]  2002    HX [address]      HX CATARACT REMOVAL      with lens implant    HX CIRCUMCISION      HX HEART CATHETERIZATION  [id number]    stents x3    HX PROSTATE SURGERY  2020    PELVIC LYMPH NODE DISSECTION     HX PROSTATE SURGERY  10/18/2013    PELVIC LYMPH NODE DISSECTION     HX PROSTATECTOMY  10/18/2013    HX TONSILLECTOMY      as young child    HX UROLOGICAL  2020    TURBT    HX UROLOGICAL  2020    TURBT    HX UROLOGICAL  2020    CYSTOSCOPY     HX UROLOGICAL  2020    ROBOTIC CYSTECTOMY    HX UROLOGICAL  2020    ILEAL CONDUIT URINARY DIVERSION     HX UROLOGICAL  2021    LOOPSCOPY     HX UROLOGICAL  2021    LOOPOGRAM    HX UROLOGICAL  2021    URETHRAL CATHERIZATION     IR EXCHANGE NEPHRO PERC LT SI  2021    IR NEPHROSTOMY PERC LT PLC CATH  SI  2021    IR NEPHROSTOMY PERC LT PLC CATH  SI  2021    IR OCCL Almeta Feller W SI  2021    IR REMOVE NEPH TUBE RT  2021    NEUROLOGICAL PROCEDURE UNLISTED  1996    evacuate SDH    NEUROLOGICAL PROCEDURE UNLISTED      implant neuro stimulator in back for neuropathy in feet. Pt no longer has this, it was removed.          Family History:   Adopted: Yes       Social History     Socioeconomic History    Marital status:      Spouse name: Not on file    Number of children: Not on file    Years of education: Not on file    Highest education level: Not on file   Occupational History    Not on file   Tobacco Use    Smoking status: Former Smoker     Years: 35.00     Quit date: 1999     Years since quittin.7    Smokeless tobacco: Never Used   Vaping Use    Vaping Use: Never used   Substance and Sexual Activity    Alcohol use: Not Currently    Drug use: Not Currently     Types: Prescription, Opiates    Sexual activity: Yes     Partners: Female   Other Topics Concern    Not on file   Social History Narrative    Not on file     Social Determinants of Health     Financial Resource Strain:     Difficulty of Paying Living Expenses:    Food Insecurity:     Worried About Running Out of Food in the Last Year:     [address] in the Last Year:    Transportation Needs:     Lack of Transportation (Medical):  Lack of Transportation (Non-Medical):    Physical Activity:     Days of Exercise per Week:     Minutes of Exercise per Session:    Stress:     Feeling of Stress :    Social Connections:     Frequency of Communication with Friends and Family:     Frequency of Social Gatherings with Friends and Family:     Attends Jewish Services:     Active Member of Clubs or Organizations:     Attends Club or Organization Meetings:     Marital Status:    Intimate Partner Violence:     Fear of Current or Ex-Partner:     Emotionally Abused:     Physically Abused:     Sexually Abused: ALLERGIES: Lisinopril    Review of Systems   Unable to perform ROS: Patient nonverbal (2/2 stroke)       Vitals:    09/26/21 2009   BP: (!) 138/59   Pulse: 74   Temp: 99.5 °F (37.5 °C)   SpO2: 98%   Weight: 69.6 kg (153 lb 7 oz)   Height: 5' 9\" (1.753 m)            Physical Exam  Vitals reviewed. Constitutional:       General: He is not in acute distress. Appearance: He is well-developed. HENT:      Head: Normocephalic and atraumatic. Eyes:      Conjunctiva/sclera: Conjunctivae normal.   Cardiovascular:      Rate and Rhythm: Normal rate and regular rhythm. Heart sounds: Normal heart sounds. Pulmonary:      Effort: Pulmonary effort is normal. No respiratory distress. Abdominal:      Palpations: Abdomen is soft. Tenderness: There is no abdominal tenderness. There is no guarding. Comments: Right ileostomy noted   Musculoskeletal:      Cervical back: Normal range of motion and neck supple. Back:       Comments: Left nephrostomy tube with clear yellow urine, the dressing is soaked with blood. No CVA tenderness. Skin:     General: Skin is warm and dry. Neurological:      Mental Status: He is alert. Mental status is at baseline. Comments: +asphasic          MDM  ED Course as of Sep 26 2159   Sun Sep 26, 2021   2120 Dressing change by myself and RN. No active bleeding noted. No tenderness palpation over the area. Scattered, nonblanching, purpuric lesions around the left buttock noted. [GL]      ED Course User Index  [GL] Luis Angel Pearson MD       Procedures    A/P: This is a 68-year-old male with complex past medical history, recent left nephrostomy exchange which is complicated by arterial erosion which required coil embolization on September 13, and a new nephrostomy tube placed on September 14, here with complaints of bleeding from the dressing. Denies any pain. Vital signs are reassuring. My main concern is possible rebleeding of the artery that was coiled versus new arterial injury. Plan to examine nephrostomy site more closely during a dressing change. Will likely need to call IR for consultation. Possible admission to the hospital.  Disposition depends on results and clinical course. 9:59 PM  I spoke with Dr. Amando Suarez, the interventional radiologist on call. Discussed the case and available results. Recommends observing the patient overnight, trending hemoglobin, may ultimately require arteriogram if symptoms continue or recur. Perfect Serve Consult for Admission  10:00 PM    ED Room Number: CJ37/03  Patient Name and age:  Nahid Mehta 66 y.o.  male  Working Diagnosis:   1. Bleeding at insertion site    2.  Anemia, unspecified type        COVID-19 Suspicion:  no  Sepsis present:  no  Reassessment needed: no  Code Status:  Full Code  Readmission: yes  Isolation Requirements:  no  Recommended Level of Care:  med/surg  Department:[facility] Adult ED - 21   Other: Consulted JESIKA

## 2021-09-27 NOTE — DISCHARGE INSTRUCTIONS
Patient Education        Anemia: Care Instructions  Your Care Instructions     Anemia is a low level of red blood cells, which carry oxygen throughout your body. Many things can cause anemia. Lack of iron is one of the most common causes. Your body needs iron to make hemoglobin, a substance in red blood cells that carries oxygen from the lungs to your body's cells. Without enough iron, the body produces fewer and smaller red blood cells. As a result, your body's cells do not get enough oxygen, and you feel tired and weak. And you may have trouble concentrating. Bleeding is the most common cause of a lack of iron. You may have heavy menstrual bleeding or bleeding caused by conditions such as ulcers, hemorrhoids, or cancer. Regular use of aspirin or other anti-inflammatory medicines (such as ibuprofen) also can cause bleeding in some people. A lack of iron in your diet also can cause anemia, especially at times when the body needs more iron, such as during pregnancy, infancy, and the teen years. Your doctor may have prescribed iron pills. It may take several months of treatment for your iron levels to return to normal. Your doctor also may suggest that you eat foods that are rich in iron, such as meat and beans. There are many other causes of anemia. It is not always due to a lack of iron. Finding the specific cause of your anemia will help your doctor find the right treatment for you. Follow-up care is a key part of your treatment and safety. Be sure to make and go to all appointments, and call your doctor if you are having problems. It's also a good idea to know your test results and keep a list of the medicines you take. How can you care for yourself at home? · Take your medicines exactly as prescribed. Call your doctor if you think you are having a problem with your medicine.   · If your doctor recommends iron pills, take them as directed:  ? Try to take the pills on an empty stomach about 1 hour before or 2 hours after meals. But you may need to take iron with food to avoid an upset stomach. ? Do not take antacids or drink milk or caffeine drinks (such as coffee, tea, or cola) at the same time or within 2 hours of the time that you take your iron. They can make it hard for your body to absorb the iron. ? Vitamin C (from food or supplements) helps your body absorb iron. Try taking iron pills with a glass of orange juice or some other food that is high in vitamin C, such as citrus fruits. ? Iron pills may cause stomach problems, such as heartburn, nausea, diarrhea, constipation, and cramps. Be sure to drink plenty of fluids, and include fruits, vegetables, and fiber in your diet each day. Iron pills often make your bowel movements dark or green. ? If you forget to take an iron pill, do not take a double dose of iron the next time you take a pill. ? Keep iron pills out of the reach of small children. An overdose of iron can be very dangerous. · Follow your doctor's advice about eating iron-rich foods. These include red meat, shellfish, poultry, eggs, beans, raisins, whole-grain bread, and leafy green vegetables. · Steam vegetables to help them keep their iron content. When should you call for help? Call 911 anytime you think you may need emergency care. For example, call if:    · You have symptoms of a heart attack. These may include:  ? Chest pain or pressure, or a strange feeling in the chest.  ? Sweating. ? Shortness of breath. ? Nausea or vomiting. ? Pain, pressure, or a strange feeling in the back, neck, jaw, or upper belly or in one or both shoulders or arms. ? Lightheadedness or sudden weakness. ? A fast or irregular heartbeat. After you call 911, the  may tell you to chew 1 adult-strength or 2 to 4 low-dose aspirin. Wait for an ambulance. Do not try to drive yourself.     · You passed out (lost consciousness).    Call your doctor now or seek immediate medical care if:    · You have new or increased shortness of breath.     · You are dizzy or lightheaded, or you feel like you may faint.     · Your fatigue and weakness continue or get worse.     · You have any abnormal bleeding, such as:  ? Nosebleeds. ? Vaginal bleeding that is different (heavier, more frequent, at a different time of the month) than what you are used to.  ? Bloody or black stools, or rectal bleeding. ? Bloody or pink urine. Watch closely for changes in your health, and be sure to contact your doctor if:    · You do not get better as expected. Where can you learn more? Go to http://www.martinez.com/  Enter R301 in the search box to learn more about \"Anemia: Care Instructions. \"  Current as of: April 29, 2021               Content Version: 13.0  © 2006-2021 [employer]. Care instructions adapted under license by [employer] (which disclaims liability or warranty for this information). If you have questions about a medical condition or this instruction, always ask your healthcare professional. [place] [address] any warranty or liability for your use of this information. Patient Education        Anemia From Heavy Bleeding: Care Instructions  Your Care Instructions     Anemia means that your body does not have enough red blood cells. Red blood cells carry oxygen around the body. When you have anemia, you may feel dizzy, tired, and weak. You may also feel your heart pounding. For some people, it's hard to focus and think clearly. One common cause of anemia is bleeding. Bleeding from ulcers, hemorrhoids, cancer, or other problems can cause anemia. It may also be caused by heavy menstrual periods. Your treatment may include iron pills. Iron helps your body make hemoglobin. Hemoglobin is the part of the red blood cell that carries oxygen. If you have severe anemia, you may need a blood transfusion to give you red blood cells as quickly as possible.   Sometimes it takes several months to get iron levels back to normal.  Follow-up care is a key part of your treatment and safety. Be sure to make and go to all appointments, and call your doctor if you are having problems. It's also a good idea to know your test results and keep a list of the medicines you take. How can you care for yourself at home? · Be safe with medicines. Take your medicines exactly as prescribed. Call your doctor if you think you are having a problem with your medicine. · Follow your doctor's advice about eating foods that have a lot of iron in them. These include red meat, shellfish, poultry, and eggs. They also include beans, raisins, whole-grain bread, and leafy green vegetables. · Steam your vegetables. This is the best way to prepare them if you want to get as much iron as possible. · Iron pills can cause constipation. If you take them, there are things you can do to avoid constipation. Drink plenty of fluids, eat foods with a lot of fiber, and exercise every day. When should you call for help? Call 911 anytime you think you may need emergency care. For example, call if:    · You passed out (lost consciousness).     · Your stools are maroon or very bloody. Call your doctor now or seek immediate medical care if:    · You are short of breath.     · You have new or worse bleeding.     · You are dizzy or light-headed, or you feel like you may faint. Watch closely for changes in your health, and be sure to contact your doctor if:    · You feel weaker or more tired than usual.     · You do not get better as expected. Where can you learn more? Go to http://www.gray.com/  Enter I435 in the search box to learn more about \"Anemia From Heavy Bleeding: Care Instructions. \"  Current as of: April 29, 2021               Content Version: 13.0  © 2514-4425 [employer].    Care instructions adapted under license by [employer] (which disclaims liability or warranty for this information). If you have questions about a medical condition or this instruction, always ask your healthcare professional. [address] any warranty or liability for your use of this information. Patient Education        Learning About Blood Transfusions  What is a blood transfusion? Blood transfusion is a medical treatment to replace the blood or parts of blood that your body has lost. The blood goes through a tube from a bag to an intravenous (IV) catheter and into your vein. You may need a blood transfusion after losing blood from an injury, a major surgery, an illness that causes bleeding, or an illness that destroys blood cells. Transfusions are also used to give you the parts of blood--such as platelets, plasma, or substances that cause clotting--that your body needs to fight an illness or stop bleeding. How is a blood transfusion done? Before you receive a blood transfusion, your blood is tested to find out what your blood type is. Blood or blood parts that are a match with your blood type are ordered by your doctor. Blood is typed as A, B, AB, or O. It is also typed as Rh-positive or Rh-negative. Your blood is also screened to look for antibodies that might react with the blood that is given to you. The blood you are getting is checked and rechecked to make sure that it's the right type for you. A sample of your blood is mixed with a sample of the blood you will receive to check for problems. Before actually giving you the transfusion, a doctor and nurses will look at the label on the package of blood and compare it to your hospital ID bracelet and medical records. The transfusion begins only when all agree that this is the correct blood and that you are the correct person to receive it. To receive the transfusion, you will have an intravenous (IV) catheter inserted into a vein.  A tube connects the catheter to the bag containing the blood, which is placed higher than your body. The blood then flows slowly into your vein. A doctor or nurse will check you several times during the transfusion to watch for a reaction or other problems. What are the possible risks? Blood transfusions have many benefits and are often life-saving. But they also have a few risks. Possible risks include:  · Your body's reaction to receiving new blood. This may include:  ? Fever. ? Breathing problems. ? Allergic reaction, such as hives, swelling, or a new rash. · An infection from the blood. This risk is small because of the strict rules placed on handling and storing blood. Getting a viral infection, such as HIV or hepatitis B or C, through blood transfusions has become very rare. The U[place] Food and Drug Administration (FDA) enforces strict guidelines on the collection, testing, storage, and use of blood. · Getting the wrong blood type by accident. Severe reactions, which can be life-threatening, are very rare. · An infection at the transfusion site, such as redness, swelling, pain, bleeding, or pus. How can you care for yourself at home? To prevent infection at the transfusion site  · Wash the area daily with warm, soapy water, and pat it dry. Don't use hydrogen peroxide or alcohol, which can slow healing. You may cover the area with a gauze bandage if it weeps or rubs against clothing. Change the bandage every day. · Keep the area clean and dry. When should you call for help? Call 911 anytime you think you may need emergency care. For example, call if:  · You have severe trouble breathing. Call your doctor now or seek immediate medical care if:  · You have signs of an allergic reaction, such as hives, swelling, or a new rash. · You have a fever. · You feel weaker or more tired than usual.  · You have a yellow tint to your skin or the whites of your eyes. · You have signs of an infection at the transfusion site, such as redness, swelling, pain, bleeding, or pus.   Watch closely for changes in your health, and be sure to contact your doctor if you have any problems. Follow-up care is a key part of your treatment and safety. Be sure to make and go to all appointments, and call your doctor if you are having problems. It's also a good idea to know your test results and keep a list of the medicines you take. Where can you learn more? Go to http://www.gray.com/  Enter V588 in the search box to learn more about \"Learning About Blood Transfusions. \"  Current as of: April 29, 2021               Content Version: 13.0  © 2410-7234 [employer]. Care instructions adapted under license by [employer] (which disclaims liability or warranty for this information). If you have questions about a medical condition or this instruction, always ask your healthcare professional. [address] any warranty or liability for your use of this information. Please bring this form with you to show your primary care provider at your follow-up appointment. Primary care provider:  Dr. Amanda Salcido NP    Discharging provider:  April Blair MD    You have been admitted to the hospital with the following diagnoses:  · Malfunction of nephrostomy tube [facility]) [J59.430L]    FOLLOW-UP CARE RECOMMENDATIONS:    APPOINTMENTS:  · Follow-up with primary care provider, Dr. Amanda Salcido NP  -  Please call [phone] shortly after discharge to set up an appointment to be seen in  1 week    · Infectious disease as needed  · Interventional radiology as needed     FOLLOW-UP TESTS recommended: cbc in 3-5 days     PENDING TEST RESULTS:  At the time of your discharge the following test results are still pending: none   Please make sure you review these results with your outpatient follow-up provider(s).     SYMPTOMS to watch for: chest pain, shortness of breath, fever, chills, nausea, vomiting, diarrhea, change in mentation, falling, weakness, bleeding. DIET/what to eat:  Resume previous diet    ACTIVITY:  Activity as tolerated    What to do if new or unexpected symptoms occur? If you experience any of the above symptoms (or should other concerns or questions arise after discharge) please call your primary care physician. Return to the emergency room if you cannot get hold of your doctor. · It is very important that you keep your follow-up appointment(s). · Please bring discharge papers, medication list (and/or medication bottles) to your follow-up appointments for review by your outpatient provider(s). · Please check the list of medications and be sure it includes every medication (even non-prescription medications) that your provider wants you to take. · It is important that you take the medication exactly as they are prescribed. · Keep your medication in the bottles provided by the pharmacist and keep a list of the medication names, dosages, and times to be taken in your wallet. · Do not take other medications without consulting your doctor. · If you have any questions about your medications or other instructions, please talk to your nurse or care provider before you leave the hospital.    I understand that if any problems occur once I am at home I am to contact my physician. These instructions were explained to me and I had the opportunity to ask questions.

## 2021-09-27 NOTE — PROGRESS NOTES
D/c teaching provided to the patient's wife, as the patient is confused and unable to comprehend/retain information. Patient's Double Lumen PICC will remain patient after discharge along with his Left Nephrostomy tube. Patient's wife understood teaching about nephrostomy tube site care along with signs to look out for.  Patient was d/c via wheelchair at

## 2021-09-27 NOTE — H&P
HISTORY AND PHYSICAL      PCP: Rolly Horner NP  History source: the patient      CC: bleeding from nephrostomy site      HPI: 66 y.o man w/ prostate and bladder CA s/p TURBT and ileostomy, L nephrostomy, recent admission here for L hydronephrosis due to bleeding that was embolized by IR. He's been bleeding through the bandage overlying the nephrostomy. He denies pain, fever, n/v, changes in nephrostomy urine output. PMH/PSH:  Past Medical History:   Diagnosis Date    Back pain     Backache, unspecified 2/8/2010    Bladder cancer ([facility] [address] [facility] 7/14/2020    He is s/p TURBT on 6/25/2020. No obvious residual tumor, but tessy and induration at the base. Pathology reads invasive papillary urothelial carcinoma, high grade (bladder) and invasive high grade urothelial carcinoma pT2 (bladder base). 07- visit He has high grade muscle invasive bladder cancer. I thought it was grossly resected but pathology reveals high grade disease. He is at high risk o    Blood clots 2004    spine    Cancer [facility]) 2013    prostate    CHF (congestive heart failure) ([facility] [address] [facility] 1999    Coronary artery disease     Coronary atherosclerosis of native coronary artery 2/8/2010    DDD (degenerative disc disease)     Diabetes ([facility] [address] [facility]     Essential hypertension, benign 2/8/2010    Gastroenteritis, viral     Heart block     95%    Hematuria, unspecified 7/14/2020    He had gross hematuria and clots in his Depends. He notes not pain in the bladder or flank. CT 4/20/20 without renal concerns. Tumor found on cystsocopy on 5/29/2020.  HTN (hypertension), benign     Hypercholesterolemia     Ill-defined condition     short term memory loss since SDH    Ill-defined condition     urinary incontinence    Ill-defined condition     limited vision R eye    Ill-defined condition 1996    concussion with subdural hematoma    Ill-defined condition 1999 & early 2000s    Mycardial Infarction    Impotence 7/14/2020    He has ED.  He and his partner are not active. Viagra was no longer helpful.  Lumbago 2/8/2010    Malignant neoplasm of prostate ([facility] [address] 7/14/2020    He is s/p a robotic prostatectomy 10/18/13. He had Orrum's 6 disease wtih negative margins and negative nodes. His PSA has been undetectable, last drawn on 5/14/2020.  Neurogenic dysfunction of the urinary bladder 7/14/2020    He has a hypotonic bladder and is emptying with Valsalva. He has mild ISD and urge incontinence. He has some enuresis.  Neuropathy Peripheral     pain in feet    Osteoarthritis     Osteoarthrosis, unspecified whether generalized or localized, unspecified site 2/8/2010    Seizures ([facility] [address]     1996 after head injury    Skin disease     itching alot    Stroke [facility]) 1996    Unspecified hereditary and idiopathic peripheral neuropathy 2/8/2010    Urgency of urination 7/14/2020    He has occasional urgency. He wears depends for a precaution. He does have an Acticuff clamp. He had urgency and episodic incontinence prior to prostate surgery. He does not leak with cough or sneeze. He drinks diet Pepsi all day long and less often coffee. He has chronic back problems with back surgery 2004, 1/2016, 12/2017 and 2/2018. He had a spinal stimulator which was removed. He has had thi    Urgency of urination 7/14/2020    He has occasional urgency. He wears depends for a precaution. He does have an Acticuff clamp. He had urgency and episodic incontinence prior to prostate surgery. He does not leak with cough or sneeze. He drinks diet Pepsi all day long and less often coffee. He has chronic back problems with back surgery 2004, 1/2016, 12/2017 and 2/2018. He had a spinal stimulator which was removed.  He has had thi     Past Surgical History:   Procedure Laterality Date    COLONOSCOPY N/A 4/12/2018    COLONOSCOPY performed by Rosa Arreguin MD at [address] HX APPENDECTOMY      age 8    [address]  2002    HX BACK SURGERY  2004    HX CATARACT REMOVAL with lens implant    HX CIRCUMCISION      HX HEART CATHETERIZATION  [id number]    stents x3    HX PROSTATE SURGERY  08/24/2020    PELVIC LYMPH NODE DISSECTION     HX PROSTATE SURGERY  10/18/2013    PELVIC LYMPH NODE DISSECTION     HX PROSTATECTOMY  10/18/2013    HX TONSILLECTOMY      as young child    HX UROLOGICAL  06/25/2020    TURBT    HX UROLOGICAL  06/25/2020    TURBT    HX UROLOGICAL  05/29/2020    CYSTOSCOPY     HX UROLOGICAL  08/24/2020    ROBOTIC CYSTECTOMY    HX UROLOGICAL  08/24/2020    ILEAL CONDUIT URINARY DIVERSION     HX UROLOGICAL  08/26/2021    LOOPSCOPY     HX UROLOGICAL  08/26/2021    LOOPOGRAM    HX UROLOGICAL  08/26/2021    URETHRAL CATHERIZATION     IR EXCHANGE NEPHRO PERC LT SI  9/21/2021    IR NEPHROSTOMY PERC LT PLC CATH  SI  9/7/2021    IR NEPHROSTOMY PERC LT PLC CATH  SI  9/14/2021    IR OCCL Albertine Rhein W SI  9/13/2021    IR REMOVE NEPH TUBE RT  9/13/2021    NEUROLOGICAL PROCEDURE UNLISTED  1996    evacuate SDH    NEUROLOGICAL PROCEDURE UNLISTED      implant neuro stimulator in back for neuropathy in feet. Pt no longer has this, it was removed. Home meds:   Prior to Admission medications    Medication Sig Start Date End Date Taking? Authorizing Provider   FLUoxetine (PROzac) 40 mg capsule Take 40 mg by mouth daily. Provider, Historical   levothyroxine (SYNTHROID) 25 mcg tablet Take 25 mcg by mouth Daily (before breakfast). Provider, Historical   calcium-cholecalciferol, d3, (CALCIUM 600 + D) 600-125 mg-unit tab Take 1 Tablet by mouth daily. Provider, Historical   sodium bicarbonate 650 mg tablet Take 650 mg by mouth two (2) times a day. Provider, Historical   budesonide-glycopyr-formoterol (Breztri Aerosphere) 160-9-4.8 mcg/actuation HFAA Take 2 Inhalation by inhalation two (2) times a day. Provider, Historical   traZODone (DESYREL) 100 mg tablet Take 100 mg by mouth nightly.     Provider, Historical   montelukast (SINGULAIR) 10 mg tablet Take 10 mg by mouth daily. Provider, Historical   aspirin delayed-release 81 mg tablet Take 81 mg by mouth daily. Provider, Historical   carvedilol (COREG) 6.25 mg tablet Take 6.25 mg by mouth daily. Provider, Historical       Allergies: Allergies   Allergen Reactions    Lisinopril Unknown (comments)       FH:  Family History   Adopted: Yes       SH:  Social History     Tobacco Use    Smoking status: Former Smoker     Years: 35.00     Quit date: 1999     Years since quittin.7    Smokeless tobacco: Never Used   Substance Use Topics    Alcohol use: Not Currently       ROS: A comprehensive review of systems was negative except for that written in the HPI.       PHYSICAL EXAM:  Visit Vitals  /61   Pulse 73   Temp 99.5 °F (37.5 °C)   Resp 24   Ht 5' 9\" (1.753 m)   Wt 69.6 kg (153 lb 7 oz)   SpO2 98%   BMI 22.66 kg/m²       Gen: NAD, non-toxic  HEENT: anicteric sclerae, normal conjunctiva  Neck: supple, trachea midline, no adenopathy  Heart: RRR, no MRG, no JVD, no peripheral edema  Lungs: CTA b/l, non-labored respirations  Abd: soft, NT, ND, BS+, L nephrostomy site bandage changed by the ED no bleeding seen however there is some swelling surrounding the area  Extr: warm  Skin: dry, no rash  Neuro: CN II-XII grossly intact, normal speech, moves all extremities  Psych: normal mood, appropriate affect      Labs/Imaging:  Recent Results (from the past 24 hour(s))   CBC WITH AUTOMATED DIFF    Collection Time: 21  8:43 PM   Result Value Ref Range    WBC 11.8 (H) 4.1 - 11.1 K/uL    RBC 2.89 (L) 4.10 - 5.70 M/uL    HGB 7.3 (L) 12.1 - 17.0 g/dL    HCT 23.2 (L) 36.6 - 50.3 %    MCV 80.3 80.0 - 99.0 FL    MCH 25.3 (L) 26.0 - 34.0 PG    MCHC 31.5 30.0 - 36.5 g/dL    RDW 15.3 (H) 11.5 - 14.5 %    PLATELET 037 (H) 604 - 400 K/uL    MPV 9.1 8.9 - 12.9 FL    NRBC 0.0 0  WBC    ABSOLUTE NRBC 0.00 0.00 - 0.01 K/uL    NEUTROPHILS 79 (H) 32 - 75 %    LYMPHOCYTES 9 (L) 12 - 49 %    MONOCYTES 8 5 - 13 % EOSINOPHILS 3 0 - 7 %    BASOPHILS 0 0 - 1 %    IMMATURE GRANULOCYTES 1 (H) 0.0 - 0.5 %    ABS. NEUTROPHILS 9.2 (H) 1.8 - 8.0 K/UL    ABS. LYMPHOCYTES 1.1 0.8 - 3.5 K/UL    ABS. MONOCYTES 0.9 0.0 - 1.0 K/UL    ABS. EOSINOPHILS 0.4 0.0 - 0.4 K/UL    ABS. BASOPHILS 0.0 0.0 - 0.1 K/UL    ABS. IMM. GRANS. 0.1 (H) 0.00 - 0.04 K/UL    DF AUTOMATED     METABOLIC PANEL, BASIC    Collection Time: 09/26/21  8:43 PM   Result Value Ref Range    Sodium 135 (L) 136 - 145 mmol/L    Potassium 3.9 3.5 - 5.1 mmol/L    Chloride 103 97 - 108 mmol/L    CO2 28 21 - 32 mmol/L    Anion gap 4 (L) 5 - 15 mmol/L    Glucose 129 (H) 65 - 100 mg/dL    BUN 25 (H) 6 - 20 MG/DL    Creatinine 1.76 (H) 0.70 - 1.30 MG/DL    BUN/Creatinine ratio 14 12 - 20      GFR est AA 46 (L) >60 ml/min/1.73m2    GFR est non-AA 38 (L) >60 ml/min/1.73m2    Calcium 8.5 8.5 - 10.1 MG/DL   SAMPLES BEING HELD    Collection Time: 09/26/21  8:43 PM   Result Value Ref Range    SAMPLES BEING HELD 1RED,1BLUE     COMMENT        Add-on orders for these samples will be processed based on acceptable specimen integrity and analyte stability, which may vary by analyte. Recent Labs     09/26/21 2043   WBC 11.8*   HGB 7.3*   HCT 23.2*   *     Recent Labs     09/26/21 2043   *   K 3.9      CO2 28   BUN 25*   CREA 1.76*   *   CA 8.5     No results for input(s): ALT, AP, TBIL, TBILI, TP, ALB, GLOB, GGT, AML, LPSE in the last 72 hours. No lab exists for component: SGOT, GPT, AMYP, HLPSE    No results for input(s): CPK, CKNDX, TROIQ in the last 72 hours. No lab exists for component: CPKMB    No results for input(s): INR, PTP, APTT, INREXT in the last 72 hours. No results for input(s): PH, PCO2, PO2 in the last 72 hours. Assessment & Plan:     Bleeding nephrostomy site: appears to have resolved in the ED. IR consulted and recommended obs overnight. Monitor H/H. Holding aspirin.     MRSA bacteremia last admission:  -continue IV daptomycin, scheduled to stop 9/29    Depression    CKD III    History of prostate and bladder CA s/p TURBT cystectomy and ileal conduit    HTN    Hyperlipidemia    DVT ppx: SCDs  Code status: full  Disposition: home when ready    Signed By: Bela Stokes MD     September 26, 2021

## 2021-09-27 NOTE — DISCHARGE SUMMARY
Discharge Summary     Patient:  Mayo Jordan       MRN: [id number]       YOB: 1950       Age: 66 y.o. Date of admission:  9/26/2021    Date of discharge:  9/27/2021    Primary care provider: Dr. Michael Ann, NP    Admitting provider:  Boy Zayas MD    Discharging provider:  Gennaro Li 91.: [phone]. If unavailable, call [phone] and ask the  to page the triage hospitalist.    Consultations  · None    Procedures  · * No surgery found *    Discharge destination: home with [facility]. The patient is stable for discharge. Admission diagnosis  · Malfunction of nephrostomy tube ([facility] [address] [T83.098A]    Current Discharge Medication List      START taking these medications    Details   DAPTOmycin (CUBICIN) IVPB 550 mg by IntraVENous route every twenty-four (24) hours. Qty: 2 Dose, Refills: 0  Start date: 9/28/2021         CONTINUE these medications which have NOT CHANGED    Details   FLUoxetine (PROzac) 40 mg capsule Take 40 mg by mouth daily. levothyroxine (SYNTHROID) 25 mcg tablet Take 25 mcg by mouth Daily (before breakfast). calcium-cholecalciferol, d3, (CALCIUM 600 + D) 600-125 mg-unit tab Take 1 Tablet by mouth daily. sodium bicarbonate 650 mg tablet Take 650 mg by mouth two (2) times a day. budesonide-glycopyr-formoterol (Breztri Aerosphere) 160-9-4.8 mcg/actuation HFAA Take 2 Inhalation by inhalation two (2) times a day. traZODone (DESYREL) 100 mg tablet Take 100 mg by mouth nightly. montelukast (SINGULAIR) 10 mg tablet Take 10 mg by mouth daily. carvedilol (COREG) 6.25 mg tablet Take 6.25 mg by mouth daily. STOP taking these medications       aspirin delayed-release 81 mg tablet Comments:   Reason for Stopping:                Follow-up Information     Follow up With Specialties Details Why Contact Info    Oziel Tipton NP Nurse Practitioner In 1 week  [address] [facility]  [phone]      Jon Marquez DO Infectious Disease  As needed [facility] [address] [facility]      Farzad Quintero MD Radiology  As needed [address] [place] [address]  [phone]            Final discharge diagnoses and brief hospital course  66 y.o man w/ prostate and bladder CA s/p TURBT and ileostomy, L nephrostomy, recent admission here for L hydronephrosis due to bleeding that was embolized by IR. He's been bleeding through the bandage overlying the nephrostomy. He denies pain, fever, n/v, changes in nephrostomy urine output. Bleeding nephrostomy site: appears to have resolved in the ED.  -  IR consulted and recommended obs overnight. - Monitor H/H.   - Holding aspirin.  - Discussed with IR Dr. Rajiv Joel - as there is no more bleeding, ok to DC     MRSA bacteremia last admission:  -continue IV daptomycin, last dose 9/29     History of prostate and bladder CA s/p TURBT cystectomy and ileal conduit     Depression: Continue home meds prozac, trazodone   HLD: statin on hold while on dapto  Hypothyroidism: Continue synthroid  CKD stage III - cr stable   Hx bladder cancer s/p TURBT 2020, cystectomy and ileal conduit. Hx Prostate ca s/p prostatectomy in 2013     Discharge recommendations:  PCP f/u in 1 week  IR and ID as needed  Cbc in 1 week    Discharge plan discussed in detail with patient's wife on phone.  She understood and agreed     Physical examination at discharge  Visit Vitals  BP (!) 161/73   Pulse 70   Temp 98.1 °F (36.7 °C)   Resp 18   Ht 5' 9\" (1.753 m)   Wt 69.6 kg (153 lb 7 oz)   SpO2 98%   BMI 22.66 kg/m²     Gen: NAD  HEENT: anicteric sclerae, normal conjunctiva  Neck: supple, trachea midline, no adenopathy  Heart: RRR, no MRG, no JVD, no peripheral edema  Lungs: CTA b/l, non-labored respirations  Abd: soft, NT, ND, BS+, L nephrostomy site bandage changed by the ED no bleeding seen however there is some swelling surrounding the area  Extr: warm  Skin: dry, no rash  Neuro:  normal speech, moves all extremities  Psych: normal mood, appropriate affect    Pertinent imaging studies:    Per EMR     Recent Labs     09/27/21 0438 09/26/21 2043   WBC 9.3 11.8*   HGB 7.1* 7.3*   HCT 22.9* 23.2*   * 570*     Recent Labs     09/27/21 0438 09/26/21 2043    135*   K 4.0 3.9    103   CO2 28 28   BUN 25* 25*   CREA 1.68* 1.76*   GLU 89 129*   CA 8.5 8.5     No results for input(s): AP, TBIL, TP, ALB, GLOB, GGT, AML, LPSE in the last 72 hours. No lab exists for component: SGOT, GPT, AMYP, HLPSE  No results for input(s): INR, PTP, APTT, INREXT in the last 72 hours. No results for input(s): FE, TIBC, PSAT, FERR in the last 72 hours. No results for input(s): PH, PCO2, PO2 in the last 72 hours. No results for input(s): CPK, CKMB in the last 72 hours. No lab exists for component: TROPONINI  No components found for: Viral Point    Chronic Diagnoses:    Problem List as of 9/27/2021 Date Reviewed: 1/26/2021        Codes Class Noted - Resolved    Malfunction of nephrostomy tube [facility]) ICD-10-CM: T52.626C  ICD-9-CM: 997.5  9/26/2021 - Present        Stage 3 chronic kidney disease ([facility] [address] ICD-10-CM: N18.30  ICD-9-CM: 585.3  8/18/2021 - Present        Hydronephrosis ICD-10-CM: N13.30  ICD-9-CM: 489  8/15/2021 - Present    Overview Signed 8/15/2021 11:31 AM by Susanne Kramer NP     CT 7/10/21: Moderate left hydronephrosis. Dilation of the ureter leading to the ureteral anastomosis with the ileal conduit. No right hydronephrosis. No stones in either side. Bladder cancer [facility]) ICD-10-CM: C67.9  ICD-9-CM: 188.9  7/14/2020 - Present    Overview Addendum 9/9/2020  1:29 PM by Susanne Kramer NP     He is s/p TURBT on 6/25/2020. No obvious residual tumor, but tessy and induration at the base.   Pathology reads invasive papillary urothelial carcinoma, high grade (bladder) and invasive high grade urothelial carcinoma pT2 (bladder base). He is s/p cystectomy with ileal conduit urinary diversion on 8/24/2020. He had an uncomplicated post-operative course and was discharged on 8/31/2020 with [address] [place] [address]. 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 PT2bN0. Malignant neoplasm of prostate [facility]) ICD-10-CM: C61  ICD-9-CM: 185  7/14/2020 - Present    Overview Addendum 8/15/2021 11:32 AM by Toñito Bruner NP     He is s/p a robotic prostatectomy 10/18/13. He had Corey's 6 disease wtih negative margins and negative nodes. His PSA has been undetectable, last drawn on 5/20/2021. Spondylolisthesis at L2-L3 level ICD-10-CM: M43.16  ICD-9-CM: 756.12  1/25/2016 - Present        Lumbar stenosis ICD-10-CM: M48.061  ICD-9-CM: 724.02  1/25/2016 - Present        Lumbago ICD-10-CM: M54.5  ICD-9-CM: 724.2  2/8/2010 - Present        Hereditary and idiopathic peripheral neuropathy ICD-10-CM: G60.9  ICD-9-CM: 356.9  2/8/2010 - Present        Osteoarthrosis, unspecified whether generalized or localized, unspecified site ICD-10-CM: M19.90  ICD-9-CM: 715.90  2/8/2010 - Present        Backache, unspecified ICD-10-CM: M54.9  ICD-9-CM: 724.5  2/8/2010 - Present        Congestive heart failure, unspecified ICD-10-CM: I50.9  ICD-9-CM: 428.0  2/8/2010 - Present        Essential hypertension, benign ICD-10-CM: I10  ICD-9-CM: 401.1  2/8/2010 - Present        Coronary atherosclerosis of native coronary artery ICD-10-CM: I25.10  ICD-9-CM: 414.01  2/8/2010 - Present        RESOLVED: Arterial bleed, intraoperative ICD-10-CM: I97.418  ICD-9-CM: 998.11  9/13/2021 - 9/22/2021        RESOLVED: Hematuria, unspecified ICD-10-CM: R31.9  ICD-9-CM: 599.70  7/14/2020 - 9/9/2020    Overview Signed 7/14/2020  5:59 PM by Amor Angel, [address] [place] [address]     He had gross hematuria and clots in his Depends. He notes not pain in the bladder or flank.  CT 4/20/20 without renal concerns. Tumor found on cystsocopy on 5/29/2020. RESOLVED: Impotence ICD-10-CM: N52.9  ICD-9-CM: 607.84  7/14/2020 - 9/9/2020    Overview Signed 7/14/2020  5:59 PM by Cinthia Martinez, [address]     He has ED. He and his partner are not active. Viagra was no longer helpful. RESOLVED: Neurogenic dysfunction of the urinary bladder ICD-10-CM: N31.9  ICD-9-CM: 596.54  7/14/2020 - 9/15/2020    Overview Addendum 9/9/2020  1:30 PM by Flip Arvizu NP     He has a hypotonic bladder and is emptying with Valsalva. He has mild ISD and urge incontinence. He has some enuresis. He is now s/p cystectomy with urinary diversion on 8/24/2020. RESOLVED: Urgency of urination ICD-10-CM: R39.15  ICD-9-CM: 788.63  7/14/2020 - 9/9/2020    Overview Signed 7/14/2020  6:01 PM by Cinthia Martinez, [address]     He has occasional urgency. He wears depends for a precaution. He does have an Acticuff clamp. He had urgency and episodic incontinence prior to prostate surgery. He does not leak with cough or sneeze. He drinks diet Pepsi all day long and less often coffee. He has chronic back problems with back surgery 2004, 1/2016, 12/2017 and 2/2018. He had a spinal stimulator which was removed. He has had thigh pain and burning in his legs. He has been on chronic pain medication. He can consider bladder relaxants after cystoscopy. Time spent on discharge related activities today greater than 30 minutes.       Signed:  Shiela Huizar MD                 Hospitalist, Internal Medicine      Cc: Bonita Gibbons NP

## 2021-09-27 NOTE — ED NOTES
MD at bedside to assess surgical site. Dressing change performed. Sterile sponge and mepilex dressing utilized.

## 2021-09-27 NOTE — ED TRIAGE NOTES
Pt arrives via EMS from home with c/o bleeding around nephrostomy site. Pt denies SOB, CP, or pain. Pt A&O x 3; disoriented to time. Pt arrives with PICC line in RIGHT AC. Ileostomy RLQ; draining. Nephrostomy LEFT side; draining.

## 2021-09-27 NOTE — ROUTINE PROCESS
TRANSFER - OUT REPORT:    Verbal report given to 5E RN(name) on Jed Jacob  being transferred to 515(unit) for routine progression of care       Report consisted of patients Situation, Background, Assessment and   Recommendations(SBAR). Information from the following report(s) SBAR, ED Summary, Intake/Output, MAR and Recent Results was reviewed with the receiving nurse. Lines:   PICC Double Lumen 45/20/84 Right;Basilic (Active)   Central Line Being Utilized Yes 09/26/21 0948   Criteria for Appropriate Use Long term IV/antibiotic administration 09/26/21 0948   Dressing Type Disk with Chlorhexadine gluconate (CHG) 09/26/21 0948   Hub Color/Line Status Purple 09/26/21 0948   Positive Blood Return (Site #1) Yes 09/26/21 0948   Hub Color/Line Status Red 09/26/21 0948   Positive Blood Return (Site #2) Yes 09/26/21 0948   Alcohol Cap Used Yes 09/26/21 0948        Opportunity for questions and clarification was provided.       Patient transported with:   Fungos

## 2021-09-28 ENCOUNTER — VIRTUAL VISIT (OUTPATIENT)
Dept: UROLOGY | Age: 71
End: 2021-09-28
Payer: MEDICARE

## 2021-09-28 ENCOUNTER — HOSPITAL ENCOUNTER (OUTPATIENT)
Dept: INFUSION THERAPY | Age: 71
Discharge: HOME | End: 2021-09-28
Payer: MEDICARE

## 2021-09-28 VITALS
HEART RATE: 71 BPM | RESPIRATION RATE: 18 BRPM | SYSTOLIC BLOOD PRESSURE: 136 MMHG | OXYGEN SATURATION: 97 % | DIASTOLIC BLOOD PRESSURE: 61 MMHG | TEMPERATURE: 99.2 F

## 2021-09-28 DIAGNOSIS — N13.30 HYDRONEPHROSIS, UNSPECIFIED HYDRONEPHROSIS TYPE: ICD-10-CM

## 2021-09-28 DIAGNOSIS — D63.1 ANEMIA DUE TO STAGE 3A CHRONIC KIDNEY DISEASE (HCC): ICD-10-CM

## 2021-09-28 DIAGNOSIS — T83.098A MALFUNCTION OF NEPHROSTOMY TUBE (HCC): ICD-10-CM

## 2021-09-28 DIAGNOSIS — N18.31 ANEMIA DUE TO STAGE 3A CHRONIC KIDNEY DISEASE (HCC): ICD-10-CM

## 2021-09-28 DIAGNOSIS — N28.9 NONFUNCTIONING KIDNEY: Primary | ICD-10-CM

## 2021-09-28 DIAGNOSIS — N18.30 STAGE 3 CHRONIC KIDNEY DISEASE, UNSPECIFIED WHETHER STAGE 3A OR 3B CKD (HCC): ICD-10-CM

## 2021-09-28 DIAGNOSIS — C67.9 MALIGNANT NEOPLASM OF URINARY BLADDER, UNSPECIFIED SITE (HCC): ICD-10-CM

## 2021-09-28 PROCEDURE — 74011250636 HC RX REV CODE- 250/636: Performed by: INTERNAL MEDICINE

## 2021-09-28 PROCEDURE — G8427 DOCREV CUR MEDS BY ELIG CLIN: HCPCS | Performed by: UROLOGY

## 2021-09-28 PROCEDURE — 74011000250 HC RX REV CODE- 250: Performed by: INTERNAL MEDICINE

## 2021-09-28 PROCEDURE — 96374 THER/PROPH/DIAG INJ IV PUSH: CPT

## 2021-09-28 PROCEDURE — G8756 NO BP MEASURE DOC: HCPCS | Performed by: UROLOGY

## 2021-09-28 PROCEDURE — G8420 CALC BMI NORM PARAMETERS: HCPCS | Performed by: UROLOGY

## 2021-09-28 PROCEDURE — G8536 NO DOC ELDER MAL SCRN: HCPCS | Performed by: UROLOGY

## 2021-09-28 PROCEDURE — G8432 DEP SCR NOT DOC, RNG: HCPCS | Performed by: UROLOGY

## 2021-09-28 PROCEDURE — 99215 OFFICE O/P EST HI 40 MIN: CPT | Performed by: UROLOGY

## 2021-09-28 RX ORDER — SODIUM CHLORIDE 9 MG/ML
25 INJECTION, SOLUTION INTRAVENOUS ONCE
Status: DISCONTINUED | OUTPATIENT
Start: 2021-09-28 | End: 2021-09-29 | Stop reason: HOSPADM

## 2021-09-28 RX ORDER — HEPARIN 100 UNIT/ML
500 SYRINGE INTRAVENOUS AS NEEDED
Status: DISCONTINUED | OUTPATIENT
Start: 2021-09-28 | End: 2021-09-29 | Stop reason: HOSPADM

## 2021-09-28 RX ORDER — ASPIRIN 81 MG/1
81 TABLET ORAL DAILY
COMMUNITY
End: 2021-12-09

## 2021-09-28 RX ORDER — SODIUM CHLORIDE 0.9 % (FLUSH) 0.9 %
10 SYRINGE (ML) INJECTION AS NEEDED
Status: DISCONTINUED | OUTPATIENT
Start: 2021-09-28 | End: 2021-09-29 | Stop reason: HOSPADM

## 2021-09-28 RX ADMIN — Medication 500 UNITS: at 15:07

## 2021-09-28 RX ADMIN — SODIUM CHLORIDE 550 MG: 9 INJECTION INTRAMUSCULAR; INTRAVENOUS; SUBCUTANEOUS at 15:03

## 2021-09-28 RX ADMIN — Medication 10 ML: at 15:06

## 2021-09-28 RX ADMIN — Medication 500 UNITS: at 15:06

## 2021-09-28 RX ADMIN — Medication 10 ML: at 15:00

## 2021-09-28 RX ADMIN — Medication 10 ML: at 15:05

## 2021-09-28 NOTE — ASSESSMENT & PLAN NOTE
He has a nonfunctioning, infected kidney on IV antibiotics. His renal function is not improved significantly with nephrostomy tube placement. He makes minimal urine output from his kidney. A chronically infected kidney poses significant risk to him. Removal of his nephrostomy tube may lead to undrained infection. This poses a risk to his health. We discussed management with nephroureterectomy, laparoscopically or open. The patient was counseled on the risks, benefits and expected course of surgery. Surgery has risks of bleeding, infection, injury, pain, death or other consequences. Perioperative medications and antibiotic use were discussed including the potential for reactions and side effects. Some specific risks of surgery were discussed as well. He has multiple comorbidities which increase the risk of his surgery. They wish to proceed. We will plan admission day before to address his metabolic issues and anemia.

## 2021-09-28 NOTE — ASSESSMENT & PLAN NOTE
He is status post cystectomy on 8/24/2020. He is doing ok from a cancer standpoint. No obvious recurrence.

## 2021-09-28 NOTE — ASSESSMENT & PLAN NOTE
He has left ureteral conduit stricturing. It appears completely occluded. He underwent a left nephrostomy tube 8/7/5863 complicated by hematuria and clot obstruction. 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 He underwent embolization in 13 2021 and tube change 9/14/2021. His postprocedural course was complicated by MRSA bacteremia. He is finishing a course of daptomycin.

## 2021-09-28 NOTE — ASSESSMENT & PLAN NOTE
He has chronic kidney disease. He has had mild improvement in his creatinine with nephrostomy tube placement. It seems he is making minimal amounts of urine from the nephrostomy tube, less than 200 cc a day. He essentially has a nonfunctioning left kidney, infected and colonized.

## 2021-09-28 NOTE — PROGRESS NOTES
HISTORY OF PRESENT ILLNESS  Angeles Jordan is a 66 y.o. male. Chief Complaint   Patient presents with    Follow-up    Hydronephrosis    Bladder Cancer   Angeles Jordan, who was evaluated through a synchronous (real-time) audio only encounter, and/or his healthcare decision maker, is aware that it is a billable service, with coverage as determined by his insurance carrier. He provided verbal consent to proceed: Yes, and patient identification was verified. It was conducted pursuant to the emergency declaration under the [facility], [address] [place] [address] and the [employer] Act. I was at the office. The patient was at home. His wife was on the line. HPI  Chronic Conditions Addressed Today     1. Bladder cancer [facility])     Overview      He is s/p TURBT on 6/25/2020. No obvious residual tumor, but tessy and induration at the base. Pathology reads invasive papillary urothelial carcinoma, high grade (bladder) and invasive high grade urothelial carcinoma pT2 (bladder base). He is s/p cystectomy with ileal conduit urinary diversion on 8/24/2020. He had an uncomplicated post-operative course and was discharged on 8/31/2020 with [address] [place] [address]. 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 PT2bN0. CT scan on 7/10/21 revealed no metastatic disease. Current Assessment & Plan      He is status post cystectomy on 8/24/2020. He is doing ok from a cancer standpoint. No obvious recurrence. 2. Hydronephrosis     Overview      CT 7/10/21: Moderate left hydronephrosis. Dilation of the ureter leading to the ureteral anastomosis with the ileal conduit. No right hydronephrosis. No stones in either side. Current Assessment & Plan       He has left ureteral conduit stricturing. It appears completely occluded.   He underwent a left nephrostomy tube 2/8/9086 complicated by hematuria and clot obstruction. 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 He underwent embolization in 13 2021 and tube change 9/14/2021. His postprocedural course was complicated by MRSA bacteremia. He is finishing a course of daptomycin. 3. Stage 3 chronic kidney disease ([address]     Overview      He has left-sided hydronephrosis with stricturing. Prior to cystectomy on 5/20/2021 his creatinine was 1.88.  9/13/2021 creatinine was 1.89. It has been as low as 1.52 on 9/22/2021.  9/27/2021 creatinine was 1.68. Current Assessment & Plan       He has chronic kidney disease. He has had mild improvement in his creatinine with nephrostomy tube placement. It seems he is making minimal amounts of urine from the nephrostomy tube, less than 200 cc a day. He essentially has a nonfunctioning left kidney, infected and colonized. 4. Malfunction of nephrostomy tube ([address]    5. Nonfunctioning kidney - Primary     Current Assessment & Plan       He has a nonfunctioning, infected kidney on IV antibiotics. His renal function is not improved significantly with nephrostomy tube placement. He makes minimal urine output from his kidney. We discussed management with nephroureterectomy, laparoscopically or open. The patient was counseled on the risks, benefits and expected course of surgery. Surgery has risks of bleeding, infection, injury, pain, death or other consequences. Perioperative medications and antibiotic use were discussed including the potential for reactions and side effects. Some specific risks of surgery were discussed as well. They wish to proceed. We will plan admission day before to address his metabolic issues and anemia. 6. Anemia due to stage 3a chronic kidney disease ([address]     Current Assessment & Plan       He is not currently on iron. Plan on preadmission and transfusion as needed prior to surgery.            He has prostate cancer and bladder cancer status post cystectomy. He underwent attempted ureteral stenting on 8/26/2021. He was unable to be stented. He underwent a left nephrostomy tube on 9/7/2021. They were unable to place a ureteral stent due to stricturing/ occclusion. The wife notes he had bloody drainage afterward. There are notes from 9/13/2021 where the patient was febrile and had evidence of arterial bleeding into the collecting system. He underwent embolization. The notes indirectly state he had a nephrostomy tube change the following day 9/14. He was in the ICU with positive blood cultures. MRSA bacteremia. He had some blood on the nephrostomy bandage the last few days, but it may have been a blood blister on the skin. He has a left nephrostomy tube and was discharged yesterday   He has a PICC line and finishing daptomycin tomorrow. His nephrostomy is not putting out much per the wife. Yesterday night it was emptied of clear urine around 7PM, and now at 850AM there is about 75cc.       Past Medical History:    PMHx (including negatives):  has a past medical history of Back pain, Backache, unspecified (2/8/2010), Bladder cancer ([address] (7/14/2020), Blood clots (2004), Cancer ([address] (2013), CHF (congestive heart failure) ([address] (1999), Coronary artery disease, Coronary atherosclerosis of native coronary artery (2/8/2010), DDD (degenerative disc disease), Diabetes ([address] Essential hypertension, benign (2/8/2010), Gastroenteritis, viral, Heart block, Hematuria, unspecified (7/14/2020), HTN (hypertension), benign, Hypercholesterolemia, Ill-defined condition, Ill-defined condition, Ill-defined condition, Ill-defined condition (1996), Ill-defined condition (1999 & early 2000s), Impotence (7/14/2020), Lumbago (2/8/2010), Malignant neoplasm of prostate ([address] (7/14/2020), Neurogenic dysfunction of the urinary bladder (7/14/2020), Neuropathy Peripheral, Osteoarthritis, Osteoarthrosis, unspecified whether generalized or localized, unspecified site (2/8/2010), Seizures ([facility] Skin disease, Stroke ([facility] (1996), Unspecified hereditary and idiopathic peripheral neuropathy (2/8/2010), Urgency of urination (7/14/2020), and Urgency of urination (7/14/2020). PSurgHx:  has a past surgical history that includes hx heart catheterization (1528,0454); hx appendectomy; hx cataract removal; hx tonsillectomy; colonoscopy (N/A, 4/12/2018); neurological procedure unlisted (1996); hx back surgery (2002); hx back surgery (2004); neurological procedure unlisted; hx circumcision; hx prostatectomy (10/18/2013); hx prostate surgery (08/24/2020); hx prostate surgery (10/18/2013); hx urological (06/25/2020); hx urological (06/25/2020); hx urological (05/29/2020); hx urological (08/24/2020); hx urological (08/24/2020); hx urological (08/26/2021); hx urological (08/26/2021); hx urological (08/26/2021); ir nephrostomy perc lt plc cath  si (9/7/2021); ir remove neph tube rt (9/13/2021); ir occl txcath hemmorage w si (9/13/2021); ir nephrostomy perc lt plc cath  si (9/14/2021); and ir exchange nephro perc lt si (9/21/2021). PSocHx:  reports that he quit smoking about 22 years ago. He quit after 35.00 years of use. He has never used smokeless tobacco. He reports previous alcohol use. He reports previous drug use. Drugs: Prescription and Opiates. Review of Systems   All other systems reviewed and are negative. Physical Exam  Allergies   Allergen Reactions    Lisinopril Unknown (comments)      Prior to Admission medications    Medication Sig Start Date End Date Taking? Authorizing Provider   aspirin delayed-release 81 mg tablet Take  by mouth daily. Yes Provider, Historical   DAPTOmycin (CUBICIN) IVPB 550 mg by IntraVENous route every twenty-four (24) hours. 9/28/21  Yes Kenan Calderón MD   FLUoxetine (PROzac) 40 mg capsule Take 40 mg by mouth daily.    Yes Provider, Historical   levothyroxine (SYNTHROID) 25 mcg tablet Take 25 mcg by mouth Daily (before breakfast). Yes Provider, Historical   calcium-cholecalciferol, d3, (CALCIUM 600 + D) 600-125 mg-unit tab Take 1 Tablet by mouth daily. Yes Provider, Historical   sodium bicarbonate 650 mg tablet Take 650 mg by mouth two (2) times a day. Yes Provider, Historical   budesonide-glycopyr-formoterol (Breztri Aerosphere) 160-9-4.8 mcg/actuation HFAA Take 2 Inhalation by inhalation two (2) times a day. Yes Provider, Historical   traZODone (DESYREL) 100 mg tablet Take 100 mg by mouth nightly. Yes Provider, Historical   montelukast (SINGULAIR) 10 mg tablet Take 10 mg by mouth daily. Yes Provider, Historical   carvedilol (COREG) 6.25 mg tablet Take 6.25 mg by mouth daily. Yes Provider, Historical        ASSESSMENT and PLAN  Diagnoses and all orders for this visit:    1. Nonfunctioning kidney  Assessment & Plan:   He has a nonfunctioning, infected kidney on IV antibiotics. His renal function is not improved significantly with nephrostomy tube placement. He makes minimal urine output from his kidney. We discussed management with nephroureterectomy, laparoscopically or open. The patient was counseled on the risks, benefits and expected course of surgery. Surgery has risks of bleeding, infection, injury, pain, death or other consequences. Perioperative medications and antibiotic use were discussed including the potential for reactions and side effects. Some specific risks of surgery were discussed as well. They wish to proceed. We will plan admission day before to address his metabolic issues and anemia. 2. Hydronephrosis, unspecified hydronephrosis type  Assessment & Plan:   He has left ureteral conduit stricturing. It appears completely occluded. He underwent a left nephrostomy tube 9/7/3484 complicated by hematuria and clot obstruction. 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 He underwent embolization in 13 2021 and tube change 9/14/2021.   His postprocedural course was complicated by MRSA bacteremia. He is finishing a course of daptomycin. 3. Malfunction of nephrostomy tube ([facility]    4. Stage 3 chronic kidney disease, unspecified whether stage 3a or 3b CKD ([facility]  Assessment & Plan:   He has chronic kidney disease. He has had mild improvement in his creatinine with nephrostomy tube placement. It seems he is making minimal amounts of urine from the nephrostomy tube, less than 200 cc a day. He essentially has a nonfunctioning left kidney, infected and colonized. 5. Malignant neoplasm of urinary bladder, unspecified site [facility])  Assessment & Plan:  He is status post cystectomy on 8/24/2020. He is doing ok from a cancer standpoint. No obvious recurrence. 6. Anemia due to stage 3a chronic kidney disease ([facility]  Assessment & Plan:   He is not currently on iron. Plan on preadmission and transfusion as needed prior to surgery.            Ori Olsen MD

## 2021-09-28 NOTE — PROGRESS NOTES
[address] [facility] @ [facility] VISIT NOTE     7470 Patient arrives for Daily IV Daptomycin without acute problems. Please see [facility] for complete assessment and education provided. Vital signs stable throughout and prior to discharge, Pt. Tolerated treatment well and discharged without incident. Patient/spouse is aware of next [facility] appointment on 09/29/2021. Appointment card given to patient/spouse. The patient/parent denies any symptoms of COVID (SOB, coughing, fever, or generally not feeling well), denies any known exposure to COVID-19 recently, and denies any known recent contact with family/friend that has a pending COVID test.      Medications Verified by Poornima Cherry RN via [employer]:  1. Daptomycin 550 mg IVP  2. NS IV Flush via [facility] PICC  3.  Heparin 500 units IV Flush via [address] [place] [address],[address]  Patient Vitals for the past 12 hrs:   Temp Pulse Resp BP SpO2   09/28/21 1455 99.2 °F (37.3 °C) 71 18 136/61 97 %

## 2021-09-29 ENCOUNTER — HOSPITAL ENCOUNTER (OUTPATIENT)
Dept: INFUSION THERAPY | Age: 71
Discharge: HOME | End: 2021-09-29
Payer: MEDICARE

## 2021-09-29 VITALS
RESPIRATION RATE: 18 BRPM | HEART RATE: 72 BPM | SYSTOLIC BLOOD PRESSURE: 134 MMHG | OXYGEN SATURATION: 98 % | TEMPERATURE: 98.6 F | DIASTOLIC BLOOD PRESSURE: 63 MMHG

## 2021-09-29 VITALS
OXYGEN SATURATION: 98 % | TEMPERATURE: 98.6 F | RESPIRATION RATE: 18 BRPM | SYSTOLIC BLOOD PRESSURE: 134 MMHG | HEART RATE: 72 BPM | DIASTOLIC BLOOD PRESSURE: 63 MMHG

## 2021-09-29 LAB
ALBUMIN SERPL-MCNC: 2.2 G/DL (ref 3.5–5)
ALBUMIN/GLOB SERPL: 0.5 {RATIO} (ref 1.1–2.2)
ALP SERPL-CCNC: 94 U/L (ref 45–117)
ALT SERPL-CCNC: 11 U/L (ref 12–78)
ANION GAP SERPL CALC-SCNC: 7 MMOL/L (ref 5–15)
AST SERPL-CCNC: 12 U/L (ref 15–37)
BASOPHILS # BLD: 0 K/UL (ref 0–0.1)
BASOPHILS NFR BLD: 0 % (ref 0–1)
BILIRUB DIRECT SERPL-MCNC: 0.1 MG/DL (ref 0–0.2)
BILIRUB SERPL-MCNC: 0.2 MG/DL (ref 0.2–1)
BUN SERPL-MCNC: 26 MG/DL (ref 6–20)
BUN/CREAT SERPL: 13 (ref 12–20)
CALCIUM SERPL-MCNC: 8.7 MG/DL (ref 8.5–10.1)
CHLORIDE SERPL-SCNC: 104 MMOL/L (ref 97–108)
CK SERPL-CCNC: 23 U/L (ref 39–308)
CO2 SERPL-SCNC: 27 MMOL/L (ref 21–32)
CREAT SERPL-MCNC: 1.95 MG/DL (ref 0.7–1.3)
DIFFERENTIAL METHOD BLD: ABNORMAL
EOSINOPHIL # BLD: 0.4 K/UL (ref 0–0.4)
EOSINOPHIL NFR BLD: 4 % (ref 0–7)
ERYTHROCYTE [DISTWIDTH] IN BLOOD BY AUTOMATED COUNT: 15.8 % (ref 11.5–14.5)
GLOBULIN SER CALC-MCNC: 4.7 G/DL (ref 2–4)
GLUCOSE SERPL-MCNC: 110 MG/DL (ref 65–100)
HCT VFR BLD AUTO: 24.6 % (ref 36.6–50.3)
HGB BLD-MCNC: 7.5 G/DL (ref 12.1–17)
IMM GRANULOCYTES # BLD AUTO: 0.1 K/UL (ref 0–0.04)
IMM GRANULOCYTES NFR BLD AUTO: 1 % (ref 0–0.5)
LYMPHOCYTES # BLD: 1 K/UL (ref 0.8–3.5)
LYMPHOCYTES NFR BLD: 11 % (ref 12–49)
MCH RBC QN AUTO: 24.8 PG (ref 26–34)
MCHC RBC AUTO-ENTMCNC: 30.5 G/DL (ref 30–36.5)
MCV RBC AUTO: 81.5 FL (ref 80–99)
MONOCYTES # BLD: 0.7 K/UL (ref 0–1)
MONOCYTES NFR BLD: 7 % (ref 5–13)
NEUTS SEG # BLD: 7.4 K/UL (ref 1.8–8)
NEUTS SEG NFR BLD: 77 % (ref 32–75)
NRBC # BLD: 0 K/UL (ref 0–0.01)
NRBC BLD-RTO: 0 PER 100 WBC
PLATELET # BLD AUTO: 251 K/UL (ref 150–400)
PMV BLD AUTO: 11.7 FL (ref 8.9–12.9)
POTASSIUM SERPL-SCNC: 4.3 MMOL/L (ref 3.5–5.1)
PROT SERPL-MCNC: 6.9 G/DL (ref 6.4–8.2)
RBC # BLD AUTO: 3.02 M/UL (ref 4.1–5.7)
SODIUM SERPL-SCNC: 138 MMOL/L (ref 136–145)
WBC # BLD AUTO: 9.6 K/UL (ref 4.1–11.1)

## 2021-09-29 PROCEDURE — 74011250636 HC RX REV CODE- 250/636: Performed by: INTERNAL MEDICINE

## 2021-09-29 PROCEDURE — 96372 THER/PROPH/DIAG INJ SC/IM: CPT

## 2021-09-29 PROCEDURE — 82550 ASSAY OF CK (CPK): CPT

## 2021-09-29 PROCEDURE — 80076 HEPATIC FUNCTION PANEL: CPT

## 2021-09-29 PROCEDURE — 80048 BASIC METABOLIC PNL TOTAL CA: CPT

## 2021-09-29 PROCEDURE — 96374 THER/PROPH/DIAG INJ IV PUSH: CPT

## 2021-09-29 PROCEDURE — 74011000258 HC RX REV CODE- 258: Performed by: INTERNAL MEDICINE

## 2021-09-29 PROCEDURE — 85025 COMPLETE CBC W/AUTO DIFF WBC: CPT

## 2021-09-29 PROCEDURE — 36415 COLL VENOUS BLD VENIPUNCTURE: CPT

## 2021-09-29 RX ORDER — CYANOCOBALAMIN 1000 UG/ML
1000 INJECTION, SOLUTION INTRAMUSCULAR; SUBCUTANEOUS ONCE
Status: COMPLETED | OUTPATIENT
Start: 2021-09-29 | End: 2021-09-29

## 2021-09-29 RX ORDER — SODIUM CHLORIDE 0.9 % (FLUSH) 0.9 %
10 SYRINGE (ML) INJECTION AS NEEDED
Status: DISCONTINUED | OUTPATIENT
Start: 2021-09-29 | End: 2021-09-30 | Stop reason: HOSPADM

## 2021-09-29 RX ADMIN — Medication 10 ML: at 15:10

## 2021-09-29 RX ADMIN — DAPTOMYCIN 550 MG: 500 INJECTION, POWDER, LYOPHILIZED, FOR SOLUTION INTRAVENOUS at 15:12

## 2021-09-29 RX ADMIN — CYANOCOBALAMIN 1000 MCG: 1000 INJECTION, SOLUTION INTRAMUSCULAR at 15:25

## 2021-09-29 RX ADMIN — Medication 10 ML: at 15:14

## 2021-09-29 NOTE — PROGRESS NOTES
[address] [facility] [address] @ [facility] VISIT NOTE     1450 Patient arrives for Vitamin B12 IM Injection without acute problems. Please see connect care for complete assessment and education provided. Vital signs stable throughout and prior to discharge, Pt. Tolerated treatment well and discharged without incident. Patient/spouse is aware of no further [facility] appointments @ this time & to follow up with caregiver for next appointment. The patient/parent denies any symptoms of COVID (SOB, coughing, fever, or generally not feeling well), denies any known exposure to COVID-19 recently, and denies any known recent contact with family/friend that has a pending COVID test.      Medications Verified by Joseline Rios RN via [employer]:  1.  Vitamin B12 1,000 mcg IM via Left Deltoid     VITAL SIGNS  Patient Vitals for the past 12 hrs:   Temp Pulse Resp BP SpO2   09/29/21 1550 98.6 °F (37 °C) 72 18 134/63    09/29/21 1450 98.5 °F (36.9 °C) 76 18 127/61 98 %

## 2021-09-29 NOTE — PROGRESS NOTES
[address] @ [facility] VISIT NOTE     7367 Patient arrives for Daily IV Daptomycin without acute problems. Please see connect care for complete assessment and education provided. Vital signs stable throughout and prior to discharge, Pt. Tolerated treatment well and discharged without incident. Patient/spouse is aware of no further [facility] appointments @ this time & to follow up with healthcare provider for next appointment. [address] line removed per [employer]. Patient lay flat x 30 minutes post removal with instructions to keep dressing intact x 48 hours with no adverse reactions & verbal understanding/agreeement noted by patient/spouse. The patient/parent denies any symptoms of COVID (SOB, coughing, fever, or generally not feeling well), denies any known exposure to COVID-19 recently, and denies any known recent contact with family/friend that has a pending COVID test.      Medications Verified by Dia Pascual RN via [employer]:  1. Daptomycin 550 mg IVP  2. NS IV Flush via [address]  Patient Vitals for the past 12 hrs:   Temp Pulse Resp BP SpO2   09/29/21 1550 98.6 °F (37 °C) 72 18 134/63    09/29/21 1450 98.5 °F (36.9 °C) 76 18 127/80 98 %     LAB WORK   Recent Results (from the past 12 hour(s))   CBC WITH AUTOMATED DIFF    Collection Time: 09/29/21  3:09 PM   Result Value Ref Range    WBC 9.6 4.1 - 11.1 K/uL    RBC 3.02 (L) 4.10 - 5.70 M/uL    HGB 7.5 (L) 12.1 - 17.0 g/dL    HCT 24.6 (L) 36.6 - 50.3 %    MCV 81.5 80.0 - 99.0 FL    MCH 24.8 (L) 26.0 - 34.0 PG    MCHC 30.5 30.0 - 36.5 g/dL    RDW 15.8 (H) 11.5 - 14.5 %    PLATELET 836 722 - 212 K/uL    MPV 11.7 8.9 - 12.9 FL    NRBC 0.0 0  WBC    ABSOLUTE NRBC 0.00 0.00 - 0.01 K/uL    NEUTROPHILS 77 (H) 32 - 75 %    LYMPHOCYTES 11 (L) 12 - 49 %    MONOCYTES 7 5 - 13 %    EOSINOPHILS 4 0 - 7 %    BASOPHILS 0 0 - 1 %    IMMATURE GRANULOCYTES 1 (H) 0.0 - 0.5 %    ABS.  NEUTROPHILS 7.4 1.8 - 8.0 K/UL    ABS. LYMPHOCYTES 1.0 0.8 - 3.5 K/UL    ABS. MONOCYTES 0.7 0.0 - 1.0 K/UL    ABS. EOSINOPHILS 0.4 0.0 - 0.4 K/UL    ABS. BASOPHILS 0.0 0.0 - 0.1 K/UL    ABS. IMM. GRANS. 0.1 (H) 0.00 - 0.04 K/UL    DF AUTOMATED     CK    Collection Time: 09/29/21  3:09 PM   Result Value Ref Range    CK 23 (L) 39 - 308 U/L   HEPATIC FUNCTION PANEL    Collection Time: 09/29/21  3:09 PM   Result Value Ref Range    Protein, total 6.9 6.4 - 8.2 g/dL    Albumin 2.2 (L) 3.5 - 5.0 g/dL    Globulin 4.7 (H) 2.0 - 4.0 g/dL    A-G Ratio 0.5 (L) 1.1 - 2.2      Bilirubin, total 0.2 0.2 - 1.0 MG/DL    Bilirubin, direct 0.1 0.0 - 0.2 MG/DL    Alk.  phosphatase 94 45 - 117 U/L    AST (SGOT) 12 (L) 15 - 37 U/L    ALT (SGPT) 11 (L) 12 - 78 U/L   METABOLIC PANEL, BASIC    Collection Time: 09/29/21  3:09 PM   Result Value Ref Range    Sodium 138 136 - 145 mmol/L    Potassium 4.3 3.5 - 5.1 mmol/L    Chloride 104 97 - 108 mmol/L    CO2 27 21 - 32 mmol/L    Anion gap 7 5 - 15 mmol/L    Glucose 110 (H) 65 - 100 mg/dL    BUN 26 (H) 6 - 20 MG/DL    Creatinine 1.95 (H) 0.70 - 1.30 MG/DL    BUN/Creatinine ratio 13 12 - 20      GFR est AA 41 (L) >60 ml/min/1.73m2    GFR est non-AA 34 (L) >60 ml/min/1.73m2    Calcium 8.7 8.5 - 10.1 MG/DL

## 2021-10-02 ENCOUNTER — TELEPHONE (OUTPATIENT)
Dept: UROLOGY | Age: 71
End: 2021-10-02

## 2021-10-02 NOTE — TELEPHONE ENCOUNTER
Dr. Julianne Finn asked me to have you check to see if a left nephrectomy was scheduled? If not, he would like to do that sooner rather than later. The patient will need admission the day before surgery with optimization.

## 2021-10-04 NOTE — TELEPHONE ENCOUNTER
Spoke with pt wife and was able to schedule surgery for 10/25  and follow up appt for 2 weeks after as well as go over instruction and prep for it.  I am mailing out the instructions as well and told pt wife that her  needs to  Hold his Asprin 7 days before per Dr. Valle Learn

## 2021-10-11 ENCOUNTER — TELEPHONE (OUTPATIENT)
Dept: UROLOGY | Age: 71
End: 2021-10-11

## 2021-10-11 NOTE — TELEPHONE ENCOUNTER
Dried blood does not necessarily mean infection. He has been bleeding from that kidney. If he has symptoms of infection (fever, chills, N/V, pain in the flank, etc) then he should be seen. They are far from either of our locations so I would suggest wherever she is comfortable taking him. If he has infection he may or may not need to be admitted given his generalized condition and issues.

## 2021-10-11 NOTE — TELEPHONE ENCOUNTER
Dry blood around tube when wife went to go change it, thinks it could be an infection     pls advise   Thank you

## 2021-10-14 ENCOUNTER — HOSPITAL ENCOUNTER (OUTPATIENT)
Dept: GENERAL RADIOLOGY | Age: 71
Discharge: HOME | End: 2021-10-14
Attending: UROLOGY
Payer: MEDICARE

## 2021-10-14 ENCOUNTER — HOSPITAL ENCOUNTER (OUTPATIENT)
Dept: PREADMISSION TESTING | Age: 71
Discharge: HOME | End: 2021-10-14
Payer: MEDICARE

## 2021-10-14 VITALS
OXYGEN SATURATION: 96 % | WEIGHT: 152 LBS | HEART RATE: 75 BPM | DIASTOLIC BLOOD PRESSURE: 68 MMHG | SYSTOLIC BLOOD PRESSURE: 133 MMHG | HEIGHT: 69 IN | BODY MASS INDEX: 22.51 KG/M2 | RESPIRATION RATE: 18 BRPM | TEMPERATURE: 98.9 F

## 2021-10-14 LAB
ANION GAP SERPL CALC-SCNC: 6 MMOL/L (ref 5–15)
APPEARANCE UR: ABNORMAL
BACTERIA URNS QL MICRO: ABNORMAL /HPF
BILIRUB UR QL: NEGATIVE
BUN SERPL-MCNC: 23 MG/DL (ref 6–20)
BUN/CREAT SERPL: 13 (ref 12–20)
CA-I BLD-MCNC: 9.1 MG/DL (ref 8.5–10.1)
CHLORIDE SERPL-SCNC: 104 MMOL/L (ref 97–108)
CO2 SERPL-SCNC: 28 MMOL/L (ref 21–32)
COLOR UR: ABNORMAL
CREAT SERPL-MCNC: 1.76 MG/DL (ref 0.7–1.3)
ERYTHROCYTE [DISTWIDTH] IN BLOOD BY AUTOMATED COUNT: 15.3 % (ref 11.5–14.5)
GLUCOSE SERPL-MCNC: 101 MG/DL (ref 65–100)
GLUCOSE UR STRIP.AUTO-MCNC: NEGATIVE MG/DL
HCT VFR BLD AUTO: 30 % (ref 36.6–50.3)
HGB BLD-MCNC: 9.1 G/DL (ref 12.1–17)
HGB UR QL STRIP: ABNORMAL
KETONES UR QL STRIP.AUTO: NEGATIVE MG/DL
LEUKOCYTE ESTERASE UR QL STRIP.AUTO: ABNORMAL
MCH RBC QN AUTO: 24.3 PG (ref 26–34)
MCHC RBC AUTO-ENTMCNC: 30.3 G/DL (ref 30–36.5)
MCV RBC AUTO: 80 FL (ref 80–99)
MRSA DNA SPEC QL NAA+PROBE: DETECTED
MUCOUS THREADS URNS QL MICRO: ABNORMAL /LPF
NITRITE UR QL STRIP.AUTO: NEGATIVE
NRBC # BLD: 0 K/UL (ref 0–0.01)
NRBC BLD-RTO: 0 PER 100 WBC
PH UR STRIP: 6 [PH] (ref 5–8)
PLATELET # BLD AUTO: 287 K/UL (ref 150–400)
PMV BLD AUTO: 10.8 FL (ref 8.9–12.9)
POTASSIUM SERPL-SCNC: 4.2 MMOL/L (ref 3.5–5.1)
PROT UR STRIP-MCNC: NEGATIVE MG/DL
RBC # BLD AUTO: 3.75 M/UL (ref 4.1–5.7)
RBC #/AREA URNS HPF: ABNORMAL /HPF (ref 0–5)
SARS-COV-2, COV2: NORMAL
SODIUM SERPL-SCNC: 138 MMOL/L (ref 136–145)
SP GR UR REFRACTOMETRY: 1.01 (ref 1–1.03)
UROBILINOGEN UR QL STRIP.AUTO: 0.1 EU/DL (ref 0.1–1)
WBC # BLD AUTO: 9.7 K/UL (ref 4.1–11.1)
WBC URNS QL MICRO: >100 /HPF (ref 0–4)

## 2021-10-14 PROCEDURE — 36415 COLL VENOUS BLD VENIPUNCTURE: CPT

## 2021-10-14 PROCEDURE — 87077 CULTURE AEROBIC IDENTIFY: CPT

## 2021-10-14 PROCEDURE — 85027 COMPLETE CBC AUTOMATED: CPT

## 2021-10-14 PROCEDURE — 81001 URINALYSIS AUTO W/SCOPE: CPT

## 2021-10-14 PROCEDURE — 86901 BLOOD TYPING SEROLOGIC RH(D): CPT

## 2021-10-14 PROCEDURE — 87086 URINE CULTURE/COLONY COUNT: CPT

## 2021-10-14 PROCEDURE — 87186 SC STD MICRODIL/AGAR DIL: CPT

## 2021-10-14 PROCEDURE — 86870 RBC ANTIBODY IDENTIFICATION: CPT

## 2021-10-14 PROCEDURE — U0005 INFEC AGEN DETEC AMPLI PROBE: HCPCS

## 2021-10-14 PROCEDURE — 87641 MR-STAPH DNA AMP PROBE: CPT

## 2021-10-14 PROCEDURE — 71046 X-RAY EXAM CHEST 2 VIEWS: CPT

## 2021-10-14 PROCEDURE — 80048 BASIC METABOLIC PNL TOTAL CA: CPT

## 2021-10-14 RX ORDER — ASCORBIC ACID 500 MG
500 TABLET ORAL DAILY
COMMUNITY
End: 2021-11-11

## 2021-10-14 RX ORDER — SIMETHICONE 125 MG
125 CAPSULE ORAL
COMMUNITY
End: 2022-07-29 | Stop reason: ALTCHOICE

## 2021-10-14 RX ORDER — KETOCONAZOLE 20 MG/G
CREAM TOPICAL DAILY
COMMUNITY
End: 2022-01-25 | Stop reason: SDUPTHER

## 2021-10-14 RX ORDER — LANOLIN ALCOHOL/MO/W.PET/CERES
325 CREAM (GRAM) TOPICAL
Status: ON HOLD | COMMUNITY
End: 2021-12-07

## 2021-10-14 RX ORDER — PRAVASTATIN SODIUM 40 MG/1
40 TABLET ORAL
COMMUNITY

## 2021-10-14 RX ORDER — SENNOSIDES 8.6 MG/1
1 TABLET ORAL DAILY
COMMUNITY
End: 2022-07-29 | Stop reason: ALTCHOICE

## 2021-10-14 NOTE — PROGRESS NOTES
Positive MRSA and all abnormal labs called to office, spoke with Nile Rascon. No new orders at this time.

## 2021-10-15 ENCOUNTER — TELEPHONE (OUTPATIENT)
Dept: UROLOGY | Age: 71
End: 2021-10-15

## 2021-10-15 LAB
ABO + RH BLD: NORMAL
BLOOD GROUP ANTIBODIES SERPL: NORMAL
BLOOD GROUP ANTIBODIES SERPL: POSITIVE
SARS-COV-2, XPLCVT: ABNORMAL
SOURCE, COVRS: ABNORMAL
SPECIMEN EXP DATE BLD: NORMAL
XXAUTO CONTROL: NEGATIVE

## 2021-10-15 NOTE — TELEPHONE ENCOUNTER
Notified pt wife of COVID and let her know that the surgery would be canceled but Dr. Brock Mayor want to do a Virtual VV appt [place] discuss how he is doing before moving forward with surgery.  She said that he is hurting and the area where the tube is is infected and wanted to know what could be done because she is hurting

## 2021-10-16 LAB
BACTERIA SPEC CULT: ABNORMAL
COLONY COUNT,CNT: ABNORMAL
SPECIAL REQUESTS,SREQ: ABNORMAL

## 2021-10-18 NOTE — TELEPHONE ENCOUNTER
I was told he had tested positive for COVID. We should reach out the patient and family in a week to see how he is doing.

## 2021-10-18 NOTE — PROGRESS NOTES
Urinary conduit urine culture positive. Resistant to ancef, cefoxitin. No treatment necessary. Perioperative abx can be planned.

## 2021-10-19 NOTE — TELEPHONE ENCOUNTER
Surgery was cancelled and I spoke with pt wife its in another telephone thread she said once he has his Virtual on 11/3 with Dr. Flynn Cash then they will resume the surgery

## 2021-11-03 ENCOUNTER — VIRTUAL VISIT (OUTPATIENT)
Dept: UROLOGY | Age: 71
End: 2021-11-03
Payer: MEDICARE

## 2021-11-03 DIAGNOSIS — C67.9 MALIGNANT NEOPLASM OF URINARY BLADDER, UNSPECIFIED SITE (HCC): Primary | ICD-10-CM

## 2021-11-03 DIAGNOSIS — T83.098A MALFUNCTION OF NEPHROSTOMY TUBE (HCC): ICD-10-CM

## 2021-11-03 DIAGNOSIS — U07.1 COVID-19: ICD-10-CM

## 2021-11-03 DIAGNOSIS — N28.9 NONFUNCTIONING KIDNEY: ICD-10-CM

## 2021-11-03 PROCEDURE — G8432 DEP SCR NOT DOC, RNG: HCPCS | Performed by: UROLOGY

## 2021-11-03 PROCEDURE — G8427 DOCREV CUR MEDS BY ELIG CLIN: HCPCS | Performed by: UROLOGY

## 2021-11-03 PROCEDURE — 99214 OFFICE O/P EST MOD 30 MIN: CPT | Performed by: UROLOGY

## 2021-11-03 PROCEDURE — G8536 NO DOC ELDER MAL SCRN: HCPCS | Performed by: UROLOGY

## 2021-11-03 PROCEDURE — G8420 CALC BMI NORM PARAMETERS: HCPCS | Performed by: UROLOGY

## 2021-11-03 PROCEDURE — G8756 NO BP MEASURE DOC: HCPCS | Performed by: UROLOGY

## 2021-11-03 RX ORDER — AZITHROMYCIN 250 MG/1
250 TABLET, FILM COATED ORAL DAILY
Status: ON HOLD | COMMUNITY
End: 2021-12-07

## 2021-11-03 RX ORDER — HYDROXYCHLOROQUINE SULFATE 200 MG/1
200 TABLET, FILM COATED ORAL DAILY
Status: ON HOLD | COMMUNITY
End: 2021-12-07

## 2021-11-03 NOTE — ASSESSMENT & PLAN NOTE
Nonfunctioning left kidney with hematuria. Plan on laparoscopic assisted left nephrectomy. The risk and benefits were discussed and he and the family wish to proceed.

## 2021-11-03 NOTE — PROGRESS NOTES
HISTORY OF PRESENT ILLNESS  Davy Roa is a 66 y.o. male. Chief Complaint   Patient presents with    Follow-up    Hydronephrosis   Davy Roa, who was evaluated through a synchronous (real-time) audio only encounter, and/or his healthcare decision maker, is aware that it is a billable service, with coverage as determined by his insurance carrier. He provided verbal consent to proceed: Yes, and patient identification was verified. It was conducted pursuant to the emergency declaration under the [facility], [address] [place] [address] and the [place] Act. I was at the office. The patient was at home. His wife was on the line. I spoke with the patient, wife. He had COVID19 infection. It was asymptomatic. He had outpatient medication. He had testing on preop testing 10/14/21. His urine smells strong. He has a nephrostomy tube. It is dark bloody at times. It can clear up. Follow-up      Chronic Conditions Addressed Today     1. Bladder cancer [facility]) - Primary     Overview      He is s/p TURBT on 6/25/2020. No obvious residual tumor, but tessy and induration at the base. Pathology reads invasive papillary urothelial carcinoma, high grade (bladder) and invasive high grade urothelial carcinoma pT2 (bladder base). He is s/p cystectomy with ileal conduit urinary diversion on 8/24/2020. He had an uncomplicated post-operative course and was discharged on 8/31/2020 with [facility]. 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 PT2bN0. CT scan on 7/10/21 revealed no metastatic disease. Current Assessment & Plan      He has a ileal conduit. His left kidney is obstructed and nonfunctioning. He has nephrostomy tube. He has had recurrent bleeding requiring interventional embolization. He still has intermittent blood. Sarahy Brown 2. Malfunction of nephrostomy tube [facility])     Current Assessment & Plan      He had significant hematuria after nephrostomy tube placement. His kidney is essentially colonized with bacteria and nonfunctioning. Best management at this point is nephrectomy. He wishes to proceed. 3. Nonfunctioning kidney     Current Assessment & Plan       Nonfunctioning left kidney with hematuria. Plan on laparoscopic assisted left nephrectomy. The risk and benefits were discussed and he and the family wish to proceed. Acute Diagnoses Addressed Today     COVID-19        He had asymptomatic infection of COVID-19. He tested positive over 2 weeks ago. He feels fine. We will reassess and proceed to surgery. Relevant Medications        hydrOXYchloroQUINE (PLAQUENIL) 200 mg tablet        azithromycin (ZITHROMAX) 250 mg tablet      He has prostate cancer and bladder cancer status post cystectomy. He underwent attempted ureteral stenting on 8/26/2021. He was unable to be stented. He underwent a left nephrostomy tube on 9/7/2021. They were unable to place a ureteral stent due to stricturing/ occclusion. The wife notes he had bloody drainage afterward. There are notes from 9/13/2021 where the patient was febrile and had evidence of arterial bleeding into the collecting system. He underwent embolization. The notes indirectly state he had a nephrostomy tube change the following day 9/14. He was in the ICU with positive blood cultures. MRSA bacteremia. He had some blood on the nephrostomy bandage the last few days, but it may have been a blood blister on the skin. He has a left nephrostomy tube and was discharged yesterday   He has a PICC line and finishing daptomycin tomorrow. His nephrostomy is not putting out much per the wife. Yesterday night it was emptied of clear urine around 7PM, and now at 850AM there is about 75cc.       Past Medical History:    PMHx (including negatives):  has a past medical history of Back pain, Backache, unspecified (2/8/2010), Bladder cancer ([address] (7/14/2020), Blood clots (2004), Cancer ([address] (2013), CHF (congestive heart failure) ([address] (1999), Chronic obstructive pulmonary disease ([address] Coronary artery disease, Coronary atherosclerosis of native coronary artery (2/8/2010), DDD (degenerative disc disease), Diabetes ([address] Essential hypertension, benign (2/8/2010), Gastroenteritis, viral, Heart block, Hematuria, unspecified (7/14/2020), HTN (hypertension), benign, Hypercholesterolemia, Ill-defined condition, Ill-defined condition, Ill-defined condition, Ill-defined condition (1996), Ill-defined condition (1999 & early 2000s), Impotence (7/14/2020), Lumbago (2/8/2010), Malignant neoplasm of prostate ([address] (7/14/2020), Neurogenic dysfunction of the urinary bladder (7/14/2020), Neuropathy Peripheral, Osteoarthritis, Osteoarthrosis, unspecified whether generalized or localized, unspecified site (2/8/2010), Seizures ([address] Skin disease, Stroke ([address] (1996), Unspecified hereditary and idiopathic peripheral neuropathy (2/8/2010), Urgency of urination (7/14/2020), and Urgency of urination (7/14/2020).      PSurgHx:  has a past surgical history that includes hx heart catheterization (1334,2403); hx appendectomy; hx cataract removal; hx tonsillectomy; colonoscopy (N/A, 4/12/2018); neurological procedure unlisted (1996); hx back surgery (2002); hx back surgery (2004); neurological procedure unlisted; hx circumcision; hx prostatectomy (10/18/2013); hx prostate surgery (08/24/2020); hx prostate surgery (10/18/2013); hx urological (06/25/2020); hx urological (06/25/2020); hx urological (05/29/2020); hx urological (08/24/2020); hx urological (08/24/2020); hx urological (08/26/2021); hx urological (08/26/2021); hx urological (08/26/2021); ir nephrostomy perc lt plc cath  si (9/7/2021); ir remove neph tube rt (9/13/2021); ir occl txcath hemmorage w si (9/13/2021); ir nephrostomy perc lt plc cath  si (9/14/2021); and ir exchange nephro perc lt si (9/21/2021). PSocHx:  reports that he quit smoking about 22 years ago. He quit after 35.00 years of use. He has never used smokeless tobacco. He reports previous alcohol use. He reports previous drug use. Drugs: Prescription and Opiates. Review of Systems   All other systems reviewed and are negative. Physical Exam  Allergies   Allergen Reactions    Lisinopril Unknown (comments)      Prior to Admission medications    Medication Sig Start Date End Date Taking? Authorizing Provider   hydrOXYchloroQUINE (PLAQUENIL) 200 mg tablet Take 200 mg by mouth daily. Yes Provider, Historical   azithromycin (ZITHROMAX) 250 mg tablet Take 250 mg by mouth daily. Yes Provider, Historical   senna (Senna) 8.6 mg tablet Take 1 Tablet by mouth daily. Yes Provider, Historical   simethicone (Gas-X) 125 mg capsule Take 125 mg by mouth four (4) times daily as needed for Flatulence. Yes Provider, Historical   ketoconazole (NIZORAL) 2 % topical cream Apply  to affected area daily. Yes Provider, Historical   FLUTICASONE PROPIONATE IN Take  by inhalation. Yes Provider, Historical   pravastatin (PRAVACHOL) 40 mg tablet Take 40 mg by mouth nightly. Yes Provider, Historical   ferrous sulfate 325 mg (65 mg iron) tablet Take 325 mg by mouth Daily (before breakfast). Yes Provider, Historical   aspirin delayed-release 81 mg tablet Take 81 mg by mouth daily. Yes Provider, Historical   DAPTOmycin (CUBICIN) IVPB 550 mg by IntraVENous route every twenty-four (24) hours. 9/28/21  Yes Sumit Atwood MD   FLUoxetine (PROzac) 40 mg capsule Take 40 mg by mouth daily. Yes Provider, Historical   levothyroxine (SYNTHROID) 25 mcg tablet Take 25 mcg by mouth Daily (before breakfast). Yes Provider, Historical   calcium-cholecalciferol, d3, (CALCIUM 600 + D) 600-125 mg-unit tab Take 1 Tablet by mouth daily.    Yes Provider, Historical   sodium bicarbonate 650 mg tablet Take 650 mg by mouth two (2) times a day. Yes Provider, Historical   budesonide-glycopyr-formoterol (Breztri Aerosphere) 160-9-4.8 mcg/actuation HFAA Take 2 Inhalation by inhalation two (2) times a day. Yes Provider, Historical   traZODone (DESYREL) 100 mg tablet Take 100 mg by mouth nightly. Yes Provider, Historical   montelukast (SINGULAIR) 10 mg tablet Take 10 mg by mouth daily. Yes Provider, Historical   carvedilol (COREG) 6.25 mg tablet Take 6.25 mg by mouth daily. Yes Provider, Historical   acetaminophen (TYLENOL PO) Take 500 mg by mouth as needed. Patient not taking: Reported on 11/3/2021    Provider, Historical   ascorbic acid, vitamin C, (Vitamin C) 500 mg tablet Take 500 mg by mouth daily. Patient not taking: Reported on 11/3/2021    Provider, Historical        ASSESSMENT and PLAN  Diagnoses and all orders for this visit:    1. Malignant neoplasm of urinary bladder, unspecified site [facility])  Assessment & Plan:  He has a ileal conduit. His left kidney is obstructed and nonfunctioning. He has nephrostomy tube. He has had recurrent bleeding requiring interventional embolization. He still has intermittent blood. .      2. Nonfunctioning kidney  Assessment & Plan:   Nonfunctioning left kidney with hematuria. Plan on laparoscopic assisted left nephrectomy. The risk and benefits were discussed and he and the family wish to proceed. 3. Malfunction of nephrostomy tube [facility])  Assessment & Plan:  He had significant hematuria after nephrostomy tube placement. His kidney is essentially colonized with bacteria and nonfunctioning. Best management at this point is nephrectomy. He wishes to proceed. 4. COVID-19  Comments:  He had asymptomatic infection of COVID-19. He tested positive over 2 weeks ago. He feels fine. We will reassess and proceed to surgery.          Yulisa Prado MD

## 2021-11-03 NOTE — ASSESSMENT & PLAN NOTE
He had significant hematuria after nephrostomy tube placement. His kidney is essentially colonized with bacteria and nonfunctioning. Best management at this point is nephrectomy. He wishes to proceed.

## 2021-11-03 NOTE — ASSESSMENT & PLAN NOTE
He has a ileal conduit. His left kidney is obstructed and nonfunctioning. He has nephrostomy tube. He has had recurrent bleeding requiring interventional embolization. He still has intermittent blood. Mona Marquez

## 2021-11-11 ENCOUNTER — HOSPITAL ENCOUNTER (OUTPATIENT)
Dept: GENERAL RADIOLOGY | Age: 71
Discharge: HOME | End: 2021-11-11
Attending: UROLOGY
Payer: MEDICARE

## 2021-11-11 ENCOUNTER — HOSPITAL ENCOUNTER (OUTPATIENT)
Dept: PREADMISSION TESTING | Age: 71
Discharge: HOME | End: 2021-11-11
Payer: MEDICARE

## 2021-11-11 VITALS
TEMPERATURE: 97.9 F | RESPIRATION RATE: 18 BRPM | OXYGEN SATURATION: 99 % | BODY MASS INDEX: 22.13 KG/M2 | DIASTOLIC BLOOD PRESSURE: 72 MMHG | HEIGHT: 67 IN | SYSTOLIC BLOOD PRESSURE: 127 MMHG | HEART RATE: 67 BPM | WEIGHT: 141 LBS

## 2021-11-11 DIAGNOSIS — Z22.322 MRSA (METHICILLIN RESISTANT STAPHYLOCOCCUS AUREUS) COLONIZATION: Primary | ICD-10-CM

## 2021-11-11 LAB
ABO + RH BLD: NORMAL
ANION GAP SERPL CALC-SCNC: 5 MMOL/L (ref 5–15)
APPEARANCE UR: ABNORMAL
BACTERIA URNS QL MICRO: ABNORMAL /HPF
BILIRUB UR QL: NEGATIVE
BLOOD GROUP ANTIBODIES SERPL: NORMAL
BLOOD GROUP ANTIBODIES SERPL: POSITIVE
BUN SERPL-MCNC: 22 MG/DL (ref 6–20)
BUN/CREAT SERPL: 13 (ref 12–20)
CA-I BLD-MCNC: 9.3 MG/DL (ref 8.5–10.1)
CHLORIDE SERPL-SCNC: 105 MMOL/L (ref 97–108)
CO2 SERPL-SCNC: 28 MMOL/L (ref 21–32)
COLOR UR: ABNORMAL
CREAT SERPL-MCNC: 1.66 MG/DL (ref 0.7–1.3)
ERYTHROCYTE [DISTWIDTH] IN BLOOD BY AUTOMATED COUNT: 17.1 % (ref 11.5–14.5)
GLUCOSE SERPL-MCNC: 99 MG/DL (ref 65–100)
GLUCOSE UR STRIP.AUTO-MCNC: NEGATIVE MG/DL
HCT VFR BLD AUTO: 33.7 % (ref 36.6–50.3)
HGB BLD-MCNC: 10.1 G/DL (ref 12.1–17)
HGB UR QL STRIP: ABNORMAL
KETONES UR QL STRIP.AUTO: NEGATIVE MG/DL
LEUKOCYTE ESTERASE UR QL STRIP.AUTO: ABNORMAL
MCH RBC QN AUTO: 23.9 PG (ref 26–34)
MCHC RBC AUTO-ENTMCNC: 30 G/DL (ref 30–36.5)
MCV RBC AUTO: 79.7 FL (ref 80–99)
MRSA DNA SPEC QL NAA+PROBE: DETECTED
MUCOUS THREADS URNS QL MICRO: ABNORMAL /LPF
NITRITE UR QL STRIP.AUTO: POSITIVE
NRBC # BLD: 0 K/UL (ref 0–0.01)
NRBC BLD-RTO: 0 PER 100 WBC
PH UR STRIP: 6 [PH] (ref 5–8)
PLATELET # BLD AUTO: 267 K/UL (ref 150–400)
PMV BLD AUTO: 10.3 FL (ref 8.9–12.9)
POTASSIUM SERPL-SCNC: 3.8 MMOL/L (ref 3.5–5.1)
PROT UR STRIP-MCNC: NEGATIVE MG/DL
RBC # BLD AUTO: 4.23 M/UL (ref 4.1–5.7)
RBC #/AREA URNS HPF: ABNORMAL /HPF (ref 0–5)
SARS-COV-2, COV2: NORMAL
SODIUM SERPL-SCNC: 138 MMOL/L (ref 136–145)
SP GR UR REFRACTOMETRY: 1.01 (ref 1–1.03)
SPECIMEN EXP DATE BLD: NORMAL
UROBILINOGEN UR QL STRIP.AUTO: 0.1 EU/DL (ref 0.1–1)
WBC # BLD AUTO: 7.3 K/UL (ref 4.1–11.1)
WBC URNS QL MICRO: ABNORMAL /HPF (ref 0–4)
XXAUTO CONTROL: NEGATIVE

## 2021-11-11 PROCEDURE — 87086 URINE CULTURE/COLONY COUNT: CPT

## 2021-11-11 PROCEDURE — 87641 MR-STAPH DNA AMP PROBE: CPT

## 2021-11-11 PROCEDURE — 81001 URINALYSIS AUTO W/SCOPE: CPT

## 2021-11-11 PROCEDURE — 80048 BASIC METABOLIC PNL TOTAL CA: CPT

## 2021-11-11 PROCEDURE — 87077 CULTURE AEROBIC IDENTIFY: CPT

## 2021-11-11 PROCEDURE — 36415 COLL VENOUS BLD VENIPUNCTURE: CPT

## 2021-11-11 PROCEDURE — 86901 BLOOD TYPING SEROLOGIC RH(D): CPT

## 2021-11-11 PROCEDURE — 86870 RBC ANTIBODY IDENTIFICATION: CPT

## 2021-11-11 PROCEDURE — 87186 SC STD MICRODIL/AGAR DIL: CPT

## 2021-11-11 PROCEDURE — U0005 INFEC AGEN DETEC AMPLI PROBE: HCPCS

## 2021-11-11 PROCEDURE — 85027 COMPLETE CBC AUTOMATED: CPT

## 2021-11-11 PROCEDURE — 71046 X-RAY EXAM CHEST 2 VIEWS: CPT

## 2021-11-11 NOTE — PERIOP NOTES
Called Lab spoke to Sierra, made aware patient had Covid antibody infusion 10/18/2021.  States ok to draw type and screen today

## 2021-11-11 NOTE — PROGRESS NOTES
MRSA positive. Will send RX. Can you let patient know to pick it up and use prior to nephrectomy? Urine from ostomy. Dr. Justyn Henderson will determine alexsandra-op abx. Other labs reviewed, stable.

## 2021-11-11 NOTE — PERIOP NOTES
Messaged Governor Mook NP, re: patient MRSA positive    [address] responded aware, will review labs  (Notes in Epic)    1120 Patient made aware rx sent to pharmacy

## 2021-11-11 NOTE — PERIOP NOTES
Called Dr. Cathy Johnson office, spoke to [facility], made aware of abnormal labs, [facility] stated should would make Dr. Davie Franco or Khang Mosher NP aware.    Will follow up

## 2021-11-11 NOTE — PERIOP NOTES
Called Dr. Chetna Silva to review patient recent EKG, Dr. Chetna Silva request all back, Will follow up    1100 Returned call to Dr. Chetna Silva, reviewed patient most recent EKG, history and upcoming surgery.  Dr. Chetna Silva stated ok to proceed with planned surgery

## 2021-11-12 LAB
SARS-COV-2, XPLCVT: ABNORMAL
SOURCE, COVRS: ABNORMAL

## 2021-11-14 LAB
BACTERIA SPEC CULT: ABNORMAL
BACTERIA SPEC CULT: ABNORMAL
COLONY COUNT,CNT: ABNORMAL
SPECIAL REQUESTS,SREQ: ABNORMAL

## 2021-12-06 ENCOUNTER — HOSPITAL ENCOUNTER (OUTPATIENT)
Dept: PREADMISSION TESTING | Age: 71
Discharge: HOME | DRG: 661 | End: 2021-12-06
Payer: MEDICARE

## 2021-12-06 VITALS
WEIGHT: 141 LBS | DIASTOLIC BLOOD PRESSURE: 73 MMHG | HEIGHT: 67 IN | SYSTOLIC BLOOD PRESSURE: 117 MMHG | TEMPERATURE: 98.1 F | BODY MASS INDEX: 22.13 KG/M2 | RESPIRATION RATE: 16 BRPM | OXYGEN SATURATION: 98 %

## 2021-12-06 LAB
ABO + RH BLD: NORMAL
ANION GAP SERPL CALC-SCNC: 4 MMOL/L (ref 5–15)
APPEARANCE UR: CLEAR
BACTERIA URNS QL MICRO: NEGATIVE /HPF
BILIRUB UR QL: NEGATIVE
BLOOD GROUP ANTIBODIES SERPL: NORMAL
BLOOD GROUP ANTIBODIES SERPL: POSITIVE
BUN SERPL-MCNC: 21 MG/DL (ref 6–20)
BUN/CREAT SERPL: 12 (ref 12–20)
CA-I BLD-MCNC: 8.6 MG/DL (ref 8.5–10.1)
CHLORIDE SERPL-SCNC: 103 MMOL/L (ref 97–108)
CO2 SERPL-SCNC: 29 MMOL/L (ref 21–32)
COLOR UR: ABNORMAL
COVID-19 RAPID TEST, COVR: NOT DETECTED
CREAT SERPL-MCNC: 1.71 MG/DL (ref 0.7–1.3)
ERYTHROCYTE [DISTWIDTH] IN BLOOD BY AUTOMATED COUNT: 17.1 % (ref 11.5–14.5)
GLUCOSE SERPL-MCNC: 96 MG/DL (ref 65–100)
GLUCOSE UR STRIP.AUTO-MCNC: NEGATIVE MG/DL
HCT VFR BLD AUTO: 30.7 % (ref 36.6–50.3)
HGB BLD-MCNC: 9.2 G/DL (ref 12.1–17)
HGB UR QL STRIP: ABNORMAL
KETONES UR QL STRIP.AUTO: NEGATIVE MG/DL
LEUKOCYTE ESTERASE UR QL STRIP.AUTO: NEGATIVE
MCH RBC QN AUTO: 23.5 PG (ref 26–34)
MCHC RBC AUTO-ENTMCNC: 30 G/DL (ref 30–36.5)
MCV RBC AUTO: 78.3 FL (ref 80–99)
MRSA DNA SPEC QL NAA+PROBE: NOT DETECTED
MUCOUS THREADS URNS QL MICRO: ABNORMAL /LPF
NITRITE UR QL STRIP.AUTO: NEGATIVE
NRBC # BLD: 0 K/UL (ref 0–0.01)
NRBC BLD-RTO: 0 PER 100 WBC
PH UR STRIP: 6 [PH] (ref 5–8)
PLATELET # BLD AUTO: 383 K/UL (ref 150–400)
PMV BLD AUTO: 10.1 FL (ref 8.9–12.9)
POTASSIUM SERPL-SCNC: 4.2 MMOL/L (ref 3.5–5.1)
PROT UR STRIP-MCNC: NEGATIVE MG/DL
RBC # BLD AUTO: 3.92 M/UL (ref 4.1–5.7)
RBC #/AREA URNS HPF: ABNORMAL /HPF (ref 0–5)
SODIUM SERPL-SCNC: 136 MMOL/L (ref 136–145)
SP GR UR REFRACTOMETRY: 1.01 (ref 1–1.03)
SPECIMEN EXP DATE BLD: NORMAL
SPECIMEN SOURCE: NORMAL
UROBILINOGEN UR QL STRIP.AUTO: 0.1 EU/DL (ref 0.1–1)
WBC # BLD AUTO: 11.5 K/UL (ref 4.1–11.1)
WBC URNS QL MICRO: ABNORMAL /HPF (ref 0–4)
XXAUTO CONTROL: NEGATIVE

## 2021-12-06 PROCEDURE — 85027 COMPLETE CBC AUTOMATED: CPT

## 2021-12-06 PROCEDURE — 87635 SARS-COV-2 COVID-19 AMP PRB: CPT

## 2021-12-06 PROCEDURE — 87086 URINE CULTURE/COLONY COUNT: CPT

## 2021-12-06 PROCEDURE — 86901 BLOOD TYPING SEROLOGIC RH(D): CPT

## 2021-12-06 PROCEDURE — 36415 COLL VENOUS BLD VENIPUNCTURE: CPT

## 2021-12-06 PROCEDURE — 87641 MR-STAPH DNA AMP PROBE: CPT

## 2021-12-06 PROCEDURE — 80048 BASIC METABOLIC PNL TOTAL CA: CPT

## 2021-12-06 PROCEDURE — 86870 RBC ANTIBODY IDENTIFICATION: CPT

## 2021-12-06 PROCEDURE — 81001 URINALYSIS AUTO W/SCOPE: CPT

## 2021-12-07 ENCOUNTER — ANESTHESIA (OUTPATIENT)
Dept: SURGERY | Age: 71
DRG: 661 | End: 2021-12-07
Payer: MEDICARE

## 2021-12-07 ENCOUNTER — ANESTHESIA EVENT (OUTPATIENT)
Dept: SURGERY | Age: 71
DRG: 661 | End: 2021-12-07
Payer: MEDICARE

## 2021-12-07 ENCOUNTER — HOSPITAL ENCOUNTER (INPATIENT)
Age: 71
LOS: 2 days | Discharge: HOME | DRG: 661 | End: 2021-12-09
Attending: UROLOGY | Admitting: UROLOGY
Payer: MEDICARE

## 2021-12-07 DIAGNOSIS — N18.30 STAGE 3 CHRONIC KIDNEY DISEASE, UNSPECIFIED WHETHER STAGE 3A OR 3B CKD (HCC): ICD-10-CM

## 2021-12-07 DIAGNOSIS — N11.9 CHRONIC PYELONEPHRITIS: ICD-10-CM

## 2021-12-07 DIAGNOSIS — N28.9 NONFUNCTIONING KIDNEY: ICD-10-CM

## 2021-12-07 DIAGNOSIS — N28.89 RENAL HEMORRHAGE, LEFT: Primary | ICD-10-CM

## 2021-12-07 PROBLEM — Z85.51 HISTORY OF BLADDER CANCER: Status: ACTIVE | Noted: 2021-12-07

## 2021-12-07 PROBLEM — Z43.6 ATTENTION TO UROSTOMY (HCC): Status: ACTIVE | Noted: 2021-12-07

## 2021-12-07 PROCEDURE — 77030012411 HC DRN WND CARD -A: Performed by: UROLOGY

## 2021-12-07 PROCEDURE — 74011250637 HC RX REV CODE- 250/637: Performed by: NURSE PRACTITIONER

## 2021-12-07 PROCEDURE — 77030011810 HC STPLR ENDOSC J&J -G: Performed by: UROLOGY

## 2021-12-07 PROCEDURE — 76210000016 HC OR PH I REC 1 TO 1.5 HR: Performed by: UROLOGY

## 2021-12-07 PROCEDURE — 50545 LAPARO RADICAL NEPHRECTOMY: CPT | Performed by: UROLOGY

## 2021-12-07 PROCEDURE — 77030013567 HC DRN WND RESERV BARD -A: Performed by: UROLOGY

## 2021-12-07 PROCEDURE — 76010000172 HC OR TIME 2.5 TO 3 HR INTENSV-TIER 1: Performed by: UROLOGY

## 2021-12-07 PROCEDURE — 77030010935 HC CLP LIG ABSRB TELE -B: Performed by: UROLOGY

## 2021-12-07 PROCEDURE — 74011250636 HC RX REV CODE- 250/636: Performed by: NURSE ANESTHETIST, CERTIFIED REGISTERED

## 2021-12-07 PROCEDURE — 74011250636 HC RX REV CODE- 250/636: Performed by: UROLOGY

## 2021-12-07 PROCEDURE — 77030008606 HC TRCR ENDOSC KII AMR -B: Performed by: UROLOGY

## 2021-12-07 PROCEDURE — 77030018673: Performed by: UROLOGY

## 2021-12-07 PROCEDURE — 77030002916 HC SUT ETHLN J&J -A: Performed by: UROLOGY

## 2021-12-07 PROCEDURE — 2709999900 HC NON-CHARGEABLE SUPPLY: Performed by: UROLOGY

## 2021-12-07 PROCEDURE — 77030018842 HC SOL IRR SOD CL 9% BAXT -A: Performed by: UROLOGY

## 2021-12-07 PROCEDURE — 77030008684 HC TU ET CUF COVD -B: Performed by: ANESTHESIOLOGY

## 2021-12-07 PROCEDURE — 77030002986 HC SUT PROL J&J -A: Performed by: UROLOGY

## 2021-12-07 PROCEDURE — 77030013079 HC BLNKT BAIR HGGR 3M -A: Performed by: ANESTHESIOLOGY

## 2021-12-07 PROCEDURE — 77030005515 HC CATH URETH FOL14 BARD -B: Performed by: UROLOGY

## 2021-12-07 PROCEDURE — 77030010507 HC ADH SKN DERMBND J&J -B: Performed by: UROLOGY

## 2021-12-07 PROCEDURE — 74011000250 HC RX REV CODE- 250: Performed by: UROLOGY

## 2021-12-07 PROCEDURE — 74011250636 HC RX REV CODE- 250/636: Performed by: ANESTHESIOLOGY

## 2021-12-07 PROCEDURE — 77030009527 HC GEL PRT SYS AMR -E: Performed by: UROLOGY

## 2021-12-07 PROCEDURE — 77030009967 HC RELD STPLR ENDOSC J&J -C: Performed by: UROLOGY

## 2021-12-07 PROCEDURE — 76060000036 HC ANESTHESIA 2.5 TO 3 HR: Performed by: UROLOGY

## 2021-12-07 PROCEDURE — 65270000029 HC RM PRIVATE

## 2021-12-07 PROCEDURE — 77030034696 HC CATH URETH FOL 2W BARD -A: Performed by: UROLOGY

## 2021-12-07 PROCEDURE — 74011000250 HC RX REV CODE- 250: Performed by: NURSE ANESTHETIST, CERTIFIED REGISTERED

## 2021-12-07 PROCEDURE — 77030037834: Performed by: UROLOGY

## 2021-12-07 PROCEDURE — 88307 TISSUE EXAM BY PATHOLOGIST: CPT

## 2021-12-07 PROCEDURE — 77030018813 HC SCIS LAPSCP EPIX DISP AMR -B: Performed by: UROLOGY

## 2021-12-07 PROCEDURE — 77030002966 HC SUT PDS J&J -A: Performed by: UROLOGY

## 2021-12-07 PROCEDURE — 0TT14ZZ RESECTION OF LEFT KIDNEY, PERCUTANEOUS ENDOSCOPIC APPROACH: ICD-10-PCS | Performed by: UROLOGY

## 2021-12-07 PROCEDURE — 77030002933 HC SUT MCRYL J&J -A: Performed by: UROLOGY

## 2021-12-07 PROCEDURE — 74011250637 HC RX REV CODE- 250/637: Performed by: UROLOGY

## 2021-12-07 PROCEDURE — 77030026438 HC STYL ET INTUB CARD -A: Performed by: ANESTHESIOLOGY

## 2021-12-07 PROCEDURE — 77030038160 HC SHR COAG HARM J&J -F: Performed by: UROLOGY

## 2021-12-07 PROCEDURE — 77030008756 HC TU IRR SUC STRY -B: Performed by: UROLOGY

## 2021-12-07 PROCEDURE — 77030016151 HC PROTCTR LNS DFOG COVD -B: Performed by: UROLOGY

## 2021-12-07 PROCEDURE — 77030040361 HC SLV COMPR DVT MDII -B: Performed by: UROLOGY

## 2021-12-07 RX ORDER — TRAZODONE HYDROCHLORIDE 50 MG/1
100 TABLET ORAL
Status: DISCONTINUED | OUTPATIENT
Start: 2021-12-07 | End: 2021-12-09 | Stop reason: HOSPADM

## 2021-12-07 RX ORDER — FLUOXETINE HYDROCHLORIDE 20 MG/1
40 CAPSULE ORAL DAILY
Status: DISCONTINUED | OUTPATIENT
Start: 2021-12-08 | End: 2021-12-09 | Stop reason: HOSPADM

## 2021-12-07 RX ORDER — HYDROMORPHONE HYDROCHLORIDE 1 MG/ML
0.5 INJECTION, SOLUTION INTRAMUSCULAR; INTRAVENOUS; SUBCUTANEOUS
Status: DISCONTINUED | OUTPATIENT
Start: 2021-12-07 | End: 2021-12-07 | Stop reason: HOSPADM

## 2021-12-07 RX ORDER — CEFAZOLIN SODIUM 1 G/3ML
INJECTION, POWDER, FOR SOLUTION INTRAMUSCULAR; INTRAVENOUS AS NEEDED
Status: DISCONTINUED | OUTPATIENT
Start: 2021-12-07 | End: 2021-12-07 | Stop reason: HOSPADM

## 2021-12-07 RX ORDER — LIDOCAINE HYDROCHLORIDE 20 MG/ML
INJECTION, SOLUTION EPIDURAL; INFILTRATION; INTRACAUDAL; PERINEURAL AS NEEDED
Status: DISCONTINUED | OUTPATIENT
Start: 2021-12-07 | End: 2021-12-07 | Stop reason: HOSPADM

## 2021-12-07 RX ORDER — SODIUM CHLORIDE 0.9 % (FLUSH) 0.9 %
5-40 SYRINGE (ML) INJECTION EVERY 8 HOURS
Status: DISCONTINUED | OUTPATIENT
Start: 2021-12-07 | End: 2021-12-09

## 2021-12-07 RX ORDER — SODIUM CHLORIDE, SODIUM LACTATE, POTASSIUM CHLORIDE, CALCIUM CHLORIDE 600; 310; 30; 20 MG/100ML; MG/100ML; MG/100ML; MG/100ML
INJECTION, SOLUTION INTRAVENOUS
Status: DISCONTINUED | OUTPATIENT
Start: 2021-12-07 | End: 2021-12-07 | Stop reason: HOSPADM

## 2021-12-07 RX ORDER — DEXTROSE, SODIUM CHLORIDE, AND POTASSIUM CHLORIDE 5; .45; .15 G/100ML; G/100ML; G/100ML
50 INJECTION INTRAVENOUS CONTINUOUS
Status: DISCONTINUED | OUTPATIENT
Start: 2021-12-07 | End: 2021-12-09 | Stop reason: HOSPADM

## 2021-12-07 RX ORDER — CARVEDILOL 3.12 MG/1
6.25 TABLET ORAL DAILY
Status: DISCONTINUED | OUTPATIENT
Start: 2021-12-08 | End: 2021-12-09 | Stop reason: HOSPADM

## 2021-12-07 RX ORDER — DIPHENHYDRAMINE HCL 25 MG
25 CAPSULE ORAL
Status: DISCONTINUED | OUTPATIENT
Start: 2021-12-07 | End: 2021-12-09 | Stop reason: HOSPADM

## 2021-12-07 RX ORDER — ONDANSETRON 2 MG/ML
INJECTION INTRAMUSCULAR; INTRAVENOUS AS NEEDED
Status: DISCONTINUED | OUTPATIENT
Start: 2021-12-07 | End: 2021-12-07 | Stop reason: HOSPADM

## 2021-12-07 RX ORDER — PROPOFOL 10 MG/ML
INJECTION, EMULSION INTRAVENOUS AS NEEDED
Status: DISCONTINUED | OUTPATIENT
Start: 2021-12-07 | End: 2021-12-07 | Stop reason: HOSPADM

## 2021-12-07 RX ORDER — ONDANSETRON 2 MG/ML
4 INJECTION INTRAMUSCULAR; INTRAVENOUS
Status: DISCONTINUED | OUTPATIENT
Start: 2021-12-07 | End: 2021-12-09 | Stop reason: HOSPADM

## 2021-12-07 RX ORDER — GLYCOPYRROLATE 0.2 MG/ML
INJECTION INTRAMUSCULAR; INTRAVENOUS AS NEEDED
Status: DISCONTINUED | OUTPATIENT
Start: 2021-12-07 | End: 2021-12-07 | Stop reason: HOSPADM

## 2021-12-07 RX ORDER — HYDROCODONE BITARTRATE AND ACETAMINOPHEN 5; 325 MG/1; MG/1
1 TABLET ORAL
Status: DISCONTINUED | OUTPATIENT
Start: 2021-12-07 | End: 2021-12-09 | Stop reason: HOSPADM

## 2021-12-07 RX ORDER — LEVOTHYROXINE SODIUM 25 UG/1
25 TABLET ORAL
Status: DISCONTINUED | OUTPATIENT
Start: 2021-12-08 | End: 2021-12-09 | Stop reason: HOSPADM

## 2021-12-07 RX ORDER — SODIUM CHLORIDE 0.9 % (FLUSH) 0.9 %
5-40 SYRINGE (ML) INJECTION AS NEEDED
Status: DISCONTINUED | OUTPATIENT
Start: 2021-12-07 | End: 2021-12-09 | Stop reason: HOSPADM

## 2021-12-07 RX ORDER — DOCUSATE SODIUM 100 MG/1
100 CAPSULE, LIQUID FILLED ORAL 2 TIMES DAILY
COMMUNITY
End: 2022-07-29 | Stop reason: ALTCHOICE

## 2021-12-07 RX ORDER — HYDROCODONE BITARTRATE AND ACETAMINOPHEN 5; 325 MG/1; MG/1
2 TABLET ORAL AS NEEDED
Status: DISCONTINUED | OUTPATIENT
Start: 2021-12-07 | End: 2021-12-07 | Stop reason: HOSPADM

## 2021-12-07 RX ORDER — PRAVASTATIN SODIUM 40 MG/1
40 TABLET ORAL
Status: DISCONTINUED | OUTPATIENT
Start: 2021-12-07 | End: 2021-12-09 | Stop reason: HOSPADM

## 2021-12-07 RX ORDER — DIPHENHYDRAMINE HYDROCHLORIDE 50 MG/ML
12.5 INJECTION, SOLUTION INTRAMUSCULAR; INTRAVENOUS AS NEEDED
Status: DISCONTINUED | OUTPATIENT
Start: 2021-12-07 | End: 2021-12-07 | Stop reason: HOSPADM

## 2021-12-07 RX ORDER — MORPHINE SULFATE 10 MG/ML
2 INJECTION, SOLUTION INTRAMUSCULAR; INTRAVENOUS
Status: DISCONTINUED | OUTPATIENT
Start: 2021-12-07 | End: 2021-12-08

## 2021-12-07 RX ORDER — MONTELUKAST SODIUM 10 MG/1
10 TABLET ORAL DAILY
Status: DISCONTINUED | OUTPATIENT
Start: 2021-12-08 | End: 2021-12-09 | Stop reason: HOSPADM

## 2021-12-07 RX ORDER — SODIUM CHLORIDE, SODIUM LACTATE, POTASSIUM CHLORIDE, CALCIUM CHLORIDE 600; 310; 30; 20 MG/100ML; MG/100ML; MG/100ML; MG/100ML
20 INJECTION, SOLUTION INTRAVENOUS CONTINUOUS
Status: DISCONTINUED | OUTPATIENT
Start: 2021-12-07 | End: 2021-12-09 | Stop reason: HOSPADM

## 2021-12-07 RX ORDER — ALBUTEROL SULFATE 2.5 MG/.5ML
2.5 SOLUTION RESPIRATORY (INHALATION) AS NEEDED
Status: DISCONTINUED | OUTPATIENT
Start: 2021-12-07 | End: 2021-12-07 | Stop reason: HOSPADM

## 2021-12-07 RX ORDER — DOCUSATE SODIUM 100 MG/1
100 CAPSULE, LIQUID FILLED ORAL 2 TIMES DAILY
Status: DISCONTINUED | OUTPATIENT
Start: 2021-12-07 | End: 2021-12-09 | Stop reason: HOSPADM

## 2021-12-07 RX ORDER — ACETAMINOPHEN 325 MG/1
650 TABLET ORAL
Status: DISCONTINUED | OUTPATIENT
Start: 2021-12-07 | End: 2021-12-09 | Stop reason: HOSPADM

## 2021-12-07 RX ORDER — FLUTICASONE PROPIONATE 50 MCG
SPRAY, SUSPENSION (ML) NASAL DAILY PRN
Status: DISCONTINUED | OUTPATIENT
Start: 2021-12-07 | End: 2021-12-09 | Stop reason: HOSPADM

## 2021-12-07 RX ORDER — DOCUSATE SODIUM 100 MG/1
100 CAPSULE, LIQUID FILLED ORAL 2 TIMES DAILY
Status: ACTIVE | OUTPATIENT
Start: 2021-12-07 | End: 2021-12-08

## 2021-12-07 RX ORDER — SODIUM CHLORIDE 0.9 % (FLUSH) 0.9 %
5-40 SYRINGE (ML) INJECTION AS NEEDED
Status: DISCONTINUED | OUTPATIENT
Start: 2021-12-07 | End: 2021-12-07 | Stop reason: HOSPADM

## 2021-12-07 RX ORDER — DEXAMETHASONE SODIUM PHOSPHATE 4 MG/ML
INJECTION, SOLUTION INTRA-ARTICULAR; INTRALESIONAL; INTRAMUSCULAR; INTRAVENOUS; SOFT TISSUE AS NEEDED
Status: DISCONTINUED | OUTPATIENT
Start: 2021-12-07 | End: 2021-12-07 | Stop reason: HOSPADM

## 2021-12-07 RX ORDER — SENNOSIDES 8.6 MG/1
1 TABLET ORAL DAILY
Status: DISCONTINUED | OUTPATIENT
Start: 2021-12-08 | End: 2021-12-09 | Stop reason: HOSPADM

## 2021-12-07 RX ORDER — ONDANSETRON 2 MG/ML
4 INJECTION INTRAMUSCULAR; INTRAVENOUS AS NEEDED
Status: DISCONTINUED | OUTPATIENT
Start: 2021-12-07 | End: 2021-12-07 | Stop reason: HOSPADM

## 2021-12-07 RX ORDER — SIMETHICONE 80 MG
80 TABLET,CHEWABLE ORAL
Status: DISCONTINUED | OUTPATIENT
Start: 2021-12-07 | End: 2021-12-09 | Stop reason: HOSPADM

## 2021-12-07 RX ORDER — FENTANYL CITRATE 50 UG/ML
50 INJECTION, SOLUTION INTRAMUSCULAR; INTRAVENOUS
Status: DISCONTINUED | OUTPATIENT
Start: 2021-12-07 | End: 2021-12-07 | Stop reason: HOSPADM

## 2021-12-07 RX ORDER — FENTANYL CITRATE 50 UG/ML
INJECTION, SOLUTION INTRAMUSCULAR; INTRAVENOUS AS NEEDED
Status: DISCONTINUED | OUTPATIENT
Start: 2021-12-07 | End: 2021-12-07 | Stop reason: HOSPADM

## 2021-12-07 RX ORDER — BUPIVACAINE HYDROCHLORIDE 2.5 MG/ML
INJECTION, SOLUTION EPIDURAL; INFILTRATION; INTRACAUDAL AS NEEDED
Status: DISCONTINUED | OUTPATIENT
Start: 2021-12-07 | End: 2021-12-07 | Stop reason: HOSPADM

## 2021-12-07 RX ORDER — ROCURONIUM BROMIDE 10 MG/ML
INJECTION, SOLUTION INTRAVENOUS AS NEEDED
Status: DISCONTINUED | OUTPATIENT
Start: 2021-12-07 | End: 2021-12-07 | Stop reason: HOSPADM

## 2021-12-07 RX ORDER — NEOSTIGMINE METHYLSULFATE 5 MG/5 ML
SYRINGE (ML) INTRAVENOUS AS NEEDED
Status: DISCONTINUED | OUTPATIENT
Start: 2021-12-07 | End: 2021-12-07 | Stop reason: HOSPADM

## 2021-12-07 RX ADMIN — SODIUM CHLORIDE, POTASSIUM CHLORIDE, SODIUM LACTATE AND CALCIUM CHLORIDE: 600; 310; 30; 20 INJECTION, SOLUTION INTRAVENOUS at 09:57

## 2021-12-07 RX ADMIN — PHENYLEPHRINE HYDROCHLORIDE 200 MCG: 10 INJECTION INTRAVENOUS at 09:10

## 2021-12-07 RX ADMIN — ONDANSETRON 4 MG: 2 INJECTION INTRAMUSCULAR; INTRAVENOUS at 08:35

## 2021-12-07 RX ADMIN — DOCUSATE SODIUM 100 MG: 100 CAPSULE, LIQUID FILLED ORAL at 13:42

## 2021-12-07 RX ADMIN — ROCURONIUM BROMIDE 20 MG: 50 INJECTION, SOLUTION INTRAVENOUS at 08:35

## 2021-12-07 RX ADMIN — CEFAZOLIN SODIUM 2 G: 1 INJECTION, POWDER, FOR SOLUTION INTRAMUSCULAR; INTRAVENOUS at 08:48

## 2021-12-07 RX ADMIN — Medication 10 ML: at 22:00

## 2021-12-07 RX ADMIN — PROPOFOL 100 MG: 10 INJECTION, EMULSION INTRAVENOUS at 08:35

## 2021-12-07 RX ADMIN — ACETAMINOPHEN 650 MG: 325 TABLET ORAL at 22:01

## 2021-12-07 RX ADMIN — CEFAZOLIN 2 G: 1 INJECTION, POWDER, FOR SOLUTION INTRAMUSCULAR; INTRAVENOUS at 16:40

## 2021-12-07 RX ADMIN — LIDOCAINE HYDROCHLORIDE 40 MG: 20 INJECTION, SOLUTION EPIDURAL; INFILTRATION; INTRACAUDAL; PERINEURAL at 08:35

## 2021-12-07 RX ADMIN — GLYCOPYRROLATE 0.4 MG: 0.2 INJECTION INTRAMUSCULAR; INTRAVENOUS at 10:55

## 2021-12-07 RX ADMIN — ROCURONIUM BROMIDE 30 MG: 50 INJECTION, SOLUTION INTRAVENOUS at 09:10

## 2021-12-07 RX ADMIN — SODIUM CHLORIDE 1000 ML: 9 INJECTION, SOLUTION INTRAVENOUS at 07:33

## 2021-12-07 RX ADMIN — PHENYLEPHRINE HYDROCHLORIDE 200 MCG: 10 INJECTION INTRAVENOUS at 08:58

## 2021-12-07 RX ADMIN — DEXTROSE MONOHYDRATE, SODIUM CHLORIDE, AND POTASSIUM CHLORIDE 125 ML/HR: 50; 4.5; 1.49 INJECTION, SOLUTION INTRAVENOUS at 13:43

## 2021-12-07 RX ADMIN — Medication 3 MG: at 10:55

## 2021-12-07 RX ADMIN — Medication 10 ML: at 13:50

## 2021-12-07 RX ADMIN — SODIUM CHLORIDE, POTASSIUM CHLORIDE, SODIUM LACTATE AND CALCIUM CHLORIDE: 600; 310; 30; 20 INJECTION, SOLUTION INTRAVENOUS at 08:20

## 2021-12-07 RX ADMIN — FENTANYL CITRATE 50 MCG: 50 INJECTION, SOLUTION INTRAMUSCULAR; INTRAVENOUS at 12:04

## 2021-12-07 RX ADMIN — DEXAMETHASONE SODIUM PHOSPHATE 4 MG: 4 INJECTION, SOLUTION INTRA-ARTICULAR; INTRALESIONAL; INTRAMUSCULAR; INTRAVENOUS; SOFT TISSUE at 08:35

## 2021-12-07 RX ADMIN — ACETAMINOPHEN 650 MG: 325 TABLET ORAL at 13:42

## 2021-12-07 RX ADMIN — FENTANYL CITRATE 100 MCG: 50 INJECTION, SOLUTION INTRAMUSCULAR; INTRAVENOUS at 08:35

## 2021-12-07 RX ADMIN — DEXTROSE MONOHYDRATE, SODIUM CHLORIDE, AND POTASSIUM CHLORIDE 125 ML/HR: 50; 4.5; 1.49 INJECTION, SOLUTION INTRAVENOUS at 22:00

## 2021-12-07 RX ADMIN — PRAVASTATIN SODIUM 40 MG: 40 TABLET ORAL at 22:01

## 2021-12-07 RX ADMIN — TRAZODONE HYDROCHLORIDE 100 MG: 50 TABLET ORAL at 22:01

## 2021-12-07 RX ADMIN — DOCUSATE SODIUM 100 MG: 100 CAPSULE, LIQUID FILLED ORAL at 22:01

## 2021-12-07 RX ADMIN — ACETAMINOPHEN 650 MG: 325 TABLET ORAL at 18:36

## 2021-12-07 NOTE — ANESTHESIA PREPROCEDURE EVALUATION
Relevant Problems   CARDIOVASCULAR   (+) Congestive heart failure, unspecified   (+) Coronary atherosclerosis of native coronary artery   (+) Essential hypertension, benign      RENAL FAILURE   (+) Anemia due to stage 3a chronic kidney disease (HCC)   (+) Hydronephrosis   (+) Nonfunctioning kidney   (+) Stage 3 chronic kidney disease (HCC)      HEMATOLOGY   (+) Anemia due to stage 3a chronic kidney disease (HCC)      PERSONAL HX & FAMILY HX OF CANCER   (+) Bladder cancer (HCC)   (+) Malignant neoplasm of prostate (HCC)       Anesthetic History   No history of anesthetic complications            Review of Systems / Medical History  Patient summary reviewed, nursing notes reviewed and pertinent labs reviewed    Pulmonary    COPD               Neuro/Psych       CVA  Dementia    Comments: H/O TBI Cardiovascular    Hypertension          Past MI (1999), CAD, cardiac stents and hyperlipidemia      Comments: 9/2021 TTE    Interpretation Summary    · LV: Calculated LVEF is 50%. Visually measured ejection fraction. Normal cavity size. Mild concentric hypertrophy. Segmentally reduced systolic function. Low normal systolic function. Moderate hypokinesis of the apical septal wall(s). · MV: Mild mitral valve regurgitation is present.        GI/Hepatic/Renal         Renal disease (Creatinine 1.71): CRI       Endo/Other    Diabetes (Diet Controlled): type 2    Arthritis     Other Findings   Comments: Medical History  Blood clots  Back pain  DDD (degenerative disc disease)  Neuropathy Peripheral  Osteoarthritis  Gastroenteritis, viral  Skin disease  Lumbago  Unspecified hereditary and idiopathic peripheral neuropathy  Osteoarthrosis, unspecified whether generalized or localized, unspecified site  Backache, unspecified  Seizures (HCC)  CHF (congestive heart failure) (HCC)  Hematuria, unspecified  Impotence  Neurogenic dysfunction of the urinary bladder  Urgency of urination  Hypercholesterolemia  Urgency of urination  Chronic obstructive pulmonary disease (HCC)  Coronary artery disease  Coronary atherosclerosis of native coronary artery  Heart attack (HCC)  HTN (hypertension), benign  Essential hypertension, benign  Stroke ([facility]  Cancer (HCC)  Bladder cancer ([facility]  Malignant neoplasm of prostate (HCC)  Heart block  Diabetes [facility]  Ill-defined condition  Ill-defined condition  Ill-defined condition  Ill-defined condition  Ill-defined condition  COVID           Physical Exam    Airway  Mallampati: III  TM Distance: 4 - 6 cm  Neck ROM: normal range of motion   Mouth opening: Normal     Cardiovascular    Rhythm: regular  Rate: normal         Dental    Dentition: Edentulous     Pulmonary  Breath sounds clear to auscultation               Abdominal  GI exam deferred       Other Findings   Comments: Results for Mary Ellen Moreno (MRN [id number]) as of 12/7/2021 08:15    12/6/2021 14:43  WBC: 11.5 (H)  NRBC: 0.0  RBC: 3.92 (L)  HGB: 9.2 (L)  HCT: 30.7 (L)  MCV: 78.3 (L)  MCH: 23.5 (L)  MCHC: 30.0  RDW: 17.1 (H)  PLATELET: 529  MPV: 58.5         Anesthetic Plan    ASA: 3  Anesthesia type: general          Induction: Intravenous  Anesthetic plan and risks discussed with: Patient and Family

## 2021-12-07 NOTE — PROGRESS NOTES
Reason for Admission:  Chronic Pyelonephritis                      RUR Score:  16%                PCP: First and Last name:   Deepa Boyd NP     Name of Practice:    Are you a current patient: Yes/No: yes   Approximate date of last visit: approx 3 months ago   Can you participate in a virtual visit if needed: no    Do you (patient/family) have any concerns for transition/discharge? No concerns noted from patient's wife    Plan for utilizing home health: agreeable if needed, recently had [facility] cannot recall agency name      Current Advanced Directive/Advance Care Plan:  Full Code      Healthcare Decision Maker:   Click here to complete [address] including selection of the Healthcare Decision Maker Relationship (ie \"Primary\")            Primary Decision Maker: Yonas Wehlan - Spouse - [phone]    Transition of Care Plan: CM met with the patient and his wife at the bedside. Patient has some confusion and does not talk a lot. Patient's wife, Thom Ken, answered all questions. Patient lives at home with his wife who is his primary caretaker. Wife reported that the patient at baseline uses a walker and a wheelchair. At times he can use the walker but she reported at home she uses the wheelchair to get him from room to room due to his legs being weak. She is with him all the time and assists him with his needs. They have all DME needed: walker, rollator, wheelchair, shower chair, and an elevated toilet seat. We discussed potential discharge needs and she reported they had home health coming for therapy but she cannot recall the name. She is unsure if it would be beneficial to have again or not as she does not feel that the patient retains anything due to short term memory loss. She reported she will let CM know if they would like HH and reconsider if it is recommended. CM will continue to follow.

## 2021-12-07 NOTE — OP NOTES
Patient: David Quiñones MRN: [id number]  SSN: [id number]    YOB: 1950  Age: 66 y.o. Sex: male              UROLOGY OPERATIVE NOTE    Pre-operative Diagnosis: Chronic pyelonephritis, renal hemorrhage, nonfunctioning kidney, history of bladder cancer, history of urostomy  Post-operative Diagnosis: same   Procedure: Left laparoscopic radical nephrectomy    Surgeon: Aniceto Sapp MD  Assistant: none  Anesthesia:  Per anesthesia  Findings: Significant induration around the left kidney  Estimated Blood Loss:    50cc  Drains: 23 Guamanian round Benjamin drain left upper quadrant  Specimens: Kidney and partial ureter  Complications: none           Procedure Details: The patient was seen in the pre-operative area. The risks, benefits, complications, alternative treatment options, and expected outcomes were again discussed with the patient. The possibilities of reaction to medication, pain, infection, bleeding, major cardiovascular event, death, damage to surrounding structures were specifically addressed. Informed consent was then obtained. Upon arrival to the operative suite, the patient, procedure, and side were confirmed via a pre-operative \"time-out\". All were in agreement. The patient was carefully positioned and anesthesia was undertaken. A bump was placed under the left flank. All pressure points were padded and the patient was secured to the table. The patient had a right lower quadrant urostomy. The stoma bag was removed and a 21 Guamanian Montero catheter was placed in the subfascial position with 10 cc in the balloon. The left flank nephrostomy tube site was also prepped sterilely within the field. The abdomen and stoma were prepped sterilely with Betadine scrub and paint. 4 x 4's were placed over the stoma and catheter and a Ioban dressing was placed over the abdomen.     An approximately 7 cm epigastric incision was made and dissection was continued through the subcutaneous tissues to the fascia. This was opened in the midline. Mild adhesions to the midline were taken down. The peritoneum was entered. A GelPort was placed for hand access. A 5 mm trocar was placed in the upper quadrant and a 12 mm trocar was placed in the lower quadrant. CO2 insufflation was started to 15 mmHg pressure. Mild abdominal adhesions were taken down. The white line of Toldt was incised laterally and the colon was collected medially. The kidney was palpable and indurated and enlarged. The ureter was identified and dissected up to the renal hilum. Distally the ureter was very scarred into the level of the iliac crossing. The aorta was identified. The renal artery and vein were dissected out. Using an Endo EZRA stapler with a vascular staple load, the renal artery was staple ligated. With a separate staple load the renal vein was staple ligated. Using harmonic scalpel dissection was continued outside of Gerota's fascia with sparing of the adrenal gland. The superior lateral and posterior attachments were taken down. The ureter was dissected toward the pelvis and clipped and incised. The specimen was then freely mobile. The specimen was extracted via the GelPort and passed off for pathologic examination. Insufflation was reestablished and the fossa was carefully inspected. Hemostasis was excellent. Through the left upper quadrant port site a 23 Iraqi drain was placed into the renal fossa. This is secured with a nylon stitch. The colon was placed in the retroperitoneum and the ports were removed under direct vision. The GelPort was removed and the transversalis fascia was anesthetized with quarter percent Marcaine. The rectus fascia was closed with 0 looped PDS running suture and figure-of-eight #1 Prolene. The skin incisions were closed with 4-0 Monocryl subcuticular sutures and skin glue. The catheter was removed out of the stoma. Stoma appliance was applied.     The patient tolerated the procedure well and was transported in stable condition to recovery.       Cole Moss MD

## 2021-12-07 NOTE — ANESTHESIA POSTPROCEDURE EVALUATION
Procedure(s):  LAPAROSCOPIC LEFT RADICAL NEPHRECTOMY.     general    Anesthesia Post Evaluation      Multimodal analgesia: multimodal analgesia not used between 6 hours prior to anesthesia start to PACU discharge  Patient location during evaluation: PACU  Patient participation: complete - patient participated  Level of consciousness: awake and alert  Pain score: 0  Pain management: adequate  Airway patency: patent  Anesthetic complications: no  Cardiovascular status: acceptable, blood pressure returned to baseline and hemodynamically stable  Respiratory status: acceptable, nonlabored ventilation, spontaneous ventilation, unassisted and room air  Hydration status: acceptable  Post anesthesia nausea and vomiting:  none  Final Post Anesthesia Temperature Assessment:  Normothermia (36.0-37.5 degrees C)      INITIAL Post-op Vital signs:   Vitals Value Taken Time   /88 12/07/21 1130   Temp 36 °C (96.8 °F) 12/07/21 1114   Pulse 59 12/07/21 1130   Resp 18 12/07/21 1130   SpO2 96 % 12/07/21 1130

## 2021-12-07 NOTE — PROGRESS NOTES
Pt arrived to unit accompanied by Pacu RN  Vital signs stable  Family at bedside  Will monitor per unit protocol

## 2021-12-08 LAB
BACTERIA SPEC CULT: NORMAL
COLONY COUNT,CNT: NORMAL
COLONY COUNT,CNT: NORMAL
GLUCOSE BLD STRIP.AUTO-MCNC: 117 MG/DL (ref 65–117)
GLUCOSE BLD STRIP.AUTO-MCNC: 141 MG/DL (ref 65–117)
PERFORMED BY, TECHID: ABNORMAL
PERFORMED BY, TECHID: NORMAL
SPECIAL REQUESTS,SREQ: NORMAL

## 2021-12-08 PROCEDURE — 74011250637 HC RX REV CODE- 250/637: Performed by: NURSE PRACTITIONER

## 2021-12-08 PROCEDURE — 74011250637 HC RX REV CODE- 250/637: Performed by: UROLOGY

## 2021-12-08 PROCEDURE — 74011250636 HC RX REV CODE- 250/636: Performed by: UROLOGY

## 2021-12-08 PROCEDURE — 65270000029 HC RM PRIVATE

## 2021-12-08 PROCEDURE — 82962 GLUCOSE BLOOD TEST: CPT

## 2021-12-08 PROCEDURE — 99024 POSTOP FOLLOW-UP VISIT: CPT | Performed by: NURSE PRACTITIONER

## 2021-12-08 PROCEDURE — 74011000250 HC RX REV CODE- 250: Performed by: UROLOGY

## 2021-12-08 PROCEDURE — 97530 THERAPEUTIC ACTIVITIES: CPT

## 2021-12-08 PROCEDURE — 97161 PT EVAL LOW COMPLEX 20 MIN: CPT

## 2021-12-08 RX ORDER — MORPHINE SULFATE 2 MG/ML
2 INJECTION, SOLUTION INTRAMUSCULAR; INTRAVENOUS
Status: DISCONTINUED | OUTPATIENT
Start: 2021-12-08 | End: 2021-12-09 | Stop reason: HOSPADM

## 2021-12-08 RX ADMIN — CEFAZOLIN 2 G: 1 INJECTION, POWDER, FOR SOLUTION INTRAMUSCULAR; INTRAVENOUS at 08:27

## 2021-12-08 RX ADMIN — CARVEDILOL 6.25 MG: 3.12 TABLET, FILM COATED ORAL at 08:26

## 2021-12-08 RX ADMIN — SENNOSIDES 8.6 MG: 8.6 TABLET, COATED ORAL at 08:27

## 2021-12-08 RX ADMIN — DEXTROSE MONOHYDRATE, SODIUM CHLORIDE, AND POTASSIUM CHLORIDE 125 ML/HR: 50; 4.5; 1.49 INJECTION, SOLUTION INTRAVENOUS at 06:58

## 2021-12-08 RX ADMIN — SIMETHICONE 80 MG: 80 TABLET, CHEWABLE ORAL at 08:27

## 2021-12-08 RX ADMIN — Medication 10 ML: at 06:20

## 2021-12-08 RX ADMIN — DOCUSATE SODIUM 100 MG: 100 CAPSULE, LIQUID FILLED ORAL at 08:27

## 2021-12-08 RX ADMIN — Medication 10 ML: at 20:57

## 2021-12-08 RX ADMIN — CEFAZOLIN 2 G: 1 INJECTION, POWDER, FOR SOLUTION INTRAMUSCULAR; INTRAVENOUS at 00:08

## 2021-12-08 RX ADMIN — LEVOTHYROXINE SODIUM 25 MCG: 0.03 TABLET ORAL at 07:02

## 2021-12-08 RX ADMIN — CEFAZOLIN 2 G: 1 INJECTION, POWDER, FOR SOLUTION INTRAMUSCULAR; INTRAVENOUS at 20:52

## 2021-12-08 RX ADMIN — FLUOXETINE 40 MG: 20 CAPSULE ORAL at 08:27

## 2021-12-08 RX ADMIN — MONTELUKAST 10 MG: 10 TABLET, FILM COATED ORAL at 08:27

## 2021-12-08 RX ADMIN — CEFAZOLIN 2 G: 1 INJECTION, POWDER, FOR SOLUTION INTRAMUSCULAR; INTRAVENOUS at 15:59

## 2021-12-08 RX ADMIN — ACETAMINOPHEN 650 MG: 325 TABLET ORAL at 08:27

## 2021-12-08 RX ADMIN — TRAZODONE HYDROCHLORIDE 100 MG: 50 TABLET ORAL at 20:53

## 2021-12-08 RX ADMIN — DOCUSATE SODIUM 100 MG: 100 CAPSULE, LIQUID FILLED ORAL at 20:52

## 2021-12-08 RX ADMIN — ACETAMINOPHEN 650 MG: 325 TABLET ORAL at 02:26

## 2021-12-08 RX ADMIN — HYDROCODONE BITARTRATE AND ACETAMINOPHEN 1 TABLET: 5; 325 TABLET ORAL at 18:18

## 2021-12-08 RX ADMIN — PRAVASTATIN SODIUM 40 MG: 40 TABLET ORAL at 20:53

## 2021-12-08 RX ADMIN — ACETAMINOPHEN 650 MG: 325 TABLET ORAL at 14:25

## 2021-12-08 NOTE — PROGRESS NOTES
Problem: Mobility Impaired (Adult and Pediatric)  Goal: *Acute Goals and Plan of Care (Insert Text)  Description: I with LE HEP x7 days  SBA with bed mob x7 days  SBA with transfers x 7 days  Amb 25-75ft with LRAD and CGAx1 x7 days    Outcome: Not Met    PHYSICAL THERAPY EVALUATION  Patient: Merna Barfield (37 y.o. male)  Date: 12/8/2021  Primary Diagnosis: Chronic pyelonephritis [N11.9]  Renal hemorrhage, left [N28.89]  History of bladder cancer [Z85.51]  Attention to urostomy ([address] [Z43.6]  Procedure(s) (LRB):  LAPAROSCOPIC LEFT RADICAL NEPHRECTOMY (Left) 1 Day Post-Op   Precautions:      ASSESSMENT    75yo M s/p laproscopic L radial nephrectomy presents to PT with decreased bed mob, transfers, LE strength, gt, balance, activity tolerance, and overall functional mobility. PMH listed below, of significance is hx of short term memory loss. Per nursing, pt's spouse is primary caregiver at home and assists pt with ADLs. Pt alert and oriented to name/place/birthday, disoriented to time/situation upon PT arrival. Pt agreeable to work with PT. Pt states he lives in 2 story home with spouse, 8STE home with rails and 8 steps to get to second floor, all with rails. PTA pt reports that he used a cane for amb. Currently, pt is min A for supine to sit EOB. Min A for sit to stand. Pt unsteady upon standing and req UE support on RW. Pt able to take several side steps at EOB up to [facility], but declined further amb. Pt was assisted back to supine in bed with min A. Pt may benefit from skilled PT to address his functional deficits. Recommend HHPT upon d/c at this time. PLAN :  Recommendations and Planned Interventions: bed mobility training, transfer training, gait training, therapeutic exercises, patient and family training/education, and therapeutic activities      Frequency/Duration: Patient will be followed by physical therapy:  5 times a week to address goals.     Recommendation for discharge: (in order for the patient to meet his/her long term goals)  HHPT           SUBJECTIVE:   Patient supine in bed upon PT arrival, agreeable to work with PT    OBJECTIVE DATA SUMMARY:   HISTORY:    Past Medical History:   Diagnosis Date    Back pain     Backache, unspecified 2/8/2010    Bladder cancer ([facility] 7/14/2020    He is s/p TURBT on 6/25/2020. No obvious residual tumor, but tessy and induration at the base. Pathology reads invasive papillary urothelial carcinoma, high grade (bladder) and invasive high grade urothelial carcinoma pT2 (bladder base). 07- visit He has high grade muscle invasive bladder cancer. I thought it was grossly resected but pathology reveals high grade disease. He is at high risk o    Blood clots 2004    spine    Cancer [facility]) 2013    prostate    CHF (congestive heart failure) ([facility] 1999    Chronic obstructive pulmonary disease (HCC)     Coronary artery disease     3 stents    Coronary atherosclerosis of native coronary artery 2/8/2010    COVID     10/2021, antibody infusion 10/2021    DDD (degenerative disc disease)     Diabetes ([address]     patient states no longer has diabetes    Essential hypertension, benign 2/8/2010    Gastroenteritis, viral     Heart attack ([address]     1999    Heart block     95%    Hematuria, unspecified 7/14/2020    He had gross hematuria and clots in his Depends. He notes not pain in the bladder or flank. CT 4/20/20 without renal concerns. Tumor found on cystsocopy on 5/29/2020. HTN (hypertension), benign     patient states no longer has htn    Hypercholesterolemia     Ill-defined condition     short term memory loss since SDH    Ill-defined condition     urinary incontinence    Ill-defined condition     limited vision R eye    Ill-defined condition 1996    concussion with subdural hematoma    Ill-defined condition 1999 & early 2000s    Mycardial Infarction    Impotence 7/14/2020    He has ED. He and his partner are not active. Viagra was no longer helpful.     Lumbago 2/8/2010 Malignant neoplasm of prostate ([facility] 7/14/2020    He is s/p a robotic prostatectomy 10/18/13. He had Corey's 6 disease wtih negative margins and negative nodes. His PSA has been undetectable, last drawn on 5/14/2020. Neurogenic dysfunction of the urinary bladder 7/14/2020    He has a hypotonic bladder and is emptying with Valsalva. He has mild ISD and urge incontinence. He has some enuresis. Neuropathy Peripheral     pain in feet    Osteoarthritis     Osteoarthrosis, unspecified whether generalized or localized, unspecified site 2/8/2010    Seizures ([facility] [facility]     1996 after head injury    Skin disease     itching alot    Stroke [facility]) 1996    Unspecified hereditary and idiopathic peripheral neuropathy 02/08/2010    Urgency of urination 7/14/2020    He has occasional urgency. He wears depends for a precaution. He does have an Acticuff clamp. He had urgency and episodic incontinence prior to prostate surgery. He does not leak with cough or sneeze. He drinks diet Pepsi all day long and less often coffee. He has chronic back problems with back surgery 2004, 1/2016, 12/2017 and 2/2018. He had a spinal stimulator which was removed. He has had thi    Urgency of urination 7/14/2020    He has occasional urgency. He wears depends for a precaution. He does have an Acticuff clamp. He had urgency and episodic incontinence prior to prostate surgery. He does not leak with cough or sneeze. He drinks diet Pepsi all day long and less often coffee. He has chronic back problems with back surgery 2004, 1/2016, 12/2017 and 2/2018. He had a spinal stimulator which was removed.  He has had thi     Past Surgical History:   Procedure Laterality Date    COLONOSCOPY N/A 4/12/2018    COLONOSCOPY performed by Corrie López MD at [address]      age 8    HX [address]  2002    HX BACK SURGERY  2004    HX CATARACT REMOVAL      with lens implant    HX CIRCUMCISION      HX HEART CATHETERIZATION  [id number]    stents x3 HX PROSTATE SURGERY  08/24/2020    PELVIC LYMPH NODE DISSECTION     HX PROSTATE SURGERY  10/18/2013    PELVIC LYMPH NODE DISSECTION     HX PROSTATECTOMY  10/18/2013    HX TONSILLECTOMY      as young child    HX UROLOGICAL  06/25/2020    TURBT    HX UROLOGICAL  06/25/2020    TURBT    HX UROLOGICAL  05/29/2020    CYSTOSCOPY     HX UROLOGICAL  08/24/2020    ROBOTIC CYSTECTOMY    HX UROLOGICAL  08/24/2020    ILEAL CONDUIT URINARY DIVERSION     HX UROLOGICAL  08/26/2021    LOOPSCOPY     HX UROLOGICAL  08/26/2021    LOOPOGRAM    HX UROLOGICAL  08/26/2021    URETHRAL CATHERIZATION     HX UROLOGICAL  12/07/2021    Left laparoscopic radical nephrectomy    IR EXCHANGE NEPHRO PERC LT SI  9/21/2021    IR NEPHROSTOMY PERC LT PLC CATH  SI  9/7/2021    IR NEPHROSTOMY PERC LT PLC CATH  SI  9/14/2021    IR OCCL [place] [address] SI  9/13/2021    IR REMOVE NEPH TUBE RT  9/13/2021    NEUROLOGICAL PROCEDURE UNLISTED  1996    evacuate SDH    NEUROLOGICAL PROCEDURE UNLISTED      implant neuro stimulator in back for neuropathy in feet. Pt no longer has this, it was removed.        Personal factors and/or comorbidities impacting plan of care:     Home Situation  Home Environment: Private residence  # Steps to Enter: 8  Rails to Enter: Yes  One/Two Story Residence: Two story  # of Interior Steps: 8  Interior Rails: Both  Living Alone: No  Support Systems: Spouse/Significant Other  Patient Expects to be Discharged toF Cor[de-identified]ration  Current DME Used/Available at Home: Cane, straight        EXAMINATION/PRESENTATION/DECISION MAKING:   Critical Behavior:  Neurologic State: Alert  Orientation Level: Oriented to person, Oriented to place, Disoriented to time, Disoriented to situation  Cognition: Follows commands, Poor safety awareness           Range Of Motion:  AROM: Generally decreased, functional     B LE    Strength:    Strength: Generally decreased, functional     Grossly 3+/5 B LE    Tone & Sensation:      Appears intact to LT B LE      Functional Mobility:  Bed Mobility:     Supine to Sit: Minimum assistance  Sit to Supine: Minimum assistance  Scooting: Minimum assistance    Transfers:  Sit to Stand: Minimum assistance  Stand to Sit: Minimum assistance                       Balance:   Sitting: Intact; With support  Standing: Intact; Pull to stand; With support    Ambulation/Gait Training:  Pt able to take a few steps at EOB with RW and min Ax1, declined further amb        Functional Measure:  [address] [facility]  How much difficulty does the patient currently have. .. Unable A Lot A Little None   1. Turning over in bed (including adjusting bedclothes, sheets and blankets)? [] 1   [] 2   [x] 3   [] 4   2. Sitting down on and standing up from a chair with arms ( e.g., wheelchair, bedside commode, etc.)   [] 1   [] 2   [x] 3   [] 4   3. Moving from lying on back to sitting on the side of the bed? [] 1   [] 2   [x] 3   [] 4          How much help from another person does the patient currently need. .. Total A Lot A Little None   4. Moving to and from a bed to a chair (including a wheelchair)? [] 1   [] 2   [x] 3   [] 4   5. Need to walk in hospital room? [] 1   [] 2   [x] 3   [] 4   6. Climbing 3-5 steps with a railing? [] 1   [] 2   [x] 3   [] 4   © 2007, Trustees of [address] [place], [place] [address], under license to [employer]. All rights reserved     Score:  Initial: 18 Most Recent: X (Date: -- )   Interpretation of Tool:  Represents activities that are increasingly more difficult (i.e. Bed mobility, Transfers, Gait).   Score 24 23 22-20 19-15 14-10 9-7 6   Modifier CH CI CJ CK CL CM CN           Physical Therapy Evaluation Charge Determination   History Examination Presentation Decision-Making   MEDIUM  Complexity : 1-2 comorbidities / personal factors will impact the outcome/ POC  MEDIUM Complexity : 3 Standardized tests and measures addressing body structure, function, activity limitation and / or participation in recreation  LOW Complexity : Stable, uncomplicated  Other outcome measures [facility]  MEDIUM      Based on the above components, the patient evaluation is determined to be of the following complexity level: LOW     Pain Rating:  Pt c/o neck pain 8/10    Activity Tolerance:   Fair      After treatment patient left in no apparent distress:   Supine in bed, Call bell within reach, and Bed / chair alarm activated          COMMUNICATION/EDUCATION:   The patients plan of care was discussed with: Registered nurse. Patient understands intent and goals of therapy, but is neutral about his/her participation.     Thank you for this referral.  Diane Gifford   Time Calculation: 20 mins

## 2021-12-08 NOTE — PROGRESS NOTES
UROLOGY Progress Note         [phone]      Daily Progress Note: 12/8/2021      Subjective: The patient is seen for UROLOGIC follow up. He is POD 1 from LEFT laparoscopic radical nephrectomy. LUCY drain in place; 120 cc out. Bloody. He feels well this morning. He had not required narcotic analgesia. Not passing flatus. Tolerating clears. PT ordered. Complains of neck discomfort. Pillows adjusted; given a warm blanket.       Problem List:  Patient Active Problem List   Diagnosis Code    Lumbago M54.50    Hereditary and idiopathic peripheral neuropathy G60.9    Osteoarthrosis, unspecified whether generalized or localized, unspecified site M19.90    Backache, unspecified M54.9    Congestive heart failure, unspecified I50.9    Essential hypertension, benign I10    Coronary atherosclerosis of native coronary artery I25.10    Spondylolisthesis at L2-L3 level M43.16    Lumbar stenosis M48.061    Bladder cancer ([facility] C67.9    Malignant neoplasm of prostate ([facility] C61    Hydronephrosis N13.30    Stage 3 chronic kidney disease ([facility] N18.30    Malfunction of nephrostomy tube ([facility] T83.098A    Nonfunctioning kidney N28.9    Anemia due to stage 3a chronic kidney disease (HCC) N18.31, D63.1    Chronic pyelonephritis N11.9    History of bladder cancer Z85.51    Renal hemorrhage, left N28.89    Attention to urostomy [facility]) Z43.6         Medications reviewed  Current Facility-Administered Medications   Medication Dose Route Frequency    lactated Ringers infusion  20 mL/hr IntraVENous CONTINUOUS    sodium chloride (NS) flush 5-40 mL  5-40 mL IntraVENous Q8H    sodium chloride (NS) flush 5-40 mL  5-40 mL IntraVENous PRN    ceFAZolin (ANCEF) 2 g in sterile water (preservative free) 20 mL IV syringe  2 g IntraVENous Q8H    acetaminophen (TYLENOL) tablet 650 mg  650 mg Oral Q4H PRN    HYDROcodone-acetaminophen (NORCO) 5-325 mg per tablet 1 Tablet  1 Tablet Oral Q4H PRN    morphine 10 mg/mL injection 2 mg  2 mg IntraVENous Q4H PRN    ondansetron (ZOFRAN) injection 4 mg  4 mg IntraVENous Q4H PRN    docusate sodium (COLACE) capsule 100 mg  100 mg Oral BID    dextrose 5% - 0.45% NaCl with KCl 20 mEq/L infusion  125 mL/hr IntraVENous CONTINUOUS    sodium chloride (NS) flush 5-40 mL  5-40 mL IntraVENous Q8H    sodium chloride (NS) flush 5-40 mL  5-40 mL IntraVENous PRN    ondansetron (ZOFRAN) injection 4 mg  4 mg IntraVENous Q6H PRN    acetaminophen (TYLENOL) tablet 650 mg  650 mg Oral Q4H PRN    HYDROcodone-acetaminophen (NORCO) 5-325 mg per tablet 1 Tablet  1 Tablet Oral Q4H PRN    docusate sodium (COLACE) capsule 100 mg  100 mg Oral BID    simethicone (MYLICON) tablet 80 mg  80 mg Oral QID PRN    diphenhydrAMINE (BENADRYL) capsule 25 mg  25 mg Oral Q6H PRN    . PHARMACY TO SUBSTITUTE PER PROTOCOL (Reordered from: budesonide-glycopyr-formoterol (Breztri Aerosphere) 160-9-4.8 mcg/actuation HFAA)    Per Protocol    carvediloL (COREG) tablet 6.25 mg  6.25 mg Oral DAILY    FLUoxetine (PROzac) capsule 40 mg  40 mg Oral DAILY    fluticasone propionate (FLONASE) 50 mcg/actuation nasal spray   Both Nostrils DAILY PRN    levothyroxine (SYNTHROID) tablet 25 mcg  25 mcg Oral ACB    montelukast (SINGULAIR) tablet 10 mg  10 mg Oral DAILY    pravastatin (PRAVACHOL) tablet 40 mg  40 mg Oral QHS    senna (SENOKOT) tablet 8.6 mg  1 Tablet Oral DAILY    traZODone (DESYREL) tablet 100 mg  100 mg Oral QHS       Review of Systems:   Review of Systems   Constitutional: Negative for chills and fever. Respiratory: Negative for shortness of breath. Gastrointestinal: Negative for nausea and vomiting. No flatus   Musculoskeletal: Positive for neck pain. Objective:   Physical Exam  Vitals and nursing note reviewed. Pulmonary:      Effort: Pulmonary effort is normal.   Abdominal:      General: Abdomen is flat. There is no distension. Comments: LUCY drain L side; bloody drainage. Genitourinary:     Comments: Conduit with clear yellow urine and pink stoma  Skin:     General: Skin is warm and dry. Neurological:      Mental Status: He is alert and oriented to person, place, and time. Psychiatric:         Behavior: Behavior normal.          Visit Vitals  BP (!) 152/69 (BP 1 Location: Left upper arm, BP Patient Position: At rest)   Pulse 60   Temp 97.7 °F (36.5 °C)   Resp 18   Ht 5' 7\" (1.702 m)   Wt 141 lb (64 kg)   SpO2 98%   BMI 22.08 kg/m²         Data Review:       Recent Days:  Recent Labs     12/06/21  1443   WBC 11.5*   HGB 9.2*   HCT 30.7*        Recent Labs     12/06/21  1443      K 4.2      CO2 29   GLU 96   BUN 21*   CREA 1.71*   CA 8.6       24 Hour Results:  Recent Results (from the past 24 hour(s))   GLUCOSE, POC    Collection Time: 12/08/21  7:33 AM   Result Value Ref Range    Glucose (POC) 141 (H) 65 - 117 mg/dL    Performed by TAIWO FAIRBANKS            Assessment/     Patient Active Problem List   Diagnosis Code    Lumbago M54.50    Hereditary and idiopathic peripheral neuropathy G60.9    Osteoarthrosis, unspecified whether generalized or localized, unspecified site M19.90    Backache, unspecified M54.9    Congestive heart failure, unspecified I50.9    Essential hypertension, benign I10    Coronary atherosclerosis of native coronary artery I25.10    Spondylolisthesis at L2-L3 level M43.16    Lumbar stenosis M48.061    Bladder cancer (HCC) C67.9    Malignant neoplasm of prostate ([address] C61    Hydronephrosis N13.30    Stage 3 chronic kidney disease (HCC) N18.30    Malfunction of nephrostomy tube ([address] T83.098A    Nonfunctioning kidney N28.9    Anemia due to stage 3a chronic kidney disease (HCC) N18.31, D63.1    Chronic pyelonephritis N11.9    History of bladder cancer Z85.51    Renal hemorrhage, left N28.89    Attention to urostomy ([address] Z43.6       Plan:  POD 1    · Advance diet as tolerated to soft.  Nutrition consult; he may require supplements. · Colleen Nigh IVF when tolerating soft diet. · Physical Therapy; impaired mobility baseline; would like to have patient get OOB for several hours; ambulate. · Record LUCY drain output.     Care Plan discussed with: Dr. Cordelia Garcia, Attending Physician      Sarika Christina NP

## 2021-12-08 NOTE — PROGRESS NOTES
\"Bedside\" shift change report given to Paul Stephens (oncoming nurse) by Karla Purcell (offgoing nurse).

## 2021-12-08 NOTE — CONSULTS
Comprehensive Nutrition Assessment    Type and Reason for Visit: Consult (ONS)    Nutrition Recommendations/Plan:   Adjust to easy to chew diet    Add ensure clear BID  Add magic cup daily    Document % intakes and BM in I/O's  Continue bowel regimen as needed    Nutrition Assessment:  S/p L laparoscopic radical nephrectomy, day 1. RD consulted for diet adv and ONS. CL x1. Advanced to soft diet today. RD visited pt bedside, consumed ~10% of CL tray. Poor sharifa/PO s/p procedure. Noted intakes and sharifa usual good PTA. Pt agreeable to ensure clear and magic cup. Does not like ensure HP/enlive. Labs: Glu , H/H 9.2/30.7. Meds: coreg, D5 at 50ml/hr, colace, singulair, statin, IVF. Malnutrition Assessment:  Malnutrition Status:  Mild malnutrition    Context:  Acute illness     Findings of the 6 clinical characteristics of malnutrition:   Energy Intake:  1 - 75% or less of est energy req for 7 or more days  Weight Loss:  No significant weight loss     Body Fat Loss:  1 - Mild body fat loss, Orbital   Muscle Mass Loss:  1 - Mild muscle mass loss, Hand (interosseous)  Fluid Accumulation:  No significant fluid accumulation,      Nutrition Related Findings:  NFPE showing areas with mild wasting. Denies c/s issues. No n/v, but has abd pain. No BM documented since admit, started on colace. Has urinary ostomy. No edema. Wounds:    Surgical incision (abd)       Current Nutrition Therapies:  ADULT DIET Dysphagia - Soft & Bite Sized    Anthropometric Measures:  · Height:  5' 7\" (170.2 cm)  · Current Body Wt:  72.6 kg (160 lb)    · Usual Body Wt:  65.8 kg (145 lb)     · Ideal Body Wt:  148 lbs:  108.1 %   · BMI Category: Overweight (BMI 25.0-29. 9)       Nutrition Diagnosis:   · Inadequate oral intake related to acute injury/trauma (s/p kidney removal) as evidenced by intake 0-25%    Nutrition Interventions:   Food and/or Nutrient Delivery: Modify current diet, Start oral nutrition supplement  Nutrition Education and Counseling: Education not indicated  Coordination of Nutrition Care: Continue to monitor while inpatient    Goals:  Pt to meet >50% of EEN within 3 days. BM every 1-2 days. Nutrition Monitoring and Evaluation:   Behavioral-Environmental Outcomes: None identified  Food/Nutrient Intake Outcomes: Food and nutrient intake, Supplement intake  Physical Signs/Symptoms Outcomes: Constipation, Weight, Meal time behavior    Discharge Planning:     Too soon to determine     Electronically signed by Frank Lyman RD on 12/8/2021 at 11:26 AM    Contact: [phone]

## 2021-12-08 NOTE — ROUTINE PROCESS
Bedside shift change report given to Daryl Angulo (oncoming nurse) by Chevy Hernandez (offgoing nurse). Report included the following information SBAR.

## 2021-12-09 VITALS
OXYGEN SATURATION: 97 % | HEART RATE: 78 BPM | TEMPERATURE: 97.1 F | HEIGHT: 67 IN | DIASTOLIC BLOOD PRESSURE: 73 MMHG | RESPIRATION RATE: 18 BRPM | WEIGHT: 160 LBS | BODY MASS INDEX: 25.11 KG/M2 | SYSTOLIC BLOOD PRESSURE: 166 MMHG

## 2021-12-09 LAB
ANION GAP SERPL CALC-SCNC: 5 MMOL/L (ref 5–15)
BUN SERPL-MCNC: 16 MG/DL (ref 6–20)
BUN/CREAT SERPL: 10 (ref 12–20)
CA-I BLD-MCNC: 8.9 MG/DL (ref 8.5–10.1)
CHLORIDE SERPL-SCNC: 104 MMOL/L (ref 97–108)
CO2 SERPL-SCNC: 28 MMOL/L (ref 21–32)
CREAT SERPL-MCNC: 1.59 MG/DL (ref 0.7–1.3)
ERYTHROCYTE [DISTWIDTH] IN BLOOD BY AUTOMATED COUNT: 17.2 % (ref 11.5–14.5)
GLUCOSE SERPL-MCNC: 103 MG/DL (ref 65–100)
HCT VFR BLD AUTO: 36 % (ref 36.6–50.3)
HGB BLD-MCNC: 10.7 G/DL (ref 12.1–17)
MCH RBC QN AUTO: 23 PG (ref 26–34)
MCHC RBC AUTO-ENTMCNC: 29.7 G/DL (ref 30–36.5)
MCV RBC AUTO: 77.4 FL (ref 80–99)
NRBC # BLD: 0 K/UL (ref 0–0.01)
NRBC BLD-RTO: 0 PER 100 WBC
PLATELET # BLD AUTO: 471 K/UL (ref 150–400)
PMV BLD AUTO: 10 FL (ref 8.9–12.9)
POTASSIUM SERPL-SCNC: 4.8 MMOL/L (ref 3.5–5.1)
RBC # BLD AUTO: 4.65 M/UL (ref 4.1–5.7)
SODIUM SERPL-SCNC: 137 MMOL/L (ref 136–145)
WBC # BLD AUTO: 12.6 K/UL (ref 4.1–11.1)

## 2021-12-09 PROCEDURE — 99024 POSTOP FOLLOW-UP VISIT: CPT | Performed by: NURSE PRACTITIONER

## 2021-12-09 PROCEDURE — 80048 BASIC METABOLIC PNL TOTAL CA: CPT

## 2021-12-09 PROCEDURE — 85027 COMPLETE CBC AUTOMATED: CPT

## 2021-12-09 PROCEDURE — 74011000250 HC RX REV CODE- 250: Performed by: UROLOGY

## 2021-12-09 PROCEDURE — 74011250636 HC RX REV CODE- 250/636: Performed by: UROLOGY

## 2021-12-09 PROCEDURE — 74011250637 HC RX REV CODE- 250/637: Performed by: UROLOGY

## 2021-12-09 PROCEDURE — 36415 COLL VENOUS BLD VENIPUNCTURE: CPT

## 2021-12-09 PROCEDURE — 74011250637 HC RX REV CODE- 250/637: Performed by: NURSE PRACTITIONER

## 2021-12-09 RX ORDER — SODIUM BICARBONATE 650 MG/1
650 TABLET ORAL 2 TIMES DAILY
Status: DISCONTINUED | OUTPATIENT
Start: 2021-12-09 | End: 2021-12-09 | Stop reason: HOSPADM

## 2021-12-09 RX ORDER — HYDROCODONE BITARTRATE AND ACETAMINOPHEN 5; 325 MG/1; MG/1
1 TABLET ORAL
Qty: 5 TABLET | Refills: 0 | Status: SHIPPED | OUTPATIENT
Start: 2021-12-09 | End: 2021-12-14

## 2021-12-09 RX ADMIN — HYDROCODONE BITARTRATE AND ACETAMINOPHEN 1 TABLET: 5; 325 TABLET ORAL at 02:24

## 2021-12-09 RX ADMIN — LEVOTHYROXINE SODIUM 25 MCG: 0.03 TABLET ORAL at 04:56

## 2021-12-09 RX ADMIN — FLUOXETINE 40 MG: 20 CAPSULE ORAL at 09:18

## 2021-12-09 RX ADMIN — CARVEDILOL 6.25 MG: 3.12 TABLET, FILM COATED ORAL at 09:17

## 2021-12-09 RX ADMIN — Medication 10 ML: at 04:55

## 2021-12-09 RX ADMIN — ACETAMINOPHEN 650 MG: 325 TABLET ORAL at 11:13

## 2021-12-09 RX ADMIN — MONTELUKAST 10 MG: 10 TABLET, FILM COATED ORAL at 09:17

## 2021-12-09 RX ADMIN — CEFAZOLIN 2 G: 1 INJECTION, POWDER, FOR SOLUTION INTRAMUSCULAR; INTRAVENOUS at 11:13

## 2021-12-09 RX ADMIN — SENNOSIDES 8.6 MG: 8.6 TABLET, COATED ORAL at 09:17

## 2021-12-09 RX ADMIN — CEFAZOLIN 2 G: 1 INJECTION, POWDER, FOR SOLUTION INTRAMUSCULAR; INTRAVENOUS at 04:55

## 2021-12-09 RX ADMIN — SODIUM BICARBONATE 650 MG: 650 TABLET ORAL at 11:13

## 2021-12-09 RX ADMIN — DOCUSATE SODIUM 100 MG: 100 CAPSULE, LIQUID FILLED ORAL at 09:18

## 2021-12-09 NOTE — PROGRESS NOTES
UROLOGY Progress Note         [phone]      Daily Progress Note: 12/9/2021      Subjective: The patient is seen for UROLOGIC follow up. He is POD 1 from LEFT laparoscopic radical nephrectomy. LUCY drain in place; no documented output; maybe 20 cc in the bulb; serosanguinous. He feels well this morning. Tolerating a soft diet. PT working on mobilization. Labs not able to be drawn yesterday. They were able to draw them this morning.     Problem List:  Patient Active Problem List   Diagnosis Code    Lumbago M54.50    Hereditary and idiopathic peripheral neuropathy G60.9    Osteoarthrosis, unspecified whether generalized or localized, unspecified site M19.90    Backache, unspecified M54.9    Congestive heart failure, unspecified I50.9    Essential hypertension, benign I10    Coronary atherosclerosis of native coronary artery I25.10    Spondylolisthesis at L2-L3 level M43.16    Lumbar stenosis M48.061    Bladder cancer ([facility] [address] C67.9    Malignant neoplasm of prostate ([facility] [address] C61    Hydronephrosis N13.30    Stage 3 chronic kidney disease ([address] N18.30    Malfunction of nephrostomy tube ([facility] [address] T83.098A    Nonfunctioning kidney N28.9    Anemia due to stage 3a chronic kidney disease (HCC) N18.31, D63.1    Chronic pyelonephritis N11.9    History of bladder cancer Z85.51    Renal hemorrhage, left N28.89    Attention to urostomy [facility]) Z43.6         Medications reviewed  Current Facility-Administered Medications   Medication Dose Route Frequency    sodium bicarbonate tablet 650 mg  650 mg Oral BID    morphine injection 2 mg  2 mg IntraVENous Q4H PRN    lactated Ringers infusion  20 mL/hr IntraVENous CONTINUOUS    sodium chloride (NS) flush 5-40 mL  5-40 mL IntraVENous PRN    ceFAZolin (ANCEF) 2 g in sterile water (preservative free) 20 mL IV syringe  2 g IntraVENous Q8H    acetaminophen (TYLENOL) tablet 650 mg  650 mg Oral Q4H PRN    HYDROcodone-acetaminophen (NORCO) 5-325 mg per tablet 1 Tablet  1 Tablet Oral Q4H PRN    ondansetron (ZOFRAN) injection 4 mg  4 mg IntraVENous Q4H PRN    docusate sodium (COLACE) capsule 100 mg  100 mg Oral BID    dextrose 5% - 0.45% NaCl with KCl 20 mEq/L infusion  50 mL/hr IntraVENous CONTINUOUS    sodium chloride (NS) flush 5-40 mL  5-40 mL IntraVENous PRN    ondansetron (ZOFRAN) injection 4 mg  4 mg IntraVENous Q6H PRN    acetaminophen (TYLENOL) tablet 650 mg  650 mg Oral Q4H PRN    HYDROcodone-acetaminophen (NORCO) 5-325 mg per tablet 1 Tablet  1 Tablet Oral Q4H PRN    simethicone (MYLICON) tablet 80 mg  80 mg Oral QID PRN    diphenhydrAMINE (BENADRYL) capsule 25 mg  25 mg Oral Q6H PRN    . PHARMACY TO SUBSTITUTE PER PROTOCOL (Reordered from: budesonide-glycopyr-formoterol (Breztri Aerosphere) 160-9-4.8 mcg/actuation HFAA)    Per Protocol    carvediloL (COREG) tablet 6.25 mg  6.25 mg Oral DAILY    FLUoxetine (PROzac) capsule 40 mg  40 mg Oral DAILY    fluticasone propionate (FLONASE) 50 mcg/actuation nasal spray   Both Nostrils DAILY PRN    levothyroxine (SYNTHROID) tablet 25 mcg  25 mcg Oral ACB    montelukast (SINGULAIR) tablet 10 mg  10 mg Oral DAILY    pravastatin (PRAVACHOL) tablet 40 mg  40 mg Oral QHS    senna (SENOKOT) tablet 8.6 mg  1 Tablet Oral DAILY    traZODone (DESYREL) tablet 100 mg  100 mg Oral QHS       Review of Systems:   Review of Systems   Constitutional: Negative for chills and fever. Respiratory: Negative for shortness of breath. Gastrointestinal: Negative for nausea and vomiting. Objective:   Physical Exam  Vitals and nursing note reviewed. Pulmonary:      Effort: Pulmonary effort is normal.   Abdominal:      General: Abdomen is flat. There is no distension. Comments: LUCY drain L side; no output noted; minimal in bulb. Genitourinary:     Comments: Conduit with clear yellow urine and pink stoma  Skin:     General: Skin is warm and dry.    Neurological:      Mental Status: He is alert and oriented to person, place, and time. Psychiatric:         Behavior: Behavior normal.          Visit Vitals  BP (!) 164/72 (BP 1 Location: Left upper arm, BP Patient Position: At rest)   Pulse 72   Temp 98.2 °F (36.8 °C)   Resp 16   Ht 5' 7\" (1.702 m)   Wt 160 lb (72.6 kg)   SpO2 98%   BMI 25.06 kg/m²         Data Review:       Recent Days:  Recent Labs     12/06/21  1443   WBC 11.5*   HGB 9.2*   HCT 30.7*        Recent Labs     12/06/21  1443      K 4.2      CO2 29   GLU 96   BUN 21*   CREA 1.71*   CA 8.6       24 Hour Results:  Recent Results (from the past 24 hour(s))   GLUCOSE, POC    Collection Time: 12/08/21 12:24 PM   Result Value Ref Range    Glucose (POC) 117 65 - 117 mg/dL    Performed by TAIWO FAIRBANKS            Assessment/     Patient Active Problem List   Diagnosis Code    Lumbago M54.50    Hereditary and idiopathic peripheral neuropathy G60.9    Osteoarthrosis, unspecified whether generalized or localized, unspecified site M19.90    Backache, unspecified M54.9    Congestive heart failure, unspecified I50.9    Essential hypertension, benign I10    Coronary atherosclerosis of native coronary artery I25.10    Spondylolisthesis at L2-L3 level M43.16    Lumbar stenosis M48.061    Bladder cancer ([address] C67.9    Malignant neoplasm of prostate ([address] C61    Hydronephrosis N13.30    Stage 3 chronic kidney disease (HCC) N18.30    Malfunction of nephrostomy tube ([address] T83.098A    Nonfunctioning kidney N28.9    Anemia due to stage 3a chronic kidney disease (HCC) N18.31, D63.1    Chronic pyelonephritis N11.9    History of bladder cancer Z85.51    Renal hemorrhage, left N28.89    Attention to urostomy ([address] Z43.6       Plan:  POD 2    · Dietary supplements per Nutrition. · [facility] IVF when tolerating soft diet and adequate PO intake. · Physical Therapy; impaired mobility baseline; continue to progress toward baseline.       Care Plan discussed with: Dr. Iglesia Solis, Attending Physician      Kassy Cade, NP

## 2021-12-09 NOTE — DISCHARGE INSTRUCTIONS
LEAVING THE HOSPITAL AFTER YOUR NEPHRECTOMY OR PARTIAL NEPHRECTOMY  DISCHARGE INSTRUCTIONS FOR DR. DAMICO'S PATIENTS    Activity   Refrain from vigorous activity (running, golfing, exercising, horseback riding, bicycling) for 4-6 weeks after your surgery. Walking is fine. You may gradually increase your pace and distance as you feel able.  Do not lift anything over 10 lbs for at least 2 weeks.  Avoid sitting still in one position for prolonged periods of time (more than 45 minutes).  Do not drive while you are taking narcotic pain medications. They may make you drowsy.  Driving, in general, should be avoided for 1-2 weeks. Bathing   Do not soak in tubs, swim, or submerge yourself in water.  You may shower as soon as you get home from the hospital. Keep the incision sites clean and dry, but do not scrub the glue. It will peel off with time. Use mild soap and water to clean your sites and pat yourself dry. Work   When you may return to work is variable. It will depend on your occupation and how quickly you recover. Specific questions can be addressed at your follow up appointment. Most patients will be able to return to work within 2-4 weeks. Medication   Most patients experience only minimal discomfort. Dr. Dario Larkin prefers you treat this with Tylenol (avoid ibuprofen or aspirin or other NSAID's as they may cause additional bleeding).  You will be sent home with a stronger, prescription pain reliever. However, it is important to note that these medications tend to be EXTREMELY constipating. It is better to avoid them, and only take them if you are having significant pain.  You may also have several days of an antibiotic.  You can resume most of your home medications as soon as you leave the hospital except for anti-coagulants like Coumadin, aspirin, or Eloquis. Some diabetic medications are also not ok to resume (Metformin).   If you have questions about your regular medications, please call the office.  At the time of discharge, you will also have a prescription for the stool softener and the suppository you received during your hospital stay. You may take these daily until you have a bowel movement. You may continue taking the stool softener as needed to combat constipation. Food   We recommend you stick to a bland, soft diet immediately after you are discharged from the hospital.     Do not force yourself to eat. It can contribute to the abdominal bloating you may feel. Once you have had a bowel movement, you can start eating normally, as you feel comfortable.  Avoid gas producing foods (beans, flour, broccoli) that can make you more uncomfortable.  Spread out eating throughout the day with snacks and small meals. Avoid eating large meals at first.    [facility] AFTER SURGERY   Abdominal distension, constipation, or bloating. Make sure you are taking your stool softener as directed and drinking plenty of water. Avoid carbonated beverages and gas-producing foods. You can use a suppository daily. The prescription pain medicine can contribute to this. Therefore, only take it when you are having significant pain. Walking and moving around help to move the gas out of your belly.  Pain and bruising. You may have pain in your belly or the side where the kidney was removed. This is normal.  You may also have bruising or slight redness around your incision sites which is also normal.  Additionally, the type of surgery you had (laparoscopic) may cause you to have some discomfort in your shoulder. The gas used during your surgery can irritate your abdominal muscles and radiate discomfort to your shoulder.  Blood in the urine. You may have blood in your urine off and on for several weeks after a urologic procedure. The blood can make your urine look tea-colored or pink.   This is normal.      WHEN TO CALL THE DOCTOR:   You have a fever over 100.5F   You have nausea or vomiting for more than 24 hours   It becomes hard to breathe   You cannot urinate   You develop swelling, redness, or temperature changes in one or both of your legs    [facility] [address], [address]

## 2021-12-09 NOTE — PROGRESS NOTES
Writer met with patient and spouse at bedside. Medicare pt has received, reviewed, and signed 2nd IM letter informing them of their right to appeal the discharge. Signed copied has been placed on pt bedside chart. Patient will be dc'd home today with no needs. Spouse is the primary cg for patient. She says if she needs further assistance, she will contact patient's PCP for orders. Discharge plan of care/case management plan validated with provider discharge order.     marie Rosa

## 2021-12-09 NOTE — DISCHARGE SUMMARY
UROLOGY Progress Note         [phone]        PROVIDER DISCHARGE SUMMARY     Patient ID:    Ranjan Estes  [id number]  67 y.o.  1950    Admit date: 12/7/2021    Discharge date : 12/9/2021    Diagnoses: Chronic pyelonephritis [N11.9]  Renal hemorrhage, left [N28.89]  History of bladder cancer [Z85.51]  Attention to urostomy [facility]) [Z43.6]    Reason for Hospitalization: surgery, or treatment for above mentioned diagnosis      Procedures:   He is POD 2 from LEFT laparoscopic radical nephrectomy (12/7/21). Hospital Course: Uncomplicated hospital course following surgery. Pain is well-controlled. Patient is tolerating a diet. Patient is ambulatory. Labs are reviewed and felt to be satisfactory for discharge. Incision sites are clean, dry and intact. Follow up Care: Your appointment with us is on: 1/25/22 at 10:00  Discharge Medications:   Current Discharge Medication List      START taking these medications    Details   HYDROcodone-acetaminophen (NORCO) 5-325 mg per tablet Take 1 Tablet by mouth every six (6) hours as needed for Pain for up to 5 days. Max Daily Amount: 4 Tablets. Qty: 5 Tablet, Refills: 0  Start date: 12/9/2021, End date: 12/14/2021    Associated Diagnoses: Renal hemorrhage, left; Chronic pyelonephritis; Nonfunctioning kidney; Stage 3 chronic kidney disease, unspecified whether stage 3a or 3b CKD ([facility]         CONTINUE these medications which have NOT CHANGED    Details   pravastatin (PRAVACHOL) 40 mg tablet Take 40 mg by mouth nightly. FLUoxetine (PROzac) 40 mg capsule Take 40 mg by mouth daily. sodium bicarbonate 650 mg tablet Take 650 mg by mouth two (2) times a day. traZODone (DESYREL) 100 mg tablet Take 100 mg by mouth nightly. montelukast (SINGULAIR) 10 mg tablet Take 10 mg by mouth daily. senna (Senna) 8.6 mg tablet Take 1 Tablet by mouth daily. ketoconazole (NIZORAL) 2 % topical cream Apply  to affected area daily.       acetaminophen (TYLENOL PO) Take 500 mg by mouth as needed. calcium-cholecalciferol, d3, (CALCIUM 600 + D) 600-125 mg-unit tab Take 1 Tablet by mouth daily. STOP taking these medications       aspirin delayed-release 81 mg tablet Comments:   Reason for Stopping:             HOLD ASPIRIN FOR 7 DAYS POST-OP    Diet: ok to resume home diet. Do not overeat. Use your bowel regimen to facilitate regular, soft, formed bowel movements. If you are experiencing discomfort on post-op day 1-3, stick to a bland, liquid diet until you are feeling less uncomfortable, or having bowel movements. Activity: As per your discharge instructions. No heavy lifting or strenuous exercise. No soaking in tubs or pools. Do not drive on narcotic pain medications. Disposition: HOME WITH SPOUSAL SUPPORT      Discharge Exam:  Physical Exam   Vitals and nursing note reviewed. Pulmonary:      Effort: Pulmonary effort is normal.   Abdominal:      General: Abdomen is flat. There is no distension. Comments: LUCY drain L side REMOVED  Genitourinary:     Comments: Conduit with clear yellow urine and pink stoma  Skin:     General: Skin is warm and dry. Neurological:      Mental Status: He is alert and oriented to person, place, and time. Psychiatric:         Behavior: Behavior normal.   Visit Vitals  BP (!) 166/73 (BP 1 Location: Left upper arm, BP Patient Position: At rest)   Pulse 78   Temp 97.1 °F (36.2 °C)   Resp 18   Ht 5' 7\" (1.702 m)   Wt 160 lb (72.6 kg)   SpO2 97%   BMI 25.06 kg/m²       Significant Diagnostic Studies:   No results found for requested labs within first 48 hours of the last admission day.   Recent Labs     12/09/21  0855 12/06/21  1443   WBC 12.6* 11.5*   HGB 10.7* 9.2*   HCT 36.0* 30.7*   * 383     Recent Labs     12/09/21  0855 12/06/21  1443    136   K 4.8 4.2    103   CO2 28 29   BUN 16 21*   CREA 1.59* 1.71*   * 96   CA 8.9 8.6       Lab Results   Component Value Date/Time    Glucose (POC) 117 12/08/2021 12:24 PM    Glucose (POC) 141 (H) 12/08/2021 07:33 AM         INPATIENT CONSULTATIONS: NONE    OUTPATIENT FOLLOW UP REMINDERS: Pathology    SURGICAL DRAINS/TUBES ON DISCHARGE: none    PLAN FOR DISCHARGE. PATIENT TO FOLLOW UP AS SCHEDULED. NURSING STAFF TO PROVIDE EDUCATION MATERIALS AND DISCHARGE INSTRUCTION SET SPECIFIC TO THIS PATIENT AS REQUESTED BY ME. POST-OPERATIVE COUNSELING PROVIDED BY ME PRIOR TO DISCHARGE.       Signed:  Angela Kaur NP  12/9/2021  12:37 PM

## 2021-12-09 NOTE — PROGRESS NOTES
Reviewed discharge instructions with pt and family. Both verbalized understanding of instruction. Pt has follow up appointments made and prescriptions sent to his preferred pharmacy. Pt was wheeled down in a wheelchair and left in a private vehicle with his wife.

## 2021-12-09 NOTE — PROGRESS NOTES
Pt has discharge orders home today with self care. Spoke with attending and attending stated pt can discharge today. Pt is discharge with an IV, tele or douglass. Pt is stable, alert, oriented and does not show any signs of distress. Discharge plan of care/case management plan validated with provider discharge order.

## 2021-12-10 ENCOUNTER — TELEPHONE (OUTPATIENT)
Dept: UROLOGY | Age: 71
End: 2021-12-10

## 2021-12-10 NOTE — TELEPHONE ENCOUNTER
Spoke with patient wife who stated her  was released from the hospital from his surgery yesterday and asked was jose sending over a antibiotic to the pharmacy she didn't know if she had misunderstood her or what.  She received the Norco medication from the pharmacy

## 2021-12-10 NOTE — TELEPHONE ENCOUNTER
No antibiotic necessary! Sent to [place] [facility] since [facility] is in clinic.

## 2021-12-13 ENCOUNTER — TELEPHONE (OUTPATIENT)
Dept: UROLOGY | Age: 71
End: 2021-12-13

## 2021-12-13 NOTE — TELEPHONE ENCOUNTER
Spoke to Eric Dorman  at [facility]. And informed them that the form scanned into pts chart does in fact say 12/10/2021 not 2022. She apologized and said she was getting the order closed out.  Contacted pts wife and made her aware

## 2021-12-21 ENCOUNTER — TELEPHONE (OUTPATIENT)
Dept: UROLOGY | Age: 71
End: 2021-12-21

## 2021-12-21 NOTE — TELEPHONE ENCOUNTER
Spoke with patient wife who stated that she would get her  blood work and imaging done before his appt in Jan . She asked did she need to do anything in regards to making an appt for the imaging and I let her know that if any Aleida Seals was needed that surgery scheduler would get it before she goes

## 2022-01-10 DIAGNOSIS — C61 MALIGNANT NEOPLASM OF PROSTATE (HCC): Primary | ICD-10-CM

## 2022-01-10 NOTE — TELEPHONE ENCOUNTER
Pt is going to have bloodwork drawn and Xray done, I don't see a PSA order, I do see a chest Xray in his chart, if that can be confirmed that, that is the one he needs to have done    pls advise

## 2022-01-12 PROBLEM — Z85.51 HISTORY OF BLADDER CANCER: Status: RESOLVED | Noted: 2021-12-07 | Resolved: 2022-01-12

## 2022-01-12 PROBLEM — Z90.5 SOLITARY KIDNEY, ACQUIRED: Status: ACTIVE | Noted: 2022-01-12

## 2022-01-18 NOTE — TELEPHONE ENCOUNTER
Spoke with patient wife who asked could a xray order be faxed over to [facility] imaging so that she can take him to get it done tomorrow before his upcoming appt on 1/25 . She called the place and they said they have not received an order yet and need one before they can do the imaging.  I see the order in his chart it just has to be faxed over and get auth if needed she said

## 2022-01-20 LAB — PSA SERPL-MCNC: <0.1 NG/ML (ref 0–4)

## 2022-01-24 NOTE — PROGRESS NOTES
HISTORY OF PRESENT ILLNESS  Glenn Malik is a 66 y.o. male. Chief Complaint   Patient presents with    Follow-up    Bladder Cancer    Prostate Cancer    Chronic Kidney Disease    Hydronephrosis     Past Medical History:  PMHx (including negatives):  has a past medical history of Back pain, Backache, unspecified (2/8/2010), Bladder cancer ([address] (7/14/2020), Blood clots (2004), Cancer ([address] (2013), CHF (congestive heart failure) ([address] (1999), Chronic obstructive pulmonary disease ([address] Coronary artery disease, Coronary atherosclerosis of native coronary artery (2/8/2010), COVID, DDD (degenerative disc disease), Diabetes ([address] Essential hypertension, benign (2/8/2010), Gastroenteritis, viral, Heart attack ([address] Heart block, Hematuria, unspecified (7/14/2020), HTN (hypertension), benign, Hypercholesterolemia, Ill-defined condition, Ill-defined condition, Ill-defined condition, Ill-defined condition (1996), Ill-defined condition (1999 & early 2000s), Impotence (7/14/2020), Lumbago (2/8/2010), Malignant neoplasm of prostate ([address] (7/14/2020), Neurogenic dysfunction of the urinary bladder (7/14/2020), Neuropathy Peripheral, Osteoarthritis, Osteoarthrosis, unspecified whether generalized or localized, unspecified site (2/8/2010), Seizures ([address] Skin disease, Stroke ([address] (1996), Unspecified hereditary and idiopathic peripheral neuropathy (02/08/2010), Urgency of urination (7/14/2020), and Urgency of urination (7/14/2020).    PSurgHx:  has a past surgical history that includes hx heart catheterization (5712,5728); hx appendectomy; hx cataract removal; hx tonsillectomy; colonoscopy (N/A, 4/12/2018); hx circumcision; hx prostatectomy (10/18/2013); hx prostate surgery (08/24/2020); hx prostate surgery (10/18/2013); ir nephrostomy perc lt plc cath  si (9/7/2021); ir remove neph tube rt (9/13/2021); ir occl txcath hemmorage w si (9/13/2021); ir nephrostomy perc lt plc cath  si (9/14/2021); ir exchange nephro perc lt si (9/21/2021); neurological procedure unlisted (1996); neurological procedure unlisted; hx back surgery (2002); hx back surgery (2004); hx urological (06/25/2020); hx urological (06/25/2020); hx urological (05/29/2020); hx urological (08/24/2020); hx urological (08/24/2020); hx urological (08/26/2021); hx urological (08/26/2021); hx urological (08/26/2021); and hx urological (12/07/2021). PSocHx:  reports that he quit smoking about 23 years ago. He quit after 35.00 years of use. He has never used smokeless tobacco. He reports previous alcohol use. He reports previous drug use. Drugs: Prescription and Opiates. He is s/p cystectomy with ileal conduit urinary diversion on 8/24/2020. 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 PT2bN0. He later developed left-sided hydronephrosis with stricturing. Left nephrostomy tube placed on 9/7/21. This was complicated by gross hematuria, infection, and clot obstruction. He underwent left nephrectomy on 12/9/21. He is s/p a robotic prostatectomy 10/18/13. 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 His PSA has been undetectable, last drawn on 1/20/22. He is doing well. Feeling well. He thinks that his left flank intermittently \"doesn't feel right. \" But it is not painful or sore. It is improved over the past week or so. No fevers. Chronic Conditions Addressed Today     1. Bladder cancer [facility])     Overview      He is s/p TURBT on 6/25/2020. No obvious residual tumor, but tessy and induration at the base. Pathology reads invasive papillary urothelial carcinoma, high grade (bladder) and invasive high grade urothelial carcinoma pT2 (bladder base). He is s/p cystectomy with ileal conduit urinary diversion on 8/24/2020. He had an uncomplicated post-operative course and was discharged on 8/31/2020 with [address] [place] [address].   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 PT2bN0. CT scan on 7/10/21 revealed no metastatic disease. 11/3/21: his left kidney is obstructed and nonfunctional.      12/7/21: LEFT laparoscopic radical nephrectomy. Pathology with no evidence of neoplasm of kidney or transitional epithelium.                  2. Malignant neoplasm of prostate ([facility] - Primary     Overview      He is s/p a robotic prostatectomy 10/18/13. He had Aiken's 6 disease wtih negative margins and negative nodes. His PSA has been undetectable, last drawn on 1/20/22. 3. Hydronephrosis     Overview      CT 7/10/21: Moderate left hydronephrosis. Dilation of the ureter leading to the ureteral anastomosis with the ileal conduit. No right hydronephrosis. No stones in either side. 9/28/21: ureteral conduit and stricturing. Left nephrostomy tube placed on 9/7/21. This has been complicated by gross hematuria, infection and clot obstruction. Tube changed on 9/14/21- MRSA bacteremia. 12/7/21: LEFT nephrectomy. 4. Stage 3 chronic kidney disease ([facility]     Overview      He has left-sided hydronephrosis with stricturing. Prior to cystectomy on 5/20/2021 his creatinine was 1.88.  9/13/2021 creatinine was 1.89. It has been as low as 1.52 on 9/22/2021.  9/27/2021 creatinine was 1.68. 12/9/21 creatinine following LEFT nephrectomy was 1.59.         5. Malfunction of nephrostomy tube [facility])     Overview      11/3/21: significant hematuria after nephrostomy tube placement. His kidney is essentially colonized with bacteria and is non-functioning. Best management at this time is nephrectomy. LEFT laparoscopic radical nephrectomy performed on 12/7/21. 6. Nonfunctioning kidney     Overview      11/3/21: nonfunctioning LEFT kidney with hematuria. 7. Solitary kidney, acquired     Overview      11/3/21: nonfunctional LEFT kidney. 12/7/21: s/p LEFT nephrectomy.  No evidence of neoplasm of kidney or transitional epithelium on pathology report. Evidence of pyelonephritis, chronic inflammation of renal pelvis and surrounding ureter, and perinephric/renal cortical abscess. CR 12/9/21: 1.59 (baseline). Review of Systems   Constitutional: Negative for chills and fever. HENT: Negative for congestion and sore throat. Respiratory: Negative for cough and sputum production. Gastrointestinal: Negative for nausea and vomiting. Genitourinary: Positive for flank pain. Discomfort; left flank- not pain. Patient denies the symptoms of COVID-19 per routine screening guidelines. Physical Exam  Constitutional:       General: He is not in acute distress. Appearance: He is not ill-appearing. Abdominal:      Comments: Stoma viable, clear urine   Neurological:      Motor: Weakness (in a wheelchair) present. ASSESSMENT and PLAN  Diagnoses and all orders for this visit:    1. Malignant neoplasm of urinary bladder, unspecified site [facility])  Assessment & Plan:  History of cystectomy with ileal conduit 8/24/2020. Last imaging 7/10/2021 with no evidence of metastatic disease. He is status post left nephrectomy on 12/7/2021. Plan reimaging in 3 months. Orders:  -     CT ABD PELV WO CONT; Future  -     METABOLIC PANEL, BASIC; Future    2. Malignant neoplasm of prostate [facility])  Assessment & Plan:  H/o prostatectomy 2013. PSA undetectable. Continue annual PSA    Orders:  -     METABOLIC PANEL, BASIC; Future    3. Nonfunctioning kidney  Assessment & Plan:   He is status post a left nephrectomy on 12/7/2021. He had a nonfunctioning kidney with hemorrhage. He recovered well. 4. Renal hemorrhage, left  Assessment & Plan:   S/p left nephrectomy for obstructed, nonfunctioning kidney with bleeding after nephrostomy. Benign on pathology. 5. Solitary kidney, acquired  -     METABOLIC PANEL, BASIC; Future    6.  Stage 3 chronic kidney disease, unspecified whether stage 3a or 3b CKD ([facility]  -     METABOLIC PANEL, BASIC; Future         Follow-up and Dispositions    · Return in about 3 months (around 4/25/2022). Kathy Vásquez has a reminder for a \"due or due soon\" health maintenance. The patient has been encouraged to contact their primary care provider for follow-up on this health maintenance or other necessary and/or routine health screening.      Irina Cuba MD

## 2022-01-25 ENCOUNTER — OFFICE VISIT (OUTPATIENT)
Dept: UROLOGY | Age: 72
End: 2022-01-25
Payer: MEDICARE

## 2022-01-25 VITALS
RESPIRATION RATE: 22 BRPM | TEMPERATURE: 97.5 F | WEIGHT: 144.2 LBS | HEIGHT: 68 IN | SYSTOLIC BLOOD PRESSURE: 138 MMHG | OXYGEN SATURATION: 98 % | DIASTOLIC BLOOD PRESSURE: 60 MMHG | HEART RATE: 61 BPM | BODY MASS INDEX: 21.86 KG/M2

## 2022-01-25 DIAGNOSIS — C67.9 MALIGNANT NEOPLASM OF URINARY BLADDER, UNSPECIFIED SITE (HCC): Primary | ICD-10-CM

## 2022-01-25 DIAGNOSIS — C61 MALIGNANT NEOPLASM OF PROSTATE (HCC): ICD-10-CM

## 2022-01-25 DIAGNOSIS — N28.9 NONFUNCTIONING KIDNEY: ICD-10-CM

## 2022-01-25 DIAGNOSIS — N28.89 RENAL HEMORRHAGE, LEFT: ICD-10-CM

## 2022-01-25 DIAGNOSIS — N18.30 STAGE 3 CHRONIC KIDNEY DISEASE, UNSPECIFIED WHETHER STAGE 3A OR 3B CKD (HCC): ICD-10-CM

## 2022-01-25 DIAGNOSIS — Z90.5 SOLITARY KIDNEY, ACQUIRED: ICD-10-CM

## 2022-01-25 PROCEDURE — G8427 DOCREV CUR MEDS BY ELIG CLIN: HCPCS | Performed by: UROLOGY

## 2022-01-25 PROCEDURE — G8536 NO DOC ELDER MAL SCRN: HCPCS | Performed by: UROLOGY

## 2022-01-25 PROCEDURE — G8754 DIAS BP LESS 90: HCPCS | Performed by: UROLOGY

## 2022-01-25 PROCEDURE — G8420 CALC BMI NORM PARAMETERS: HCPCS | Performed by: UROLOGY

## 2022-01-25 PROCEDURE — 99214 OFFICE O/P EST MOD 30 MIN: CPT | Performed by: UROLOGY

## 2022-01-25 PROCEDURE — G8752 SYS BP LESS 140: HCPCS | Performed by: UROLOGY

## 2022-01-25 PROCEDURE — G8510 SCR DEP NEG, NO PLAN REQD: HCPCS | Performed by: UROLOGY

## 2022-01-25 RX ORDER — KETOCONAZOLE 20 MG/G
CREAM TOPICAL
COMMUNITY
End: 2022-07-29 | Stop reason: ALTCHOICE

## 2022-01-25 RX ORDER — CYANOCOBALAMIN 1000 UG/ML
INJECTION, SOLUTION INTRAMUSCULAR; SUBCUTANEOUS
COMMUNITY
Start: 2021-12-21

## 2022-01-25 RX ORDER — NITROGLYCERIN 0.4 MG/1
TABLET SUBLINGUAL
COMMUNITY
End: 2022-04-29 | Stop reason: ALTCHOICE

## 2022-01-25 RX ORDER — MUPIROCIN 20 MG/G
OINTMENT TOPICAL
COMMUNITY
Start: 2021-11-11 | End: 2022-04-29 | Stop reason: ALTCHOICE

## 2022-01-25 NOTE — ASSESSMENT & PLAN NOTE
History of cystectomy with ileal conduit 8/24/2020. Last imaging 7/10/2021 with no evidence of metastatic disease. He is status post left nephrectomy on 12/7/2021. Plan reimaging in 3 months.

## 2022-01-25 NOTE — ASSESSMENT & PLAN NOTE
S/p left nephrectomy for obstructed, nonfunctioning kidney with bleeding after nephrostomy. Benign on pathology.

## 2022-01-25 NOTE — PROGRESS NOTES
1. Have you been to the ER, urgent care clinic since your last visit? Hospitalized since your last visit? No    2. Have you seen or consulted any other health care providers outside of the [address] [place] [address] since your last visit? Include any pap smears or colon screening.  No  Chief Complaint   Patient presents with    Follow-up    Bladder Cancer    Prostate Cancer    Chronic Kidney Disease    Hydronephrosis     Visit Vitals  /60 (BP 1 Location: Left arm, BP Patient Position: Sitting, BP Cuff Size: Adult long)   Pulse 61   Temp 97.5 °F (36.4 °C) (Temporal)   Resp 22   Ht 5' 8\" (1.727 m)   Wt 144 lb 3.2 oz (65.4 kg)   SpO2 98%   BMI 21.93 kg/m²

## 2022-01-25 NOTE — ASSESSMENT & PLAN NOTE
He is status post a left nephrectomy on 12/7/2021. He had a nonfunctioning kidney with hemorrhage. He recovered well.

## 2022-03-18 PROBLEM — C67.9 BLADDER CANCER (HCC): Status: ACTIVE | Noted: 2020-07-14

## 2022-03-18 PROBLEM — N28.9 NONFUNCTIONING KIDNEY: Status: ACTIVE | Noted: 2021-09-28

## 2022-03-18 PROBLEM — Z90.5 SOLITARY KIDNEY, ACQUIRED: Status: ACTIVE | Noted: 2022-01-12

## 2022-03-19 PROBLEM — D63.1 ANEMIA DUE TO STAGE 3A CHRONIC KIDNEY DISEASE (HCC): Status: ACTIVE | Noted: 2021-09-28

## 2022-03-19 PROBLEM — N13.30 HYDRONEPHROSIS: Status: ACTIVE | Noted: 2021-08-15

## 2022-03-19 PROBLEM — N11.9 CHRONIC PYELONEPHRITIS: Status: ACTIVE | Noted: 2021-12-07

## 2022-03-19 PROBLEM — N18.31 ANEMIA DUE TO STAGE 3A CHRONIC KIDNEY DISEASE (HCC): Status: ACTIVE | Noted: 2021-09-28

## 2022-03-19 PROBLEM — N28.89 RENAL HEMORRHAGE, LEFT: Status: ACTIVE | Noted: 2021-12-07

## 2022-03-19 PROBLEM — N18.30 STAGE 3 CHRONIC KIDNEY DISEASE (HCC): Status: ACTIVE | Noted: 2021-08-18

## 2022-03-19 PROBLEM — C61 MALIGNANT NEOPLASM OF PROSTATE (HCC): Status: ACTIVE | Noted: 2020-07-14

## 2022-03-19 PROBLEM — T83.098A MALFUNCTION OF NEPHROSTOMY TUBE (HCC): Status: ACTIVE | Noted: 2021-09-26

## 2022-03-20 PROBLEM — Z43.6 ATTENTION TO UROSTOMY (HCC): Status: ACTIVE | Noted: 2021-12-07

## 2022-04-25 ENCOUNTER — TELEPHONE (OUTPATIENT)
Dept: UROLOGY | Age: 72
End: 2022-04-25

## 2022-04-25 DIAGNOSIS — Z90.5 SOLITARY KIDNEY, ACQUIRED: ICD-10-CM

## 2022-04-25 DIAGNOSIS — C67.9 MALIGNANT NEOPLASM OF URINARY BLADDER, UNSPECIFIED SITE (HCC): ICD-10-CM

## 2022-04-25 DIAGNOSIS — C61 MALIGNANT NEOPLASM OF PROSTATE (HCC): ICD-10-CM

## 2022-04-25 DIAGNOSIS — N18.30 STAGE 3 CHRONIC KIDNEY DISEASE, UNSPECIFIED WHETHER STAGE 3A OR 3B CKD (HCC): ICD-10-CM

## 2022-04-25 NOTE — TELEPHONE ENCOUNTER
Spoke with pts wife to remind her, pt needs imaging and labs completed prior to apt on Friday.  She stated they were going tomorrow but could not tell me the name of the facility he was having imaging done at. shes going to call back so I can call and get results prior to apt

## 2022-04-26 PROBLEM — N11.9 CHRONIC PYELONEPHRITIS: Status: RESOLVED | Noted: 2021-12-07 | Resolved: 2022-04-26

## 2022-04-27 LAB
BUN SERPL-MCNC: 29 MG/DL (ref 8–27)
BUN/CREAT SERPL: 13 (ref 10–24)
CALCIUM SERPL-MCNC: 9.4 MG/DL (ref 8.6–10.2)
CHLORIDE SERPL-SCNC: 100 MMOL/L (ref 96–106)
CO2 SERPL-SCNC: 24 MMOL/L (ref 20–29)
CREAT SERPL-MCNC: 2.32 MG/DL (ref 0.76–1.27)
EGFR: 29 ML/MIN/1.73
GLUCOSE SERPL-MCNC: 90 MG/DL (ref 65–99)
POTASSIUM SERPL-SCNC: 4.6 MMOL/L (ref 3.5–5.2)
SODIUM SERPL-SCNC: 137 MMOL/L (ref 134–144)

## 2022-04-28 NOTE — PROGRESS NOTES
HISTORY OF PRESENT ILLNESS  Cem Peng is a 74 y.o. male. Chief Complaint   Patient presents with    Follow-up    Bladder Cancer    Prostate Cancer     Past Medical History:  PMHx (including negatives):  has a past medical history of Back pain, Backache, unspecified (2/8/2010), Bladder cancer ([address] (7/14/2020), Blood clots (2004), Cancer ([address] (2013), CHF (congestive heart failure) ([address] (1999), Chronic obstructive pulmonary disease ([address] Coronary artery disease, Coronary atherosclerosis of native coronary artery (2/8/2010), COVID, DDD (degenerative disc disease), Diabetes ([address] Essential hypertension, benign (2/8/2010), Gastroenteritis, viral, Heart attack ([address] Heart block, Hematuria, unspecified (7/14/2020), HTN (hypertension), benign, Hypercholesterolemia, Ill-defined condition, Ill-defined condition, Ill-defined condition, Ill-defined condition (1996), Ill-defined condition (1999 & early 2000s), Impotence (7/14/2020), Lumbago (2/8/2010), Malignant neoplasm of prostate ([address] (7/14/2020), Neurogenic dysfunction of the urinary bladder (7/14/2020), Neuropathy Peripheral, Osteoarthritis, Osteoarthrosis, unspecified whether generalized or localized, unspecified site (2/8/2010), Seizures ([address] Skin disease, Stroke ([address] (1996), Unspecified hereditary and idiopathic peripheral neuropathy (02/08/2010), Urgency of urination (7/14/2020), and Urgency of urination (7/14/2020).    PSurgHx:  has a past surgical history that includes hx heart catheterization (8888,3274); hx appendectomy; hx cataract removal; hx tonsillectomy; colonoscopy (N/A, 4/12/2018); hx circumcision; hx prostatectomy (10/18/2013); hx prostate surgery (08/24/2020); hx prostate surgery (10/18/2013); ir nephrostomy perc lt plc cath  si (9/7/2021); ir remove neph tube rt (9/13/2021); ir occl txcath hemmorage w si (9/13/2021); ir nephrostomy perc lt plc cath  si (9/14/2021); ir exchange nephro perc lt si (9/21/2021); neurological procedure unlisted (1996); neurological procedure unlisted; hx back surgery (2002); hx back surgery (2004); hx urological (06/25/2020); hx urological (06/25/2020); hx urological (05/29/2020); hx urological (08/24/2020); hx urological (08/24/2020); hx urological (08/26/2021); hx urological (08/26/2021); hx urological (08/26/2021); and hx urological (12/07/2021). PSocHx:  reports that he quit smoking about 23 years ago. He quit after 35.00 years of use. He has never used smokeless tobacco. He reports previous alcohol use. He reports previous drug use. Drugs: Prescription and Opiates. He is s/p cystectomy with ileal conduit urinary diversion on 8/24/2020. He had an uncomplicated post-operative course and was discharged on 8/31/2020. 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 PT2bN0. He is s/p LEFT nephrectomy 12/7/21. No evidence of neoplasm of kidney or transitional epithelium on pathology report. Evidence of pyelonephritis, chronic inflammation of renal pelvis and surrounding ureter, and perinephric/renal cortical abscess. CT 4/26/22 with mild fullness of the right renal collecting system and ureter. Area of mild heterogeneous soft tissue immediately anterior to the proximal common iliac vessels, nonspecific, possibly postsurgical changes or scarring. Recommend follow up. CXR good.     Component Ref Range & Units 4/26/22 0917 12/9/21 0855 12/6/21 1443 11/11/21 0832 10/14/21 0950 9/29/21 1509 9/27/21 0438   Glucose 65 - 99 mg/dL 90  103 High  R  96 R  99 R  101 High  R  110 High  R  89 R    BUN 8 - 27 mg/dL 29 High   16 R  21 High  R  22 High  R  23 High  R  26 High  R  25 High  R    Creatinine 0.76 - 1.27 mg/dL 2.32 High   1.59 High  R  1.71 High  R  1.66 High  R  1.76 High  R  1.95 High  R  1.68 High  R    eGFR >59 mL/min/1.73 29 Low           BUN/Creatinine ratio 10 - 24 13  10 Low  R  12 R  13 R  13 R  13 R  15 R    Sodium 134 - 144 mmol/L 137  137 R  136 R  138 R  138 R  138 R  136 R    Potassium 3.5 - 5.2 mmol/L 4.6  4.8 R  4.2 R  3.8 R  4.2 R  4.3 R  4.0 R    Chloride 96 - 106 mmol/L 100  104 R  103 R  105 R  104 R  104 R  106 R    CO2 20 - 29 mmol/L 24  28 R  29 R  28 R  28 R  27 R  28            Results for Harsh Santana (MRN [id number]) as of 4/28/2022 17:32   Ref. Range 9/26/2021 20:43 9/27/2021 04:38 9/29/2021 15:09 10/14/2021 09:50 11/11/2021 08:32 12/6/2021 14:43 12/9/2021 08:55 4/26/2022 09:17   Creatinine Latest Ref Range: 0.76 - 1.27 mg/dL 1.76 (H) 1.68 (H) 1.95 (H) 1.76 (H) 1.66 (H) 1.71 (H) 1.59 (H) 2.32 (H)     He is feeling well, doing well. He rubs at a spot at the bottom of this appliance at night and causes some leakage; the skin is intact with no breakdown. Apparently, he is asleep and unaware of this. Chronic Conditions Addressed Today     1. Bladder cancer [facility])     Overview      He is s/p TURBT on 6/25/2020. No obvious residual tumor, but tessy and induration at the base. Pathology reads invasive papillary urothelial carcinoma, high grade (bladder) and invasive high grade urothelial carcinoma pT2 (bladder base). He is s/p cystectomy with ileal conduit urinary diversion on 8/24/2020. He had an uncomplicated post-operative course and was discharged on 8/31/2020 with [address] [place] [address]. 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 PT2bN0. CT scan on 7/10/21 revealed no metastatic disease. 11/3/21: his left kidney is obstructed and nonfunctional.      12/7/21: LEFT laparoscopic radical nephrectomy. Pathology with no evidence of neoplasm of kidney or transitional epithelium.       CT 4/26/22 with mild fullness of the right renal collecting system and ureter. Area of mild heterogeneous soft tissue immediately anterior to the proximal common iliac vessels, nonspecific, possibly postsurgical changes or scarring. Recommend follow up.              2. Stage 3 chronic kidney disease [facility])     Overview      He has left-sided hydronephrosis with stricturing. Prior to cystectomy on 5/20/2021 his creatinine was 1.88.    9/13/2021 creatinine was 1.89.    9/22/2021 creatinine was 1.52.    9/27/2021 creatinine was 1.68.   12/9/21 creatinine following LEFT nephrectomy was 1.59.         3. Solitary kidney, acquired     Overview      11/3/21: nonfunctional LEFT kidney. 12/7/21: s/p LEFT nephrectomy. No evidence of neoplasm of kidney or transitional epithelium on pathology report. Evidence of pyelonephritis, chronic inflammation of renal pelvis and surrounding ureter, and perinephric/renal cortical abscess. CR 12/9/21: 1.59 (baseline). CT 4/26/22 with mild fullness of the right renal collecting system and ureter. Area of mild heterogeneous soft tissue immediately anterior to the proximal common iliac vessels, nonspecific, possibly postsurgical changes or scarring. Recommend follow up. ROS  Patient denies the symptoms of COVID-19 per routine screening guidelines. Physical Exam    ASSESSMENT and PLAN  Diagnoses and all orders for this visit:    1. Solitary kidney, acquired  Assessment & Plan:   He is status post left nephrectomy 12/7/2021 for hemorrhage. No recurrent cancer was seen. Orders:  -     AMB POC URINALYSIS DIP STICK AUTO W/O MICRO  -     URINALYSIS W/MICROSCOPIC  -     METABOLIC PANEL, BASIC; Future  -     CT ABD PELV WO CONT; Future    2. Malignant neoplasm of urinary bladder, unspecified site [facility])  Assessment & Plan:  He is status post cystectomy with urinary diversion 8/24/2020 for invasive urothelial cancer. CT with a dense area near the right iliac vessels, nonspecific. Plan on CT in 3 months. Right ureteral fullness. CKD. Advised hydration. Follow up labs. Orders:  -     AMB POC URINALYSIS DIP STICK AUTO W/O MICRO  -     URINALYSIS W/MICROSCOPIC  -     METABOLIC PANEL, BASIC; Future  -     CT ABD PELV WO CONT; Future    3.  Stage 3 chronic kidney disease, unspecified whether stage 3a or 3b CKD ([facility]  -     METABOLIC PANEL, BASIC; Future    4. Malignant neoplasm of prostate [facility])  Assessment & Plan:  He is status post a robotic prostatectomy October 18, 2013. He had low risk disease. His PSA remains undetectable. Continue annual follow-up, next due January 2023      5. Elevated serum creatinine  Assessment & Plan:  Creatinine rising with solitary kidney. He sees Dr. Aston Encarnacion, Nephrology, in [place]. Orders:  -     CT ABD PELV WO CONT; Future    6. Hydronephrosis, unspecified hydronephrosis type  Assessment & Plan:   Right ureteral fullness. Follow with labs and CT. Orders:  -     CT ABD PELV WO CONT; Future         Follow-up and Dispositions    · Return in about 3 months (around 7/29/2022) for Follow up after testing. Emma Jin may have a reminder for a \"due or due soon\" health maintenance. The patient has been encouraged to contact their primary care provider for follow-up on this health maintenance or other necessary and/or routine health screening.      Anand Nassar MD

## 2022-04-29 ENCOUNTER — OFFICE VISIT (OUTPATIENT)
Dept: UROLOGY | Age: 72
End: 2022-04-29
Payer: MEDICARE

## 2022-04-29 VITALS
DIASTOLIC BLOOD PRESSURE: 79 MMHG | WEIGHT: 141 LBS | HEIGHT: 67 IN | HEART RATE: 65 BPM | BODY MASS INDEX: 22.13 KG/M2 | SYSTOLIC BLOOD PRESSURE: 131 MMHG

## 2022-04-29 DIAGNOSIS — N18.30 STAGE 3 CHRONIC KIDNEY DISEASE, UNSPECIFIED WHETHER STAGE 3A OR 3B CKD (HCC): ICD-10-CM

## 2022-04-29 DIAGNOSIS — Z90.5 SOLITARY KIDNEY, ACQUIRED: ICD-10-CM

## 2022-04-29 DIAGNOSIS — C67.9 MALIGNANT NEOPLASM OF URINARY BLADDER, UNSPECIFIED SITE (HCC): ICD-10-CM

## 2022-04-29 DIAGNOSIS — C61 MALIGNANT NEOPLASM OF PROSTATE (HCC): ICD-10-CM

## 2022-04-29 DIAGNOSIS — R79.89 ELEVATED SERUM CREATININE: ICD-10-CM

## 2022-04-29 DIAGNOSIS — N13.30 HYDRONEPHROSIS, UNSPECIFIED HYDRONEPHROSIS TYPE: ICD-10-CM

## 2022-04-29 LAB
BILIRUB UR QL: NEGATIVE
GLUCOSE UR-MCNC: NEGATIVE MG/DL
KETONES P FAST UR STRIP-MCNC: NEGATIVE MG/DL
PH UR STRIP: 7 [PH] (ref 4.6–8)
PROT UR QL STRIP: NEGATIVE
SP GR UR STRIP: 1.01 (ref 1–1.03)
UA UROBILINOGEN AMB POC: NORMAL (ref 0.2–1)
URINALYSIS CLARITY POC: CLEAR
URINALYSIS COLOR POC: YELLOW
URINE BLOOD POC: NORMAL
URINE LEUKOCYTES POC: NORMAL
URINE NITRITES POC: POSITIVE

## 2022-04-29 PROCEDURE — 81003 URINALYSIS AUTO W/O SCOPE: CPT | Performed by: UROLOGY

## 2022-04-29 PROCEDURE — G8536 NO DOC ELDER MAL SCRN: HCPCS | Performed by: UROLOGY

## 2022-04-29 PROCEDURE — G8427 DOCREV CUR MEDS BY ELIG CLIN: HCPCS | Performed by: UROLOGY

## 2022-04-29 PROCEDURE — G8510 SCR DEP NEG, NO PLAN REQD: HCPCS | Performed by: UROLOGY

## 2022-04-29 PROCEDURE — G8754 DIAS BP LESS 90: HCPCS | Performed by: UROLOGY

## 2022-04-29 PROCEDURE — G8752 SYS BP LESS 140: HCPCS | Performed by: UROLOGY

## 2022-04-29 PROCEDURE — G8420 CALC BMI NORM PARAMETERS: HCPCS | Performed by: UROLOGY

## 2022-04-29 PROCEDURE — 99214 OFFICE O/P EST MOD 30 MIN: CPT | Performed by: UROLOGY

## 2022-04-29 RX ORDER — ASPIRIN 81 MG/1
TABLET ORAL DAILY
COMMUNITY

## 2022-04-29 NOTE — ASSESSMENT & PLAN NOTE
He is status post cystectomy with urinary diversion 8/24/2020 for invasive urothelial cancer. CT with a dense area near the right iliac vessels, nonspecific. Plan on CT in 3 months. Right ureteral fullness. CKD. Advised hydration. Follow up labs.

## 2022-04-29 NOTE — ASSESSMENT & PLAN NOTE
He is status post a robotic prostatectomy October 18, 2013. He had low risk disease. His PSA remains undetectable.   Continue annual follow-up, next due January 2023

## 2022-04-29 NOTE — PROGRESS NOTES
Chief Complaint   Patient presents with    Follow-up    Bladder Cancer    Prostate Cancer       PHQ-9 score is   0 Negative    Vitals:    04/29/22 0830   BP: 131/79   Pulse: 65   Weight: 141 lb (64 kg)   Height: 5' 7\" (1.702 m)   PainSc:   0 - No pain      1. \"Have you been to the ER, urgent care clinic since your last visit? Hospitalized since your last visit? \" No    2. \"Have you seen or consulted any other health care providers outside of the [address] [place] [address] since your last visit? \" No     3. For patients aged 39-70: Has the patient had a colonoscopy / FIT/ Cologuard? Yes - no Care Gap present      If the patient is female:    4. For patients aged 41-77: Has the patient had a mammogram within the past 2 years? NA - based on age or sex      11. For patients aged 21-65: Has the patient had a pap smear?  NA - based on age or sex

## 2022-05-18 RX ORDER — NYSTATIN 100000 [USP'U]/G
POWDER TOPICAL
Qty: 60 G | Refills: 5 | Status: SHIPPED | OUTPATIENT
Start: 2022-05-18 | End: 2022-07-29 | Stop reason: ALTCHOICE

## 2022-05-24 ENCOUNTER — TELEPHONE (OUTPATIENT)
Dept: UROLOGY | Age: 72
End: 2022-05-24

## 2022-05-24 DIAGNOSIS — C67.9 MALIGNANT NEOPLASM OF URINARY BLADDER, UNSPECIFIED SITE (HCC): Primary | ICD-10-CM

## 2022-05-24 NOTE — TELEPHONE ENCOUNTER
I spoke to Mrs Vaishali Rangel, yes please send referral to ostomy clinic so that they will call her and set up an appointment asap.   thanks

## 2022-05-24 NOTE — TELEPHONE ENCOUNTER
Spoke with patient wife who asked could you call her in regards to pt colostomy bag.  She said she has been putting the powder on it like you instructed them but she said its not healing and wanted to know if he needed an antibiotic for it or not

## 2022-05-31 ENCOUNTER — HOSPITAL ENCOUNTER (OUTPATIENT)
Dept: WOUND CARE | Age: 72
Discharge: HOME | End: 2022-05-31
Payer: MEDICARE

## 2022-05-31 VITALS
HEART RATE: 85 BPM | RESPIRATION RATE: 18 BRPM | DIASTOLIC BLOOD PRESSURE: 67 MMHG | SYSTOLIC BLOOD PRESSURE: 144 MMHG | TEMPERATURE: 97.3 F

## 2022-05-31 PROCEDURE — 99213 OFFICE O/P EST LOW 20 MIN: CPT

## 2022-05-31 NOTE — WOUND CARE
Discharge Instructions/Wound Orders  [facility]  [address] [place], [address]  Telephone: [phone]    NAME:  Kajal Sales OF BIRTH:  [id number]  MEDICAL RECORD NUMBER:  [id number]  DATE:  5/31/2022  Ostomy Care Orders:  1) Remove old bag, wash stoma and peristomal with tap water and paper towels, then dry thoroughly. 2) If skin is irritated treat with crusting (I.e. powder then skin prep). 3) Shape ring to size, crimp inner edge and place over stoma site. 4) Cut [place] [id number] to size (I.e. dotted line between 20 and 30)  5) Remove plastic backing and place over stoma site, remove paper from tape edges and smooth onto skin. 6) Attach stoma belt. 7) Change 2 x week and as needed for leaking. Dietary:  [x] Diet as tolerated: [] Calorie Diabetic Diet:Low carb and no Sugar [] No Added Salt:[x] Increase Protein: [] Other:Limit the amount of liquid you are drinking and avoid drinking in between meals   Activity:  [x] Activity as tolerated:  [] Patient has no activity restrictions     [] Strict Bedrest: [] Remain off Work:     [] May return to full duty work:                                   [] Return to work with restrictions:             Return Appointment:  [x] Return Appointment: With Meka Faye  in  2 Week(s)  [] Ordered tests:    Electronically signed Clementina Sandoval RN on 5/31/2022 at [address] [place] [address]: Should you experience any significant changes in your wound(s) or have questions about your wound care, please contact the [address] [place] [address] at [address] [place] [address] 8:00 am - 4:30. If you need help with your wound outside these hours and cannot wait until we are again available, contact your PCP or go to the hospital emergency room. PLEASE NOTE: IF YOU ARE UNABLE TO OBTAIN WOUND SUPPLIES, CONTINUE TO USE THE SUPPLIES YOU HAVE AVAILABLE UNTIL YOU ARE ABLE TO REACH US.  IT IS MOST IMPORTANT TO KEEP THE WOUND COVERED AT ALL TIMES.      DAINON Signature:_______________________    Date: ___________ Time:  ____________

## 2022-05-31 NOTE — WOUND CARE
OSTOMY Assessment    Stoma Tissue Assessment: ___Post-op _x__Follow-up       Type of Diversion: ___New__x_ Established ___Revision___Permanent____Temporary    _____  Ileostomy   _____  Colostomy: ____ Ascending, ____ Transverse, _____.  Sigmoid   _____  Maggie Ditch   ___x__  Ileal Conduit   _____  Mucous Fistula     Appearance of Ostomy:   ___ Bright Red /Moist/Viable ___ Dark Red __x_ Pink ___ Sloughing ___Necrotic____Pallor ____Red  ___Dry ____Moist    Stoma Size: ____25_____ (mm) ___Round ___ Loise Bee ___Irregular     Stoma Height:   _x___Flush ____Retracted ____Budded ____Edematous ____Prolapse     Stoma Location  ____ Left Side          __x__Right Side     _____Umbilicus  _____Incision Line  __x__ Above Belt       ____ Below Belt    Abdominal Contours  ____ Firm ____ Flat __x__Flabby __x_Soft ___Round ___ Hard ___ Other     Stoma Function: x__Yes __No  Output:   __x__ Yellow ____Amber ____ Pink(Hematuria) ____ Tea Colored   ____ Clots ____Foul Odor ____ Mucous     Peristomal skin:   ___ Intact _x__ Irritant Dermatitis ___ Allergic Contact Dermatitis ___Candidiasis ___Caput Medusae ___Folliculitis ___Mechanical Trauma ___Mucosal Transplantation    ___Pyoderma Gangrenosum ___Hyperplasia ___Radiation Trauma ___Allergies mpling  ___ Dimpling ____Blistered ____Fragile ____Macerated ___Peeling  ___Ulceration  ___ Fungal ___Peristomal Hernia ___Non-blanchable ___ Hypergranulation      Stoma Complications:   __Excessive Bleeding __Ischemia __ Abscess __Necrosis __Prolapse   __Hernia __ Retraction __Stenosis __Mucosal Separation __Melanosis Coli   __Laceration __Other     Application for Patient    Wafer and pouch used during assessment and education _____Elliott [id number]_______    Pouch System Recommended:   x__One-Piece __Two-Piece ___Custom     Flat:   ___Pre-cut   ___Cut-to fit     Convexity: ___Shallow ___Deep_x__ Flexible ___Creative Convexity   ___Pre-cut ___Cut to [employer]     Accessory Products   __x [employer] __Adhesive Remover Wipes __Barrier [employer] _x_Barrier Wipes   __Deodorizer __Liquid Adhesive _x_ Paste __ Powder __Strip   _x_ Support Belt __Tape      Education for Patient & Family  1. Booklet of information on specific ostomy given and some verbal discussion of patients ostomy and expectations. 2. Instruction/demostration of ostomy wafer & pouch change. 3. Discuss concerns/ answer questions. 4. Provide follow up education in clinic if needed.        PICTURE INSERT:

## 2022-05-31 NOTE — WOUND CARE
Clinical Level of Care Assessment    Outpatient Ostomy Care      NAME:  Gloria Amaro OF BIRTH:  1950  MEDICAL RECORD NUMBER:  [id number]   DATE:  5/31/2022      Patient Mary Dunn Assessment- Document in Flowsheet I&O   Points   Review of chart []   0   Assess Complete Ostomy tab in Navigator for assessment of; stoma status, peristomal skin, presence of hernia/stool consistency/diet/related medications   Simple adjustments to pouch size/pouch system, new stoma pattern, accessory addition/deletion. []   1   Assess Complete Ostomy tab in Navigator for assessment of; stoma status, peristomal skin, presence of hernia/stool consistency/diet/related medications   Moderate adjustments to pouch size/pouch system, new stoma pattern, accessory addition/deletion. Observe patient/caregiver with hands-on care. 1-2 adjustments to pouch size/system/skin care/accessory addition or deletion. [x]   2   Assess Complete Ostomy tab in Navigator for assessment of; stoma status, peristomal skin, presence of hernia/stool consistency/diet/related medications   Complex adjustments to pouch size/pouch system, new stoma pattern, accessory addition/deletion. 3 or more complex adjustments to pouch size/system/skin care/accessory addition or deletion. Observe patient/caregiver with hands-on care. Assess patient/patient abdomen for optimal pre-marked stoma site. Assess patient abdomen for type of hernia belt/accessory needed. []   3         Ambulation Status Documented in CN Clinical Note  Status Definition Points   Independent Independently able to ambulate. Fully able (without any assistance) to get on/off exam table/chair. []   0   Minimal Physical Assistance Requires physical assistance of one person to ambulate and/or position patient to be examined. Includes necessary physical assistance to position lower extremities on/off stool.    []   1   Moderate Physical Assistance Requires at least one staff member to physically assist patient in ambulating into treatment room, and/or on off chair/bed. Requires assistance to bathroom. [x]   2   Full Assistance Requires assistance of at least two staff members to transfer patient into treatment room and/or on/off bed/chair. \"Total Transfer\". Unable to use bathroom requires bedside commode and/or bedpan []   3       Teaching Effort Documented in [facility] Clinical Note  Effort Definition Points   No Teaching  []   0   General Initial/Simple lesson:  Assess readiness to learn, assess patient learning style to determine educational flow/special needs for learning. Teaching related to 1-3 topics  Documentation in [facility] completed. []   1   Intermediate Assess readiness to learn, assess patient learning style to determine educational flow/special needs for learning. Teaching related to 3-4 topics. Hernia belt application and care considerations  Documentation in [facility] completed. [x]   2   Complex Assess readiness to learn, assess patient learning style to determine educational flow/special needs for learning. Teaching of greater than 5 additional topics   Pre-operative ostomy education with review of written resources for patient/family/caregiver as needed. Demonstration/return demonstration of ostomy irrigation  Documentation in [facility] completed. []   3     Patient Assessment and Planning in [facility] Clinical Note   Planning Definition Points   Simple Simple pouch change procedure completed and reviewed with patient/family/caregiver   Documentation in [facility] completed. []   1   Intermediate Moderate level of follow-up needs:   Pouch change/discharge procedure revised and reviewed with patient/caregiver. Communications with outside resources; i.e. Telephone calls to Surgeon/ PCP, family/caregiver, home health, ECF. Documentation in [facility] completed.      [x]   2   Complex Complex level of instructions/changes:   Family/Caregiver learning/demonstration/return demonstration visit. Pouching/discharge procedure revised/reviewed with patient/family/caregiver. Contact with outside resources; i.e. communication with Surgeon/ PCP, home health, ECF. Contact/referral to ostomy appliance supplier for new or additional products. Review when to call [facility]OCN or schedule follow-up visit. Referral to Emergency Department   Documentation in [facility] completed. []   3       Is this the Patient's First Visit with [facility]OC[facility] @ [facility]? Yes    Is this Patient Established to this [facility] within the last 3 years?    Yes             Clinical Level of Care      Points  0-3  Level 1 []     Points  4-6  Level 2 []     Points  7-8  Level 3 [x]     Points  9-10  Level 4 []     Points  11-12  Level 5 []       Electronically signed by Wolfgang Contreras RN on 5/31/2022 at 11:37 AM

## 2022-06-13 ENCOUNTER — HOSPITAL ENCOUNTER (OUTPATIENT)
Dept: WOUND CARE | Age: 72
Discharge: HOME | End: 2022-06-13
Payer: MEDICARE

## 2022-06-13 VITALS
TEMPERATURE: 97.2 F | DIASTOLIC BLOOD PRESSURE: 65 MMHG | HEART RATE: 62 BPM | RESPIRATION RATE: 16 BRPM | SYSTOLIC BLOOD PRESSURE: 147 MMHG

## 2022-06-13 PROCEDURE — 99213 OFFICE O/P EST LOW 20 MIN: CPT

## 2022-06-13 NOTE — WOUND CARE
Discharge Instructions/Wound Orders  [facility]  [address], [address]  Telephone: [phone]    NAME:  Stefan Morley OF BIRTH:  1950  MEDICAL RECORD NUMBER:  [id number]  DATE:  6/13/2022    Ostomy Care Orders:  1) Remove old bag, wash stoma and peristomal with tap water and paper towels, then dry thoroughly. 2) If skin is irritated treat with crusting (I.e. powder then skin prep). 3) Place Coloplast Elastic Barrier Strips # [id number]  4) Shape ring to size, crimp inner edge and place over stoma site. 5) Cut [place] [address] to size (I.e. dotted line between 20 and 30)  5) Remove plastic backing and place over stoma site, remove paper from tape edges and smooth onto skin. 6) Attach stoma belt. 7) Change 2 x week and as needed for leaking. Supply List  Elliott [address] one piece cut to fit convex bag  Coloplast Elastic Barrier Strips [id number]  Lexis seal # [id number]  No Sting skin prep      Dietary:  [x] Diet as tolerated: [] Calorie Diabetic Diet:Low carb and no Sugar [] No Added Salt:[x] Increase Protein: [] Other:Limit the amount of liquid you are drinking and avoid drinking in between meals   Activity:  [x] Activity as tolerated:  [] Patient has no activity restrictions     [] Strict Bedrest: [] Remain off Work:     [] May return to full duty work:                                   [] Return to work with restrictions:             Return Appointment:  [x] Return Appointment: With Meka Yeager  in  2 Week(s)  [] Ordered tests:    Electronically signed Jaylan Tavares RN on 6/13/2022 at 10:57 AM     Vivien Mccollum 281: Should you experience any significant changes in your wound(s) or have questions about your wound care, please contact the [address] [place] [address] at [address] [place] [address] 8:00 am - 4:30.   If you need help with your wound outside these hours and cannot wait until we are again available, contact your PCP or go to the hospital emergency room. PLEASE NOTE: IF YOU ARE UNABLE TO OBTAIN WOUND SUPPLIES, CONTINUE TO USE THE SUPPLIES YOU HAVE AVAILABLE UNTIL YOU ARE ABLE TO REACH US. IT IS MOST IMPORTANT TO KEEP THE WOUND COVERED AT ALL TIMES.      CWON Signature:_______________________    Date: ___________ Time:  ____________

## 2022-06-13 NOTE — WOUND CARE
Clinical Level of Care Assessment    Outpatient Ostomy Care      NAME:  Myesha Fairly OF BIRTH:  1950  MEDICAL RECORD NUMBER:  [id number]   DATE:  6/13/2022      Patient Diane Marsh Assessment- Document in Flowsheet I&O   Points   Review of chart []   0   Assess Complete Ostomy tab in Navigator for assessment of; stoma status, peristomal skin, presence of hernia/stool consistency/diet/related medications   Simple adjustments to pouch size/pouch system, new stoma pattern, accessory addition/deletion. []   1   Assess Complete Ostomy tab in Navigator for assessment of; stoma status, peristomal skin, presence of hernia/stool consistency/diet/related medications   Moderate adjustments to pouch size/pouch system, new stoma pattern, accessory addition/deletion. Observe patient/caregiver with hands-on care. 1-2 adjustments to pouch size/system/skin care/accessory addition or deletion. [x]   2   Assess Complete Ostomy tab in Navigator for assessment of; stoma status, peristomal skin, presence of hernia/stool consistency/diet/related medications   Complex adjustments to pouch size/pouch system, new stoma pattern, accessory addition/deletion. 3 or more complex adjustments to pouch size/system/skin care/accessory addition or deletion. Observe patient/caregiver with hands-on care. Assess patient/patient abdomen for optimal pre-marked stoma site. Assess patient abdomen for type of hernia belt/accessory needed. []   3         Ambulation Status Documented in [facility] Clinical Note  Status Definition Points   Independent Independently able to ambulate. Fully able (without any assistance) to get on/off exam table/chair. []   0   Minimal Physical Assistance Requires physical assistance of one person to ambulate and/or position patient to be examined. Includes necessary physical assistance to position lower extremities on/off stool.    [x]   1   Moderate Physical Assistance Requires at least one staff member to physically assist patient in ambulating into treatment room, and/or on off chair/bed. Requires assistance to bathroom. []   2   Full Assistance Requires assistance of at least two staff members to transfer patient into treatment room and/or on/off bed/chair. \"Total Transfer\". Unable to use bathroom requires bedside commode and/or bedpan []   3       Teaching Effort Documented in [facility] Clinical Note  Effort Definition Points   No Teaching  []   0   General Initial/Simple lesson:  Assess readiness to learn, assess patient learning style to determine educational flow/special needs for learning. Teaching related to 1-3 topics  Documentation in [facility] completed. []   1   Intermediate Assess readiness to learn, assess patient learning style to determine educational flow/special needs for learning. Teaching related to 3-4 topics. Hernia belt application and care considerations  Documentation in [facility] completed. [x]   2   Complex Assess readiness to learn, assess patient learning style to determine educational flow/special needs for learning. Teaching of greater than 5 additional topics   Pre-operative ostomy education with review of written resources for patient/family/caregiver as needed. Demonstration/return demonstration of ostomy irrigation  Documentation in [facility] completed. []   3     Patient Assessment and Planning in [facility] Clinical Note   Planning Definition Points   Simple Simple pouch change procedure completed and reviewed with patient/family/caregiver   Documentation in [facility] completed. []   1   Intermediate Moderate level of follow-up needs:   Pouch change/discharge procedure revised and reviewed with patient/caregiver. Communications with outside resources; i.e. Telephone calls to Surgeon/ PCP, family/caregiver, home health, ECF. Documentation in [facility] completed.      [x]   2   Complex Complex level of instructions/changes:   Family/Caregiver learning/demonstration/return demonstration visit. Pouching/discharge procedure revised/reviewed with patient/family/caregiver. Contact with outside resources; i.e. communication with Surgeon/ PCP, home health, ECF. Contact/referral to ostomy appliance supplier for new or additional products. Review when to call WOCN or schedule follow-up visit. Referral to Emergency Department   Documentation in [facility] completed. []   3       Is this the Patient's First Visit with [facility] @ [facility]? No    Is this Patient Established to this [facility] within the last 3 years?    Yes             Clinical Level of Care      Points  0-3  Level 1 []     Points  4-6  Level 2 []     Points  7-8  Level 3 [x]     Points  9-10  Level 4 []     Points  11-12  Level 5 []       Electronically signed by Zora Kelley RN on 6/13/2022 at 11:07 AM

## 2022-06-27 ENCOUNTER — HOSPITAL ENCOUNTER (OUTPATIENT)
Dept: WOUND CARE | Age: 72
Discharge: HOME | End: 2022-06-27
Payer: MEDICARE

## 2022-06-27 VITALS
TEMPERATURE: 97.3 F | SYSTOLIC BLOOD PRESSURE: 166 MMHG | DIASTOLIC BLOOD PRESSURE: 68 MMHG | RESPIRATION RATE: 16 BRPM | HEART RATE: 61 BPM

## 2022-06-27 PROCEDURE — 99212 OFFICE O/P EST SF 10 MIN: CPT

## 2022-06-27 NOTE — WOUND CARE
Discharge Instructions/Wound Orders  [facility]  [address]  Telephone: [phone]    NAME:  Tree Cook OF BIRTH:  1950  MEDICAL RECORD NUMBER:  [id number]  DATE:  6/27/2022  Ostomy Care Orders:  1) Remove old bag, wash stoma and peristomal with tap water and paper towels, then dry thoroughly. 2) If skin is irritated treat with crusting (I.e. powder then skin prep). 3) Place Coloplast Elastic Barrier Strips # [id number]  4) Shape ring to size, crimp inner edge and place over stoma site. 5) Cut [place] [address] to size (I.e. dotted line between 20 and 30)  5) Remove plastic backing and place over stoma site, remove paper from tape edges and smooth onto skin. 6) Attach stoma belt. 7) Change 2 x week and as needed for leaking. 8) If using Coloplast (gray bag), put bag down first, then strips over top. Supply List  1) Elliott [address] (brown bag)  2) Coloplast [address]10  3) Coloplast Brava strips #[id number]  4) Lexis barrier ring [id number]    Dietary:  [x] Diet as tolerated: [] Calorie Diabetic Diet:Low carb and no Sugar [] No Added Salt:[x] Increase Protein: [] Other:Limit the amount of liquid you are drinking and avoid drinking in between meals   Activity:  [x] Activity as tolerated:  [] Patient has no activity restrictions     [] Strict Bedrest: [] Remain off Work:     [] May return to full duty work:                                   [] Return to work with restrictions:             Return Appointment:  [x] Return Appointment: With Meka Duckworth  in  2 Week(s)  [] Ordered tests:    Electronically signed Tao Rodríguez RN on 6/27/2022 at 11:04 AM     Vivien Mccollum 281: Should you experience any significant changes in your wound(s) or have questions about your wound care, please contact the [address] [place] [address] at [address] [place] [address] 8:00 am - 4:30.   If you need help with your wound outside these hours and cannot wait until we are again available, contact your PCP or go to the hospital emergency room. PLEASE NOTE: IF YOU ARE UNABLE TO OBTAIN WOUND SUPPLIES, CONTINUE TO USE THE SUPPLIES YOU HAVE AVAILABLE UNTIL YOU ARE ABLE TO REACH US. IT IS MOST IMPORTANT TO KEEP THE WOUND COVERED AT ALL TIMES.      CWON Signature:_______________________    Date: ___________ Time:  ____________

## 2022-06-27 NOTE — WOUND CARE
Clinical Level of Care Assessment    Outpatient Ostomy Care      NAME:  La Nena Roldan OF BIRTH:  1950  MEDICAL RECORD NUMBER:  [id number]   DATE:  6/27/2022      Patient Jacobo Galvan Assessment- Document in Flowsheet I&O   Points   Review of chart []   0   Assess Complete Ostomy tab in Navigator for assessment of; stoma status, peristomal skin, presence of hernia/stool consistency/diet/related medications   Simple adjustments to pouch size/pouch system, new stoma pattern, accessory addition/deletion. []   1   Assess Complete Ostomy tab in Navigator for assessment of; stoma status, peristomal skin, presence of hernia/stool consistency/diet/related medications   Moderate adjustments to pouch size/pouch system, new stoma pattern, accessory addition/deletion. Observe patient/caregiver with hands-on care. 1-2 adjustments to pouch size/system/skin care/accessory addition or deletion. [x]   2   Assess Complete Ostomy tab in Navigator for assessment of; stoma status, peristomal skin, presence of hernia/stool consistency/diet/related medications   Complex adjustments to pouch size/pouch system, new stoma pattern, accessory addition/deletion. 3 or more complex adjustments to pouch size/system/skin care/accessory addition or deletion. Observe patient/caregiver with hands-on care. Assess patient/patient abdomen for optimal pre-marked stoma site. Assess patient abdomen for type of hernia belt/accessory needed. []   3         Ambulation Status Documented in [facility] Clinical Note  Status Definition Points   Independent Independently able to ambulate. Fully able (without any assistance) to get on/off exam table/chair. [x]   0   Minimal Physical Assistance Requires physical assistance of one person to ambulate and/or position patient to be examined. Includes necessary physical assistance to position lower extremities on/off stool.    []   1   Moderate Physical Assistance Requires at least one staff member to physically assist patient in ambulating into treatment room, and/or on off chair/bed. Requires assistance to bathroom. []   2   Full Assistance Requires assistance of at least two staff members to transfer patient into treatment room and/or on/off bed/chair. \"Total Transfer\". Unable to use bathroom requires bedside commode and/or bedpan []   3       Teaching Effort Documented in [facility] Clinical Note  Effort Definition Points   No Teaching  []   0   General Initial/Simple lesson:  Assess readiness to learn, assess patient learning style to determine educational flow/special needs for learning. Teaching related to 1-3 topics  Documentation in [facility] completed. [x]   1   Intermediate Assess readiness to learn, assess patient learning style to determine educational flow/special needs for learning. Teaching related to 3-4 topics. Hernia belt application and care considerations  Documentation in [facility] completed. []   2   Complex Assess readiness to learn, assess patient learning style to determine educational flow/special needs for learning. Teaching of greater than 5 additional topics   Pre-operative ostomy education with review of written resources for patient/family/caregiver as needed. Demonstration/return demonstration of ostomy irrigation  Documentation in [facility] completed. []   3     Patient Assessment and Planning in [facility] Clinical Note   Planning Definition Points   Simple Simple pouch change procedure completed and reviewed with patient/family/caregiver   Documentation in [facility] completed. []   1   Intermediate Moderate level of follow-up needs:   Pouch change/discharge procedure revised and reviewed with patient/caregiver. Communications with outside resources; i.e. Telephone calls to Surgeon/ PCP, family/caregiver, home health, ECF. Documentation in [facility] completed.      [x]   2   Complex Complex level of instructions/changes:   Family/Caregiver learning/demonstration/return demonstration visit. Pouching/discharge procedure revised/reviewed with patient/family/caregiver. Contact with outside resources; i.e. communication with Surgeon/ PCP, home health, ECF. Contact/referral to ostomy appliance supplier for new or additional products. Review when to call [facility]OCN or schedule follow-up visit. Referral to Emergency Department   Documentation in [facility] completed. []   3       Is this the Patient's First Visit with WOCN @ [facility]? No    Is this Patient Established to this [facility] within the last 3 years?    Yes             Clinical Level of Care      Points  0-3  Level 1 []     Points  4-6  Level 2 [x]     Points  7-8  Level 3 []     Points  9-10  Level 4 []     Points  11-12  Level 5 []       Electronically signed by Radha Mcintosh RN on 6/27/2022 at 12:50 PM

## 2022-06-27 NOTE — WOUND CARE
OSTOMY Assessment    Stoma Tissue Assessment: ___Post-op _x__Follow-up       Type of Diversion: ___New_x__ Established ___Revision___Permanent____Temporary    _____  Ileostomy   _____  Colostomy: ____ Ascending, ____ Transverse, _____.  Sigmoid   _____  Cecilia Zebulon   __x__  Ileal Conduit   _____  Mucous Fistula     Appearance of Ostomy:   _x_ Bright Red /Moist/Viable ___ Dark Red ___ Pink ___ Sloughing ___Necrotic____Pallor ____Red  ___Dry ____Moist    Stoma Size: ___25______ (mm) ___Round ___ Gaila Capuchin ___Irregular     Stoma Height:   ____Flush ____Retracted __x__Budded ____Edematous ____Prolapse     Stoma Location  ____ Left Side          __x__Right Side     _____Umbilicus  _____Incision Line  __x__ Above Belt       ____ Below Belt    Abdominal Contours  ____ Firm __x__ Flat ____Flabby ___Soft ___Round ___ Hard ___ Other     Stoma Function: _x_Yes __No  Output:   __x__ Yellow ____Amber ____ Pink(Hematuria) ____ Tea Colored   ____ Clots ____Foul Odor ____ Mucous     Peristomal skin: Peristomal allergic dermatitis resolved   ___ Intact ___ Irritant Dermatitis ___ Allergic Contact Dermatitis ___Candidiasis ___Caput Medusae ___Folliculitis ___Mechanical Trauma ___Mucosal Transplantation    ___Pyoderma Gangrenosum ___Hyperplasia ___Radiation Trauma ___Allergies mpling  ___ Dimpling ____Blistered ____Fragile ____Macerated ___Peeling  ___Ulceration  ___ Fungal ___Peristomal Hernia ___Non-blanchable ___ Hypergranulation      Stoma Complications:   __Excessive Bleeding __Ischemia __ Abscess __Necrosis __Prolapse   __Hernia __ Retraction __Stenosis __Mucosal Separation __Melanosis Coli   __Laceration __Other     Application for Patient    Wafer and pouch used during assessment and education ___[place] [address] or Coloplast 74008_________    Pouch System Recommended:   _x_One-Piece __Two-Piece ___Custom     Flat:   _x__Pre-cut   ___Cut-to fit     Convexity: ___Shallow ___Deep_x__ Flexible ___Creative Convexity   ___Pre-cut ___Cut to Fit     Accessory Products   _x_ Adhesive Seals __Adhesive Remover Wipes __Barrier [employer] __Barrier Wipes   __Deodorizer __Liquid Adhesive __ Paste __ Powder _x_Strip [employer] barrier [id number]  __ Support Belt __Tape      Education for Patient & Family  1. Booklet of information on specific ostomy given and some verbal discussion of patients ostomy and expectations. 2. Instruction/demostration of ostomy wafer & pouch change. 3. Discuss concerns/ answer questions. 4. Provide follow up education in clinic if needed.        PICTURE INSERT:

## 2022-07-11 ENCOUNTER — HOSPITAL ENCOUNTER (OUTPATIENT)
Dept: WOUND CARE | Age: 72
Discharge: HOME | End: 2022-07-11
Payer: MEDICARE

## 2022-07-11 VITALS
TEMPERATURE: 97.5 F | DIASTOLIC BLOOD PRESSURE: 67 MMHG | RESPIRATION RATE: 16 BRPM | SYSTOLIC BLOOD PRESSURE: 158 MMHG | HEART RATE: 58 BPM

## 2022-07-11 PROCEDURE — 99212 OFFICE O/P EST SF 10 MIN: CPT

## 2022-07-11 NOTE — WOUND CARE
OSTOMY Assessment    Stoma Tissue Assessment: ___Post-op __x_Follow-up       Type of Diversion: ___New_x__ Established ___Revision___Permanent____Temporary    _____  Ileostomy   _____  Colostomy: ____ Ascending, ____ Transverse, _____.  Sigmoid   __x___  [place]   _____  Ileal Conduit   _____  Mucous Fistula     Appearance of Ostomy:   __x_ Birtha Gula /Moist/Viable ___ Dark Red ___ Pink ___ Sloughing ___Necrotic____Pallor ____Red  ___Dry ____Moist    Stoma Size: ____25_____ (mm) ___Round ___ Todd Rase ___Irregular     Stoma Height:   ____Flush ____Retracted __x__Budded ____Edematous ____Prolapse     Stoma Location  ____ Left Side          __x__Right Side     _____Umbilicus  _____Incision Line  _x___ Above Belt       ____ Below Belt    Abdominal Contours  ____ Firm ____ Flat ____Flabby _x__Soft _x__Round ___ Hard ___ Other     Stoma Function: _x_Yes __No  Output:   ___x_ Yellow ____Amber ____ Pink(Hematuria) ____ Tea Colored   ____ Clots ____Foul Odor ____ Mucous     Peristomal skin:   ___ Intact _x__ Irritant Dermatitis ___ Allergic Contact Dermatitis ___Candidiasis ___Caput Medusae ___Folliculitis ___Mechanical Trauma ___Mucosal Transplantation    ___Pyoderma Gangrenosum ___Hyperplasia ___Radiation Trauma ___Allergies mpling  ___ Dimpling ____Blistered ____Fragile ____Macerated ___Peeling  ___Ulceration  ___ Fungal ___Peristomal Hernia ___Non-blanchable ___ Hypergranulation      Stoma Complications:   __Excessive Bleeding __Ischemia __ Abscess __Necrosis __Prolapse   __Hernia __ Retraction __Stenosis __Mucosal Separation __Melanosis Coli   __Laceration __Other     Application for Patient    Wafer and pouch used during assessment and education ____[place] [id number]________    Pouch System Recommended:   x__One-Piece __Two-Piece ___Custom     Flat:   ___Pre-cut   ___Cut-to fit     Convexity: ___Shallow ___Deep_x__ Flexible ___Creative Convexity   ___Pre-cut _x__Cut to [employer]     Accessory Products   __ [employer] __Adhesive Remover Wipes _x_Barrier [employer] __Barrier Wipes _x_Coloplast barriers [id number]  __Deodorizer __Liquid Adhesive __ Paste __ Powder __Strip   __ Support Belt __Tape      Education for Patient & Family  1. Booklet of information on specific ostomy given and some verbal discussion of patients ostomy and expectations. 2. Instruction/demostration of ostomy wafer & pouch change. 3. Discuss concerns/ answer questions. 4. Provide follow up education in clinic if needed.        PICTURE INSERT:

## 2022-07-11 NOTE — WOUND CARE
Discharge Instructions/Wound Orders  [facility]  [address], [address] [facility]  Telephone: [phone]    NAME:  Shae Wilson OF BIRTH:  1950  MEDICAL RECORD NUMBER:  [id number]  DATE:  7/11/2022    Ostomy Care Orders:  1) Remove old bag, wash stoma and peristomal with tap water and paper towels, then dry thoroughly. 2) If skin is irritated treat with crusting (I.e. powder then skin prep). 3) Shape ring to size, crimp inner edge and place over stoma site. 4) Cut [place] [address] to size (I.e. dotted line between 20 and 30)  5) Remove plastic backing and place over stoma site, place Coloplast barriers #[id number] under tape border,  remove paper from tape edges and smooth onto skin. 6) Attach stoma belt. 7) Change 2 x week and as needed for leaking. Dietary:  [x] Diet as tolerated: [] Calorie Diabetic Diet:Low carb and no Sugar [] No Added Salt:[x] Increase Protein: [] Other:Limit the amount of liquid you are drinking and avoid drinking in between meals   Activity:  [x] Activity as tolerated:  [] Patient has no activity restrictions     [] Strict Bedrest: [] Remain off Work:     [] May return to full duty work:                                   [] Return to work with restrictions:             Return Appointment:  [x] Return Appointment: As needed for recurrent issues. [] Ordered tests:    Electronically signed Nathaniel Kumar RN on 7/11/2022 at 10:49 AM     [address] [place] [address] Information: Should you experience any significant changes in your wound(s) or have questions about your wound care, please contact the [address] [place] [address] at [address] [place] [address] 8:00 am - 4:30. If you need help with your wound outside these hours and cannot wait until we are again available, contact your PCP or go to the hospital emergency room.      PLEASE NOTE: IF YOU ARE UNABLE TO OBTAIN WOUND SUPPLIES, CONTINUE TO USE THE SUPPLIES YOU HAVE AVAILABLE UNTIL YOU ARE ABLE TO REACH US. IT IS MOST IMPORTANT TO KEEP THE WOUND COVERED AT ALL TIMES.      CWON Signature:_______________________    Date: ___________ Time:  ____________

## 2022-07-11 NOTE — WOUND CARE
Clinical Level of Care Assessment    Outpatient Ostomy Care      NAME:  Ping Loo OF BIRTH:  1950  MEDICAL RECORD NUMBER:  [id number]   DATE:  7/11/2022      Patient Nanci Johansen Assessment- Document in Flowsheet I&O   Points   Review of chart []   0   Assess Complete Ostomy tab in Navigator for assessment of; stoma status, peristomal skin, presence of hernia/stool consistency/diet/related medications   Simple adjustments to pouch size/pouch system, new stoma pattern, accessory addition/deletion. []   1   Assess Complete Ostomy tab in Navigator for assessment of; stoma status, peristomal skin, presence of hernia/stool consistency/diet/related medications   Moderate adjustments to pouch size/pouch system, new stoma pattern, accessory addition/deletion. Observe patient/caregiver with hands-on care. 1-2 adjustments to pouch size/system/skin care/accessory addition or deletion. [x]   2   Assess Complete Ostomy tab in Navigator for assessment of; stoma status, peristomal skin, presence of hernia/stool consistency/diet/related medications   Complex adjustments to pouch size/pouch system, new stoma pattern, accessory addition/deletion. 3 or more complex adjustments to pouch size/system/skin care/accessory addition or deletion. Observe patient/caregiver with hands-on care. Assess patient/patient abdomen for optimal pre-marked stoma site. Assess patient abdomen for type of hernia belt/accessory needed. []   3         Ambulation Status Documented in CN Clinical Note  Status Definition Points   Independent Independently able to ambulate. Fully able (without any assistance) to get on/off exam table/chair. []   0   Minimal Physical Assistance Requires physical assistance of one person to ambulate and/or position patient to be examined. Includes necessary physical assistance to position lower extremities on/off stool.    [x]   1   Moderate Physical Assistance Requires at least one staff member to physically assist patient in ambulating into treatment room, and/or on off chair/bed. Requires assistance to bathroom. []   2   Full Assistance Requires assistance of at least two staff members to transfer patient into treatment room and/or on/off bed/chair. \"Total Transfer\". Unable to use bathroom requires bedside commode and/or bedpan []   3       Teaching Effort Documented in [facility] Clinical Note  Effort Definition Points   No Teaching  []   0   General Initial/Simple lesson:  Assess readiness to learn, assess patient learning style to determine educational flow/special needs for learning. Teaching related to 1-3 topics  Documentation in [facility] completed. [x]   1   Intermediate Assess readiness to learn, assess patient learning style to determine educational flow/special needs for learning. Teaching related to 3-4 topics. Hernia belt application and care considerations  Documentation in [facility] completed. []   2   Complex Assess readiness to learn, assess patient learning style to determine educational flow/special needs for learning. Teaching of greater than 5 additional topics   Pre-operative ostomy education with review of written resources for patient/family/caregiver as needed. Demonstration/return demonstration of ostomy irrigation  Documentation in [facility] completed. []   3     Patient Assessment and Planning in [facility] Clinical Note   Planning Definition Points   Simple Simple pouch change procedure completed and reviewed with patient/family/caregiver   Documentation in [facility] completed. []   1   Intermediate Moderate level of follow-up needs:   Pouch change/discharge procedure revised and reviewed with patient/caregiver. Communications with outside resources; i.e. Telephone calls to Surgeon/ PCP, family/caregiver, home health, ECF. Documentation in [facility] completed.      [x]   2   Complex Complex level of instructions/changes:   Family/Caregiver learning/demonstration/return demonstration visit. Pouching/discharge procedure revised/reviewed with patient/family/caregiver. Contact with outside resources; i.e. communication with Surgeon/ PCP, home health, ECF. Contact/referral to ostomy appliance supplier for new or additional products. Review when to call [facility] or schedule follow-up visit. Referral to Emergency Department   Documentation in [facility] completed. []   3       Is this the Patient's First Visit with [facility] @ [facility]? No    Is this Patient Established to this [facility] within the last 3 years?    Yes             Clinical Level of Care      Points  0-3  Level 1 []     Points  4-6  Level 2 [x]     Points  7-8  Level 3 []     Points  9-10  Level 4 []     Points  11-12  Level 5 []       Electronically signed by Aisha Roy RN on 7/11/2022 at 10:57 AM

## 2022-07-18 NOTE — PROGRESS NOTES
HISTORY OF PRESENT ILLNESS  Davian Rutledge is a 74 y.o. male. Chief Complaint   Patient presents with    Follow-up    Bladder Cancer    Prostate Cancer       Past Medical History:  PMHx (including negatives):  has a past medical history of Back pain, Backache, unspecified (2/8/2010), Bladder cancer ([address] (7/14/2020), Blood clots (2004), Cancer ([address] (2013), CHF (congestive heart failure) ([address] (1999), Chronic obstructive pulmonary disease ([address] Coronary artery disease, Coronary atherosclerosis of native coronary artery (2/8/2010), COVID, DDD (degenerative disc disease), Diabetes ([address] Essential hypertension, benign (2/8/2010), Gastroenteritis, viral, Heart attack ([address] Heart block, Hematuria, unspecified (7/14/2020), HTN (hypertension), benign, Hypercholesterolemia, Ill-defined condition, Ill-defined condition, Ill-defined condition, Ill-defined condition (1996), Ill-defined condition (1999 & early 2000s), Impotence (7/14/2020), Lumbago (2/8/2010), Malignant neoplasm of prostate ([address] (7/14/2020), Neurogenic dysfunction of the urinary bladder (7/14/2020), Neuropathy Peripheral, Osteoarthritis, Osteoarthrosis, unspecified whether generalized or localized, unspecified site (2/8/2010), Seizures ([address] Skin disease, Stroke ([address] (1996), Thyroid disease, Unspecified hereditary and idiopathic peripheral neuropathy (02/08/2010), Urgency of urination (7/14/2020), and Urgency of urination (7/14/2020).    PSurgHx:  has a past surgical history that includes hx heart catheterization (0611,1281); hx appendectomy; hx cataract removal; hx tonsillectomy; colonoscopy (N/A, 4/12/2018); hx circumcision; hx prostatectomy (10/18/2013); hx prostate surgery (08/24/2020); hx prostate surgery (10/18/2013); ir nephrostomy perc lt plc cath  si (9/7/2021); ir remove neph tube rt (9/13/2021); ir occl txcath hemmorage w si (9/13/2021); ir nephrostomy perc lt plc cath  si (9/14/2021); ir exchange nephro perc lt si (9/21/2021); neurological procedure unlisted (1996); neurological procedure unlisted; hx back surgery (2002); hx back surgery (2004); hx urological (06/25/2020); hx urological (06/25/2020); hx urological (05/29/2020); hx urological (08/24/2020); hx urological (08/24/2020); hx urological (08/26/2021); hx urological (08/26/2021); hx urological (08/26/2021); and hx urological (12/07/2021). PSocHx:  reports that he quit smoking about 23 years ago. His smoking use included cigarettes. He has never used smokeless tobacco. He reports that he does not currently use alcohol. He reports that he does not currently use drugs after having used the following drugs: Prescription and Opiates. He is s/p cystectomy with ileal conduit urinary diversion on 8/24/2020. He had an uncomplicated post-operative course and was discharged on 8/31/2020 with [address] [place] [address]. 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 PT2bN0.    12/7/21: LEFT laparoscopic radical nephrectomy. Pathology with no evidence of neoplasm of kidney or transitional epithelium. CT 4/26/22 with mild fullness of the right renal collecting system and ureter. Area of mild heterogeneous soft tissue immediately anterior to the proximal common iliac vessels, nonspecific, possibly postsurgical changes or scarring. CT 7/25/22: no findings to suggest disease progression. Minimal heterogenous soft tissue anterior to the bilateral common iliac arteries is slightly decreased; non-specific (read only, image not available). Most recent creatinine was 2.44 (up slightly) on 7/25/22. Here today in follow up. Chronic Conditions Addressed Today       1. Bladder cancer [facility]) - Primary     Overview      He is s/p TURBT on 6/25/2020. No obvious residual tumor, but tessy and induration at the base.   Pathology reads invasive papillary urothelial carcinoma, high grade (bladder) and invasive high grade urothelial carcinoma pT2 (bladder base). He is s/p cystectomy with ileal conduit urinary diversion on 8/24/2020. He had an uncomplicated post-operative course and was discharged on 8/31/2020 with [address] [place] [address]. 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 PT2bN0. CT scan on 7/10/21 revealed no metastatic disease. 11/3/21: his left kidney is obstructed and nonfunctional.      12/7/21: LEFT laparoscopic radical nephrectomy. Pathology with no evidence of neoplasm of kidney or transitional epithelium. CT 4/26/22 with mild fullness of the right renal collecting system and ureter. Area of mild heterogeneous soft tissue immediately anterior to the proximal common iliac vessels, nonspecific, possibly postsurgical changes or scarring. Recommend follow up. 2. Malignant neoplasm of prostate [facility])     Overview      He is s/p a robotic prostatectomy 10/18/13. He had [place]'s 6 disease wtih negative margins and negative nodes. His PSA has been undetectable, last drawn on 1/20/22. 3. Stage 3 chronic kidney disease ([facility]     Overview      He has left-sided hydronephrosis with stricturing. Prior to cystectomy on 5/20/2021 his creatinine was 1.88.    9/13/2021 creatinine was 1.89.    9/22/2021 creatinine was 1.52.    9/27/2021 creatinine was 1.68.   12/9/21 creatinine following LEFT nephrectomy was 1.59.         4. Nonfunctioning kidney     Overview      11/3/21: nonfunctioning LEFT kidney with hematuria. 11/3/21: significant hematuria after nephrostomy tube placement. His kidney is essentially colonized with bacteria and is non-functioning. Best management at this time is nephrectomy. LEFT laparoscopic radical nephrectomy performed on 12/7/21. ROS  Patient denies the symptoms of COVID-19 per routine screening guidelines. Physical Exam    ASSESSMENT and PLAN  Diagnoses and all orders for this visit:    1.  Malignant neoplasm of urinary bladder, unspecified site [facility])  Assessment & Plan:  History of invasive bladder cancer status post cystectomy and urinary diversion 8/24/2020. He had a CT of the abdomen pelvis on 7/25/2022 without evidence of recurrence. No hydronephrosis in his right kidney. Orders:  -     METABOLIC PANEL, COMPREHENSIVE; Future  -     CBC WITH AUTOMATED DIFF; Future    2. Malignant neoplasm of prostate [facility])  Assessment & Plan:  Prostatectomy in 2013. PSA has been undetectable this year. PSA in January    Orders:  -     PSA, DIAGNOSTIC (PROSTATE SPECIFIC AG); Future    3. Nonfunctioning kidney  Assessment & Plan:  He is status post a left nephrectomy 12/7/2021 for nonfunctioning kidney with hemorrhage. Pathology did not reveal malignancy. 4. Solitary kidney, acquired  Assessment & Plan:   CT 7/25/2022 without evidence of right hydronephrosis. Postsurgical changes without evidence of recurrence. 5. CKD (chronic kidney disease) stage 4, GFR 15-29 ml/min [facility])  Assessment & Plan:     Component Ref Range & Units 7/25/22 0900 4/26/22 0917 12/9/21 0855 12/6/21 1443 11/11/21 0832 10/14/21 0950 9/29/21 1509   Glucose 65 - 99 mg/dL 87  90  103 High  R  96 R  99 R  101 High  R  110 High  R    BUN 8 - 27 mg/dL 32 High   29 High   16 R  21 High  R  22 High  R  23 High  R  26 High  R    Creatinine 0.76 - 1.27 mg/dL 2.44 High   2.32 High   1.59 High  R  1.71 High  R  1.66 High  R  1.76 High  R  1.95 High  R    eGFR >59 mL/min/1.73 27 Low   29 Low          BUN/Creatinine ratio 10 - 24 13  13  10 Low  R  12 R  13 R  13 R  13 R    Sodium 134 - 144 mmol/L 138  137  137 R  136 R  138 R  138 R  138 R    Potassium 3.5 - 5.2 mmol/L 5.2  4.6  4.8 R  4.2 R  3.8 R  4.2 R  4.3 R    Chloride 96 - 106 mmol/L 100  100  104 R  103 R  105 R  104 R  104 R    CO2 20 - 29 mmol/L 26  24  28 R  29 R  28 R  28 R  27 R    Calcium 8.6 - 10.2 mg/dL 9.3  9.4  8.9 R          Solitary right kidney and ileal conduit.   Creatinine is slightly increased from April at 2.4 from 2.3. He follows with nephrology      Orders:  -     METABOLIC PANEL, COMPREHENSIVE; Future  -     CBC WITH AUTOMATED DIFF; Future    6. Attention to urostomy [facility])  Assessment & Plan:  He is managing with his urostomy well. No concerns. Follow-up and Dispositions    Return in about 6 months (around 1/29/2023). Michelle Murphy may have a reminder for a \"due or due soon\" health maintenance. The patient has been encouraged to contact their primary care provider for follow-up on this health maintenance or other necessary and/or routine health screening.      Jose Juan Oleary MD

## 2022-07-26 LAB
BUN SERPL-MCNC: 32 MG/DL (ref 8–27)
BUN/CREAT SERPL: 13 (ref 10–24)
CALCIUM SERPL-MCNC: 9.3 MG/DL (ref 8.6–10.2)
CHLORIDE SERPL-SCNC: 100 MMOL/L (ref 96–106)
CO2 SERPL-SCNC: 26 MMOL/L (ref 20–29)
CREAT SERPL-MCNC: 2.44 MG/DL (ref 0.76–1.27)
EGFR: 27 ML/MIN/1.73
GLUCOSE SERPL-MCNC: 87 MG/DL (ref 65–99)
POTASSIUM SERPL-SCNC: 5.2 MMOL/L (ref 3.5–5.2)
SODIUM SERPL-SCNC: 138 MMOL/L (ref 134–144)

## 2022-07-29 ENCOUNTER — OFFICE VISIT (OUTPATIENT)
Dept: UROLOGY | Age: 72
End: 2022-07-29
Payer: MEDICARE

## 2022-07-29 VITALS
DIASTOLIC BLOOD PRESSURE: 62 MMHG | BODY MASS INDEX: 23.7 KG/M2 | HEART RATE: 59 BPM | SYSTOLIC BLOOD PRESSURE: 135 MMHG | HEIGHT: 67 IN | WEIGHT: 151 LBS

## 2022-07-29 DIAGNOSIS — Z43.6 ATTENTION TO UROSTOMY (HCC): ICD-10-CM

## 2022-07-29 DIAGNOSIS — N18.4 CKD (CHRONIC KIDNEY DISEASE) STAGE 4, GFR 15-29 ML/MIN (HCC): ICD-10-CM

## 2022-07-29 DIAGNOSIS — C67.9 MALIGNANT NEOPLASM OF URINARY BLADDER, UNSPECIFIED SITE (HCC): ICD-10-CM

## 2022-07-29 DIAGNOSIS — Z90.5 SOLITARY KIDNEY, ACQUIRED: ICD-10-CM

## 2022-07-29 DIAGNOSIS — N18.30 STAGE 3 CHRONIC KIDNEY DISEASE, UNSPECIFIED WHETHER STAGE 3A OR 3B CKD (HCC): ICD-10-CM

## 2022-07-29 DIAGNOSIS — C67.9 MALIGNANT NEOPLASM OF URINARY BLADDER, UNSPECIFIED SITE (HCC): Primary | ICD-10-CM

## 2022-07-29 DIAGNOSIS — C61 MALIGNANT NEOPLASM OF PROSTATE (HCC): ICD-10-CM

## 2022-07-29 DIAGNOSIS — N28.9 NONFUNCTIONING KIDNEY: ICD-10-CM

## 2022-07-29 PROBLEM — N28.89 RENAL HEMORRHAGE, LEFT: Status: RESOLVED | Noted: 2021-12-07 | Resolved: 2022-07-29

## 2022-07-29 PROBLEM — T83.098A MALFUNCTION OF NEPHROSTOMY TUBE (HCC): Status: RESOLVED | Noted: 2021-09-26 | Resolved: 2022-07-29

## 2022-07-29 PROBLEM — N13.30 HYDRONEPHROSIS: Status: RESOLVED | Noted: 2021-08-15 | Resolved: 2022-07-29

## 2022-07-29 PROCEDURE — G8752 SYS BP LESS 140: HCPCS | Performed by: UROLOGY

## 2022-07-29 PROCEDURE — G8754 DIAS BP LESS 90: HCPCS | Performed by: UROLOGY

## 2022-07-29 PROCEDURE — G8536 NO DOC ELDER MAL SCRN: HCPCS | Performed by: UROLOGY

## 2022-07-29 PROCEDURE — G8427 DOCREV CUR MEDS BY ELIG CLIN: HCPCS | Performed by: UROLOGY

## 2022-07-29 PROCEDURE — 99214 OFFICE O/P EST MOD 30 MIN: CPT | Performed by: UROLOGY

## 2022-07-29 PROCEDURE — G8432 DEP SCR NOT DOC, RNG: HCPCS | Performed by: UROLOGY

## 2022-07-29 PROCEDURE — G8420 CALC BMI NORM PARAMETERS: HCPCS | Performed by: UROLOGY

## 2022-07-29 RX ORDER — ESCITALOPRAM OXALATE 10 MG/1
10 TABLET ORAL DAILY
COMMUNITY

## 2022-07-29 NOTE — ASSESSMENT & PLAN NOTE
He is status post a left nephrectomy 12/7/2021 for nonfunctioning kidney with hemorrhage. Pathology did not reveal malignancy.

## 2022-07-29 NOTE — ASSESSMENT & PLAN NOTE
CT 7/25/2022 without evidence of right hydronephrosis. Postsurgical changes without evidence of recurrence.

## 2022-07-29 NOTE — ASSESSMENT & PLAN NOTE
History of invasive bladder cancer status post cystectomy and urinary diversion 8/24/2020. He had a CT of the abdomen pelvis on 7/25/2022 without evidence of recurrence. No hydronephrosis in his right kidney.

## 2022-07-29 NOTE — LETTER
7/29/2022    Patient: Maria T Rodriguez   YOB: 1950   Date of Visit: 7/29/2022     Fatimah Guardado NP  [address] [place] [address]  Via Fax: [phone]    Dear Fatimah Guardado NP,      Thank you for referring Mr. Jared Hennessy to [place] [address] for evaluation. My notes for this consultation are attached. If you have questions, please do not hesitate to call me. I look forward to following your patient along with you.       Sincerely,    Saw Reynoso MD

## 2022-07-29 NOTE — ASSESSMENT & PLAN NOTE
Component Ref Range & Units 7/25/22 0900 4/26/22 0917 12/9/21 0855 12/6/21 1443 11/11/21 0832 10/14/21 0950 9/29/21 1509   Glucose 65 - 99 mg/dL 87  90  103 High  R  96 R  99 R  101 High  R  110 High  R    BUN 8 - 27 mg/dL 32 High   29 High   16 R  21 High  R  22 High  R  23 High  R  26 High  R    Creatinine 0.76 - 1.27 mg/dL 2.44 High   2.32 High   1.59 High  R  1.71 High  R  1.66 High  R  1.76 High  R  1.95 High  R    eGFR >59 mL/min/1.73 27 Low   29 Low          BUN/Creatinine ratio 10 - 24 13  13  10 Low  R  12 R  13 R  13 R  13 R    Sodium 134 - 144 mmol/L 138  137  137 R  136 R  138 R  138 R  138 R    Potassium 3.5 - 5.2 mmol/L 5.2  4.6  4.8 R  4.2 R  3.8 R  4.2 R  4.3 R    Chloride 96 - 106 mmol/L 100  100  104 R  103 R  105 R  104 R  104 R    CO2 20 - 29 mmol/L 26  24  28 R  29 R  28 R  28 R  27 R    Calcium 8.6 - 10.2 mg/dL 9.3  9.4  8.9 R          Solitary right kidney and ileal conduit. Creatinine is slightly increased from April at 2.4 from 2.3.   He follows with nephrology

## 2022-07-29 NOTE — PROGRESS NOTES
Chief Complaint   Patient presents with    Follow-up    Bladder Cancer    Prostate Cancer     1. Have you been to the ER, urgent care clinic since your last visit? Hospitalized since your last visit? No    2. Have you seen or consulted any other health care providers outside of the [address] [place] [address] since your last visit? Include any pap smears or colon screening.  No  Visit Vitals  /62 (BP 1 Location: Left upper arm, BP Patient Position: Sitting, BP Cuff Size: Adult)   Pulse (!) 59   Ht 5' 7\" (1.702 m)   Wt 151 lb (68.5 kg)   BMI 23.65 kg/m²

## 2023-01-18 LAB
ALBUMIN SERPL-MCNC: 4.6 G/DL (ref 3.7–4.7)
ALBUMIN/GLOB SERPL: 2.4 {RATIO} (ref 1.2–2.2)
ALP SERPL-CCNC: 87 IU/L (ref 44–121)
ALT SERPL-CCNC: 9 IU/L (ref 0–44)
AST SERPL-CCNC: 9 IU/L (ref 0–40)
BASOPHILS # BLD AUTO: 0.1 X10E3/UL (ref 0–0.2)
BASOPHILS NFR BLD AUTO: 1 %
BILIRUB SERPL-MCNC: 0.3 MG/DL (ref 0–1.2)
BUN SERPL-MCNC: 26 MG/DL (ref 8–27)
BUN/CREAT SERPL: 12 (ref 10–24)
CALCIUM SERPL-MCNC: 9.7 MG/DL (ref 8.6–10.2)
CHLORIDE SERPL-SCNC: 100 MMOL/L (ref 96–106)
CO2 SERPL-SCNC: 23 MMOL/L (ref 20–29)
CREAT SERPL-MCNC: 2.14 MG/DL (ref 0.76–1.27)
EGFR: 32 ML/MIN/1.73
EOSINOPHIL # BLD AUTO: 0.3 X10E3/UL (ref 0–0.4)
EOSINOPHIL NFR BLD AUTO: 3 %
ERYTHROCYTE [DISTWIDTH] IN BLOOD BY AUTOMATED COUNT: 12.8 % (ref 11.6–15.4)
GLOBULIN SER CALC-MCNC: 1.9 G/DL (ref 1.5–4.5)
GLUCOSE SERPL-MCNC: 93 MG/DL (ref 70–99)
HCT VFR BLD AUTO: 45.3 % (ref 37.5–51)
HGB BLD-MCNC: 15 G/DL (ref 13–17.7)
IMM GRANULOCYTES # BLD AUTO: 0 X10E3/UL (ref 0–0.1)
IMM GRANULOCYTES NFR BLD AUTO: 0 %
LYMPHOCYTES # BLD AUTO: 1.5 X10E3/UL (ref 0.7–3.1)
LYMPHOCYTES NFR BLD AUTO: 17 %
MCH RBC QN AUTO: 28.8 PG (ref 26.6–33)
MCHC RBC AUTO-ENTMCNC: 33.1 G/DL (ref 31.5–35.7)
MCV RBC AUTO: 87 FL (ref 79–97)
MONOCYTES # BLD AUTO: 0.7 X10E3/UL (ref 0.1–0.9)
MONOCYTES NFR BLD AUTO: 8 %
NEUTROPHILS # BLD AUTO: 6 X10E3/UL (ref 1.4–7)
NEUTROPHILS NFR BLD AUTO: 71 %
PLATELET # BLD AUTO: 243 X10E3/UL (ref 150–450)
POTASSIUM SERPL-SCNC: 5.3 MMOL/L (ref 3.5–5.2)
PROT SERPL-MCNC: 6.5 G/DL (ref 6–8.5)
PSA SERPL-MCNC: <0.1 NG/ML (ref 0–4)
RBC # BLD AUTO: 5.2 X10E6/UL (ref 4.14–5.8)
SODIUM SERPL-SCNC: 140 MMOL/L (ref 134–144)
WBC # BLD AUTO: 8.4 X10E3/UL (ref 3.4–10.8)

## 2023-01-19 NOTE — PROGRESS NOTES
HISTORY OF PRESENT ILLNESS  Milla Crystal is a 74 y.o. male. Chief Complaint   Patient presents with    Follow-up    Bladder Cancer    Prostate Cancer     Past Medical History:  PMHx (including negatives):  has a past medical history of Back pain, Backache, unspecified (2/8/2010), Bladder cancer ([address] (7/14/2020), Blood clots (2004), Cancer ([address] (2013), CHF (congestive heart failure) ([address] (1999), Chronic obstructive pulmonary disease ([address] Coronary artery disease, Coronary atherosclerosis of native coronary artery (2/8/2010), COVID, DDD (degenerative disc disease), Diabetes ([address] Essential hypertension, benign (2/8/2010), Gastroenteritis, viral, Heart attack ([address] Heart block, Hematuria, unspecified (7/14/2020), HTN (hypertension), benign, Hypercholesterolemia, Ill-defined condition, Ill-defined condition, Ill-defined condition, Ill-defined condition (1996), Ill-defined condition (1999 & early 2000s), Impotence (7/14/2020), Lumbago (2/8/2010), Malignant neoplasm of prostate ([address] (7/14/2020), Neurogenic dysfunction of the urinary bladder (7/14/2020), Neuropathy Peripheral, Osteoarthritis, Osteoarthrosis, unspecified whether generalized or localized, unspecified site (2/8/2010), Seizures ([address] Skin disease, Stroke ([address] (1996), Thyroid disease, Unspecified hereditary and idiopathic peripheral neuropathy (02/08/2010), Urgency of urination (7/14/2020), and Urgency of urination (7/14/2020).    PSurgHx:  has a past surgical history that includes hx heart catheterization (0493,6877); hx appendectomy; hx cataract removal; hx tonsillectomy; colonoscopy (N/A, 4/12/2018); hx circumcision; hx prostatectomy (10/18/2013); hx prostate surgery (08/24/2020); hx prostate surgery (10/18/2013); ir nephrostomy perc lt plc cath  si (9/7/2021); ir remove neph tube rt (9/13/2021); ir occl txcath hemmorage w si (9/13/2021); ir nephrostomy perc lt plc cath  si (9/14/2021); ir exchange nephro perc lt si (9/21/2021); pr unlisted neurological/neuromuscular dx px (1996); pr unlisted neurological/neuromuscular dx px; hx back surgery (2002); hx back surgery (2004); hx urological (06/25/2020); hx urological (06/25/2020); hx urological (05/29/2020); hx urological (08/24/2020); hx urological (08/24/2020); hx urological (08/26/2021); hx urological (08/26/2021); hx urological (08/26/2021); and hx urological (12/07/2021). PSocHx:  reports that he quit smoking about 24 years ago. His smoking use included cigarettes. He has never used smokeless tobacco. He reports that he does not currently use alcohol. He reports that he does not currently use drugs after having used the following drugs: Prescription and Opiates. He is s/p cystectomy with ileal conduit urinary diversion on 8/24/2020. 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 PT2bN0. Prostatectomy same date, pathology significant for [place] 6 disease, PSA remains undetectable, 1/17/23. He is s/p LEFT nephrectomy 12/7/21 for a non-functioning kidney. No evidence of neoplasm of kidney or transitional epithelium on pathology report. Evidence of pyelonephritis, chronic inflammation of renal pelvis and surrounding ureter, and perinephric/renal cortical abscess. CKD followed by Nephrology. Creatinine 2.14 (improved) on 1/17/23. He has bilateral inguinal hernias, not symptomatic, reducible. He was advised to observe them. Chronic Conditions Addressed Today       1. Bladder cancer [facility]) - Primary     Overview      He is s/p TURBT on 6/25/2020. No obvious residual tumor, but tessy and induration at the base. Pathology reads invasive papillary urothelial carcinoma, high grade (bladder) and invasive high grade urothelial carcinoma pT2 (bladder base). He is s/p cystectomy with ileal conduit urinary diversion on 8/24/2020.   He had an uncomplicated post-operative course and was discharged on 8/31/2020 with Home Health. 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 PT2bN0.    12/7/21: LEFT laparoscopic radical nephrectomy for non-functional kidney. Pathology with no evidence of neoplasm of kidney or transitional epithelium. Current Assessment & Plan      He is status post cystectomy August 2020. CMP and CBC look okay. He has not had recent imaging. Relevant Medications     ferrous sulfate (Iron) 325 mg (65 mg iron) tablet     Other Relevant Orders     CT ABD PELV WO CONT     XR CHEST PA LAT    2. Malignant neoplasm of prostate [facility])     Overview      He is s/p a robotic prostatectomy 10/18/13. He had Corey's 6 disease wtih negative margins and negative nodes. His PSA has been undetectable, last drawn on 1/17/23. Current Assessment & Plan      He is status post prostatectomy October 2013. He [place] 6 disease. His PSA remains undetectable. Plan on annual observation. Relevant Medications     ferrous sulfate (Iron) 325 mg (65 mg iron) tablet     Other Relevant Orders     XR CHEST PA LAT    3. Solitary kidney, acquired     Overview      11/3/21: nonfunctional LEFT kidney. 12/7/21: s/p LEFT nephrectomy. No evidence of neoplasm of kidney or transitional epithelium on pathology report. Evidence of pyelonephritis, chronic inflammation of renal pelvis and surrounding ureter, and perinephric/renal cortical abscess. Followed by Nephrology. Current Assessment & Plan       History of left nephrectomy for nonfunctioning, chronically infected kidney. 4. CKD (chronic kidney disease) stage 4, GFR 15-29 ml/min ([facility])     Current Assessment & Plan      Solitary kidney.   Renal function is stable  Component Ref Range & Units 1/17/23 1157 7/25/22 0900 4/26/22 0917 12/9/21 0855 12/6/21 1443 11/11/21 0832 10/14/21 0950   Glucose 70 - 99 mg/dL 93  87 R  90 R  103 High  R  96 R  99 R  101 High  R    BUN 8 - 27 mg/dL 26  32 High   29 High   16 R  21 High  R  22 High  R  23 High  R    Creatinine 0.76 - 1.27 mg/dL 2.14 High   2.44 High   2.32 High   1.59 High  R  1.71 High  R  1.66 High  R  1.76 High  R    eGFR >59 mL/min/1.73 32 Low   27 Low   29 Low                     Relevant Medications     ferrous sulfate (Iron) 325 mg (65 mg iron) tablet            Review of Systems   Constitutional:  Positive for malaise/fatigue. Negative for weight loss. Gastrointestinal:  Negative for nausea and vomiting. All other systems reviewed and are negative. Patient denies the symptoms of COVID-19 per routine screening guidelines. Physical Exam    ASSESSMENT and PLAN  Diagnoses and all orders for this visit:    1. Malignant neoplasm of urinary bladder, unspecified site [facility])  Assessment & Plan:  He is status post cystectomy August 2020. CMP and CBC look okay. He has not had recent imaging. Orders:  -     CT ABD PELV WO CONT; Future  -     XR CHEST PA LAT; Future    2. Malignant neoplasm of prostate [facility])  Assessment & Plan:  He is status post prostatectomy October 2013. He [place] 6 disease. His PSA remains undetectable. Plan on annual observation. Orders:  -     XR CHEST PA LAT; Future    3. Solitary kidney, acquired  Assessment & Plan:   History of left nephrectomy for nonfunctioning, chronically infected kidney. 4. CKD (chronic kidney disease) stage 4, GFR 15-29 ml/min ([facility])  Assessment & Plan:  Solitary kidney.   Renal function is stable  Component Ref Range & Units 1/17/23 1157 7/25/22 0900 4/26/22 0917 12/9/21 0855 12/6/21 1443 11/11/21 0832 10/14/21 0950   Glucose 70 - 99 mg/dL 93  87 R  90 R  103 High  R  96 R  99 R  101 High  R    BUN 8 - 27 mg/dL 26  32 High   29 High   16 R  21 High  R  22 High  R  23 High  R    Creatinine 0.76 - 1.27 mg/dL 2.14 High   2.44 High   2.32 High   1.59 High  R  1.71 High  R  1.66 High  R  1.76 High  R    eGFR >59 mL/min/1.73 32 Low   27 Low   29 Low Follow-up and Dispositions    Return in about 6 months (around 7/24/2023). Sanjay Lorenz may have a reminder for a \"due or due soon\" health maintenance. The patient has been encouraged to contact their primary care provider for follow-up on this health maintenance or other necessary and/or routine health screening. Mayo Alpers, MD     Please note that portions of this note were completed with Dragon dictation, the computer voice recognition software. Quite often unanticipated grammatical, syntax, homophones, and other interpretive errors are inadvertently transcribed by the computer software. Please disregard these errors and any other errors that may have escaped final proofreading. Thank you.

## 2023-01-24 ENCOUNTER — OFFICE VISIT (OUTPATIENT)
Dept: UROLOGY | Age: 73
End: 2023-01-24
Payer: MEDICARE

## 2023-01-24 VITALS — HEIGHT: 67 IN | BODY MASS INDEX: 26.68 KG/M2 | WEIGHT: 170 LBS

## 2023-01-24 DIAGNOSIS — C61 MALIGNANT NEOPLASM OF PROSTATE (HCC): ICD-10-CM

## 2023-01-24 DIAGNOSIS — C67.9 MALIGNANT NEOPLASM OF URINARY BLADDER, UNSPECIFIED SITE (HCC): Primary | ICD-10-CM

## 2023-01-24 DIAGNOSIS — N18.4 CKD (CHRONIC KIDNEY DISEASE) STAGE 4, GFR 15-29 ML/MIN (HCC): ICD-10-CM

## 2023-01-24 DIAGNOSIS — Z90.5 SOLITARY KIDNEY, ACQUIRED: ICD-10-CM

## 2023-01-24 PROCEDURE — 3017F COLORECTAL CA SCREEN DOC REV: CPT | Performed by: UROLOGY

## 2023-01-24 PROCEDURE — G8417 CALC BMI ABV UP PARAM F/U: HCPCS | Performed by: UROLOGY

## 2023-01-24 PROCEDURE — G8536 NO DOC ELDER MAL SCRN: HCPCS | Performed by: UROLOGY

## 2023-01-24 PROCEDURE — 1101F PT FALLS ASSESS-DOCD LE1/YR: CPT | Performed by: UROLOGY

## 2023-01-24 PROCEDURE — G8432 DEP SCR NOT DOC, RNG: HCPCS | Performed by: UROLOGY

## 2023-01-24 PROCEDURE — 1123F ACP DISCUSS/DSCN MKR DOCD: CPT | Performed by: UROLOGY

## 2023-01-24 PROCEDURE — 99214 OFFICE O/P EST MOD 30 MIN: CPT | Performed by: UROLOGY

## 2023-01-24 PROCEDURE — G8427 DOCREV CUR MEDS BY ELIG CLIN: HCPCS | Performed by: UROLOGY

## 2023-01-24 RX ORDER — CARVEDILOL 6.25 MG/1
TABLET ORAL 2 TIMES DAILY WITH MEALS
COMMUNITY

## 2023-01-24 RX ORDER — LANOLIN ALCOHOL/MO/W.PET/CERES
CREAM (GRAM) TOPICAL
COMMUNITY

## 2023-01-24 RX ORDER — FLUTICASONE PROPIONATE AND SALMETEROL 100; 50 UG/1; UG/1
1 POWDER RESPIRATORY (INHALATION) EVERY 12 HOURS
COMMUNITY

## 2023-01-24 NOTE — PROGRESS NOTES
Chief Complaint   Patient presents with    Follow-up    Bladder Cancer    Prostate Cancer     1. Have you been to the ER, urgent care clinic since your last visit? Hospitalized since your last visit? No    2. Have you seen or consulted any other health care providers outside of the [address] [place] [address] since your last visit? Include any pap smears or colon screening.  No  Visit Vitals  Ht 5' 7\" (1.702 m)   Wt 170 lb (77.1 kg)   BMI 26.63 kg/m²

## 2023-01-24 NOTE — ASSESSMENT & PLAN NOTE
He is status post prostatectomy October 2013. He [place] 6 disease. His PSA remains undetectable. Plan on annual observation.

## 2023-01-24 NOTE — ASSESSMENT & PLAN NOTE
Solitary kidney.   Renal function is stable  Component Ref Range & Units 1/17/23 1157 7/25/22 0900 4/26/22 0917 12/9/21 0855 12/6/21 1443 11/11/21 0832 10/14/21 0950   Glucose 70 - 99 mg/dL 93  87 R  90 R  103 High  R  96 R  99 R  101 High  R    BUN 8 - 27 mg/dL 26  32 High   29 High   16 R  21 High  R  22 High  R  23 High  R    Creatinine 0.76 - 1.27 mg/dL 2.14 High   2.44 High   2.32 High   1.59 High  R  1.71 High  R  1.66 High  R  1.76 High  R    eGFR >59 mL/min/1.73 32 Low   27 Low   29 Low

## 2023-01-24 NOTE — LETTER
1/24/2023    Patient: Alla Mishra   YOB: 1950   Date of Visit: 1/24/2023     Brittany Stewart NP  [address] [place] [address]  Via Fax: [phone]    Dear Brittany Stewart NP,      Thank you for referring Mr. Caroline Laguna to [facility] for evaluation. My notes for this consultation are attached. If you have questions, please do not hesitate to call me. I look forward to following your patient along with you.       Sincerely,    Daniel Bhatt MD

## 2023-01-29 DIAGNOSIS — N18.4 CKD (CHRONIC KIDNEY DISEASE) STAGE 4, GFR 15-29 ML/MIN (HCC): ICD-10-CM

## 2023-01-29 DIAGNOSIS — C67.9 MALIGNANT NEOPLASM OF URINARY BLADDER, UNSPECIFIED SITE (HCC): ICD-10-CM

## 2023-01-29 DIAGNOSIS — C61 MALIGNANT NEOPLASM OF PROSTATE (HCC): ICD-10-CM

## 2023-03-14 ENCOUNTER — HOSPITAL ENCOUNTER (OUTPATIENT)
Dept: WOUND CARE | Age: 73
Discharge: HOME | End: 2023-03-14
Payer: MEDICARE

## 2023-03-14 VITALS
RESPIRATION RATE: 16 BRPM | SYSTOLIC BLOOD PRESSURE: 152 MMHG | HEART RATE: 69 BPM | TEMPERATURE: 97.4 F | DIASTOLIC BLOOD PRESSURE: 66 MMHG

## 2023-03-14 PROCEDURE — 99213 OFFICE O/P EST LOW 20 MIN: CPT

## 2023-03-14 RX ORDER — MAGNESIUM 200 MG
1000 TABLET ORAL DAILY
COMMUNITY

## 2023-03-14 RX ORDER — LEVOTHYROXINE SODIUM 88 UG/1
88 TABLET ORAL
COMMUNITY

## 2023-03-14 NOTE — WOUND CARE
CHIEF COMPLAINT: Wife states that she brought patient in today due to concerns that the skin around his urostomy has been red and irritated. She states that she last changed the bag on Sunday and at that time the skin was very red and raw so she treated it with stoma powder and no Sting skin prep as instructed but wanted to ostomy nurse to assess to see if there is anything else she should be doing. She reports that ostomy bags have been lasting 4 days consistently without leaks and she is very happy with that. OSTOMY Assessment    Stoma Tissue Assessment: ___Post-op __x_Follow-up       Type of Diversion: ___New__x_ Established ___Revision___Permanent____Temporary    _____  Ileostomy   _____  Colostomy: ____ Ascending, ____ Transverse, _____.  Sigmoid   _____  Liliam Pennant   ___x__  Ileal Conduit   _____  Mucous Fistula     Appearance of Ostomy:   __x_ Nevelyn Carlo /Moist/Viable ___ Dark Red ___ Pink ___ Sloughing ___Necrotic____Pallor ____Red  ___Dry ____Moist    Stoma Size: ____25_____ (mm) ___Round ___ Oval ___Irregular     Stoma Height:   ___xx_Flush ____Retracted ____Budded ____Edematous ____Prolapse     Stoma Location  ____ Left Side          _x___Right Side     _____Umbilicus  _____Incision Line  __x__ Above Belt       ____ Below Belt    Abdominal Contours  ____ Firm ____ Flat ____Flabby _x__Soft __x_Round ___ Hard ___ Other     Stoma Function: _x_Yes __No  Output:   __x__ Yellow ____Amber ____ Pink(Hematuria) ____ Tea Colored   ____ Clots ____Foul Odor ____ Mucous     Peristomal skin: mild irritation in immediate peristomal area  ___ Intact __x_ Irritant Dermatitis ___ Allergic Contact Dermatitis ___Candidiasis ___Caput Medusae ___Folliculitis ___Mechanical Trauma ___Mucosal Transplantation    ___Pyoderma Gangrenosum ___Hyperplasia ___Radiation Trauma ___Allergies mpling  ___ Dimpling ____Blistered ____Fragile ____Macerated ___Peeling  ___Ulceration  ___ Fungal ___Peristomal Hernia ___Non-blanchable ___ Hypergranulation      Stoma Complications:   __Excessive Bleeding __Ischemia __ Abscess __Necrosis __Prolapse   __Hernia __ Retraction __Stenosis __Mucosal Separation __Melanosis Coli   __Laceration __Other     Application for Patient    Wafer and pouch used during assessment and education ___[place] [id number]_________    Pouch System Recommended:   _x_One-Piece __Two-Piece ___Custom     Flat:   ___Pre-cut   _x__Cut-to fit     Convexity: ___Shallow ___Deep_x__ Flexible ___Creative Convexity   ___Pre-cut ___Cut to Fit     Accessory Products   _x_ Adhesive Seals __Adhesive Remover Wipes __Barrier Wafer _x_Barrier Wipes   __Deodorizer __Liquid Adhesive __ Paste _x_ Powder _x_Strip Coloplast [id number]  __ Support Belt __Tape      Education for Patient & Family  1. Booklet of information on specific ostomy given and some verbal discussion of patients ostomy and expectations. 2. Instruction/demostration of ostomy wafer & pouch change. 3. Discuss concerns/ answer questions. 4. Provide follow up education in clinic if needed.        PICTURE INSERT:

## 2023-03-14 NOTE — WOUND CARE
Clinical Level of Care Assessment    Outpatient Ostomy Care      NAME:  Martina Yan OF BIRTH:  1950  MEDICAL RECORD NUMBER:  [id number]   DATE:  3/14/2023      Patient Jennifer Sosa Assessment- Document in Flowsheet I&O   Points   Review of chart []   0   Assess Complete Ostomy tab in Navigator for assessment of; stoma status, peristomal skin, presence of hernia/stool consistency/diet/related medications   Simple adjustments to pouch size/pouch system, new stoma pattern, accessory addition/deletion. []   1   Assess Complete Ostomy tab in Navigator for assessment of; stoma status, peristomal skin, presence of hernia/stool consistency/diet/related medications   Moderate adjustments to pouch size/pouch system, new stoma pattern, accessory addition/deletion. Observe patient/caregiver with hands-on care. 1-2 adjustments to pouch size/system/skin care/accessory addition or deletion. [x]   2   Assess Complete Ostomy tab in Navigator for assessment of; stoma status, peristomal skin, presence of hernia/stool consistency/diet/related medications   Complex adjustments to pouch size/pouch system, new stoma pattern, accessory addition/deletion. 3 or more complex adjustments to pouch size/system/skin care/accessory addition or deletion. Observe patient/caregiver with hands-on care. Assess patient/patient abdomen for optimal pre-marked stoma site. Assess patient abdomen for type of hernia belt/accessory needed. []   3         Ambulation Status Documented in CN Clinical Note  Status Definition Points   Independent Independently able to ambulate. Fully able (without any assistance) to get on/off exam table/chair. []   0   Minimal Physical Assistance Requires physical assistance of one person to ambulate and/or position patient to be examined. Includes necessary physical assistance to position lower extremities on/off stool.    []   1   Moderate Physical Assistance Requires at least one staff member to physically assist patient in ambulating into treatment room, and/or on off chair/bed. Requires assistance to bathroom. [x]   2   Full Assistance Requires assistance of at least two staff members to transfer patient into treatment room and/or on/off bed/chair. \"Total Transfer\". Unable to use bathroom requires bedside commode and/or bedpan []   3       Teaching Effort Documented in [facility] Clinical Note  Effort Definition Points   No Teaching  []   0   General Initial/Simple lesson:  Assess readiness to learn, assess patient learning style to determine educational flow/special needs for learning. Teaching related to 1-3 topics  Documentation in [facility] completed. []   1   Intermediate Assess readiness to learn, assess patient learning style to determine educational flow/special needs for learning. Teaching related to 3-4 topics. Hernia belt application and care considerations  Documentation in [facility] completed. [x]   2   Complex Assess readiness to learn, assess patient learning style to determine educational flow/special needs for learning. Teaching of greater than 5 additional topics   Pre-operative ostomy education with review of written resources for patient/family/caregiver as needed. Demonstration/return demonstration of ostomy irrigation  Documentation in [facility] completed. []   3     Patient Assessment and Planning in [facility] Clinical Note   Planning Definition Points   Simple Simple pouch change procedure completed and reviewed with patient/family/caregiver   Documentation in [facility] completed. [x]   1   Intermediate Moderate level of follow-up needs:   Pouch change/discharge procedure revised and reviewed with patient/caregiver. Communications with outside resources; i.e. Telephone calls to Surgeon/ PCP, family/caregiver, home health, ECF. Documentation in [facility] completed.      []   2   Complex Complex level of instructions/changes:   Family/Caregiver learning/demonstration/return demonstration visit. Pouching/discharge procedure revised/reviewed with patient/family/caregiver. Contact with outside resources; i.e. communication with Surgeon/ PCP, home health, ECF. Contact/referral to ostomy appliance supplier for new or additional products. Review when to call [facility] or schedule follow-up visit. Referral to Emergency Department   Documentation in [facility] completed. []   3       Is this the Patient's First Visit with [facility] @ [facility]? No    Is this Patient Established to this [facility] within the last 3 years?    Yes             Clinical Level of Care      Points  0-3  Level 1 []     Points  4-6  Level 2 []     Points  7-8  Level 3 [x]     Points  9-10  Level 4 []     Points  11-12  Level 5 []       Electronically signed by Erman Koyanagi, RN on 3/14/2023 at 2:50 PM

## 2023-03-14 NOTE — WOUND CARE
Discharge Instructions/Wound Orders  [facility]  [address], [address] [facility] [place], [address] [facility]  Telephone: [phone]    NAME:  Lore Savage OF BIRTH:  1950  MEDICAL RECORD NUMBER:  [id number]  DATE:  3/14/2023  Ostomy Care Orders:  1) Remove old bag, wash stoma and peristomal with tap water and paper towels, then dry thoroughly. 2) If skin is irritated treat with crusting (I.e. powder then skin prep). 3) Shape ring to size, crimp inner edge and place over stoma site. 4) Cut [place] [address] to size (I.e. dotted line between 20 and 30)  5) Remove plastic backing and place over stoma site, place Coloplast barriers #[id number] under tape border,  remove paper from tape edges and smooth onto skin. 6) Attach stoma belt. 7) Change 2 x week and as needed for leaking. Dietary:  [] Diet as tolerated: [] Calorie Diabetic Diet:Low carb and no Sugar [] No Added Salt:[] Increase Protein: [] Other:Limit the amount of liquid you are drinking and avoid drinking in between meals   Activity:  [] Activity as tolerated:  [] Patient has no activity restrictions     [] Strict Bedrest: [] Remain off Work:     [] May return to full duty work:                                   [] Return to work with restrictions:             Return Appointment:  [] Return Appointment: With Meka Corrigan  in  follow up as needed. [] Ordered tests:    Electronically signed Gucci Odom RN on 3/14/2023 at [address] [place] [address] Information: Should you experience any significant changes in your wound(s) or have questions about your wound care, please contact the [address] [place] [address] at [address] [place] [address] 8:00 am - 4:30. If you need help with your wound outside these hours and cannot wait until we are again available, contact your PCP or go to the hospital emergency room.      PLEASE NOTE: IF YOU ARE UNABLE TO OBTAIN WOUND SUPPLIES, CONTINUE TO USE THE SUPPLIES YOU HAVE AVAILABLE UNTIL YOU ARE ABLE TO REACH US. IT IS MOST IMPORTANT TO KEEP THE WOUND COVERED AT ALL TIMES.      CWON Signature:_______________________    Date: ___________ Time:  ____________

## 2023-05-24 ENCOUNTER — TELEPHONE (OUTPATIENT)
Age: 73
End: 2023-05-24

## 2023-05-24 DIAGNOSIS — N30.00 ACUTE CYSTITIS WITHOUT HEMATURIA: Primary | ICD-10-CM

## 2023-05-24 RX ORDER — LEVOFLOXACIN 250 MG/1
250 TABLET ORAL DAILY
Qty: 7 TABLET | Refills: 0 | Status: SHIPPED | OUTPATIENT
Start: 2023-05-24 | End: 2023-05-24 | Stop reason: ALTCHOICE

## 2023-05-24 NOTE — TELEPHONE ENCOUNTER
Can you let patient's wife know that the culture results sent to me from PCP office are consistent with his conduit. There is not specific bacterial growth that is predominant to treat. His fatigue is not likely due to UTI. If he has new symptoms (fever, chills, nausea, vomiting or back or flank pain or hematuria) have them come in to be seen.

## 2023-07-28 PROBLEM — C67.9 BLADDER CANCER (HCC): Status: ACTIVE | Noted: 2020-07-14

## 2023-07-28 PROBLEM — Z90.5 SOLITARY KIDNEY, ACQUIRED: Status: ACTIVE | Noted: 2022-01-12

## 2023-07-28 PROBLEM — Z43.6 ATTENTION TO UROSTOMY (HCC): Status: RESOLVED | Noted: 2021-12-07 | Resolved: 2023-07-28

## 2023-07-28 PROBLEM — C61 MALIGNANT NEOPLASM OF PROSTATE (HCC): Status: ACTIVE | Noted: 2020-07-14

## 2023-07-31 ENCOUNTER — TELEPHONE (OUTPATIENT)
Age: 73
End: 2023-07-31

## 2023-07-31 DIAGNOSIS — C61 MALIGNANT NEOPLASM OF PROSTATE (HCC): Primary | ICD-10-CM

## 2023-08-04 ENCOUNTER — OFFICE VISIT (OUTPATIENT)
Age: 73
End: 2023-08-04

## 2023-08-04 VITALS
TEMPERATURE: 97.8 F | HEIGHT: 67 IN | SYSTOLIC BLOOD PRESSURE: 138 MMHG | BODY MASS INDEX: 26.68 KG/M2 | OXYGEN SATURATION: 100 % | HEART RATE: 97 BPM | DIASTOLIC BLOOD PRESSURE: 68 MMHG | WEIGHT: 170 LBS

## 2023-08-04 DIAGNOSIS — N18.4 CKD (CHRONIC KIDNEY DISEASE) STAGE 4, GFR 15-29 ML/MIN (HCC): ICD-10-CM

## 2023-08-04 DIAGNOSIS — Z90.5 SOLITARY KIDNEY, ACQUIRED: ICD-10-CM

## 2023-08-04 DIAGNOSIS — K40.20 BILATERAL INGUINAL HERNIA WITHOUT OBSTRUCTION OR GANGRENE, RECURRENCE NOT SPECIFIED: ICD-10-CM

## 2023-08-04 DIAGNOSIS — C67.9 MALIGNANT NEOPLASM OF URINARY BLADDER, UNSPECIFIED SITE (HCC): Primary | ICD-10-CM

## 2023-08-04 DIAGNOSIS — L30.9 STOMA DERMATITIS: ICD-10-CM

## 2023-08-04 DIAGNOSIS — C61 MALIGNANT NEOPLASM OF PROSTATE (HCC): ICD-10-CM

## 2023-08-04 DIAGNOSIS — C67.9 MALIGNANT NEOPLASM OF URINARY BLADDER, UNSPECIFIED SITE (HCC): ICD-10-CM

## 2023-08-04 LAB
BILIRUBIN, URINE, POC: NEGATIVE
BLOOD URINE, POC: ABNORMAL
GLUCOSE URINE, POC: NEGATIVE
KETONES, URINE, POC: NEGATIVE
LEUKOCYTE ESTERASE, URINE, POC: ABNORMAL
NITRITE, URINE, POC: NEGATIVE
PH, URINE, POC: 5.5 (ref 4.6–8)
PROTEIN,URINE, POC: ABNORMAL
SPECIFIC GRAVITY, URINE, POC: 1 (ref 1–1.03)
URINALYSIS CLARITY, POC: CLEAR
URINALYSIS COLOR, POC: YELLOW
UROBILINOGEN, POC: ABNORMAL

## 2023-08-04 RX ORDER — FENTANYL 12.5 UG/1
PATCH TRANSDERMAL
COMMUNITY
Start: 2018-07-07

## 2023-08-04 NOTE — PROGRESS NOTES
HISTORY OF PRESENT ILLNESS  Ewa Jaimes is a 74 y.o. male. has a past medical history of Back pain, Backache, unspecified, Bladder cancer ([address]), Cancer ([address]), CHF (congestive heart failure) ([address]), Chronic obstructive pulmonary disease ([address]), Coronary artery disease, Coronary atherosclerosis of native coronary artery, COVID, DDD (degenerative disc disease), Diabetes ([address]), Essential hypertension, benign, Heart attack ([address]), Heart block, Hematuria, unspecified, HTN (hypertension), benign, Hypercholesterolemia, Ill-defined condition, Ill-defined condition, Ill-defined condition, Ill-defined condition, Ill-defined condition, Impotence, Lumbago, Malignant neoplasm of prostate ([address]), Neurogenic dysfunction of the urinary bladder, Osteoarthrosis, unspecified whether generalized or localized, unspecified site, Seizures ([address]), Skin disease, Stroke ([address]), Thyroid disease, Unspecified hereditary and idiopathic peripheral neuropathy, Urgency of urination, and Urgency of urination. has a past surgical history that includes IR GUIDED NEPHROSTOMY CATH PLACEMENT LEFT (9/7/2021); IR EMBOLIZATION HEMORRHAGE (9/13/2021); IR GUIDED NEPHROSTOMY CATH PLACEMENT LEFT (9/14/2021); Urological Surgery (08/24/2020); Urological Surgery (08/24/2020); Urological Surgery (06/25/2020); Urological Surgery (06/25/2020); back surgery (2004); back surgery (2002); neurological surgery; neurological surgery (1996); IR GUIDED NEPHROSTOMY CATH EXCHANGE (9/21/2021); IR GUIDED NEPHROSTOMY CATH PLACEMENT LEFT (9/14/2021); IR REMOVAL NEPHROSTOMY TUBE W FLUORO (9/13/2021); IR GUIDED NEPHROSTOMY CATH PLACEMENT LEFT (9/7/2021); Prostate surgery (10/18/2013); Prostate surgery (08/24/2020); Prostatectomy (10/18/2013); Circumcision; Colonoscopy (N/A, 4/12/2018); Tonsillectomy; Cataract removal; Appendectomy; Cardiac catheterization (1317,3044); IR EMBOLIZATION HEMORRHAGE (9/13/2021); Urological Surgery (12/07/2021); Urological Surgery (08/26/2021);  Urological

## 2023-08-04 NOTE — ASSESSMENT & PLAN NOTE
Some stoma plate skin irritation. He is using a barrier wipe. He saw Larry Wilde at the stoma clinic. He is instructed to see her for further skin care.

## 2023-08-17 LAB
ALBUMIN SERPL-MCNC: 3.9 G/DL (ref 3.8–4.8)
ALBUMIN/GLOB SERPL: 1.8 {RATIO} (ref 1.2–2.2)
ALP SERPL-CCNC: 88 IU/L (ref 44–121)
ALT SERPL-CCNC: 6 IU/L (ref 0–44)
AST SERPL-CCNC: 7 IU/L (ref 0–40)
BASOPHILS # BLD AUTO: 0.1 X10E3/UL (ref 0–0.2)
BASOPHILS NFR BLD AUTO: 1 %
BILIRUB SERPL-MCNC: 0.5 MG/DL (ref 0–1.2)
BUN SERPL-MCNC: 22 MG/DL (ref 8–27)
BUN/CREAT SERPL: 10 (ref 10–24)
CALCIUM SERPL-MCNC: 8.9 MG/DL (ref 8.6–10.2)
CHLORIDE SERPL-SCNC: 97 MMOL/L (ref 96–106)
CO2 SERPL-SCNC: 24 MMOL/L (ref 20–29)
CREAT SERPL-MCNC: 2.31 MG/DL (ref 0.76–1.27)
EGFRCR SERPLBLD CKD-EPI 2021: 29 ML/MIN/1.73
EOSINOPHIL # BLD AUTO: 0.4 X10E3/UL (ref 0–0.4)
EOSINOPHIL NFR BLD AUTO: 4 %
ERYTHROCYTE [DISTWIDTH] IN BLOOD BY AUTOMATED COUNT: 12.6 % (ref 11.6–15.4)
GLOBULIN SER CALC-MCNC: 2.2 G/DL (ref 1.5–4.5)
GLUCOSE SERPL-MCNC: 94 MG/DL (ref 70–99)
HCT VFR BLD AUTO: 39.3 % (ref 37.5–51)
HGB BLD-MCNC: 12.9 G/DL (ref 13–17.7)
IMM GRANULOCYTES # BLD AUTO: 0 X10E3/UL (ref 0–0.1)
IMM GRANULOCYTES NFR BLD AUTO: 0 %
LYMPHOCYTES # BLD AUTO: 1.1 X10E3/UL (ref 0.7–3.1)
LYMPHOCYTES NFR BLD AUTO: 12 %
MCH RBC QN AUTO: 27.9 PG (ref 26.6–33)
MCHC RBC AUTO-ENTMCNC: 32.8 G/DL (ref 31.5–35.7)
MCV RBC AUTO: 85 FL (ref 79–97)
MONOCYTES # BLD AUTO: 0.7 X10E3/UL (ref 0.1–0.9)
MONOCYTES NFR BLD AUTO: 8 %
NEUTROPHILS # BLD AUTO: 6.8 X10E3/UL (ref 1.4–7)
NEUTROPHILS NFR BLD AUTO: 75 %
PLATELET # BLD AUTO: 250 X10E3/UL (ref 150–450)
POTASSIUM SERPL-SCNC: 4.1 MMOL/L (ref 3.5–5.2)
PROT SERPL-MCNC: 6.1 G/DL (ref 6–8.5)
PSA SERPL-MCNC: <0.1 NG/ML (ref 0–4)
RBC # BLD AUTO: 4.63 X10E6/UL (ref 4.14–5.8)
SODIUM SERPL-SCNC: 137 MMOL/L (ref 134–144)
WBC # BLD AUTO: 9 X10E3/UL (ref 3.4–10.8)

## 2024-04-10 ENCOUNTER — TELEPHONE (OUTPATIENT)
Age: 74
End: 2024-04-10

## 2024-04-10 DIAGNOSIS — S31.109A WOUND OF ABDOMEN: Primary | ICD-10-CM

## 2024-04-10 NOTE — TELEPHONE ENCOUNTER
Patient is going to a wound specialist (Ostomy) tomorrow at 9:00 am, regarding a sore where the bag is located.   Ostomy is requiring a referral to be sent over in order for the patient to be seen.

## 2024-04-11 ENCOUNTER — HOSPITAL ENCOUNTER (OUTPATIENT)
Facility: HOSPITAL | Age: 74
Discharge: HOME | End: 2024-04-11
Payer: MEDICARE

## 2024-04-11 VITALS
SYSTOLIC BLOOD PRESSURE: 145 MMHG | RESPIRATION RATE: 16 BRPM | TEMPERATURE: 97 F | DIASTOLIC BLOOD PRESSURE: 65 MMHG | HEART RATE: 61 BPM

## 2024-04-11 PROCEDURE — 99214 OFFICE O/P EST MOD 30 MIN: CPT

## 2024-04-11 RX ORDER — LEVOTHYROXINE SODIUM 0.07 MG/1
75 TABLET ORAL DAILY
COMMUNITY

## 2024-04-11 NOTE — FLOWSHEET NOTE
04/11/24 1007   Wound 04/11/24 Abdomen Lateral #1   Date First Assessed/Time First Assessed: 04/11/24 1006   Present on Original Admission: Yes  Wound Approximate Age at First Assessment (Weeks): 4 weeks  Primary Wound Type: Traumatic  Location: (c) Abdomen  Wound Location Orientation: Lateral  Wound D...   Wound Image    Wound Etiology Traumatic   Dressing Status Old drainage noted   Wound Cleansed Other (Comment)  (Tap water)   Dressing/Treatment   (Stoma bag & crusting)   Wound Length (cm) 1.7 cm   Wound Width (cm) 2.6 cm   Wound Depth (cm) 0.1 cm   Wound Surface Area (cm^2) 4.42 cm^2   Wound Volume (cm^3) 0.442 cm^3   Wound Assessment [place]/red;Slough   Drainage Amount Moderate (25-50%)   Drainage Description Serous   Odor None   Saniya-wound Assessment Intact;Blanchable erythema   Margins Defined edges   Wound Thickness Description not for Pressure Injury Full thickness     BP (!) 145/65   Pulse 61   Temp 97 °F (36.1 °C) (Temporal)   Resp 16

## 2024-04-11 NOTE — WOUND CARE
Discharge Instructions/Wound Orders  [facility]  [address], [address]  [place] [address]  Telephone: [phone]     FAX [phone]    NAME:  Filipe Real  YOB: 1950  MEDICAL RECORD NUMBER:  [id number]  DATE:  4/11/2024  Ostomy Care Orders:  1) Remove old bag and clean stoma, peristomal and the wound tap water and paper towel, dry thoroughly, then cover the stoma with paper towel while you treat the wound.  2) Treat the wound by applying a small amount of stomahesive powder, dot with NO STING SKIN PREP, cover with a small piece alginate and seal with extra thin douderm.  3) Cut the Juancho [id number] to size at first ring, remove plastic backing apply a thin bead of stomahesive paste directly at opening.  4) Fold like a taco and place over stoma site.   5) Attach ostomy belt if patient will tolerate.  6) Change 2 x week and as needed for leaking.     Supply list  R[place] [id number]  Stomahesive paste (this brand if possible)  No Sting skin prep (I like Cavilon brand but any brand will work as long as it is No Sting).  Juancho ostomy belt #7300  Stomahesive powder    Dietary:  [x] Diet as tolerated: [] Calorie Diabetic Diet:Low carb and no Sugar [] No Added Salt:[] Increase Protein: [] Other:Limit the amount of liquid you are drinking and avoid drinking in between meals   Activity:  [x] Activity as tolerated:  [] Patient has no activity restrictions     [] Strict Bedrest: [] Remain off Work:     [] May return to full duty work:                                   [] Return to work with restrictions:             Return Appointment:  [x] Return Appointment: With PARTHA Valladares  in  2 Week(s)  [] Ordered tests:    Electronically signed GRACE REAL RN on 4/11/2024 at 9:46 AM     Wound Care Center Information: Should you experience any significant changes in your wound(s) or have questions about your wound care, please contact the [facility] 
OSTOMY Assessment    Stoma Tissue Assessment: Post-op _x__Follow-up       Type of Diversion: ___New__x_ Established ___Revision___Permanent____Temporary    _____  Ileostomy   _____  Colostomy: ____ Ascending, ____ Transverse, _____. Sigmoid   _____  Urostomy   ___x__  Ileal Conduit   _____  Mucous Fistula     Appearance of Ostomy:   __x_ Bright Red /Moist/Viable ___ Dark Red ___ Pink ___ Sloughing ___Necrotic____Pallor ____Red  ___Dry ____Moist    Stoma Size: ___28______ (mm) __x_Round ___ Oval ___Irregular     Stoma Height:   _x___Flush ____Retracted ____Budded ____Edematous ____Prolapse     Stoma Location  ____ Left Side          __x__Right Side     _____Umbilicus  _____Incision Line  __x__ Above Belt       ____ Below Belt    Abdominal Contours  ____ Firm ____ Flat ____Flabby _x__Soft _x__Round ___ Hard ___ Other     Stoma Function: _x_Yes __No  Output:   _x___ Yellow ____Amber ____ Pink(Hematuria) ____ Tea Colored   ____ Clots ____Foul Odor ____ Mucous     Peristomal skin: See Wound Flowsheet for perisotmal wound documentation  ___ Intact ___ Irritant Dermatitis ___ Allergic Contact Dermatitis ___Candidiasis ___Caput Medusae ___Folliculitis ___Mechanical Trauma ___Mucosal Transplantation    ___Pyoderma Gangrenosum ___Hyperplasia ___Radiation Trauma ___Allergies mpling  ___ Dimpling ____Blistered ____Fragile ____Macerated ___Peeling  ___Ulceration  ___ Fungal ___Peristomal Hernia ___Non-blanchable ___ Hypergranulation      Stoma Complications:   __Excessive Bleeding __Ischemia __ Abscess __Necrosis __Prolapse   __Hernia __ Retraction __Stenosis __Mucosal Separation __Melanosis Coli   __Laceration __Other     Application for Patient    Wafer and pouch used during assessment and education ____Juancho [id number]________    Pouch System Recommended:   _x_One-Piece __Two-Piece ___Custom     Flat:   ___Pre-cut   ___Cut-to fit     Convexity: ___Shallow ___Deep__x_ Flexible ___Creative Convexity   ___Pre-cut _x__Cut to Fit 
Ostomy Care Plan  Risk for impaired skin integrity  Goal: Patient will exhibit intact peristomal skin at each visit.  Interventions: Assess patients peristomal skin at each visit.                            Instruct patient in peristomal skin care procedures at each visit.    Outcomes: Patient will demonstrate proper sizing and application of wafer.                       Patient will demonstrate intact peristomal skin without irritation.                       Patient will verbalize two strategies to prevent skin irritation.    Risk for disturbed body image  Goal: Patient will verbalize concerns in appearance, relationships and lifestyle changes.  Interventions: Assess patients coping mechanisms and body image issues at each visit.  Outcomes: Patient will verbalize/demonstrate comfort with body image as evidenced by willingness to manage ostomy care and acceptance of body image change.     Knowledge Deficit r/t Ostomy Care  Goal: Patient will verbalize/demonstrate independent ostomy care by end of teaching sessions.  Interventions: Assess patients understanding/ability to manage ostomy care including daily maintenance of pouch and intermittent changing of appliance.  Outcomes: Patient will demonstrate ability to remove, clean and reapply ostomy pouch.  Patient will verbalize understanding of ostomy pouching complications (i.e. failure to maintain a seal for at least 3 to 4 days, leaking of pouch, skin irritation of peristomal skin).      Goal 1 addressed today, pt will return in 2 weeks to reassess efficacy of current treatment and status of peristomal wound.  Goal 2 Addressed today will complete at next visit.   
instructions/changes:   Family/Caregiver learning/demonstration/return demonstration visit.   Pouching/discharge procedure revised/reviewed with patient/family/caregiver.      Contact with outside resources, i.e., communication with Surgeon/ PCP, home health, ECF.    Contact/referral to ostomy appliance supplier for new or additional products.   Review when to call [facility] or schedule a follow-up visit.   Referral to Emergency Department   Documentation in [facility] completed. []   3       Is this the Patient's First Visit with [facility] @ [facility]?  No    Is this Patient Established at this [facility] within the last 3 years?   Yes             Clinical Level of Care      Points  0-3  Level 1 []     Points  4-6  Level 2 []     Points  7-8  Level 3 []     Points  9-10  Level 4 [x]     Points  11-12  Level 5 []       Electronically signed by GRACE ROWE RN on 4/11/2024 at 10:46 AM

## 2024-04-25 ENCOUNTER — HOSPITAL ENCOUNTER (OUTPATIENT)
Facility: HOSPITAL | Age: 74
Discharge: HOME | End: 2024-04-25
Payer: MEDICARE

## 2024-04-25 VITALS
TEMPERATURE: 97.3 F | SYSTOLIC BLOOD PRESSURE: 182 MMHG | HEART RATE: 69 BPM | RESPIRATION RATE: 16 BRPM | DIASTOLIC BLOOD PRESSURE: 81 MMHG

## 2024-04-25 PROCEDURE — 99213 OFFICE O/P EST LOW 20 MIN: CPT

## 2024-04-25 RX ORDER — LEVOTHYROXINE SODIUM 88 UG/1
88 TABLET ORAL DAILY
COMMUNITY

## 2024-04-25 NOTE — WOUND CARE
Ostomy Care Plan  Risk for impaired skin integrity  Goal: Patient will exhibit intact peristomal skin at each visit.  Interventions: Assess patients peristomal skin at each visit.                            Instruct patient in peristomal skin care procedures at each visit.     Outcomes: Patient will demonstrate proper sizing and application of wafer.                       Patient will demonstrate intact peristomal skin without irritation.                       Patient will verbalize two strategies to prevent skin irritation.     Risk for disturbed body image  Goal: Patient will verbalize concerns in appearance, relationships and lifestyle changes.  Interventions: Assess patients coping mechanisms and body image issues at each visit.  Outcomes: Patient will verbalize/demonstrate comfort with body image as evidenced by willingness to manage ostomy care and acceptance of body image change.      Knowledge Deficit r/t Ostomy Care  Goal: Patient will verbalize/demonstrate independent ostomy care by end of teaching sessions.  Interventions: Assess patients understanding/ability to manage ostomy care including daily maintenance of pouch and intermittent changing of appliance.  Outcomes: Patient will demonstrate ability to remove, clean and reapply ostomy pouch.  Patient will verbalize understanding of ostomy pouching complications (i.e. failure to maintain a seal for at least 3 to 4 days, leaking of pouch, skin irritation of peristomal skin).       Goal 1 Completed today.   Goal 2 Wound care protocol changed today will reassess in 3 weeks to determine efficacy of treatment.

## 2024-04-25 NOTE — WOUND CARE
OSTOMY Assessment  Stoma Tissue Assessment: Post-op _x__Follow-up       Type of Diversion: ___New___ Established ___Revision___Permanent____Temporary    _____  Ileostomy   _____  Colostomy: ____ Ascending, ____ Transverse, _____. Sigmoid   _____  Urostomy   __x___  Ileal Conduit   _____  Mucous Fistula     Appearance of Ostomy:   _x__ Bright Red /Moist/Viable ___ Dark Red ___ Pink ___ Sloughing ___Necrotic____Pallor ____Red  ___Dry ____Moist    Stoma Size: _____25____ (mm) ___Round ___ Oval ___Irregular     Stoma Height:   __x__Flush ____Retracted ____Budded ____Edematous ____Prolapse     Stoma Location  ____ Left Side          __x__Right Side     _____Umbilicus  _____Incision Line  ____ Above Belt       __x__ Below Belt    Abdominal Contours  ____ Firm ____ Flat __x__Flabby __x_Soft ___Round ___ Hard ___ Other     Stoma Function: _x_Yes __No  Output:   __x__ Yellow ____Amber ____ Pink(Hematuria) ____ Tea Colored   ____ Clots ____Foul Odor ____ Mucous     Peristomal skin: Peristomal wound (See flowsheet)  ___ Intact ___ Irritant Dermatitis ___ Allergic Contact Dermatitis ___Candidiasis ___Caput Medusae ___Folliculitis ___Mechanical Trauma ___Mucosal Transplantation    ___Pyoderma Gangrenosum ___Hyperplasia ___Radiation Trauma ___Allergies mpling  ___ Dimpling ____Blistered ____Fragile ____Macerated ___Peeling  ___Ulceration  ___ Fungal ___Peristomal Hernia ___Non-blanchable ___ Hypergranulation      Stoma Complications:   __Excessive Bleeding __Ischemia __ Abscess __Necrosis __Prolapse   __Hernia __ Retraction __Stenosis __Mucosal Separation __Melanosis Coli   __Laceration __Other     Application for Patient    Wafer and pouch used during assessment and education ____[id number]________    Pouch System Recommended:   _x_One-Piece __Two-Piece ___Custom     Flat:   ___Pre-cut   ___Cut-to fit     Convexity: ___Shallow ___Deep_x__ Flexible ___Creative Convexity   ___Pre-cut _x__Cut to Fit     Accessory Products Hydrofera

## 2024-04-25 NOTE — WOUND CARE
Clinical Level of Care Assessment    Outpatient Ostomy Care      NAME:  Filipe Real  YOB: 1950  MEDICAL RECORD NUMBER:  [id number]   DATE:  4/25/2024      Patient /Ostomy Assessment- Document in Flowsheet I&O   Points   Review of chart []   0   Assess the Complete Ostomy tab in Navigator for assessment of; stoma status, peristomal skin, presence of hernia/stool consistency/diet/related medications.   Simple adjustments to pouch size/pouch system, new stoma pattern, accessory addition/deletion.   [x]   1   Assess the Complete Ostomy tab in Navigator for assessment of; stoma status, peristomal skin, presence of hernia/stool consistency/diet/related medications.   Moderate adjustments to pouch size/pouch system, new stoma pattern, and accessory addition/deletion.  Observe patient/caregiver with hands-on care.   1-2 adjustments to pouch size/system/skincare/accessory addition or deletion.    []   2   Assess the Complete Ostomy tab in Navigator for assessment of; stoma status, peristomal skin, presence of hernia/stool consistency/diet/related medications.   Complex adjustments to pouch size/pouch system, new stoma pattern, accessory addition/deletion.  3 or more complex adjustments to pouch size/system/skincare/accessory addition or deletion.  Observe patient/caregiver with hands-on care.   Assess patient/patient's abdomen for optimal pre-marked stoma site.  Assess the patient's abdomen for a type of hernia belt/accessory needed. []   3         Ambulation Status Documented in CN Clinical Note  Status Definition Points   Independent Independently able to ambulate.  Fully able (without any assistance) to get on/off the exam table/chair.    []   0   Minimal Physical Assistance Requires physical assistance of one person to ambulate and position the patient to be examined. Includes necessary physical assistance to position lower extremities on/off the stool.   []   1   Moderate Physical Assistance Requires

## 2024-04-25 NOTE — WOUND CARE
Discharge Instructions/Wound Orders  [facility]  [address], [address]  [place] [address]  Telephone: [phone]     FAX [phone]    NAME:  Filipe Real  YOB: 1950  MEDICAL RECORD NUMBER:  [id number]  DATE:  4/25/2024  Ostomy Care Orders:  1) Remove old bag and clean stoma, peristomal and the wound tap water and paper towel, dry thoroughly, then cover the stoma with paper towel while you treat the wound.  2) Treat the wound by applying a small amount of stomahesive powder, dot with NO STING SKIN PREP, cover with a small piece hydrofera blue ostomy ring and seal with extra thin douderm or Coloplast arc strip.  3) Cut the Juancho 22079 to size at first ring, shape Jose [id number] ring to size and place over stoma site (crimp inside edge), place bag over stoma site, then place the Coloplast barrier rings under tape border.   4) Fold like a taco and place over stoma site.   5) Attach ostomy belt if patient will tolerate.  6) Change 2 x week and as needed for leaking.     Dietary:  [x] Diet as tolerated: [] Calorie Diabetic Diet:Low carb and no Sugar [] No Added Salt:[x] Increase Protein: [] Other:Limit the amount of liquid you are drinking and avoid drinking in between meals   Activity:  [x] Activity as tolerated:  [] Patient has no activity restrictions     [] Strict Bedrest: [] Remain off Work:     [] May return to full duty work:                                   [] Return to work with restrictions:             Return Appointment:  [x] Return Appointment: With PARTHA Valladares  in  3 Week(s)  [] Ordered tests:    Electronically signed GRACE REAL RN on 4/25/2024 at 8:44 AM     Wound Care Center Information: Should you experience any significant changes in your wound(s) or have questions about your wound care, please contact the [facility] at MONDAY - FRIDAY 8:00 am - 4:30.  If you need help with your wound outside these

## 2024-07-13 ENCOUNTER — HOSPITAL ENCOUNTER (EMERGENCY)
Facility: HOSPITAL | Age: 74
Discharge: HOME | End: 2024-07-13
Attending: EMERGENCY MEDICINE
Payer: MEDICARE

## 2024-07-13 VITALS
HEART RATE: 65 BPM | DIASTOLIC BLOOD PRESSURE: 65 MMHG | BODY MASS INDEX: 22.99 KG/M2 | WEIGHT: 151.68 LBS | TEMPERATURE: 97 F | HEIGHT: 68 IN | OXYGEN SATURATION: 96 % | SYSTOLIC BLOOD PRESSURE: 143 MMHG | RESPIRATION RATE: 19 BRPM

## 2024-07-13 DIAGNOSIS — N39.0 URINARY TRACT INFECTION ASSOCIATED WITH NEPHROSTOMY CATHETER, INITIAL ENCOUNTER (HCC): ICD-10-CM

## 2024-07-13 DIAGNOSIS — B02.9 HERPES ZOSTER WITHOUT COMPLICATION: Primary | ICD-10-CM

## 2024-07-13 DIAGNOSIS — T83.512A URINARY TRACT INFECTION ASSOCIATED WITH NEPHROSTOMY CATHETER, INITIAL ENCOUNTER (HCC): ICD-10-CM

## 2024-07-13 LAB
ALBUMIN SERPL-MCNC: 3.2 G/DL (ref 3.5–5)
ALBUMIN/GLOB SERPL: 1 (ref 1.1–2.2)
ALP SERPL-CCNC: 86 U/L (ref 45–117)
ALT SERPL-CCNC: <6 U/L (ref 12–78)
AMORPH CRY URNS QL MICRO: ABNORMAL
ANION GAP SERPL CALC-SCNC: 7 MMOL/L (ref 5–15)
APPEARANCE UR: ABNORMAL
AST SERPL-CCNC: 15 U/L (ref 15–37)
BACTERIA URNS QL MICRO: ABNORMAL /HPF
BASOPHILS # BLD: 0.1 K/UL (ref 0–0.1)
BASOPHILS NFR BLD: 1 % (ref 0–1)
BILIRUB SERPL-MCNC: 0.5 MG/DL (ref 0.2–1)
BILIRUB UR QL: NEGATIVE
BUN SERPL-MCNC: 18 MG/DL (ref 6–20)
BUN/CREAT SERPL: 9 (ref 12–20)
CALCIUM SERPL-MCNC: 8.7 MG/DL (ref 8.5–10.1)
CHLORIDE SERPL-SCNC: 97 MMOL/L (ref 97–108)
CO2 SERPL-SCNC: 32 MMOL/L (ref 21–32)
COLOR UR: ABNORMAL
COMMENT:: NORMAL
CREAT SERPL-MCNC: 1.95 MG/DL (ref 0.7–1.3)
DIFFERENTIAL METHOD BLD: ABNORMAL
EOSINOPHIL # BLD: 0.1 K/UL (ref 0–0.4)
EOSINOPHIL NFR BLD: 2 % (ref 0–7)
EPITH CASTS URNS QL MICRO: ABNORMAL /LPF
ERYTHROCYTE [DISTWIDTH] IN BLOOD BY AUTOMATED COUNT: 13.6 % (ref 11.5–14.5)
GLOBULIN SER CALC-MCNC: 3.3 G/DL (ref 2–4)
GLUCOSE SERPL-MCNC: 113 MG/DL (ref 65–100)
GLUCOSE UR STRIP.AUTO-MCNC: NEGATIVE MG/DL
HCT VFR BLD AUTO: 44 % (ref 36.6–50.3)
HGB BLD-MCNC: 14.1 G/DL (ref 12.1–17)
HGB UR QL STRIP: ABNORMAL
IMM GRANULOCYTES # BLD AUTO: 0 K/UL (ref 0–0.04)
IMM GRANULOCYTES NFR BLD AUTO: 0 % (ref 0–0.5)
KETONES UR QL STRIP.AUTO: NEGATIVE MG/DL
LEUKOCYTE ESTERASE UR QL STRIP.AUTO: ABNORMAL
LYMPHOCYTES # BLD: 0.6 K/UL (ref 0.8–3.5)
LYMPHOCYTES NFR BLD: 10 % (ref 12–49)
MAGNESIUM SERPL-MCNC: 1.7 MG/DL (ref 1.6–2.4)
MCH RBC QN AUTO: 27.5 PG (ref 26–34)
MCHC RBC AUTO-ENTMCNC: 32 G/DL (ref 30–36.5)
MCV RBC AUTO: 85.8 FL (ref 80–99)
MONOCYTES # BLD: 0.4 K/UL (ref 0–1)
MONOCYTES NFR BLD: 8 % (ref 5–13)
NEUTS SEG # BLD: 4.3 K/UL (ref 1.8–8)
NEUTS SEG NFR BLD: 79 % (ref 32–75)
NITRITE UR QL STRIP.AUTO: POSITIVE
NRBC # BLD: 0 K/UL (ref 0–0.01)
NRBC BLD-RTO: 0 PER 100 WBC
PH UR STRIP: 6 (ref 5–8)
PLATELET # BLD AUTO: 222 K/UL (ref 150–400)
PMV BLD AUTO: 10.5 FL (ref 8.9–12.9)
POTASSIUM SERPL-SCNC: 3.9 MMOL/L (ref 3.5–5.1)
PROT SERPL-MCNC: 6.5 G/DL (ref 6.4–8.2)
PROT UR STRIP-MCNC: 30 MG/DL
RBC # BLD AUTO: 5.13 M/UL (ref 4.1–5.7)
RBC #/AREA URNS HPF: ABNORMAL /HPF (ref 0–5)
RBC MORPH BLD: ABNORMAL
SODIUM SERPL-SCNC: 136 MMOL/L (ref 136–145)
SP GR UR REFRACTOMETRY: 1.01 (ref 1–1.03)
SPECIMEN HOLD: NORMAL
URINE CULTURE IF INDICATED: ABNORMAL
UROBILINOGEN UR QL STRIP.AUTO: 0.2 EU/DL (ref 0.2–1)
WBC # BLD AUTO: 5.5 K/UL (ref 4.1–11.1)
WBC URNS QL MICRO: ABNORMAL /HPF (ref 0–4)

## 2024-07-13 PROCEDURE — 83735 ASSAY OF MAGNESIUM: CPT

## 2024-07-13 PROCEDURE — 96374 THER/PROPH/DIAG INJ IV PUSH: CPT

## 2024-07-13 PROCEDURE — 2580000003 HC RX 258: Performed by: EMERGENCY MEDICINE

## 2024-07-13 PROCEDURE — 99284 EMERGENCY DEPT VISIT MOD MDM: CPT

## 2024-07-13 PROCEDURE — 81001 URINALYSIS AUTO W/SCOPE: CPT

## 2024-07-13 PROCEDURE — 85025 COMPLETE CBC W/AUTO DIFF WBC: CPT

## 2024-07-13 PROCEDURE — 87086 URINE CULTURE/COLONY COUNT: CPT

## 2024-07-13 PROCEDURE — 80053 COMPREHEN METABOLIC PANEL: CPT

## 2024-07-13 PROCEDURE — 6360000002 HC RX W HCPCS: Performed by: EMERGENCY MEDICINE

## 2024-07-13 PROCEDURE — 36415 COLL VENOUS BLD VENIPUNCTURE: CPT

## 2024-07-13 PROCEDURE — 6370000000 HC RX 637 (ALT 250 FOR IP): Performed by: EMERGENCY MEDICINE

## 2024-07-13 RX ORDER — PREDNISONE 20 MG/1
60 TABLET ORAL
Status: COMPLETED | OUTPATIENT
Start: 2024-07-13 | End: 2024-07-13

## 2024-07-13 RX ORDER — 0.9 % SODIUM CHLORIDE 0.9 %
1000 INTRAVENOUS SOLUTION INTRAVENOUS ONCE
Status: COMPLETED | OUTPATIENT
Start: 2024-07-13 | End: 2024-07-13

## 2024-07-13 RX ORDER — VALACYCLOVIR HYDROCHLORIDE 1 G/1
1000 TABLET, FILM COATED ORAL 3 TIMES DAILY
Qty: 21 TABLET | Refills: 0 | Status: SHIPPED | OUTPATIENT
Start: 2024-07-13 | End: 2024-07-20

## 2024-07-13 RX ORDER — VALACYCLOVIR HYDROCHLORIDE 500 MG/1
1000 TABLET, FILM COATED ORAL
Status: COMPLETED | OUTPATIENT
Start: 2024-07-13 | End: 2024-07-13

## 2024-07-13 RX ORDER — CEFDINIR 300 MG/1
300 CAPSULE ORAL 2 TIMES DAILY
Qty: 14 CAPSULE | Refills: 0 | Status: SHIPPED | OUTPATIENT
Start: 2024-07-13 | End: 2024-07-20

## 2024-07-13 RX ORDER — PREDNISONE 10 MG/1
TABLET ORAL
Qty: 21 TABLET | Refills: 0 | Status: SHIPPED | OUTPATIENT
Start: 2024-07-13 | End: 2024-07-18

## 2024-07-13 RX ADMIN — VALACYCLOVIR HYDROCHLORIDE 1000 MG: 500 TABLET, FILM COATED ORAL at 10:54

## 2024-07-13 RX ADMIN — PREDNISONE 60 MG: 20 TABLET ORAL at 10:55

## 2024-07-13 RX ADMIN — WATER 1000 MG: 1 INJECTION INTRAMUSCULAR; INTRAVENOUS; SUBCUTANEOUS at 12:49

## 2024-07-13 RX ADMIN — SODIUM CHLORIDE 1000 ML: 9 INJECTION, SOLUTION INTRAVENOUS at 10:53

## 2024-07-13 ASSESSMENT — LIFESTYLE VARIABLES
HOW OFTEN DO YOU HAVE A DRINK CONTAINING ALCOHOL: NEVER
HOW MANY STANDARD DRINKS CONTAINING ALCOHOL DO YOU HAVE ON A TYPICAL DAY: PATIENT DOES NOT DRINK

## 2024-07-13 ASSESSMENT — PAIN SCALES - GENERAL: PAINLEVEL_OUTOF10: 0

## 2024-07-13 ASSESSMENT — ENCOUNTER SYMPTOMS
SORE THROAT: 0
CONSTIPATION: 0
SHORTNESS OF BREATH: 0

## 2024-07-13 NOTE — ED PROVIDER NOTES
[facility]  EMERGENCY DEPARTMENT ENCOUNTER      Pt Name: Filipe Real  MRN: [id number]  Birthdate 1950  Date of evaluation: 7/13/2024  Provider: Luis Armando Camacho MD    CHIEF COMPLAINT       Chief Complaint   Patient presents with    Rash    Altered Mental Status         HISTORY OF PRESENT ILLNESS   (Location/Symptom, Timing/Onset, Context/Setting, Quality, Duration, Modifying Factors, Severity)  Note limiting factors.   74-year-old male presents from home accompanied by his spouse and son with concerns for dark urine and possibly an infection.  Patient started new medication prescribed by his neurologist at [facility].  This include doxazosin and carbidopa levodopa intended to help treat his Parkinson's.  The wife has noticed over the past couple days he has had some chills, being clammy, had a runny nose.  Called the neurologist today who advised that they come to the ER.  He has a urostomy tube due to recent bladder cancer.  Wife notes that this has appeared darker than usual.  No known fevers at home.  Wife also reports a rash they noticed today over his left abdomen.    The history is provided by the patient and the spouse.         Review of External Medical Records:     Nursing Notes were reviewed.    REVIEW OF SYSTEMS    (2-9 systems for level 4, 10 or more for level 5)     Review of Systems   Constitutional:  Negative for fatigue.   HENT:  Negative for sore throat.    Eyes:  Negative for visual disturbance.   Respiratory:  Negative for shortness of breath.    Cardiovascular:  Negative for palpitations.   Gastrointestinal:  Negative for constipation.   Genitourinary:  Negative for difficulty urinating.   Musculoskeletal:  Negative for myalgias.   Skin:  Negative for rash.       Except as noted above the remainder of the review of systems was reviewed and negative.       PAST MEDICAL HISTORY     Past Medical History:   Diagnosis Date    Back pain     Backache, unspecified 2/8/2010    Bladder cancer  [id number]  [phone]    Schedule an appointment as soon as possible for a visit in 1 week      [facility]  [address] [address] [phone]    If symptoms worsen      DISCHARGE MEDICATIONS:  New Prescriptions    CEFDINIR (OMNICEF) 300 MG CAPSULE    Take 1 capsule by mouth 2 times daily for 7 days    PREDNISONE (DELTASONE) 10 MG TABLET    Take 6 tablets by mouth daily for 1 day, THEN 5 tablets daily for 1 day, THEN 4 tablets daily for 1 day, THEN 3 tablets daily for 1 day, THEN 2 tablets daily for 1 day, THEN 1 tablet daily for 1 day.    VALACYCLOVIR (VALTREX) 1 G TABLET    Take 1 tablet by mouth 3 times daily for 7 days         (Please note that portions of this note were completed with a voice recognition program.  Efforts were made to edit the dictations but occasionally words are mis-transcribed.)    Luis Armando Camacho MD (electronically signed)  Emergency Attending Physician / Physician Assistant / Nurse Practitioner             Luis Armando Camacho MD  07/13/24 0195

## 2024-07-13 NOTE — ED TRIAGE NOTES
Patient arrives by wheelchair with wife with c/o urine being darker, patient was clammy, and elevated BP at home. Wife called neurologist an ws told to go to the ER. Patient has a urostomy bag due to bladder cancer that was removed, left kidney was also removed. Patient has a rash to the left of his abdomen with blisters that wife noticed yesterday. Patient had an MRI of his brain today at [facility] because patient has been having neurological decline for the last year.

## 2024-07-14 LAB
BACTERIA SPEC CULT: ABNORMAL
CC UR VC: ABNORMAL
SERVICE CMNT-IMP: ABNORMAL

## 2024-07-15 LAB
BACTERIA SPEC CULT: NORMAL
CC UR VC: NORMAL
SERVICE CMNT-IMP: NORMAL

## 2024-07-25 ENCOUNTER — TELEPHONE (OUTPATIENT)
Age: 74
End: 2024-07-25

## 2024-07-25 NOTE — TELEPHONE ENCOUNTER
Pt wife called wanting to know if the pt need PSA and imaging done prior to his appt. If he does can you let me know when you put in the order please

## 2024-07-27 ENCOUNTER — HOSPITAL ENCOUNTER (EMERGENCY)
Facility: HOSPITAL | Age: 74
Discharge: HOME | End: 2024-07-27
Attending: EMERGENCY MEDICINE
Payer: MEDICARE

## 2024-07-27 VITALS
SYSTOLIC BLOOD PRESSURE: 167 MMHG | DIASTOLIC BLOOD PRESSURE: 93 MMHG | RESPIRATION RATE: 20 BRPM | WEIGHT: 143.52 LBS | BODY MASS INDEX: 21.82 KG/M2 | HEART RATE: 68 BPM | OXYGEN SATURATION: 93 % | TEMPERATURE: 97.1 F

## 2024-07-27 DIAGNOSIS — N18.9 CHRONIC KIDNEY DISEASE, UNSPECIFIED CKD STAGE: ICD-10-CM

## 2024-07-27 DIAGNOSIS — L08.9 LOCAL INFECTION OF SKIN AND SUBCUTANEOUS TISSUE: Primary | ICD-10-CM

## 2024-07-27 LAB
ANION GAP SERPL CALC-SCNC: 5 MMOL/L (ref 5–15)
BASOPHILS # BLD: 0.1 K/UL (ref 0–0.1)
BASOPHILS NFR BLD: 0 % (ref 0–1)
BUN SERPL-MCNC: 22 MG/DL (ref 6–20)
BUN/CREAT SERPL: 13 (ref 12–20)
CALCIUM SERPL-MCNC: 9.4 MG/DL (ref 8.5–10.1)
CHLORIDE SERPL-SCNC: 99 MMOL/L (ref 97–108)
CO2 SERPL-SCNC: 32 MMOL/L (ref 21–32)
CREAT SERPL-MCNC: 1.64 MG/DL (ref 0.7–1.3)
DIFFERENTIAL METHOD BLD: ABNORMAL
EOSINOPHIL # BLD: 0 K/UL (ref 0–0.4)
EOSINOPHIL NFR BLD: 0 % (ref 0–7)
ERYTHROCYTE [DISTWIDTH] IN BLOOD BY AUTOMATED COUNT: 13.2 % (ref 11.5–14.5)
GLUCOSE SERPL-MCNC: 141 MG/DL (ref 65–100)
HCT VFR BLD AUTO: 48 % (ref 36.6–50.3)
HGB BLD-MCNC: 15.3 G/DL (ref 12.1–17)
IMM GRANULOCYTES # BLD AUTO: 0.1 K/UL (ref 0–0.04)
IMM GRANULOCYTES NFR BLD AUTO: 1 % (ref 0–0.5)
LYMPHOCYTES # BLD: 1.2 K/UL (ref 0.8–3.5)
LYMPHOCYTES NFR BLD: 10 % (ref 12–49)
MCH RBC QN AUTO: 27.2 PG (ref 26–34)
MCHC RBC AUTO-ENTMCNC: 31.9 G/DL (ref 30–36.5)
MCV RBC AUTO: 85.4 FL (ref 80–99)
MONOCYTES # BLD: 0.5 K/UL (ref 0–1)
MONOCYTES NFR BLD: 4 % (ref 5–13)
NEUTS SEG # BLD: 10.2 K/UL (ref 1.8–8)
NEUTS SEG NFR BLD: 85 % (ref 32–75)
NRBC # BLD: 0 K/UL (ref 0–0.01)
NRBC BLD-RTO: 0 PER 100 WBC
PLATELET # BLD AUTO: 281 K/UL (ref 150–400)
PMV BLD AUTO: 11.5 FL (ref 8.9–12.9)
POTASSIUM SERPL-SCNC: 5 MMOL/L (ref 3.5–5.1)
RBC # BLD AUTO: 5.62 M/UL (ref 4.1–5.7)
SODIUM SERPL-SCNC: 136 MMOL/L (ref 136–145)
WBC # BLD AUTO: 12.1 K/UL (ref 4.1–11.1)

## 2024-07-27 PROCEDURE — 85025 COMPLETE CBC W/AUTO DIFF WBC: CPT

## 2024-07-27 PROCEDURE — 99283 EMERGENCY DEPT VISIT LOW MDM: CPT

## 2024-07-27 PROCEDURE — 80048 BASIC METABOLIC PNL TOTAL CA: CPT

## 2024-07-27 PROCEDURE — 2580000003 HC RX 258: Performed by: EMERGENCY MEDICINE

## 2024-07-27 RX ORDER — NITROFURANTOIN 25; 75 MG/1; MG/1
100 CAPSULE ORAL 2 TIMES DAILY
COMMUNITY
End: 2024-07-27

## 2024-07-27 RX ORDER — CEPHALEXIN 250 MG/1
500 CAPSULE ORAL 3 TIMES DAILY
Qty: 30 CAPSULE | Refills: 0 | Status: SHIPPED | OUTPATIENT
Start: 2024-07-27 | End: 2024-08-01

## 2024-07-27 RX ORDER — 0.9 % SODIUM CHLORIDE 0.9 %
1000 INTRAVENOUS SOLUTION INTRAVENOUS ONCE
Status: COMPLETED | OUTPATIENT
Start: 2024-07-27 | End: 2024-07-27

## 2024-07-27 RX ORDER — DONEPEZIL HYDROCHLORIDE 5 MG/1
5 TABLET, FILM COATED ORAL NIGHTLY
COMMUNITY

## 2024-07-27 RX ADMIN — SODIUM CHLORIDE 1000 ML: 9 INJECTION, SOLUTION INTRAVENOUS at 21:51

## 2024-07-28 NOTE — ED PROVIDER NOTES
[facility]  EMERGENCY DEPARTMENT ENCOUNTER      Pt Name: Filipe Real  MRN: [id number]  Birthdate 1950  Date of evaluation: 7/27/2024  Provider: Tc Medeiros MD    CHIEF COMPLAINT       Chief Complaint   Patient presents with    Herpes Zoster         HISTORY OF PRESENT ILLNESS   (Location/Symptom, Timing/Onset, Context/Setting, Quality, Duration, Modifying Factors, Severity)  Note limiting factors.   74M w/ hx bladder cancer s/p urostomy, CHF, CAD s/p coronary stent, DM, HTN, COPD, CKD, dementia p/w skin wound x3d. Pt here w/ wife who reports that recently treated w/ medication for herpes zoster. Lesions to left flank have crusted over but an area to the back appeared irritated to her so she brought him to the ER. She reports yellow colored drainage and odor. Has been apply topical abx w/o improvement. No fevers, N/V/D, cough. No change in MS. No rapid or labored breathing. Pt is a limited historian but denies any pain. Wife also concerned because has been eating/drinking very little.    Recently also started on macrobid for \"UTI\" after urine was tested from his urostomy.             Review of External Medical Records:     Nursing Notes were reviewed.    REVIEW OF SYSTEMS    (2-9 systems for level 4, 10 or more for level 5)     Review of Systems   Unable to perform ROS: Dementia       Except as noted above the remainder of the review of systems was reviewed and negative.       PAST MEDICAL HISTORY     Past Medical History:   Diagnosis Date    Back pain     Backache, unspecified 2/8/2010    Bladder cancer (HCC) 7/14/2020    He is s/p TURBT on 6/25/2020. No obvious residual tumor, but bruce and induration at the base. Pathology reads invasive papillary urothelial carcinoma, high grade (bladder) and invasive high grade urothelial carcinoma pT2 (bladder base). 07- visit He has high grade muscle invasive bladder cancer. I thought it was grossly resected but pathology reveals high grade disease.

## 2024-08-13 ENCOUNTER — OFFICE VISIT (OUTPATIENT)
Age: 74
End: 2024-08-13
Payer: MEDICARE

## 2024-08-13 VITALS
BODY MASS INDEX: 21.67 KG/M2 | HEIGHT: 68 IN | HEART RATE: 82 BPM | SYSTOLIC BLOOD PRESSURE: 150 MMHG | WEIGHT: 143 LBS | DIASTOLIC BLOOD PRESSURE: 79 MMHG

## 2024-08-13 DIAGNOSIS — Z90.5 SOLITARY KIDNEY, ACQUIRED: ICD-10-CM

## 2024-08-13 DIAGNOSIS — N39.0 CHRONIC UTI (URINARY TRACT INFECTION): ICD-10-CM

## 2024-08-13 DIAGNOSIS — C61 MALIGNANT NEOPLASM OF PROSTATE (HCC): ICD-10-CM

## 2024-08-13 DIAGNOSIS — Z98.890 HISTORY OF ILEAL CONDUIT: ICD-10-CM

## 2024-08-13 DIAGNOSIS — C67.9 MALIGNANT NEOPLASM OF URINARY BLADDER, UNSPECIFIED SITE (HCC): Primary | ICD-10-CM

## 2024-08-13 DIAGNOSIS — N18.4 CKD (CHRONIC KIDNEY DISEASE) STAGE 4, GFR 15-29 ML/MIN (HCC): ICD-10-CM

## 2024-08-13 PROCEDURE — G8420 CALC BMI NORM PARAMETERS: HCPCS | Performed by: UROLOGY

## 2024-08-13 PROCEDURE — 3078F DIAST BP <80 MM HG: CPT | Performed by: UROLOGY

## 2024-08-13 PROCEDURE — 3077F SYST BP >= 140 MM HG: CPT | Performed by: UROLOGY

## 2024-08-13 PROCEDURE — G8427 DOCREV CUR MEDS BY ELIG CLIN: HCPCS | Performed by: UROLOGY

## 2024-08-13 PROCEDURE — 1036F TOBACCO NON-USER: CPT | Performed by: UROLOGY

## 2024-08-13 PROCEDURE — 99214 OFFICE O/P EST MOD 30 MIN: CPT | Performed by: UROLOGY

## 2024-08-13 PROCEDURE — 1123F ACP DISCUSS/DSCN MKR DOCD: CPT | Performed by: UROLOGY

## 2024-08-13 RX ORDER — SULFAMETHOXAZOLE AND TRIMETHOPRIM 800; 160 MG/1; MG/1
TABLET ORAL
COMMUNITY
Start: 2024-08-05

## 2024-08-13 ASSESSMENT — ENCOUNTER SYMPTOMS: ABDOMINAL PAIN: 1

## 2024-08-13 NOTE — ASSESSMENT & PLAN NOTE
Well-controlled.  It is no longer an issue specially in light of his overall chronic health issues.

## 2024-08-13 NOTE — ASSESSMENT & PLAN NOTE
He has a urinary conduit.  He we will be colonized.  He was advised to hydrate well to prevent upper tract infection.  I would advise only treating suspected pyelonephritis and not a positive urine culture alone.

## 2024-08-13 NOTE — ASSESSMENT & PLAN NOTE
Relatively stable CKD with a solitary kidney and urinary conduit.  I emphasized hydration to prevent

## 2024-08-13 NOTE — PROGRESS NOTES
HISTORY OF PRESENT ILLNESS  Filipe Real is a 74 y.o. male.   has a past medical history of Back pain, Backache, unspecified, Bladder cancer (HCC), Cancer (HCC), CHF (congestive heart failure) (HCC), Chronic obstructive pulmonary disease (HCC), Coronary artery disease, Coronary atherosclerosis of native coronary artery, COVID, DDD (degenerative disc disease), Diabetes (HCC), Essential hypertension, benign, Heart attack (HCC), Heart block, Hematuria, unspecified, HTN (hypertension), benign, Hypercholesterolemia, Ill-defined condition, Ill-defined condition, Ill-defined condition, Ill-defined condition, Ill-defined condition, Impotence, Lumbago, Malignant neoplasm of prostate (HCC), Neurogenic dysfunction of the urinary bladder, Osteoarthrosis, unspecified whether generalized or localized, unspecified site, Seizures (HCC), Skin disease, Stroke (HCC), Thyroid disease, Unspecified hereditary and idiopathic peripheral neuropathy, Urgency of urination, and Urgency of urination.  has a past surgical history that includes IR GUIDED NEPHROSTOMY CATH PLACEMENT LEFT (9/7/2021); IR EMBOLIZATION HEMORRHAGE (9/13/2021); IR GUIDED NEPHROSTOMY CATH PLACEMENT LEFT (9/14/2021); Urological Surgery (08/24/2020); Urological Surgery (08/24/2020); Urological Surgery (06/25/2020); Urological Surgery (06/25/2020); back surgery (2004); back surgery (2002); neurological surgery; neurological surgery (1996); IR GUIDED NEPHROSTOMY CATH EXCHANGE (9/21/2021); IR GUIDED NEPHROSTOMY CATH PLACEMENT LEFT (9/14/2021); IR REMOVAL NEPHROSTOMY TUBE W FLUORO (9/13/2021); IR GUIDED NEPHROSTOMY CATH PLACEMENT LEFT (9/7/2021); Prostate surgery (10/18/2013); Prostate surgery (08/24/2020); Prostatectomy (10/18/2013); Circumcision; Colonoscopy (N/A, 4/12/2018); Tonsillectomy; Cataract removal; Appendectomy; Cardiac catheterization (1999,2008); IR EMBOLIZATION HEMORRHAGE (9/13/2021); Urological Surgery (12/07/2021); Urological Surgery (08/26/2021); Urological

## 2024-08-13 NOTE — ASSESSMENT & PLAN NOTE
He has chronic urinary colonization.  He finished 5 days of Bactrim.  The urine was clear.  I would hold on further antibiotics at this time.  I advised him to look for signs of upper tract infection including fevers, confusion or back pain.

## 2024-08-13 NOTE — PROGRESS NOTES
Chief Complaint   Patient presents with    Annual Exam     Malignant neoplasm of urinary bladder, unspecified site     1. Have you been to the ER, urgent care clinic since your last visit?  Hospitalized since your last visit?No    2. Have you seen or consulted any other health care providers outside of the [facility] since your last visit?  Include any pap smears or colon screening. No  BP (!) 150/79   Pulse 82   Ht 1.727 m (5' 8\")   Wt 64.9 kg (143 lb)   BMI 21.74 kg/m²

## 2024-11-06 NOTE — TELEPHONE ENCOUNTER
I called and spoke with his wife. He has evidence of left ureteral obstruction. I recommend we attempt ureteral stenting via the conduit. If unsuccessful, he may need a nephrostomy tube. She expressed understanding. We will try to schedule ASAP.   PHYSICAL EXAM    GEN: no distress, comfortable  PULM: BS heard b/l equal, No wheezing  CVS: S1S2 present, no rubs or gallops  ABD: Soft, non-distended, no guarding; non-tender  EXT: 1+ lower extremity edema, wound have some slough tissue to the lateral side;   NEURO: A&Ox3, moving all extremities

## 2024-11-07 NOTE — PROGRESS NOTES
Bedside report given to [facility] marisol KIM transferred to 2E room 216 OTC (over the counter) lotion such as CeraVe SA, urea cream or ammonium lactate may make these bumps less noticeable.   show

## 2024-12-02 ENCOUNTER — TELEPHONE (OUTPATIENT)
Age: 74
End: 2024-12-02

## 2024-12-02 DIAGNOSIS — N39.0 CHRONIC UTI (URINARY TRACT INFECTION): Primary | ICD-10-CM

## 2024-12-02 NOTE — TELEPHONE ENCOUNTER
Pt wife called in regards to pt having smelly dark urine coming out of his ostomy bag and thinks he has a bladder infection. Wanted to talk to kristy in regards to this

## 2024-12-02 NOTE — TELEPHONE ENCOUNTER
They live far away.    If smelly and dark without other symptoms, I would advise increasing hydration to make sure he is not just dehydrated (that's what it sounds like).    In the presence of other symptoms- lethargy, fatigue, fever, N/V or flank pain, the I suggest a urine drop off at [facility] for me to rule out infection.

## 2024-12-04 RX ORDER — CIPROFLOXACIN 500 MG/1
500 TABLET, FILM COATED ORAL 2 TIMES DAILY
Qty: 14 TABLET | Refills: 0 | Status: SHIPPED | OUTPATIENT
Start: 2024-12-04 | End: 2024-12-11

## 2024-12-04 NOTE — TELEPHONE ENCOUNTER
RX sent to pharmacy for 7 days of Ciprofloxacin.  If culture is positive, I will extend that RX for another 7 days.

## 2024-12-08 LAB
APPEARANCE UR: ABNORMAL
BACTERIA #/AREA URNS HPF: ABNORMAL /[HPF]
BACTERIA UR CULT: ABNORMAL
BACTERIA UR CULT: ABNORMAL
BILIRUB UR QL STRIP: NEGATIVE
CASTS URNS QL MICRO: ABNORMAL /LPF
COLOR UR: YELLOW
EPI CELLS #/AREA URNS HPF: ABNORMAL /HPF (ref 0–10)
GLUCOSE UR QL STRIP: NEGATIVE
HGB UR QL STRIP: NEGATIVE
KETONES UR QL STRIP: NEGATIVE
LEUKOCYTE ESTERASE UR QL STRIP: ABNORMAL
MICRO URNS: ABNORMAL
NITRITE UR QL STRIP: NEGATIVE
PH UR STRIP: 8 [PH] (ref 5–7.5)
PROT UR QL STRIP: ABNORMAL
RBC #/AREA URNS HPF: ABNORMAL /HPF (ref 0–2)
SP GR UR STRIP: 1.01 (ref 1–1.03)
URINALYSIS REFLEX: ABNORMAL
UROBILINOGEN UR STRIP-MCNC: 0.2 MG/DL (ref 0.2–1)
WBC #/AREA URNS HPF: ABNORMAL /HPF (ref 0–5)

## 2024-12-09 DIAGNOSIS — N39.0 CHRONIC UTI (URINARY TRACT INFECTION): Primary | ICD-10-CM

## 2024-12-09 RX ORDER — DOXYCYCLINE HYCLATE 100 MG
100 TABLET ORAL 2 TIMES DAILY
Qty: 28 TABLET | Refills: 0 | Status: SHIPPED | OUTPATIENT
Start: 2024-12-09 | End: 2024-12-23

## 2025-01-01 ENCOUNTER — HOSPITAL ENCOUNTER (INPATIENT)
Facility: HOSPITAL | Age: 75
LOS: 7 days | Discharge: HOME HEALTH | DRG: 592 | End: 2025-03-06
Attending: EMERGENCY MEDICINE | Admitting: INTERNAL MEDICINE
Payer: MEDICARE

## 2025-01-01 ENCOUNTER — APPOINTMENT (OUTPATIENT)
Facility: HOSPITAL | Age: 75
DRG: 592 | End: 2025-01-01
Payer: MEDICARE

## 2025-01-01 VITALS
WEIGHT: 118 LBS | HEART RATE: 80 BPM | TEMPERATURE: 98.4 F | BODY MASS INDEX: 18.52 KG/M2 | RESPIRATION RATE: 20 BRPM | OXYGEN SATURATION: 97 % | HEIGHT: 67 IN | SYSTOLIC BLOOD PRESSURE: 124 MMHG | DIASTOLIC BLOOD PRESSURE: 72 MMHG

## 2025-01-01 DIAGNOSIS — L03.90 CELLULITIS, UNSPECIFIED CELLULITIS SITE: ICD-10-CM

## 2025-01-01 DIAGNOSIS — E44.0 MODERATE PROTEIN-CALORIE MALNUTRITION: Primary | ICD-10-CM

## 2025-01-01 LAB
ALBUMIN SERPL-MCNC: 2 G/DL (ref 3.5–5)
ALBUMIN SERPL-MCNC: 2.9 G/DL (ref 3.5–5)
ALBUMIN/GLOB SERPL: 0.6 (ref 1.1–2.2)
ALBUMIN/GLOB SERPL: 0.9 (ref 1.1–2.2)
ALP SERPL-CCNC: 74 U/L (ref 45–117)
ALP SERPL-CCNC: 94 U/L (ref 45–117)
ALT SERPL-CCNC: 10 U/L (ref 12–78)
ALT SERPL-CCNC: <6 U/L (ref 12–78)
ANION GAP SERPL CALC-SCNC: 12 MMOL/L (ref 2–12)
ANION GAP SERPL CALC-SCNC: 5 MMOL/L (ref 2–12)
ANION GAP SERPL CALC-SCNC: 5 MMOL/L (ref 2–12)
ANION GAP SERPL CALC-SCNC: 6 MMOL/L (ref 2–12)
ANION GAP SERPL CALC-SCNC: 7 MMOL/L (ref 2–12)
ANION GAP SERPL CALC-SCNC: 8 MMOL/L (ref 2–12)
AST SERPL-CCNC: 19 U/L (ref 15–37)
AST SERPL-CCNC: 25 U/L (ref 15–37)
BACTERIA SPEC CULT: ABNORMAL
BACTERIA SPEC CULT: NORMAL
BASOPHILS # BLD: 0.01 K/UL (ref 0–0.1)
BASOPHILS # BLD: 0.02 K/UL (ref 0–0.1)
BASOPHILS # BLD: 0.04 K/UL (ref 0–0.1)
BASOPHILS # BLD: 0.04 K/UL (ref 0–0.1)
BASOPHILS # BLD: 0.06 K/UL (ref 0–0.1)
BASOPHILS NFR BLD: 0.2 % (ref 0–1)
BASOPHILS NFR BLD: 0.3 % (ref 0–1)
BASOPHILS NFR BLD: 0.4 % (ref 0–1)
BASOPHILS NFR BLD: 0.4 % (ref 0–1)
BASOPHILS NFR BLD: 0.6 % (ref 0–1)
BILIRUB SERPL-MCNC: 0.5 MG/DL (ref 0.2–1)
BILIRUB SERPL-MCNC: 0.5 MG/DL (ref 0.2–1)
BUN SERPL-MCNC: 14 MG/DL (ref 6–20)
BUN SERPL-MCNC: 19 MG/DL (ref 6–20)
BUN SERPL-MCNC: 19 MG/DL (ref 6–20)
BUN SERPL-MCNC: 21 MG/DL (ref 6–20)
BUN SERPL-MCNC: 22 MG/DL (ref 6–20)
BUN SERPL-MCNC: 26 MG/DL (ref 6–20)
BUN SERPL-MCNC: 32 MG/DL (ref 6–20)
BUN SERPL-MCNC: 37 MG/DL (ref 6–20)
BUN/CREAT SERPL: 16 (ref 12–20)
BUN/CREAT SERPL: 21 (ref 12–20)
BUN/CREAT SERPL: 22 (ref 12–20)
BUN/CREAT SERPL: 24 (ref 12–20)
BUN/CREAT SERPL: 26 (ref 12–20)
BUN/CREAT SERPL: 26 (ref 12–20)
CALCIUM SERPL-MCNC: 7.7 MG/DL (ref 8.5–10.1)
CALCIUM SERPL-MCNC: 8.1 MG/DL (ref 8.5–10.1)
CALCIUM SERPL-MCNC: 8.2 MG/DL (ref 8.5–10.1)
CALCIUM SERPL-MCNC: 8.2 MG/DL (ref 8.5–10.1)
CALCIUM SERPL-MCNC: 8.4 MG/DL (ref 8.5–10.1)
CALCIUM SERPL-MCNC: 9.6 MG/DL (ref 8.5–10.1)
CHLORIDE SERPL-SCNC: 106 MMOL/L (ref 97–108)
CHLORIDE SERPL-SCNC: 106 MMOL/L (ref 97–108)
CHLORIDE SERPL-SCNC: 108 MMOL/L (ref 97–108)
CHLORIDE SERPL-SCNC: 110 MMOL/L (ref 97–108)
CHLORIDE SERPL-SCNC: 110 MMOL/L (ref 97–108)
CHLORIDE SERPL-SCNC: 115 MMOL/L (ref 97–108)
CO2 SERPL-SCNC: 22 MMOL/L (ref 21–32)
CO2 SERPL-SCNC: 22 MMOL/L (ref 21–32)
CO2 SERPL-SCNC: 23 MMOL/L (ref 21–32)
CO2 SERPL-SCNC: 24 MMOL/L (ref 21–32)
CO2 SERPL-SCNC: 24 MMOL/L (ref 21–32)
CO2 SERPL-SCNC: 25 MMOL/L (ref 21–32)
CO2 SERPL-SCNC: 25 MMOL/L (ref 21–32)
CO2 SERPL-SCNC: 26 MMOL/L (ref 21–32)
CREAT SERPL-MCNC: 0.85 MG/DL (ref 0.7–1.3)
CREAT SERPL-MCNC: 0.88 MG/DL (ref 0.7–1.3)
CREAT SERPL-MCNC: 0.9 MG/DL (ref 0.7–1.3)
CREAT SERPL-MCNC: 1.01 MG/DL (ref 0.7–1.3)
CREAT SERPL-MCNC: 1.31 MG/DL (ref 0.7–1.3)
CREAT SERPL-MCNC: 1.41 MG/DL (ref 0.7–1.3)
CRP SERPL-MCNC: 8.47 MG/DL (ref 0–0.3)
DIFFERENTIAL METHOD BLD: ABNORMAL
EOSINOPHIL # BLD: 0.01 K/UL (ref 0–0.4)
EOSINOPHIL # BLD: 0.1 K/UL (ref 0–0.4)
EOSINOPHIL # BLD: 0.18 K/UL (ref 0–0.4)
EOSINOPHIL # BLD: 0.29 K/UL (ref 0–0.4)
EOSINOPHIL # BLD: 0.31 K/UL (ref 0–0.4)
EOSINOPHIL NFR BLD: 0.2 % (ref 0–7)
EOSINOPHIL NFR BLD: 0.8 % (ref 0–7)
EOSINOPHIL NFR BLD: 1.3 % (ref 0–7)
EOSINOPHIL NFR BLD: 4.3 % (ref 0–7)
EOSINOPHIL NFR BLD: 5.5 % (ref 0–7)
ERYTHROCYTE [DISTWIDTH] IN BLOOD BY AUTOMATED COUNT: 14.9 % (ref 11.5–14.5)
ERYTHROCYTE [DISTWIDTH] IN BLOOD BY AUTOMATED COUNT: 15.3 % (ref 11.5–14.5)
ERYTHROCYTE [DISTWIDTH] IN BLOOD BY AUTOMATED COUNT: 15.6 % (ref 11.5–14.5)
ERYTHROCYTE [DISTWIDTH] IN BLOOD BY AUTOMATED COUNT: 15.9 % (ref 11.5–14.5)
ERYTHROCYTE [DISTWIDTH] IN BLOOD BY AUTOMATED COUNT: 16.1 % (ref 11.5–14.5)
ERYTHROCYTE [SEDIMENTATION RATE] IN BLOOD: 32 MM/HR (ref 0–20)
GLOBULIN SER CALC-MCNC: 3.1 G/DL (ref 2–4)
GLOBULIN SER CALC-MCNC: 3.3 G/DL (ref 2–4)
GLUCOSE BLD STRIP.AUTO-MCNC: 116 MG/DL (ref 65–117)
GLUCOSE BLD STRIP.AUTO-MCNC: 90 MG/DL (ref 65–117)
GLUCOSE SERPL-MCNC: 100 MG/DL (ref 65–100)
GLUCOSE SERPL-MCNC: 125 MG/DL (ref 65–100)
GLUCOSE SERPL-MCNC: 79 MG/DL (ref 65–100)
GLUCOSE SERPL-MCNC: 82 MG/DL (ref 65–100)
GLUCOSE SERPL-MCNC: 87 MG/DL (ref 65–100)
GLUCOSE SERPL-MCNC: 90 MG/DL (ref 65–100)
GLUCOSE SERPL-MCNC: 91 MG/DL (ref 65–100)
GLUCOSE SERPL-MCNC: 92 MG/DL (ref 65–100)
GRAM STN SPEC: ABNORMAL
GRAM STN SPEC: ABNORMAL
HCT VFR BLD AUTO: 32.2 % (ref 36.6–50.3)
HCT VFR BLD AUTO: 34.2 % (ref 36.6–50.3)
HCT VFR BLD AUTO: 36.9 % (ref 36.6–50.3)
HCT VFR BLD AUTO: 46.4 % (ref 36.6–50.3)
HCT VFR BLD AUTO: 48.2 % (ref 36.6–50.3)
HGB BLD-MCNC: 10.2 G/DL (ref 12.1–17)
HGB BLD-MCNC: 11.1 G/DL (ref 12.1–17)
HGB BLD-MCNC: 12 G/DL (ref 12.1–17)
HGB BLD-MCNC: 14.2 G/DL (ref 12.1–17)
HGB BLD-MCNC: 14.8 G/DL (ref 12.1–17)
IMM GRANULOCYTES # BLD AUTO: 0.02 K/UL (ref 0–0.04)
IMM GRANULOCYTES # BLD AUTO: 0.02 K/UL (ref 0–0.04)
IMM GRANULOCYTES # BLD AUTO: 0.03 K/UL (ref 0–0.04)
IMM GRANULOCYTES # BLD AUTO: 0.04 K/UL (ref 0–0.04)
IMM GRANULOCYTES # BLD AUTO: 0.06 K/UL (ref 0–0.04)
IMM GRANULOCYTES NFR BLD AUTO: 0.3 % (ref 0–0.5)
IMM GRANULOCYTES NFR BLD AUTO: 0.3 % (ref 0–0.5)
IMM GRANULOCYTES NFR BLD AUTO: 0.4 % (ref 0–0.5)
IMM GRANULOCYTES NFR BLD AUTO: 0.4 % (ref 0–0.5)
IMM GRANULOCYTES NFR BLD AUTO: 0.6 % (ref 0–0.5)
LYMPHOCYTES # BLD: 0.71 K/UL (ref 0.8–3.5)
LYMPHOCYTES # BLD: 1.05 K/UL (ref 0.8–3.5)
LYMPHOCYTES # BLD: 1.39 K/UL (ref 0.8–3.5)
LYMPHOCYTES # BLD: 1.42 K/UL (ref 0.8–3.5)
LYMPHOCYTES # BLD: 1.52 K/UL (ref 0.8–3.5)
LYMPHOCYTES NFR BLD: 11.9 % (ref 12–49)
LYMPHOCYTES NFR BLD: 12.6 % (ref 12–49)
LYMPHOCYTES NFR BLD: 19.7 % (ref 12–49)
LYMPHOCYTES NFR BLD: 20 % (ref 12–49)
LYMPHOCYTES NFR BLD: 9.9 % (ref 12–49)
MCH RBC QN AUTO: 27.4 PG (ref 26–34)
MCH RBC QN AUTO: 27.5 PG (ref 26–34)
MCH RBC QN AUTO: 27.5 PG (ref 26–34)
MCHC RBC AUTO-ENTMCNC: 30.6 G/DL (ref 30–36.5)
MCHC RBC AUTO-ENTMCNC: 30.7 G/DL (ref 30–36.5)
MCHC RBC AUTO-ENTMCNC: 31.7 G/DL (ref 30–36.5)
MCHC RBC AUTO-ENTMCNC: 32.5 G/DL (ref 30–36.5)
MCHC RBC AUTO-ENTMCNC: 32.5 G/DL (ref 30–36.5)
MCV RBC AUTO: 84.4 FL (ref 80–99)
MCV RBC AUTO: 84.6 FL (ref 80–99)
MCV RBC AUTO: 86.6 FL (ref 80–99)
MCV RBC AUTO: 89.4 FL (ref 80–99)
MCV RBC AUTO: 89.4 FL (ref 80–99)
MONOCYTES # BLD: 0.33 K/UL (ref 0–1)
MONOCYTES # BLD: 0.5 K/UL (ref 0–1)
MONOCYTES # BLD: 0.53 K/UL (ref 0–1)
MONOCYTES # BLD: 0.8 K/UL (ref 0–1)
MONOCYTES # BLD: 0.9 K/UL (ref 0–1)
MONOCYTES NFR BLD: 10.1 % (ref 5–13)
MONOCYTES NFR BLD: 5.7 % (ref 5–13)
MONOCYTES NFR BLD: 5.9 % (ref 5–13)
MONOCYTES NFR BLD: 6.9 % (ref 5–13)
MONOCYTES NFR BLD: 7 % (ref 5–13)
NEUTS SEG # BLD: 10.17 K/UL (ref 1.8–8)
NEUTS SEG # BLD: 11.55 K/UL (ref 1.8–8)
NEUTS SEG # BLD: 3.33 K/UL (ref 1.8–8)
NEUTS SEG # BLD: 4.52 K/UL (ref 1.8–8)
NEUTS SEG # BLD: 4.93 K/UL (ref 1.8–8)
NEUTS SEG NFR BLD: 63.4 % (ref 32–75)
NEUTS SEG NFR BLD: 68.2 % (ref 32–75)
NEUTS SEG NFR BLD: 79.7 % (ref 32–75)
NEUTS SEG NFR BLD: 80.7 % (ref 32–75)
NEUTS SEG NFR BLD: 82.3 % (ref 32–75)
NRBC # BLD: 0 K/UL (ref 0–0.01)
NRBC BLD-RTO: 0 PER 100 WBC
PLATELET # BLD AUTO: 213 K/UL (ref 150–400)
PLATELET # BLD AUTO: 237 K/UL (ref 150–400)
PLATELET # BLD AUTO: 244 K/UL (ref 150–400)
PLATELET # BLD AUTO: 264 K/UL (ref 150–400)
PLATELET # BLD AUTO: 271 K/UL (ref 150–400)
PMV BLD AUTO: 10.6 FL (ref 8.9–12.9)
PMV BLD AUTO: 10.7 FL (ref 8.9–12.9)
PMV BLD AUTO: 11.3 FL (ref 8.9–12.9)
PMV BLD AUTO: 11.4 FL (ref 8.9–12.9)
PMV BLD AUTO: 11.4 FL (ref 8.9–12.9)
POTASSIUM SERPL-SCNC: 3.5 MMOL/L (ref 3.5–5.1)
POTASSIUM SERPL-SCNC: 4 MMOL/L (ref 3.5–5.1)
POTASSIUM SERPL-SCNC: 4.1 MMOL/L (ref 3.5–5.1)
POTASSIUM SERPL-SCNC: 4.2 MMOL/L (ref 3.5–5.1)
POTASSIUM SERPL-SCNC: 4.3 MMOL/L (ref 3.5–5.1)
POTASSIUM SERPL-SCNC: 4.4 MMOL/L (ref 3.5–5.1)
POTASSIUM SERPL-SCNC: 4.8 MMOL/L (ref 3.5–5.1)
POTASSIUM SERPL-SCNC: 4.8 MMOL/L (ref 3.5–5.1)
PROT SERPL-MCNC: 5.3 G/DL (ref 6.4–8.2)
PROT SERPL-MCNC: 6 G/DL (ref 6.4–8.2)
RBC # BLD AUTO: 3.72 M/UL (ref 4.1–5.7)
RBC # BLD AUTO: 4.05 M/UL (ref 4.1–5.7)
RBC # BLD AUTO: 4.36 M/UL (ref 4.1–5.7)
RBC # BLD AUTO: 5.19 M/UL (ref 4.1–5.7)
RBC # BLD AUTO: 5.39 M/UL (ref 4.1–5.7)
RBC MORPH BLD: ABNORMAL
SERVICE CMNT-IMP: ABNORMAL
SERVICE CMNT-IMP: NORMAL
SODIUM SERPL-SCNC: 136 MMOL/L (ref 136–145)
SODIUM SERPL-SCNC: 137 MMOL/L (ref 136–145)
SODIUM SERPL-SCNC: 137 MMOL/L (ref 136–145)
SODIUM SERPL-SCNC: 138 MMOL/L (ref 136–145)
SODIUM SERPL-SCNC: 139 MMOL/L (ref 136–145)
SODIUM SERPL-SCNC: 140 MMOL/L (ref 136–145)
SODIUM SERPL-SCNC: 143 MMOL/L (ref 136–145)
SODIUM SERPL-SCNC: 149 MMOL/L (ref 136–145)
VANCOMYCIN SERPL-MCNC: 13.1 UG/ML
VANCOMYCIN SERPL-MCNC: 19.8 UG/ML
WBC # BLD AUTO: 12.8 K/UL (ref 4.1–11.1)
WBC # BLD AUTO: 14 K/UL (ref 4.1–11.1)
WBC # BLD AUTO: 5.3 K/UL (ref 4.1–11.1)
WBC # BLD AUTO: 5.6 K/UL (ref 4.1–11.1)
WBC # BLD AUTO: 7.2 K/UL (ref 4.1–11.1)

## 2025-01-01 PROCEDURE — 97530 THERAPEUTIC ACTIVITIES: CPT

## 2025-01-01 PROCEDURE — 6370000000 HC RX 637 (ALT 250 FOR IP): Performed by: SURGERY

## 2025-01-01 PROCEDURE — 85025 COMPLETE CBC W/AUTO DIFF WBC: CPT

## 2025-01-01 PROCEDURE — 6360000002 HC RX W HCPCS: Performed by: SURGERY

## 2025-01-01 PROCEDURE — 3600000002 HC SURGERY LEVEL 2 BASE: Performed by: SURGERY

## 2025-01-01 PROCEDURE — 7100000000 HC PACU RECOVERY - FIRST 15 MIN: Performed by: SURGERY

## 2025-01-01 PROCEDURE — 6370000000 HC RX 637 (ALT 250 FOR IP): Performed by: PHYSICIAN ASSISTANT

## 2025-01-01 PROCEDURE — 2580000003 HC RX 258: Performed by: STUDENT IN AN ORGANIZED HEALTH CARE EDUCATION/TRAINING PROGRAM

## 2025-01-01 PROCEDURE — 87070 CULTURE OTHR SPECIMN AEROBIC: CPT

## 2025-01-01 PROCEDURE — 6370000000 HC RX 637 (ALT 250 FOR IP): Performed by: NURSE PRACTITIONER

## 2025-01-01 PROCEDURE — 3600000012 HC SURGERY LEVEL 2 ADDTL 15MIN: Performed by: SURGERY

## 2025-01-01 PROCEDURE — 2580000003 HC RX 258: Performed by: PHYSICIAN ASSISTANT

## 2025-01-01 PROCEDURE — 6360000002 HC RX W HCPCS: Performed by: NURSE PRACTITIONER

## 2025-01-01 PROCEDURE — 0JDC0ZZ EXTRACTION OF PELVIC REGION SUBCUTANEOUS TISSUE AND FASCIA, OPEN APPROACH: ICD-10-PCS | Performed by: FAMILY MEDICINE

## 2025-01-01 PROCEDURE — 87205 SMEAR GRAM STAIN: CPT

## 2025-01-01 PROCEDURE — 2500000003 HC RX 250 WO HCPCS: Performed by: SURGERY

## 2025-01-01 PROCEDURE — 36415 COLL VENOUS BLD VENIPUNCTURE: CPT

## 2025-01-01 PROCEDURE — 86140 C-REACTIVE PROTEIN: CPT

## 2025-01-01 PROCEDURE — 2580000003 HC RX 258

## 2025-01-01 PROCEDURE — 3700000001 HC ADD 15 MINUTES (ANESTHESIA): Performed by: SURGERY

## 2025-01-01 PROCEDURE — 87077 CULTURE AEROBIC IDENTIFY: CPT

## 2025-01-01 PROCEDURE — 96365 THER/PROPH/DIAG IV INF INIT: CPT

## 2025-01-01 PROCEDURE — 82962 GLUCOSE BLOOD TEST: CPT

## 2025-01-01 PROCEDURE — 80048 BASIC METABOLIC PNL TOTAL CA: CPT

## 2025-01-01 PROCEDURE — 2709999900 HC NON-CHARGEABLE SUPPLY

## 2025-01-01 PROCEDURE — 2500000003 HC RX 250 WO HCPCS: Performed by: INTERNAL MEDICINE

## 2025-01-01 PROCEDURE — 87186 SC STD MICRODIL/AGAR DIL: CPT

## 2025-01-01 PROCEDURE — 97165 OT EVAL LOW COMPLEX 30 MIN: CPT

## 2025-01-01 PROCEDURE — 80202 ASSAY OF VANCOMYCIN: CPT

## 2025-01-01 PROCEDURE — 2580000003 HC RX 258: Performed by: SURGERY

## 2025-01-01 PROCEDURE — 6360000002 HC RX W HCPCS

## 2025-01-01 PROCEDURE — 11042 DBRDMT SUBQ TIS 1ST 20SQCM/<: CPT | Performed by: SURGERY

## 2025-01-01 PROCEDURE — 6360000002 HC RX W HCPCS: Performed by: STUDENT IN AN ORGANIZED HEALTH CARE EDUCATION/TRAINING PROGRAM

## 2025-01-01 PROCEDURE — 1100000000 HC RM PRIVATE

## 2025-01-01 PROCEDURE — 3E033XZ INTRODUCTION OF VASOPRESSOR INTO PERIPHERAL VEIN, PERCUTANEOUS APPROACH: ICD-10-PCS | Performed by: FAMILY MEDICINE

## 2025-01-01 PROCEDURE — 99232 SBSQ HOSP IP/OBS MODERATE 35: CPT | Performed by: INTERNAL MEDICINE

## 2025-01-01 PROCEDURE — 6370000000 HC RX 637 (ALT 250 FOR IP)

## 2025-01-01 PROCEDURE — 6360000002 HC RX W HCPCS: Performed by: INTERNAL MEDICINE

## 2025-01-01 PROCEDURE — 80053 COMPREHEN METABOLIC PANEL: CPT

## 2025-01-01 PROCEDURE — 87147 CULTURE TYPE IMMUNOLOGIC: CPT

## 2025-01-01 PROCEDURE — 3700000000 HC ANESTHESIA ATTENDED CARE: Performed by: SURGERY

## 2025-01-01 PROCEDURE — 2709999900 HC NON-CHARGEABLE SUPPLY: Performed by: SURGERY

## 2025-01-01 PROCEDURE — P9045 ALBUMIN (HUMAN), 5%, 250 ML: HCPCS

## 2025-01-01 PROCEDURE — 36569 INSJ PICC 5 YR+ W/O IMAGING: CPT

## 2025-01-01 PROCEDURE — 2580000003 HC RX 258: Performed by: EMERGENCY MEDICINE

## 2025-01-01 PROCEDURE — 99223 1ST HOSP IP/OBS HIGH 75: CPT | Performed by: INTERNAL MEDICINE

## 2025-01-01 PROCEDURE — 99233 SBSQ HOSP IP/OBS HIGH 50: CPT | Performed by: INTERNAL MEDICINE

## 2025-01-01 PROCEDURE — 6370000000 HC RX 637 (ALT 250 FOR IP): Performed by: STUDENT IN AN ORGANIZED HEALTH CARE EDUCATION/TRAINING PROGRAM

## 2025-01-01 PROCEDURE — 99285 EMERGENCY DEPT VISIT HI MDM: CPT

## 2025-01-01 PROCEDURE — 6360000002 HC RX W HCPCS: Performed by: EMERGENCY MEDICINE

## 2025-01-01 PROCEDURE — 73502 X-RAY EXAM HIP UNI 2-3 VIEWS: CPT

## 2025-01-01 PROCEDURE — 7100000001 HC PACU RECOVERY - ADDTL 15 MIN: Performed by: SURGERY

## 2025-01-01 PROCEDURE — C1751 CATH, INF, PER/CENT/MIDLINE: HCPCS

## 2025-01-01 PROCEDURE — 85652 RBC SED RATE AUTOMATED: CPT

## 2025-01-01 PROCEDURE — 97161 PT EVAL LOW COMPLEX 20 MIN: CPT

## 2025-01-01 PROCEDURE — 76937 US GUIDE VASCULAR ACCESS: CPT

## 2025-01-01 PROCEDURE — 87040 BLOOD CULTURE FOR BACTERIA: CPT

## 2025-01-01 PROCEDURE — 2500000003 HC RX 250 WO HCPCS

## 2025-01-01 PROCEDURE — 99222 1ST HOSP IP/OBS MODERATE 55: CPT | Performed by: NURSE PRACTITIONER

## 2025-01-01 PROCEDURE — 2500000003 HC RX 250 WO HCPCS: Performed by: STUDENT IN AN ORGANIZED HEALTH CARE EDUCATION/TRAINING PROGRAM

## 2025-01-01 PROCEDURE — 02HV33Z INSERTION OF INFUSION DEVICE INTO SUPERIOR VENA CAVA, PERCUTANEOUS APPROACH: ICD-10-PCS | Performed by: FAMILY MEDICINE

## 2025-01-01 RX ORDER — FERROUS SULFATE 325(65) MG
325 TABLET ORAL
Status: DISCONTINUED | OUTPATIENT
Start: 2025-01-01 | End: 2025-01-01 | Stop reason: HOSPADM

## 2025-01-01 RX ORDER — SODIUM CHLORIDE 9 MG/ML
INJECTION, SOLUTION INTRAVENOUS PRN
Status: DISCONTINUED | OUTPATIENT
Start: 2025-01-01 | End: 2025-01-01 | Stop reason: HOSPADM

## 2025-01-01 RX ORDER — POLYETHYLENE GLYCOL 3350 17 G/17G
17 POWDER, FOR SOLUTION ORAL DAILY PRN
Status: DISCONTINUED | OUTPATIENT
Start: 2025-01-01 | End: 2025-01-01 | Stop reason: HOSPADM

## 2025-01-01 RX ORDER — MULTIVITAMIN WITH IRON
1 TABLET ORAL DAILY
Status: DISCONTINUED | OUTPATIENT
Start: 2025-01-01 | End: 2025-01-01 | Stop reason: HOSPADM

## 2025-01-01 RX ORDER — MEMANTINE HYDROCHLORIDE 5 MG/1
5 TABLET ORAL 2 TIMES DAILY
Status: DISCONTINUED | OUTPATIENT
Start: 2025-01-01 | End: 2025-01-01 | Stop reason: HOSPADM

## 2025-01-01 RX ORDER — SODIUM CHLORIDE, SODIUM LACTATE, POTASSIUM CHLORIDE, CALCIUM CHLORIDE 600; 310; 30; 20 MG/100ML; MG/100ML; MG/100ML; MG/100ML
INJECTION, SOLUTION INTRAVENOUS CONTINUOUS
Status: DISCONTINUED | OUTPATIENT
Start: 2025-01-01 | End: 2025-01-01 | Stop reason: HOSPADM

## 2025-01-01 RX ORDER — LACTOBACILLUS RHAMNOSUS GG 10B CELL
1 CAPSULE ORAL
Qty: 30 CAPSULE | Refills: 0 | Status: SHIPPED | OUTPATIENT
Start: 2025-01-01 | End: 2025-03-19

## 2025-01-01 RX ORDER — POTASSIUM CHLORIDE 750 MG/1
40 TABLET, EXTENDED RELEASE ORAL PRN
Status: DISCONTINUED | OUTPATIENT
Start: 2025-01-01 | End: 2025-01-01

## 2025-01-01 RX ORDER — ASCORBIC ACID 500 MG
500 TABLET ORAL DAILY
Status: DISCONTINUED | OUTPATIENT
Start: 2025-01-01 | End: 2025-01-01 | Stop reason: HOSPADM

## 2025-01-01 RX ORDER — BUPIVACAINE HYDROCHLORIDE 2.5 MG/ML
INJECTION, SOLUTION EPIDURAL; INFILTRATION; INTRACAUDAL; PERINEURAL PRN
Status: DISCONTINUED | OUTPATIENT
Start: 2025-01-01 | End: 2025-01-01 | Stop reason: HOSPADM

## 2025-01-01 RX ORDER — DRONABINOL 5 MG/1
5 CAPSULE ORAL 2 TIMES DAILY
Qty: 60 CAPSULE | Refills: 1 | Status: SHIPPED | OUTPATIENT
Start: 2025-01-01 | End: 2025-01-01

## 2025-01-01 RX ORDER — SODIUM CHLORIDE 9 MG/ML
INJECTION, SOLUTION INTRAVENOUS CONTINUOUS
Status: DISPENSED | OUTPATIENT
Start: 2025-01-01 | End: 2025-01-01

## 2025-01-01 RX ORDER — FENTANYL CITRATE 50 UG/ML
50 INJECTION, SOLUTION INTRAMUSCULAR; INTRAVENOUS EVERY 5 MIN PRN
Status: DISCONTINUED | OUTPATIENT
Start: 2025-01-01 | End: 2025-01-01 | Stop reason: HOSPADM

## 2025-01-01 RX ORDER — POTASSIUM CHLORIDE 7.45 MG/ML
10 INJECTION INTRAVENOUS PRN
Status: DISCONTINUED | OUTPATIENT
Start: 2025-01-01 | End: 2025-01-01

## 2025-01-01 RX ORDER — TRAZODONE HYDROCHLORIDE 100 MG/1
100 TABLET ORAL NIGHTLY
Status: DISCONTINUED | OUTPATIENT
Start: 2025-01-01 | End: 2025-01-01 | Stop reason: HOSPADM

## 2025-01-01 RX ORDER — PROCHLORPERAZINE EDISYLATE 5 MG/ML
5 INJECTION INTRAMUSCULAR; INTRAVENOUS
Status: DISCONTINUED | OUTPATIENT
Start: 2025-01-01 | End: 2025-01-01 | Stop reason: HOSPADM

## 2025-01-01 RX ORDER — EPHEDRINE SULFATE 50 MG/ML
10 INJECTION INTRAVENOUS ONCE
Status: COMPLETED | OUTPATIENT
Start: 2025-01-01 | End: 2025-01-01

## 2025-01-01 RX ORDER — ALBUMIN HUMAN 50 G/1000ML
12.5 SOLUTION INTRAVENOUS ONCE
Status: COMPLETED | OUTPATIENT
Start: 2025-01-01 | End: 2025-01-01

## 2025-01-01 RX ORDER — MAGNESIUM SULFATE IN WATER 40 MG/ML
2000 INJECTION, SOLUTION INTRAVENOUS PRN
Status: DISCONTINUED | OUTPATIENT
Start: 2025-01-01 | End: 2025-01-01

## 2025-01-01 RX ORDER — DRONABINOL 2.5 MG/1
5 CAPSULE ORAL 2 TIMES DAILY
Qty: 60 CAPSULE | Refills: 1 | Status: SHIPPED | OUTPATIENT
Start: 2025-01-01 | End: 2025-03-19

## 2025-01-01 RX ORDER — DRONABINOL 2.5 MG/1
2.5 CAPSULE ORAL DAILY
Status: DISCONTINUED | OUTPATIENT
Start: 2025-01-01 | End: 2025-01-01

## 2025-01-01 RX ORDER — HYDROMORPHONE HYDROCHLORIDE 1 MG/ML
0.25 INJECTION, SOLUTION INTRAMUSCULAR; INTRAVENOUS; SUBCUTANEOUS EVERY 5 MIN PRN
Status: DISCONTINUED | OUTPATIENT
Start: 2025-01-01 | End: 2025-01-01 | Stop reason: HOSPADM

## 2025-01-01 RX ORDER — SODIUM CHLORIDE 9 MG/ML
INJECTION, SOLUTION INTRAVENOUS PRN
Status: CANCELLED | OUTPATIENT
Start: 2025-01-01

## 2025-01-01 RX ORDER — ENOXAPARIN SODIUM 100 MG/ML
40 INJECTION SUBCUTANEOUS DAILY
Status: DISCONTINUED | OUTPATIENT
Start: 2025-01-01 | End: 2025-01-01 | Stop reason: HOSPADM

## 2025-01-01 RX ORDER — CASTOR OIL AND BALSAM, PERU 788; 87 MG/G; MG/G
OINTMENT TOPICAL EVERY 12 HOURS
Qty: 56.7 G | Refills: 0 | Status: SHIPPED | OUTPATIENT
Start: 2025-01-01 | End: 2025-03-19

## 2025-01-01 RX ORDER — ASPIRIN 81 MG/1
81 TABLET ORAL DAILY
Status: DISCONTINUED | OUTPATIENT
Start: 2025-01-01 | End: 2025-01-01 | Stop reason: HOSPADM

## 2025-01-01 RX ORDER — MULTIVITAMIN WITH IRON
1000 TABLET ORAL 2 TIMES DAILY
Status: DISCONTINUED | OUTPATIENT
Start: 2025-01-01 | End: 2025-01-01 | Stop reason: HOSPADM

## 2025-01-01 RX ORDER — CARVEDILOL 6.25 MG/1
6.25 TABLET ORAL DAILY
Status: DISCONTINUED | OUTPATIENT
Start: 2025-01-01 | End: 2025-01-01 | Stop reason: HOSPADM

## 2025-01-01 RX ORDER — CARBIDOPA AND LEVODOPA 25; 100 MG/1; MG/1
1 TABLET, EXTENDED RELEASE ORAL 3 TIMES DAILY
Status: DISCONTINUED | OUTPATIENT
Start: 2025-01-01 | End: 2025-01-01 | Stop reason: HOSPADM

## 2025-01-01 RX ORDER — ACETAMINOPHEN 325 MG/1
650 TABLET ORAL EVERY 6 HOURS PRN
Status: DISCONTINUED | OUTPATIENT
Start: 2025-01-01 | End: 2025-01-01 | Stop reason: HOSPADM

## 2025-01-01 RX ORDER — NALOXONE HYDROCHLORIDE 0.4 MG/ML
INJECTION, SOLUTION INTRAMUSCULAR; INTRAVENOUS; SUBCUTANEOUS PRN
Status: DISCONTINUED | OUTPATIENT
Start: 2025-01-01 | End: 2025-01-01 | Stop reason: HOSPADM

## 2025-01-01 RX ORDER — SODIUM CHLORIDE 0.9 % (FLUSH) 0.9 %
5-40 SYRINGE (ML) INJECTION PRN
Status: DISCONTINUED | OUTPATIENT
Start: 2025-01-01 | End: 2025-01-01 | Stop reason: HOSPADM

## 2025-01-01 RX ORDER — METRONIDAZOLE 500 MG/100ML
500 INJECTION, SOLUTION INTRAVENOUS EVERY 8 HOURS
Status: DISCONTINUED | OUTPATIENT
Start: 2025-01-01 | End: 2025-01-01

## 2025-01-01 RX ORDER — ONDANSETRON 4 MG/1
4 TABLET, ORALLY DISINTEGRATING ORAL EVERY 8 HOURS PRN
Status: DISCONTINUED | OUTPATIENT
Start: 2025-01-01 | End: 2025-01-01 | Stop reason: HOSPADM

## 2025-01-01 RX ORDER — SODIUM BICARBONATE 650 MG/1
650 TABLET ORAL 2 TIMES DAILY
Status: DISCONTINUED | OUTPATIENT
Start: 2025-01-01 | End: 2025-01-01 | Stop reason: HOSPADM

## 2025-01-01 RX ORDER — EPHEDRINE SULFATE 50 MG/ML
INJECTION INTRAVENOUS
Status: COMPLETED
Start: 2025-01-01 | End: 2025-01-01

## 2025-01-01 RX ORDER — POLYETHYLENE GLYCOL 3350 17 G/17G
17 POWDER, FOR SOLUTION ORAL DAILY PRN
Qty: 527 G | Refills: 1 | Status: SHIPPED | OUTPATIENT
Start: 2025-01-01 | End: 2025-03-19

## 2025-01-01 RX ORDER — SODIUM CHLORIDE 0.9 % (FLUSH) 0.9 %
5-40 SYRINGE (ML) INJECTION EVERY 12 HOURS SCHEDULED
Status: DISCONTINUED | OUTPATIENT
Start: 2025-01-01 | End: 2025-01-01 | Stop reason: HOSPADM

## 2025-01-01 RX ORDER — ONDANSETRON 2 MG/ML
4 INJECTION INTRAMUSCULAR; INTRAVENOUS EVERY 6 HOURS PRN
Status: DISCONTINUED | OUTPATIENT
Start: 2025-01-01 | End: 2025-01-01 | Stop reason: HOSPADM

## 2025-01-01 RX ORDER — ACETAMINOPHEN 650 MG/1
650 SUPPOSITORY RECTAL EVERY 6 HOURS PRN
Status: DISCONTINUED | OUTPATIENT
Start: 2025-01-01 | End: 2025-01-01 | Stop reason: HOSPADM

## 2025-01-01 RX ORDER — CASTOR OIL AND BALSAM, PERU 788; 87 MG/G; MG/G
OINTMENT TOPICAL EVERY 12 HOURS
Status: DISCONTINUED | OUTPATIENT
Start: 2025-01-01 | End: 2025-01-01 | Stop reason: HOSPADM

## 2025-01-01 RX ORDER — SODIUM CHLORIDE 0.9 % (FLUSH) 0.9 %
5-40 SYRINGE (ML) INJECTION PRN
Status: CANCELLED | OUTPATIENT
Start: 2025-01-01

## 2025-01-01 RX ORDER — ZINC SULFATE 50(220)MG
50 CAPSULE ORAL DAILY
Status: DISCONTINUED | OUTPATIENT
Start: 2025-01-01 | End: 2025-01-01 | Stop reason: HOSPADM

## 2025-01-01 RX ORDER — DRONABINOL 2.5 MG/1
5 CAPSULE ORAL 2 TIMES DAILY
Status: DISCONTINUED | OUTPATIENT
Start: 2025-01-01 | End: 2025-01-01 | Stop reason: HOSPADM

## 2025-01-01 RX ORDER — SODIUM CHLORIDE 0.9 % (FLUSH) 0.9 %
5-40 SYRINGE (ML) INJECTION EVERY 12 HOURS SCHEDULED
Status: CANCELLED | OUTPATIENT
Start: 2025-01-01

## 2025-01-01 RX ORDER — LEVOTHYROXINE SODIUM 88 UG/1
88 TABLET ORAL DAILY
Status: DISCONTINUED | OUTPATIENT
Start: 2025-01-01 | End: 2025-01-01 | Stop reason: HOSPADM

## 2025-01-01 RX ORDER — ALBUMIN HUMAN 50 G/1000ML
SOLUTION INTRAVENOUS
Status: COMPLETED
Start: 2025-01-01 | End: 2025-01-01

## 2025-01-01 RX ADMIN — SODIUM CHLORIDE, PRESERVATIVE FREE 10 ML: 5 INJECTION INTRAVENOUS at 10:53

## 2025-01-01 RX ADMIN — OXYCODONE HYDROCHLORIDE AND ACETAMINOPHEN 500 MG: 500 TABLET ORAL at 12:38

## 2025-01-01 RX ADMIN — SODIUM BICARBONATE 650 MG: 650 TABLET ORAL at 10:52

## 2025-01-01 RX ADMIN — Medication: at 22:43

## 2025-01-01 RX ADMIN — SODIUM CHLORIDE, POTASSIUM CHLORIDE, SODIUM LACTATE AND CALCIUM CHLORIDE: 600; 310; 30; 20 INJECTION, SOLUTION INTRAVENOUS at 17:48

## 2025-01-01 RX ADMIN — OXYCODONE HYDROCHLORIDE AND ACETAMINOPHEN 500 MG: 500 TABLET ORAL at 10:21

## 2025-01-01 RX ADMIN — SODIUM CHLORIDE 25 ML: 0.9 INJECTION, SOLUTION INTRAVENOUS at 16:41

## 2025-01-01 RX ADMIN — Medication: at 23:09

## 2025-01-01 RX ADMIN — MEMANTINE 5 MG: 5 TABLET ORAL at 23:32

## 2025-01-01 RX ADMIN — CARBIDOPA AND LEVODOPA 1 TABLET: 25; 100 TABLET, EXTENDED RELEASE ORAL at 09:00

## 2025-01-01 RX ADMIN — Medication: at 00:05

## 2025-01-01 RX ADMIN — CARVEDILOL 6.25 MG: 6.25 TABLET, FILM COATED ORAL at 10:52

## 2025-01-01 RX ADMIN — ZINC SULFATE 220 MG (50 MG) CAPSULE 50 MG: CAPSULE at 10:34

## 2025-01-01 RX ADMIN — ASPIRIN 81 MG: 81 TABLET, COATED ORAL at 08:59

## 2025-01-01 RX ADMIN — ENOXAPARIN SODIUM 40 MG: 100 INJECTION SUBCUTANEOUS at 10:50

## 2025-01-01 RX ADMIN — MEMANTINE 5 MG: 5 TABLET ORAL at 21:55

## 2025-01-01 RX ADMIN — COLLAGENASE SANTYL: 250 OINTMENT TOPICAL at 10:53

## 2025-01-01 RX ADMIN — OXYCODONE HYDROCHLORIDE AND ACETAMINOPHEN 500 MG: 500 TABLET ORAL at 16:35

## 2025-01-01 RX ADMIN — CARBIDOPA AND LEVODOPA 1 TABLET: 25; 100 TABLET, EXTENDED RELEASE ORAL at 10:21

## 2025-01-01 RX ADMIN — SODIUM CHLORIDE, PRESERVATIVE FREE 10 ML: 5 INJECTION INTRAVENOUS at 21:30

## 2025-01-01 RX ADMIN — EPHEDRINE SULFATE 10 MG: 50 INJECTION INTRAVENOUS at 10:04

## 2025-01-01 RX ADMIN — CYANOCOBALAMIN TAB 500 MCG 1000 MCG: 500 TAB at 22:43

## 2025-01-01 RX ADMIN — ZINC SULFATE 220 MG (50 MG) CAPSULE 50 MG: CAPSULE at 12:38

## 2025-01-01 RX ADMIN — METRONIDAZOLE 500 MG: 5 INJECTION, SOLUTION INTRAVENOUS at 09:06

## 2025-01-01 RX ADMIN — MEMANTINE 5 MG: 5 TABLET ORAL at 09:00

## 2025-01-01 RX ADMIN — ASPIRIN 81 MG: 81 TABLET, COATED ORAL at 10:52

## 2025-01-01 RX ADMIN — Medication: at 21:57

## 2025-01-01 RX ADMIN — DRONABINOL 5 MG: 2.5 CAPSULE ORAL at 10:52

## 2025-01-01 RX ADMIN — THERA TABS 1 TABLET: TAB at 16:40

## 2025-01-01 RX ADMIN — VANCOMYCIN HYDROCHLORIDE 1000 MG: 1 INJECTION, POWDER, LYOPHILIZED, FOR SOLUTION INTRAVENOUS at 17:55

## 2025-01-01 RX ADMIN — TRAZODONE HYDROCHLORIDE 100 MG: 100 TABLET ORAL at 21:56

## 2025-01-01 RX ADMIN — CYANOCOBALAMIN TAB 500 MCG 1000 MCG: 500 TAB at 10:54

## 2025-01-01 RX ADMIN — Medication: at 12:17

## 2025-01-01 RX ADMIN — ENOXAPARIN SODIUM 40 MG: 100 INJECTION SUBCUTANEOUS at 09:16

## 2025-01-01 RX ADMIN — MEROPENEM 1000 MG: 1 INJECTION INTRAVENOUS at 06:21

## 2025-01-01 RX ADMIN — CARBIDOPA AND LEVODOPA 1 TABLET: 25; 100 TABLET, EXTENDED RELEASE ORAL at 16:16

## 2025-01-01 RX ADMIN — ACETAMINOPHEN 650 MG: 325 TABLET ORAL at 06:40

## 2025-01-01 RX ADMIN — THERA TABS 1 TABLET: TAB at 12:37

## 2025-01-01 RX ADMIN — COLLAGENASE SANTYL: 250 OINTMENT TOPICAL at 10:21

## 2025-01-01 RX ADMIN — ZINC SULFATE 220 MG (50 MG) CAPSULE 50 MG: CAPSULE at 09:17

## 2025-01-01 RX ADMIN — DRONABINOL 5 MG: 2.5 CAPSULE ORAL at 21:55

## 2025-01-01 RX ADMIN — CARBIDOPA AND LEVODOPA 1 TABLET: 25; 100 TABLET, EXTENDED RELEASE ORAL at 20:58

## 2025-01-01 RX ADMIN — METRONIDAZOLE 500 MG: 5 INJECTION, SOLUTION INTRAVENOUS at 01:17

## 2025-01-01 RX ADMIN — ACETAMINOPHEN 650 MG: 325 TABLET ORAL at 23:22

## 2025-01-01 RX ADMIN — DRONABINOL 5 MG: 2.5 CAPSULE ORAL at 20:59

## 2025-01-01 RX ADMIN — ONDANSETRON 4 MG: 4 TABLET, ORALLY DISINTEGRATING ORAL at 23:32

## 2025-01-01 RX ADMIN — CARBIDOPA AND LEVODOPA 1 TABLET: 25; 100 TABLET, EXTENDED RELEASE ORAL at 08:41

## 2025-01-01 RX ADMIN — METRONIDAZOLE 500 MG: 5 INJECTION, SOLUTION INTRAVENOUS at 00:14

## 2025-01-01 RX ADMIN — METRONIDAZOLE 500 MG: 5 INJECTION, SOLUTION INTRAVENOUS at 17:52

## 2025-01-01 RX ADMIN — MEMANTINE 5 MG: 5 TABLET ORAL at 03:17

## 2025-01-01 RX ADMIN — Medication: at 11:30

## 2025-01-01 RX ADMIN — CARBIDOPA AND LEVODOPA 1 TABLET: 25; 100 TABLET, EXTENDED RELEASE ORAL at 15:08

## 2025-01-01 RX ADMIN — ALBUMIN HUMAN 12.5 G: 50 SOLUTION INTRAVENOUS at 10:32

## 2025-01-01 RX ADMIN — ASPIRIN 81 MG: 81 TABLET, COATED ORAL at 10:20

## 2025-01-01 RX ADMIN — TRAZODONE HYDROCHLORIDE 100 MG: 100 TABLET ORAL at 21:55

## 2025-01-01 RX ADMIN — SODIUM CHLORIDE, PRESERVATIVE FREE 10 ML: 5 INJECTION INTRAVENOUS at 10:35

## 2025-01-01 RX ADMIN — CYANOCOBALAMIN TAB 500 MCG 1000 MCG: 500 TAB at 09:17

## 2025-01-01 RX ADMIN — SODIUM BICARBONATE 650 MG: 650 TABLET ORAL at 10:33

## 2025-01-01 RX ADMIN — CYANOCOBALAMIN TAB 500 MCG 1000 MCG: 500 TAB at 10:52

## 2025-01-01 RX ADMIN — MEMANTINE 5 MG: 5 TABLET ORAL at 20:59

## 2025-01-01 RX ADMIN — CARBIDOPA AND LEVODOPA 1 TABLET: 25; 100 TABLET, EXTENDED RELEASE ORAL at 21:57

## 2025-01-01 RX ADMIN — Medication: at 23:38

## 2025-01-01 RX ADMIN — DRONABINOL 5 MG: 2.5 CAPSULE ORAL at 09:18

## 2025-01-01 RX ADMIN — VANCOMYCIN HYDROCHLORIDE 750 MG: 750 INJECTION, POWDER, LYOPHILIZED, FOR SOLUTION INTRAVENOUS at 16:45

## 2025-01-01 RX ADMIN — CARBIDOPA AND LEVODOPA 1 TABLET: 25; 100 TABLET, EXTENDED RELEASE ORAL at 23:30

## 2025-01-01 RX ADMIN — CARBIDOPA AND LEVODOPA 1 TABLET: 25; 100 TABLET, EXTENDED RELEASE ORAL at 16:06

## 2025-01-01 RX ADMIN — FERROUS SULFATE TAB 325 MG (65 MG ELEMENTAL FE) 325 MG: 325 (65 FE) TAB at 08:43

## 2025-01-01 RX ADMIN — ASPIRIN 81 MG: 81 TABLET, COATED ORAL at 09:17

## 2025-01-01 RX ADMIN — CARVEDILOL 6.25 MG: 6.25 TABLET, FILM COATED ORAL at 10:20

## 2025-01-01 RX ADMIN — MEROPENEM 1000 MG: 1 INJECTION INTRAVENOUS at 06:31

## 2025-01-01 RX ADMIN — SODIUM CHLORIDE: 0.9 INJECTION, SOLUTION INTRAVENOUS at 03:16

## 2025-01-01 RX ADMIN — MEMANTINE 5 MG: 5 TABLET ORAL at 10:52

## 2025-01-01 RX ADMIN — WATER 2000 MG: 1 INJECTION INTRAMUSCULAR; INTRAVENOUS; SUBCUTANEOUS at 03:20

## 2025-01-01 RX ADMIN — MEROPENEM 1000 MG: 1 INJECTION INTRAVENOUS at 22:40

## 2025-01-01 RX ADMIN — ENOXAPARIN SODIUM 40 MG: 100 INJECTION SUBCUTANEOUS at 08:44

## 2025-01-01 RX ADMIN — MEROPENEM 1000 MG: 1 INJECTION INTRAVENOUS at 23:00

## 2025-01-01 RX ADMIN — MEROPENEM 1000 MG: 1 INJECTION INTRAVENOUS at 06:34

## 2025-01-01 RX ADMIN — SODIUM BICARBONATE 650 MG: 650 TABLET ORAL at 15:33

## 2025-01-01 RX ADMIN — SODIUM CHLORIDE, PRESERVATIVE FREE 10 ML: 5 INJECTION INTRAVENOUS at 09:18

## 2025-01-01 RX ADMIN — CARVEDILOL 6.25 MG: 6.25 TABLET, FILM COATED ORAL at 09:17

## 2025-01-01 RX ADMIN — LEVOTHYROXINE SODIUM 88 MCG: 0.09 TABLET ORAL at 07:13

## 2025-01-01 RX ADMIN — CARBIDOPA AND LEVODOPA 1 TABLET: 25; 100 TABLET, EXTENDED RELEASE ORAL at 12:38

## 2025-01-01 RX ADMIN — LEVOTHYROXINE SODIUM 88 MCG: 0.09 TABLET ORAL at 06:31

## 2025-01-01 RX ADMIN — MEMANTINE 5 MG: 5 TABLET ORAL at 09:17

## 2025-01-01 RX ADMIN — LEVOTHYROXINE SODIUM 88 MCG: 0.09 TABLET ORAL at 04:14

## 2025-01-01 RX ADMIN — FERROUS SULFATE TAB 325 MG (65 MG ELEMENTAL FE) 325 MG: 325 (65 FE) TAB at 10:32

## 2025-01-01 RX ADMIN — LEVOTHYROXINE SODIUM 88 MCG: 0.09 TABLET ORAL at 06:34

## 2025-01-01 RX ADMIN — CYANOCOBALAMIN TAB 500 MCG 1000 MCG: 500 TAB at 21:56

## 2025-01-01 RX ADMIN — MEMANTINE 5 MG: 5 TABLET ORAL at 21:56

## 2025-01-01 RX ADMIN — SODIUM CHLORIDE, PRESERVATIVE FREE 10 ML: 5 INJECTION INTRAVENOUS at 21:57

## 2025-01-01 RX ADMIN — CARVEDILOL 6.25 MG: 6.25 TABLET, FILM COATED ORAL at 12:38

## 2025-01-01 RX ADMIN — ACETAMINOPHEN 650 MG: 325 TABLET ORAL at 12:22

## 2025-01-01 RX ADMIN — FERROUS SULFATE TAB 325 MG (65 MG ELEMENTAL FE) 325 MG: 325 (65 FE) TAB at 10:52

## 2025-01-01 RX ADMIN — Medication: at 15:33

## 2025-01-01 RX ADMIN — Medication: at 10:40

## 2025-01-01 RX ADMIN — OXYCODONE HYDROCHLORIDE AND ACETAMINOPHEN 500 MG: 500 TABLET ORAL at 10:51

## 2025-01-01 RX ADMIN — SODIUM CHLORIDE: 0.9 INJECTION, SOLUTION INTRAVENOUS at 00:12

## 2025-01-01 RX ADMIN — CEFEPIME 2000 MG: 2 INJECTION, POWDER, FOR SOLUTION INTRAVENOUS at 04:27

## 2025-01-01 RX ADMIN — CEFEPIME 2000 MG: 2 INJECTION, POWDER, FOR SOLUTION INTRAVENOUS at 05:55

## 2025-01-01 RX ADMIN — CARVEDILOL 6.25 MG: 6.25 TABLET, FILM COATED ORAL at 10:51

## 2025-01-01 RX ADMIN — CARVEDILOL 6.25 MG: 6.25 TABLET, FILM COATED ORAL at 09:00

## 2025-01-01 RX ADMIN — Medication: at 23:33

## 2025-01-01 RX ADMIN — MEROPENEM 1000 MG: 1 INJECTION INTRAVENOUS at 15:09

## 2025-01-01 RX ADMIN — CYANOCOBALAMIN TAB 500 MCG 1000 MCG: 500 TAB at 12:37

## 2025-01-01 RX ADMIN — MEMANTINE 5 MG: 5 TABLET ORAL at 10:51

## 2025-01-01 RX ADMIN — MEMANTINE 5 MG: 5 TABLET ORAL at 12:38

## 2025-01-01 RX ADMIN — CARBIDOPA AND LEVODOPA 1 TABLET: 25; 100 TABLET, EXTENDED RELEASE ORAL at 10:53

## 2025-01-01 RX ADMIN — SODIUM CHLORIDE, PRESERVATIVE FREE 10 ML: 5 INJECTION INTRAVENOUS at 21:00

## 2025-01-01 RX ADMIN — CARBIDOPA AND LEVODOPA 1 TABLET: 25; 100 TABLET, EXTENDED RELEASE ORAL at 10:51

## 2025-01-01 RX ADMIN — TRAZODONE HYDROCHLORIDE 100 MG: 100 TABLET ORAL at 23:37

## 2025-01-01 RX ADMIN — THERA TABS 1 TABLET: TAB at 09:18

## 2025-01-01 RX ADMIN — ENOXAPARIN SODIUM 40 MG: 100 INJECTION SUBCUTANEOUS at 12:38

## 2025-01-01 RX ADMIN — MEROPENEM 1000 MG: 1 INJECTION INTRAVENOUS at 23:37

## 2025-01-01 RX ADMIN — CYANOCOBALAMIN TAB 500 MCG 1000 MCG: 500 TAB at 10:33

## 2025-01-01 RX ADMIN — THERA TABS 1 TABLET: TAB at 10:51

## 2025-01-01 RX ADMIN — SODIUM CHLORIDE, POTASSIUM CHLORIDE, SODIUM LACTATE AND CALCIUM CHLORIDE: 600; 310; 30; 20 INJECTION, SOLUTION INTRAVENOUS at 22:39

## 2025-01-01 RX ADMIN — VANCOMYCIN HYDROCHLORIDE 1000 MG: 1 INJECTION, POWDER, LYOPHILIZED, FOR SOLUTION INTRAVENOUS at 17:39

## 2025-01-01 RX ADMIN — CYANOCOBALAMIN TAB 500 MCG 1000 MCG: 500 TAB at 03:17

## 2025-01-01 RX ADMIN — MEMANTINE 5 MG: 5 TABLET ORAL at 10:33

## 2025-01-01 RX ADMIN — TRAZODONE HYDROCHLORIDE 100 MG: 100 TABLET ORAL at 22:44

## 2025-01-01 RX ADMIN — METRONIDAZOLE 500 MG: 5 INJECTION, SOLUTION INTRAVENOUS at 16:42

## 2025-01-01 RX ADMIN — Medication: at 10:52

## 2025-01-01 RX ADMIN — MEMANTINE 5 MG: 5 TABLET ORAL at 23:30

## 2025-01-01 RX ADMIN — ACETAMINOPHEN 650 MG: 325 TABLET ORAL at 23:31

## 2025-01-01 RX ADMIN — VANCOMYCIN HYDROCHLORIDE 1000 MG: 1 INJECTION, POWDER, LYOPHILIZED, FOR SOLUTION INTRAVENOUS at 18:03

## 2025-01-01 RX ADMIN — METRONIDAZOLE 500 MG: 5 INJECTION, SOLUTION INTRAVENOUS at 09:35

## 2025-01-01 RX ADMIN — LEVOTHYROXINE SODIUM 88 MCG: 0.09 TABLET ORAL at 07:06

## 2025-01-01 RX ADMIN — MEROPENEM 1000 MG: 1 INJECTION INTRAVENOUS at 15:34

## 2025-01-01 RX ADMIN — CARBIDOPA AND LEVODOPA 1 TABLET: 25; 100 TABLET, EXTENDED RELEASE ORAL at 16:35

## 2025-01-01 RX ADMIN — THERA TABS 1 TABLET: TAB at 10:52

## 2025-01-01 RX ADMIN — ASPIRIN 81 MG: 81 TABLET, COATED ORAL at 08:43

## 2025-01-01 RX ADMIN — SODIUM CHLORIDE, PRESERVATIVE FREE 10 ML: 5 INJECTION INTRAVENOUS at 22:44

## 2025-01-01 RX ADMIN — CARBIDOPA AND LEVODOPA 1 TABLET: 25; 100 TABLET, EXTENDED RELEASE ORAL at 16:07

## 2025-01-01 RX ADMIN — CARBIDOPA AND LEVODOPA 1 TABLET: 25; 100 TABLET, EXTENDED RELEASE ORAL at 09:16

## 2025-01-01 RX ADMIN — DRONABINOL 5 MG: 2.5 CAPSULE ORAL at 23:37

## 2025-01-01 RX ADMIN — SODIUM CHLORIDE, PRESERVATIVE FREE 10 ML: 5 INJECTION INTRAVENOUS at 21:38

## 2025-01-01 RX ADMIN — TRAZODONE HYDROCHLORIDE 100 MG: 100 TABLET ORAL at 20:59

## 2025-01-01 RX ADMIN — TRAZODONE HYDROCHLORIDE 100 MG: 100 TABLET ORAL at 23:30

## 2025-01-01 RX ADMIN — OXYCODONE HYDROCHLORIDE AND ACETAMINOPHEN 500 MG: 500 TABLET ORAL at 10:53

## 2025-01-01 RX ADMIN — SODIUM CHLORIDE: 0.9 INJECTION, SOLUTION INTRAVENOUS at 06:33

## 2025-01-01 RX ADMIN — ZINC SULFATE 220 MG (50 MG) CAPSULE 50 MG: CAPSULE at 16:35

## 2025-01-01 RX ADMIN — ENOXAPARIN SODIUM 40 MG: 100 INJECTION SUBCUTANEOUS at 10:32

## 2025-01-01 RX ADMIN — THERA TABS 1 TABLET: TAB at 09:00

## 2025-01-01 RX ADMIN — SODIUM CHLORIDE: 0.9 INJECTION, SOLUTION INTRAVENOUS at 22:40

## 2025-01-01 RX ADMIN — CARBIDOPA AND LEVODOPA 1 TABLET: 25; 100 TABLET, EXTENDED RELEASE ORAL at 23:32

## 2025-01-01 RX ADMIN — CARBIDOPA AND LEVODOPA 1 TABLET: 25; 100 TABLET, EXTENDED RELEASE ORAL at 21:55

## 2025-01-01 RX ADMIN — CEFEPIME 2000 MG: 2 INJECTION, POWDER, FOR SOLUTION INTRAVENOUS at 03:35

## 2025-01-01 RX ADMIN — CARBIDOPA AND LEVODOPA 1 TABLET: 25; 100 TABLET, EXTENDED RELEASE ORAL at 20:13

## 2025-01-01 RX ADMIN — VANCOMYCIN HYDROCHLORIDE 1000 MG: 1 INJECTION, POWDER, LYOPHILIZED, FOR SOLUTION INTRAVENOUS at 18:32

## 2025-01-01 RX ADMIN — THERA TABS 1 TABLET: TAB at 10:34

## 2025-01-01 RX ADMIN — DRONABINOL 5 MG: 2.5 CAPSULE ORAL at 10:21

## 2025-01-01 RX ADMIN — CYANOCOBALAMIN TAB 500 MCG 1000 MCG: 500 TAB at 20:59

## 2025-01-01 RX ADMIN — LEVOTHYROXINE SODIUM 88 MCG: 0.09 TABLET ORAL at 06:37

## 2025-01-01 RX ADMIN — SODIUM CHLORIDE 25 ML: 0.9 INJECTION, SOLUTION INTRAVENOUS at 16:44

## 2025-01-01 RX ADMIN — DRONABINOL 5 MG: 2.5 CAPSULE ORAL at 21:56

## 2025-01-01 RX ADMIN — ALBUMIN (HUMAN) 12.5 G: 12.5 INJECTION, SOLUTION INTRAVENOUS at 10:32

## 2025-01-01 RX ADMIN — Medication: at 12:41

## 2025-01-01 RX ADMIN — OXYCODONE HYDROCHLORIDE AND ACETAMINOPHEN 500 MG: 500 TABLET ORAL at 09:16

## 2025-01-01 RX ADMIN — Medication: at 12:31

## 2025-01-01 RX ADMIN — ZINC SULFATE 220 MG (50 MG) CAPSULE 50 MG: CAPSULE at 10:52

## 2025-01-01 RX ADMIN — CYANOCOBALAMIN TAB 500 MCG 1000 MCG: 500 TAB at 23:40

## 2025-01-01 RX ADMIN — ENOXAPARIN SODIUM 40 MG: 100 INJECTION SUBCUTANEOUS at 08:59

## 2025-01-01 RX ADMIN — SODIUM CHLORIDE, POTASSIUM CHLORIDE, SODIUM LACTATE AND CALCIUM CHLORIDE: 600; 310; 30; 20 INJECTION, SOLUTION INTRAVENOUS at 19:04

## 2025-01-01 RX ADMIN — METRONIDAZOLE 500 MG: 5 INJECTION, SOLUTION INTRAVENOUS at 01:30

## 2025-01-01 RX ADMIN — VANCOMYCIN HYDROCHLORIDE 1000 MG: 1 INJECTION, POWDER, LYOPHILIZED, FOR SOLUTION INTRAVENOUS at 18:08

## 2025-01-01 RX ADMIN — ZINC SULFATE 220 MG (50 MG) CAPSULE 50 MG: CAPSULE at 10:51

## 2025-01-01 RX ADMIN — MEMANTINE 5 MG: 5 TABLET ORAL at 22:43

## 2025-01-01 RX ADMIN — CYANOCOBALAMIN TAB 500 MCG 1000 MCG: 500 TAB at 23:37

## 2025-01-01 RX ADMIN — METRONIDAZOLE 500 MG: 5 INJECTION, SOLUTION INTRAVENOUS at 18:28

## 2025-01-01 RX ADMIN — CYANOCOBALAMIN TAB 500 MCG 1000 MCG: 500 TAB at 09:00

## 2025-01-01 RX ADMIN — ZINC SULFATE 220 MG (50 MG) CAPSULE 50 MG: CAPSULE at 08:59

## 2025-01-01 RX ADMIN — SODIUM BICARBONATE 650 MG: 650 TABLET ORAL at 21:56

## 2025-01-01 RX ADMIN — DRONABINOL 5 MG: 2.5 CAPSULE ORAL at 12:31

## 2025-01-01 RX ADMIN — SODIUM BICARBONATE 650 MG: 650 TABLET ORAL at 20:59

## 2025-01-01 RX ADMIN — VANCOMYCIN HYDROCHLORIDE 1000 MG: 1 INJECTION, POWDER, LYOPHILIZED, FOR SOLUTION INTRAVENOUS at 17:18

## 2025-01-01 RX ADMIN — SODIUM CHLORIDE, POTASSIUM CHLORIDE, SODIUM LACTATE AND CALCIUM CHLORIDE: 600; 310; 30; 20 INJECTION, SOLUTION INTRAVENOUS at 18:22

## 2025-01-01 RX ADMIN — CYANOCOBALAMIN TAB 500 MCG 1000 MCG: 500 TAB at 21:55

## 2025-01-01 RX ADMIN — ASPIRIN 81 MG: 81 TABLET, COATED ORAL at 12:38

## 2025-01-01 RX ADMIN — OXYCODONE HYDROCHLORIDE AND ACETAMINOPHEN 500 MG: 500 TABLET ORAL at 08:59

## 2025-01-01 RX ADMIN — CARBIDOPA AND LEVODOPA 1 TABLET: 25; 100 TABLET, EXTENDED RELEASE ORAL at 15:33

## 2025-01-01 RX ADMIN — DRONABINOL 2.5 MG: 2.5 CAPSULE ORAL at 08:59

## 2025-01-01 RX ADMIN — SODIUM CHLORIDE, POTASSIUM CHLORIDE, SODIUM LACTATE AND CALCIUM CHLORIDE: 600; 310; 30; 20 INJECTION, SOLUTION INTRAVENOUS at 00:11

## 2025-01-01 RX ADMIN — METRONIDAZOLE 500 MG: 5 INJECTION, SOLUTION INTRAVENOUS at 10:50

## 2025-01-01 ASSESSMENT — PAIN SCALES - GENERAL
PAINLEVEL_OUTOF10: 0
PAINLEVEL_OUTOF10: 5
PAINLEVEL_OUTOF10: 1
PAINLEVEL_OUTOF10: 2
PAINLEVEL_OUTOF10: 3
PAINLEVEL_OUTOF10: 5

## 2025-01-01 ASSESSMENT — PAIN SCALES - WONG BAKER: WONGBAKER_NUMERICALRESPONSE: NO HURT

## 2025-01-01 ASSESSMENT — PAIN DESCRIPTION - LOCATION
LOCATION: HIP
LOCATION: SHOULDER

## 2025-01-01 ASSESSMENT — PAIN DESCRIPTION - DESCRIPTORS: DESCRIPTORS: ACHING

## 2025-01-01 ASSESSMENT — PAIN DESCRIPTION - ORIENTATION
ORIENTATION: LEFT
ORIENTATION: RIGHT

## 2025-01-01 ASSESSMENT — PAIN - FUNCTIONAL ASSESSMENT: PAIN_FUNCTIONAL_ASSESSMENT: ADULT NONVERBAL PAIN SCALE (NPVS)

## 2025-01-30 ENCOUNTER — HOSPITAL ENCOUNTER (INPATIENT)
Facility: HOSPITAL | Age: 75
LOS: 2 days | Discharge: HOME | DRG: 592 | End: 2025-02-01
Attending: STUDENT IN AN ORGANIZED HEALTH CARE EDUCATION/TRAINING PROGRAM | Admitting: STUDENT IN AN ORGANIZED HEALTH CARE EDUCATION/TRAINING PROGRAM
Payer: MEDICARE

## 2025-01-30 ENCOUNTER — APPOINTMENT (OUTPATIENT)
Facility: HOSPITAL | Age: 75
DRG: 592 | End: 2025-01-30
Payer: MEDICARE

## 2025-01-30 DIAGNOSIS — T83.512A URINARY TRACT INFECTION ASSOCIATED WITH NEPHROSTOMY CATHETER, INITIAL ENCOUNTER: ICD-10-CM

## 2025-01-30 DIAGNOSIS — N39.0 URINARY TRACT INFECTION ASSOCIATED WITH NEPHROSTOMY CATHETER, INITIAL ENCOUNTER: ICD-10-CM

## 2025-01-30 DIAGNOSIS — L89.212 PRESSURE INJURY OF RIGHT HIP, STAGE 2 (HCC): Primary | ICD-10-CM

## 2025-01-30 PROBLEM — N30.00 ACUTE INFECTIVE CYSTITIS: Status: ACTIVE | Noted: 2025-01-30

## 2025-01-30 LAB
ALBUMIN SERPL-MCNC: 3.2 G/DL (ref 3.5–5)
ALBUMIN/GLOB SERPL: 0.9 (ref 1.1–2.2)
ALP SERPL-CCNC: 85 U/L (ref 45–117)
ALT SERPL-CCNC: 7 U/L (ref 12–78)
ANION GAP SERPL CALC-SCNC: 8 MMOL/L (ref 2–12)
APPEARANCE UR: CLEAR
AST SERPL-CCNC: 12 U/L (ref 15–37)
BACTERIA URNS QL MICRO: ABNORMAL /HPF
BASOPHILS # BLD: 0.05 K/UL (ref 0–0.1)
BASOPHILS NFR BLD: 0.6 % (ref 0–1)
BILIRUB SERPL-MCNC: 0.6 MG/DL (ref 0.2–1)
BILIRUB UR QL: NEGATIVE
BUN SERPL-MCNC: 24 MG/DL (ref 6–20)
BUN/CREAT SERPL: 16 (ref 12–20)
CALCIUM SERPL-MCNC: 9.6 MG/DL (ref 8.5–10.1)
CHLORIDE SERPL-SCNC: 102 MMOL/L (ref 97–108)
CO2 SERPL-SCNC: 31 MMOL/L (ref 21–32)
COLOR UR: ABNORMAL
CREAT SERPL-MCNC: 1.54 MG/DL (ref 0.7–1.3)
DIFFERENTIAL METHOD BLD: ABNORMAL
EOSINOPHIL # BLD: 0.17 K/UL (ref 0–0.4)
EOSINOPHIL NFR BLD: 1.9 % (ref 0–7)
EPITH CASTS URNS QL MICRO: ABNORMAL /LPF
ERYTHROCYTE [DISTWIDTH] IN BLOOD BY AUTOMATED COUNT: 16.6 % (ref 11.5–14.5)
GLOBULIN SER CALC-MCNC: 3.4 G/DL (ref 2–4)
GLUCOSE SERPL-MCNC: 97 MG/DL (ref 65–100)
GLUCOSE UR STRIP.AUTO-MCNC: NEGATIVE MG/DL
HCT VFR BLD AUTO: 42.9 % (ref 36.6–50.3)
HGB BLD-MCNC: 13.3 G/DL (ref 12.1–17)
HGB UR QL STRIP: ABNORMAL
IMM GRANULOCYTES # BLD AUTO: 0.03 K/UL (ref 0–0.04)
IMM GRANULOCYTES NFR BLD AUTO: 0.3 % (ref 0–0.5)
KETONES UR QL STRIP.AUTO: NEGATIVE MG/DL
LEUKOCYTE ESTERASE UR QL STRIP.AUTO: ABNORMAL
LYMPHOCYTES # BLD: 1.16 K/UL (ref 0.8–3.5)
LYMPHOCYTES NFR BLD: 13 % (ref 12–49)
MCH RBC QN AUTO: 27.1 PG (ref 26–34)
MCHC RBC AUTO-ENTMCNC: 31 G/DL (ref 30–36.5)
MCV RBC AUTO: 87.4 FL (ref 80–99)
MONOCYTES # BLD: 0.69 K/UL (ref 0–1)
MONOCYTES NFR BLD: 7.7 % (ref 5–13)
NEUTS SEG # BLD: 6.84 K/UL (ref 1.8–8)
NEUTS SEG NFR BLD: 76.5 % (ref 32–75)
NITRITE UR QL STRIP.AUTO: POSITIVE
NRBC # BLD: 0 K/UL (ref 0–0.01)
NRBC BLD-RTO: 0 PER 100 WBC
PH UR STRIP: 6.5 (ref 5–8)
PLATELET # BLD AUTO: 318 K/UL (ref 150–400)
PMV BLD AUTO: 10.6 FL (ref 8.9–12.9)
POTASSIUM SERPL-SCNC: 4.3 MMOL/L (ref 3.5–5.1)
PROT SERPL-MCNC: 6.6 G/DL (ref 6.4–8.2)
PROT UR STRIP-MCNC: ABNORMAL MG/DL
RBC # BLD AUTO: 4.91 M/UL (ref 4.1–5.7)
RBC #/AREA URNS HPF: ABNORMAL /HPF (ref 0–5)
SODIUM SERPL-SCNC: 141 MMOL/L (ref 136–145)
SP GR UR REFRACTOMETRY: 1.01 (ref 1–1.03)
URINE CULTURE IF INDICATED: ABNORMAL
UROBILINOGEN UR QL STRIP.AUTO: 0.2 EU/DL (ref 0.2–1)
WBC # BLD AUTO: 8.9 K/UL (ref 4.1–11.1)
WBC URNS QL MICRO: ABNORMAL /HPF (ref 0–4)

## 2025-01-30 PROCEDURE — 36415 COLL VENOUS BLD VENIPUNCTURE: CPT

## 2025-01-30 PROCEDURE — 87086 URINE CULTURE/COLONY COUNT: CPT

## 2025-01-30 PROCEDURE — 99285 EMERGENCY DEPT VISIT HI MDM: CPT

## 2025-01-30 PROCEDURE — 72193 CT PELVIS W/DYE: CPT

## 2025-01-30 PROCEDURE — 87040 BLOOD CULTURE FOR BACTERIA: CPT

## 2025-01-30 PROCEDURE — 6360000004 HC RX CONTRAST MEDICATION: Performed by: STUDENT IN AN ORGANIZED HEALTH CARE EDUCATION/TRAINING PROGRAM

## 2025-01-30 PROCEDURE — 96375 TX/PRO/DX INJ NEW DRUG ADDON: CPT

## 2025-01-30 PROCEDURE — 96361 HYDRATE IV INFUSION ADD-ON: CPT

## 2025-01-30 PROCEDURE — 96374 THER/PROPH/DIAG INJ IV PUSH: CPT

## 2025-01-30 PROCEDURE — 87186 SC STD MICRODIL/AGAR DIL: CPT

## 2025-01-30 PROCEDURE — 2580000003 HC RX 258: Performed by: STUDENT IN AN ORGANIZED HEALTH CARE EDUCATION/TRAINING PROGRAM

## 2025-01-30 PROCEDURE — 1100000000 HC RM PRIVATE

## 2025-01-30 PROCEDURE — 85025 COMPLETE CBC W/AUTO DIFF WBC: CPT

## 2025-01-30 PROCEDURE — 96360 HYDRATION IV INFUSION INIT: CPT

## 2025-01-30 PROCEDURE — 81001 URINALYSIS AUTO W/SCOPE: CPT

## 2025-01-30 PROCEDURE — 2500000003 HC RX 250 WO HCPCS: Performed by: STUDENT IN AN ORGANIZED HEALTH CARE EDUCATION/TRAINING PROGRAM

## 2025-01-30 PROCEDURE — 73701 CT LOWER EXTREMITY W/DYE: CPT

## 2025-01-30 PROCEDURE — 87088 URINE BACTERIA CULTURE: CPT

## 2025-01-30 PROCEDURE — 6360000002 HC RX W HCPCS: Performed by: STUDENT IN AN ORGANIZED HEALTH CARE EDUCATION/TRAINING PROGRAM

## 2025-01-30 PROCEDURE — 80053 COMPREHEN METABOLIC PANEL: CPT

## 2025-01-30 RX ORDER — 0.9 % SODIUM CHLORIDE 0.9 %
500 INTRAVENOUS SOLUTION INTRAVENOUS ONCE
Status: COMPLETED | OUTPATIENT
Start: 2025-01-30 | End: 2025-01-30

## 2025-01-30 RX ORDER — IOPAMIDOL 755 MG/ML
100 INJECTION, SOLUTION INTRAVASCULAR
Status: COMPLETED | OUTPATIENT
Start: 2025-01-30 | End: 2025-01-30

## 2025-01-30 RX ADMIN — SODIUM CHLORIDE 1250 MG: 9 INJECTION, SOLUTION INTRAVENOUS at 23:40

## 2025-01-30 RX ADMIN — IOPAMIDOL 100 ML: 755 INJECTION, SOLUTION INTRAVENOUS at 21:30

## 2025-01-30 RX ADMIN — WATER 1000 MG: 1 INJECTION INTRAMUSCULAR; INTRAVENOUS; SUBCUTANEOUS at 23:36

## 2025-01-30 RX ADMIN — SODIUM CHLORIDE 500 ML: 9 INJECTION, SOLUTION INTRAVENOUS at 20:35

## 2025-01-30 ASSESSMENT — PAIN SCALES - PAIN ASSESSMENT IN ADVANCED DEMENTIA (PAINAD)
BODYLANGUAGE: RELAXED
BREATHING: NORMAL
TOTALSCORE: 0
FACIALEXPRESSION: SMILING OR INEXPRESSIVE
CONSOLABILITY: NO NEED TO CONSOLE

## 2025-01-30 NOTE — ED NOTES
Arrives to triage in wheelchair accompanied by wife who reports concern for growing wounds to sacral area over past several weeks. Wife also reports pt seems weaker than usual and has had a poor appetite recently with significant weight loss. Hx dementia.

## 2025-01-31 PROCEDURE — 6370000000 HC RX 637 (ALT 250 FOR IP): Performed by: FAMILY MEDICINE

## 2025-01-31 PROCEDURE — 6370000000 HC RX 637 (ALT 250 FOR IP): Performed by: EMERGENCY MEDICINE

## 2025-01-31 PROCEDURE — 6370000000 HC RX 637 (ALT 250 FOR IP): Performed by: NURSE PRACTITIONER

## 2025-01-31 PROCEDURE — 2500000003 HC RX 250 WO HCPCS: Performed by: NURSE PRACTITIONER

## 2025-01-31 PROCEDURE — 2580000003 HC RX 258: Performed by: NURSE PRACTITIONER

## 2025-01-31 PROCEDURE — 94640 AIRWAY INHALATION TREATMENT: CPT

## 2025-01-31 PROCEDURE — 6360000002 HC RX W HCPCS: Performed by: NURSE PRACTITIONER

## 2025-01-31 PROCEDURE — 1100000000 HC RM PRIVATE

## 2025-01-31 RX ORDER — PRAVASTATIN SODIUM 40 MG
40 TABLET ORAL DAILY
Status: DISCONTINUED | OUTPATIENT
Start: 2025-01-31 | End: 2025-02-01 | Stop reason: HOSPADM

## 2025-01-31 RX ORDER — MULTIVITAMIN WITH IRON
1 TABLET ORAL DAILY
Status: DISCONTINUED | OUTPATIENT
Start: 2025-01-31 | End: 2025-02-01 | Stop reason: HOSPADM

## 2025-01-31 RX ORDER — BUDESONIDE 0.5 MG/2ML
500 INHALANT ORAL
Status: DISCONTINUED | OUTPATIENT
Start: 2025-01-31 | End: 2025-02-01 | Stop reason: HOSPADM

## 2025-01-31 RX ORDER — SODIUM CHLORIDE 0.9 % (FLUSH) 0.9 %
5-40 SYRINGE (ML) INJECTION EVERY 12 HOURS SCHEDULED
Status: DISCONTINUED | OUTPATIENT
Start: 2025-01-31 | End: 2025-02-01 | Stop reason: HOSPADM

## 2025-01-31 RX ORDER — FORMOTEROL FUMARATE 20 UG/2ML
20 SOLUTION RESPIRATORY (INHALATION)
COMMUNITY

## 2025-01-31 RX ORDER — ONDANSETRON 4 MG/1
4 TABLET, ORALLY DISINTEGRATING ORAL EVERY 8 HOURS PRN
Status: DISCONTINUED | OUTPATIENT
Start: 2025-01-31 | End: 2025-02-01 | Stop reason: HOSPADM

## 2025-01-31 RX ORDER — BUDESONIDE 0.5 MG/2ML
1 INHALANT ORAL 2 TIMES DAILY
COMMUNITY

## 2025-01-31 RX ORDER — FERROUS SULFATE 325(65) MG
325 TABLET ORAL
Status: DISCONTINUED | OUTPATIENT
Start: 2025-01-31 | End: 2025-02-01 | Stop reason: HOSPADM

## 2025-01-31 RX ORDER — ZINC SULFATE 50(220)MG
50 CAPSULE ORAL DAILY
Status: DISCONTINUED | OUTPATIENT
Start: 2025-01-31 | End: 2025-02-01 | Stop reason: HOSPADM

## 2025-01-31 RX ORDER — TRAZODONE HYDROCHLORIDE 50 MG/1
100 TABLET ORAL NIGHTLY
Status: DISCONTINUED | OUTPATIENT
Start: 2025-01-31 | End: 2025-02-01 | Stop reason: HOSPADM

## 2025-01-31 RX ORDER — ACETAMINOPHEN 500 MG
1000 TABLET ORAL
Status: COMPLETED | OUTPATIENT
Start: 2025-01-31 | End: 2025-01-31

## 2025-01-31 RX ORDER — ASPIRIN 81 MG/1
81 TABLET ORAL DAILY
Status: DISCONTINUED | OUTPATIENT
Start: 2025-01-31 | End: 2025-02-01 | Stop reason: HOSPADM

## 2025-01-31 RX ORDER — ARFORMOTEROL TARTRATE 15 UG/2ML
15 SOLUTION RESPIRATORY (INHALATION)
Status: DISCONTINUED | OUTPATIENT
Start: 2025-01-31 | End: 2025-02-01 | Stop reason: HOSPADM

## 2025-01-31 RX ORDER — CARBIDOPA AND LEVODOPA 25; 100 MG/1; MG/1
1 TABLET, EXTENDED RELEASE ORAL 3 TIMES DAILY
Status: DISCONTINUED | OUTPATIENT
Start: 2025-01-31 | End: 2025-02-01 | Stop reason: HOSPADM

## 2025-01-31 RX ORDER — MEMANTINE HYDROCHLORIDE 5 MG/1
5 TABLET ORAL 2 TIMES DAILY
COMMUNITY

## 2025-01-31 RX ORDER — SODIUM CHLORIDE 9 MG/ML
INJECTION, SOLUTION INTRAVENOUS CONTINUOUS
Status: DISPENSED | OUTPATIENT
Start: 2025-01-31 | End: 2025-02-01

## 2025-01-31 RX ORDER — ONDANSETRON 2 MG/ML
4 INJECTION INTRAMUSCULAR; INTRAVENOUS EVERY 6 HOURS PRN
Status: DISCONTINUED | OUTPATIENT
Start: 2025-01-31 | End: 2025-02-01 | Stop reason: HOSPADM

## 2025-01-31 RX ORDER — SODIUM CHLORIDE 9 MG/ML
INJECTION, SOLUTION INTRAVENOUS PRN
Status: DISCONTINUED | OUTPATIENT
Start: 2025-01-31 | End: 2025-02-01 | Stop reason: HOSPADM

## 2025-01-31 RX ORDER — CARVEDILOL 3.12 MG/1
6.25 TABLET ORAL DAILY
Status: DISCONTINUED | OUTPATIENT
Start: 2025-01-31 | End: 2025-02-01 | Stop reason: HOSPADM

## 2025-01-31 RX ORDER — ACETAMINOPHEN 325 MG/1
650 TABLET ORAL EVERY 6 HOURS PRN
Status: DISCONTINUED | OUTPATIENT
Start: 2025-01-31 | End: 2025-02-01 | Stop reason: HOSPADM

## 2025-01-31 RX ORDER — ASCORBIC ACID 500 MG
500 TABLET ORAL DAILY
Status: DISCONTINUED | OUTPATIENT
Start: 2025-01-31 | End: 2025-02-01 | Stop reason: HOSPADM

## 2025-01-31 RX ORDER — MEMANTINE HYDROCHLORIDE 5 MG/1
5 TABLET ORAL 2 TIMES DAILY
Status: DISCONTINUED | OUTPATIENT
Start: 2025-01-31 | End: 2025-02-01 | Stop reason: HOSPADM

## 2025-01-31 RX ORDER — CARBIDOPA AND LEVODOPA 25; 100 MG/1; MG/1
1 TABLET, EXTENDED RELEASE ORAL 3 TIMES DAILY
COMMUNITY

## 2025-01-31 RX ORDER — POLYETHYLENE GLYCOL 3350 17 G/17G
17 POWDER, FOR SOLUTION ORAL DAILY PRN
Status: DISCONTINUED | OUTPATIENT
Start: 2025-01-31 | End: 2025-02-01 | Stop reason: HOSPADM

## 2025-01-31 RX ORDER — SODIUM CHLORIDE 0.9 % (FLUSH) 0.9 %
5-40 SYRINGE (ML) INJECTION PRN
Status: DISCONTINUED | OUTPATIENT
Start: 2025-01-31 | End: 2025-02-01 | Stop reason: HOSPADM

## 2025-01-31 RX ORDER — ENOXAPARIN SODIUM 100 MG/ML
40 INJECTION SUBCUTANEOUS DAILY
Status: DISCONTINUED | OUTPATIENT
Start: 2025-01-31 | End: 2025-02-01 | Stop reason: HOSPADM

## 2025-01-31 RX ORDER — SODIUM BICARBONATE 650 MG/1
650 TABLET ORAL 2 TIMES DAILY
Status: DISCONTINUED | OUTPATIENT
Start: 2025-01-31 | End: 2025-02-01 | Stop reason: HOSPADM

## 2025-01-31 RX ORDER — LEVOTHYROXINE SODIUM 88 UG/1
88 TABLET ORAL DAILY
Status: DISCONTINUED | OUTPATIENT
Start: 2025-02-01 | End: 2025-02-01 | Stop reason: HOSPADM

## 2025-01-31 RX ORDER — ACETAMINOPHEN 650 MG/1
650 SUPPOSITORY RECTAL EVERY 6 HOURS PRN
Status: DISCONTINUED | OUTPATIENT
Start: 2025-01-31 | End: 2025-02-01 | Stop reason: HOSPADM

## 2025-01-31 RX ADMIN — THERA TABS 1 TABLET: TAB at 11:26

## 2025-01-31 RX ADMIN — SODIUM CHLORIDE: 9 INJECTION, SOLUTION INTRAVENOUS at 08:24

## 2025-01-31 RX ADMIN — MEMANTINE 5 MG: 5 TABLET ORAL at 11:27

## 2025-01-31 RX ADMIN — COLLAGENASE SANTYL: 250 OINTMENT TOPICAL at 13:49

## 2025-01-31 RX ADMIN — OXYCODONE HYDROCHLORIDE AND ACETAMINOPHEN 500 MG: 500 TABLET ORAL at 11:27

## 2025-01-31 RX ADMIN — CARBIDOPA AND LEVODOPA 1 TABLET: 25; 100 TABLET, EXTENDED RELEASE ORAL at 15:25

## 2025-01-31 RX ADMIN — CARVEDILOL 6.25 MG: 6.25 TABLET, FILM COATED ORAL at 11:26

## 2025-01-31 RX ADMIN — FERROUS SULFATE TAB 325 MG (65 MG ELEMENTAL FE) 325 MG: 325 (65 FE) TAB at 11:26

## 2025-01-31 RX ADMIN — PRAVASTATIN SODIUM 40 MG: 40 TABLET ORAL at 11:27

## 2025-01-31 RX ADMIN — VANCOMYCIN HYDROCHLORIDE 750 MG: 750 INJECTION, POWDER, LYOPHILIZED, FOR SOLUTION INTRAVENOUS at 23:57

## 2025-01-31 RX ADMIN — ARFORMOTEROL TARTRATE 15 MCG: 15 SOLUTION RESPIRATORY (INHALATION) at 19:47

## 2025-01-31 RX ADMIN — SODIUM BICARBONATE 650 MG: 650 TABLET ORAL at 11:27

## 2025-01-31 RX ADMIN — ASPIRIN 81 MG: 81 TABLET, COATED ORAL at 11:27

## 2025-01-31 RX ADMIN — WATER 1000 MG: 1 INJECTION INTRAMUSCULAR; INTRAVENOUS; SUBCUTANEOUS at 23:58

## 2025-01-31 RX ADMIN — ENOXAPARIN SODIUM 40 MG: 100 INJECTION SUBCUTANEOUS at 08:24

## 2025-01-31 RX ADMIN — SODIUM CHLORIDE, PRESERVATIVE FREE 10 ML: 5 INJECTION INTRAVENOUS at 23:38

## 2025-01-31 RX ADMIN — BUDESONIDE 500 MCG: 0.5 INHALANT RESPIRATORY (INHALATION) at 10:03

## 2025-01-31 RX ADMIN — ACETAMINOPHEN 650 MG: 325 TABLET ORAL at 15:26

## 2025-01-31 RX ADMIN — ZINC SULFATE 220 MG (50 MG) CAPSULE 50 MG: CAPSULE at 11:27

## 2025-01-31 RX ADMIN — MEMANTINE 5 MG: 5 TABLET ORAL at 22:04

## 2025-01-31 RX ADMIN — SODIUM CHLORIDE: 9 INJECTION, SOLUTION INTRAVENOUS at 23:33

## 2025-01-31 RX ADMIN — SODIUM CHLORIDE, PRESERVATIVE FREE 10 ML: 5 INJECTION INTRAVENOUS at 08:25

## 2025-01-31 RX ADMIN — SODIUM BICARBONATE 650 MG: 650 TABLET ORAL at 22:05

## 2025-01-31 RX ADMIN — CARBIDOPA AND LEVODOPA 1 TABLET: 25; 100 TABLET, EXTENDED RELEASE ORAL at 22:05

## 2025-01-31 RX ADMIN — ACETAMINOPHEN 1000 MG: 500 TABLET ORAL at 00:16

## 2025-01-31 RX ADMIN — BUDESONIDE 500 MCG: 0.5 INHALANT RESPIRATORY (INHALATION) at 19:47

## 2025-01-31 RX ADMIN — CARBIDOPA AND LEVODOPA 1 TABLET: 25; 100 TABLET, EXTENDED RELEASE ORAL at 11:27

## 2025-01-31 ASSESSMENT — PAIN DESCRIPTION - FREQUENCY
FREQUENCY: CONTINUOUS

## 2025-01-31 ASSESSMENT — PAIN DESCRIPTION - LOCATION
LOCATION: BUTTOCKS
LOCATION: BUTTOCKS
LOCATION: SACRUM
LOCATION: BUTTOCKS
LOCATION: BUTTOCKS

## 2025-01-31 ASSESSMENT — PAIN DESCRIPTION - ORIENTATION
ORIENTATION: RIGHT

## 2025-01-31 ASSESSMENT — PAIN DESCRIPTION - ONSET
ONSET: ON-GOING

## 2025-01-31 ASSESSMENT — PAIN SCALES - GENERAL
PAINLEVEL_OUTOF10: 0
PAINLEVEL_OUTOF10: 0
PAINLEVEL_OUTOF10: 6
PAINLEVEL_OUTOF10: 10
PAINLEVEL_OUTOF10: 7
PAINLEVEL_OUTOF10: 6

## 2025-01-31 ASSESSMENT — PAIN DESCRIPTION - PAIN TYPE
TYPE: ACUTE PAIN
TYPE: ACUTE PAIN
TYPE: CHRONIC PAIN

## 2025-01-31 ASSESSMENT — PAIN DESCRIPTION - DESCRIPTORS
DESCRIPTORS: CRAMPING;DISCOMFORT
DESCRIPTORS: ACHING
DESCRIPTORS: CRAMPING;DISCOMFORT;ACHING

## 2025-01-31 ASSESSMENT — PAIN - FUNCTIONAL ASSESSMENT
PAIN_FUNCTIONAL_ASSESSMENT: PREVENTS OR INTERFERES SOME ACTIVE ACTIVITIES AND ADLS
PAIN_FUNCTIONAL_ASSESSMENT: PREVENTS OR INTERFERES SOME ACTIVE ACTIVITIES AND ADLS
PAIN_FUNCTIONAL_ASSESSMENT: ACTIVITIES ARE NOT PREVENTED

## 2025-01-31 NOTE — H&P
History and Physical    Date of Service:  1/31/2025  Primary Care Provider: Mian Alva APRN - NP  Source of information: The patient and Chart review    Chief Complaint: R ischial Wound    History of Presenting Illness:   Filipe Real is a 74 y.o. male with history of dementia, prior bladder cancer (urostomy), diabetes and previous stroke who [facility]on [facility] from PCP office) for wound concern.  Wife (and caregiver) report patient has had a wound over the right hip that has been present for several weeks but worsened over the past 4 to 5 days.  PCP reviewed them to ED for antibiotics and further evaluation. No reported fevers and no noted drainage. Patient is bedbound (does get up to chair with family assist), minimally conversant at baseline, cannot contribute to history.  Pt transferred to [facility] for admit/evaluation and treatment    Patient was seen and examined this morning, he was lying on ED stretcher in no acute distress, his wife was present at bedside.  Patient does not speak but does nod on occasion but seems to nod his head \"no\" to all questions asked.  He does follow some commands.  Difficult to obtain review of systems at this time.  Patient's wife was at bedside, she is his primary caregiver with some assistance from other family members in the home.  Patient is bedbound for the most part, they do get him up and transfer into a recliner chair for most of the day.  Reports development of a right ischial wound that has progressively worsened.  She was provided some education on turning and repositioning running frequently to offload pressure.     Medication reconciliation completed with wife at bedside     REVIEW OF SYSTEMS:  As mentioned above int he HPI    Past Medical History:   Diagnosis Date    Back pain     Backache, unspecified 2/8/2010    Bladder cancer (HCC) 7/14/2020    He is s/p TURBT on 6/25/2020. No obvious residual tumor, but bruce and induration at the base.  Pathology reads invasive papillary urothelial carcinoma, high grade (bladder) and invasive high grade urothelial carcinoma pT2 (bladder base). 07- visit He has high grade muscle invasive bladder cancer. I thought it was grossly resected but pathology reveals high grade disease. He is at high risk o    Cancer ([facility]) 2013    prostate    CHF (congestive heart failure) ([facility]) 1999    Chronic obstructive pulmonary disease ([facility])     Coronary artery disease     3 stents    Coronary atherosclerosis of native coronary artery 2/8/2010    COVID     10/2021, antibody infusion 10/2021    DDD (degenerative disc disease)     Diabetes ([facility])     patient states no longer has diabetes    Essential hypertension, benign 2/8/2010    Heart attack ([facility])     1999    Heart block     95%    Hematuria, unspecified 7/14/2020    He had gross hematuria and clots in his Depends. He notes not pain in the bladder or flank. CT 4/20/20 without renal concerns. Tumor found on cystsocopy on 5/29/2020.    HTN (hypertension), benign     patient states no longer has htn    Hypercholesterolemia     Ill-defined condition 1999 & early 2000s    Mycardial Infarction    Ill-defined condition 1996    concussion with subdural hematoma    Ill-defined condition     limited vision R eye    Ill-defined condition     urinary incontinence    Ill-defined condition     short term memory loss since SDH    Impotence 7/14/2020    He has ED. He and his partner are not active. Viagra was no longer helpful.    Lumbago 2/8/2010    Malignant neoplasm of prostate ([facility]) 7/14/2020    He is s/p a robotic prostatectomy 10/18/13. 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 His PSA has been undetectable, last drawn on 5/14/2020.    Neurogenic dysfunction of the urinary bladder 7/14/2020    He has a hypotonic bladder and is emptying with Valsalva. He has mild ISD and urge incontinence. He has some enuresis.    Osteoarthrosis, unspecified whether generalized  Abnormal; Notable for the following components:    BUN 24 (*)     Creatinine 1.54 (*)     Est, Glom Filt Rate 46 (*)     ALT 7 (*)     AST 12 (*)     Albumin 3.2 (*)     Albumin/Globulin Ratio 0.9 (*)     All other components within normal limits   URINALYSIS WITH REFLEX TO CULTURE - Abnormal; Notable for the following components:    Protein, UA TRACE (*)     Blood, Urine TRACE (*)     Nitrite, Urine Positive (*)     Leukocyte Esterase, Urine TRACE (*)     BACTERIA, URINE 2+ (*)     All other components within normal limits     IMAGING:   CT PELVIS W CONTRAST Additional Contrast? None   Final Result   Right buttock wound with involvement of the ischial bursa but no definite   evidence for osteomyelitis at this time.      Bilateral grade 3 femoral head AVN      Electronically signed by Ashlee Willett        Notes reviewed from all clinical/nonclinical/nursing services involved in patient's clinical care. Care coordination discussions were held with appropriate clinical/nonclinical/ nursing providers based on care coordination needs.     Assessment/Plan:     Wound, unable to stage  Poor nutrition  Weight loss PTA, current BMI 18.07  - CT scan pelvis: R buttock wound with involvement of the ischial bursa but no definite evidence for osteomyelitis at this time. Bilateral grade 3 femoreal head AVN  - abx started for possible infection but has no fever or leukocytosis  - wound care consulted, possible enzymatic debridement  - discussed turning and repositioning (at home) for wound off loading to promote healing  - add MVI, vit C, Zn  - prealbumin ordered  - nutrition consult    Acute Cystitis  - UA not reliable as from urostomy  - started on ceftriaxone in ED (also given for possible wound infection)  - urine is clear and yellow    Hx Stroke  Hx Seizure  - resume home medications    Hx CAD with Stents  Hx CHF  - resume home medications    Hx COPD  - resume home medications (nebs)  - sats 95% RA    Hx bladder and prostate

## 2025-01-31 NOTE — CONSULTS
Comprehensive Nutrition Assessment    Type and Reason for Visit: Initial, Consult    Nutrition Recommendations/Plan:   Removed CHO restriction (continue Regular diet)  Teddy BID, Ensure Plus BID  Consider addition of Probiotic   Monitor weight   Continue current vitamin/mineral supplementation (Vit C, Fe, MVI, Zn)     Malnutrition Assessment:  Malnutrition Status:  Severe malnutrition (01/31/25 1424)    Context:  Acute Illness     Findings of the 6 clinical characteristics of malnutrition:  Energy Intake:  75% or less of estimated energy requirements for 7 or more days  Weight Loss:  Greater than 5% over 1 month     Body Fat Loss:  Moderate body fat loss Orbital, Buccal region, Triceps   Muscle Mass Loss:  Moderate muscle mass loss Temples (temporalis)  Fluid Accumulation:  Unable to assess     Strength:  Not Performed     Nutrition Assessment:    75 yo male admitted for acute infective cystitis, presented for wound concern. PMH of dementia, bladder ca (urostomy), T2DM, CKD, HTN, stroke. Pt is bed bound and minimally conversant.     1/31: RD consult for wounds and poor PO intake. Wound care evaluation pending, wound on R ischium (picture in H&P). Spoke with pt's wife at bedside, she reports a decrease in his overall status since a UTI in early Dec. Reports decreased PO intake, he used to eat very well. Now his intake is variable, mentions that he will eat something he enjoys one day and then refuse it the next (example she used was [employer]). She is able to get him to drink Kemp Breakfast drinks depending on his mood that day. Usually offers him small portions of the meal she makes and presents a sweet snack after dinner. She emphasized that his PO intake really depends on the day and she considers whatever she gets him to eat is good in her eyes. Currently on Regular, 4 CHO diet, will liberalize CHO restriction to optimize PO intake. Also explained process of ordering meals through diet office.     Unable to 
Attending

## 2025-01-31 NOTE — ED NOTES
TRANSFER - OUT REPORT:    Verbal report given to ROBERTO Everett on Filipe Real  being transferred to ED for routine progression of patient care       Report consisted of patient's Situation, Background, Assessment and   Recommendations(SBAR).     Information from the following report(s) Nurse Handoff Report, ED Encounter Summary, ED SBAR, Adult Overview, MAR, Recent Results, and Med Rec Status was reviewed with the receiving nurse.    [place] Fall Assessment:                           Lines:   Peripheral IV 01/30/25 Left;Anterior Forearm (Active)        Opportunity for questions and clarification was provided.      Patient transported with:  Tech

## 2025-01-31 NOTE — ED TRIAGE NOTES
Pt arrived via EMS from [facility] with CC wounds on right buttocks/ sacrum and increased fatigue/ weakness. Decreased appetite with weight loss.     Aox4, but Hx dementia

## 2025-01-31 NOTE — ED PROVIDER NOTES
[facility] DEPARTMENT  EMERGENCY DEPARTMENT ENCOUNTER      Pt Name: Filipe Real  MRN: [id number]  Birthdate 1950  Date of evaluation: 1/30/2025  Provider: Job Herzog MD    CHIEF COMPLAINT       Chief Complaint   Patient presents with    Wound Check       HISTORY OF PRESENT ILLNESS    HPI    Nursing notes reviewed.    REVIEW OF SYSTEMS     Review of Systems  Unless otherwise stated, a complete review of systems was asked of the patient. Pertinent positives are noted in the HPI section.    PAST MEDICAL HISTORY     Past Medical History:   Diagnosis Date    Back pain     Backache, unspecified 2/8/2010    Bladder cancer (HCC) 7/14/2020    He is s/p TURBT on 6/25/2020. No obvious residual tumor, but bruce and induration at the base. Pathology reads invasive papillary urothelial carcinoma, high grade (bladder) and invasive high grade urothelial carcinoma pT2 (bladder base). 07- visit He has high grade muscle invasive bladder cancer. I thought it was grossly resected but pathology reveals high grade disease. He is at high risk o    Cancer ([facility]) 2013    prostate    CHF (congestive heart failure) ([facility]) 1999    Chronic obstructive pulmonary disease ([facility])     Coronary artery disease     3 stents    Coronary atherosclerosis of native coronary artery 2/8/2010    COVID     10/2021, antibody infusion 10/2021    DDD (degenerative disc disease)     Diabetes ([facility])     patient states no longer has diabetes    Essential hypertension, benign 2/8/2010    Heart attack ([facility])     1999    Heart block     95%    Hematuria, unspecified 7/14/2020    He had gross hematuria and clots in his Depends. He notes not pain in the bladder or flank. CT 4/20/20 without renal concerns. Tumor found on cystsocopy on 5/29/2020.    HTN (hypertension), benign     patient states no longer has htn    Hypercholesterolemia     Ill-defined condition 1999 & early 2000s    Mycardial Infarction    Ill-defined condition 1996     components:    Protein, UA TRACE (*)     Blood, Urine TRACE (*)     Nitrite, Urine Positive (*)     Leukocyte Esterase, Urine TRACE (*)     BACTERIA, URINE 2+ (*)     All other components within normal limits   CULTURE, URINE   EXTRA TUBES HOLD       All other labs were within normal range or not returned as of this dictation.    EMERGENCY DEPARTMENT COURSE and DIFFERENTIAL DIAGNOSIS/MDM:   Vitals:    Vitals:    01/30/25 1815 01/30/25 2000   BP: (!) 120/59 (!) 147/68   Pulse: 79    Resp: 18    Temp: 97.3 °F (36.3 °C)    TempSrc: Oral    SpO2: 94% 94%   Weight: 53.1 kg (117 lb)    Height: 1.727 m (5' 8\")        Medical Decision Making  74-year-old male history of dementia, prior bladder cancer now with urostomy, diabetes, previous stroke here for wound concern.  Wife and caregiver report wound over the right hip that has been present for several weeks but worsened over the past 4 to 5 days.  They have been putting Mepilex and saline cleanser on it but feel it has worsened over the past few days.  Saw their primary provider who advised to come to the ER for antibiotics and further care.  They deny fever, unsure of altered mentation. Patient seems uncomfortable and is moaning. Wife does report cloudy urine in the urostomy collection bag.    Patient is bedbound and minimally conversant at baseline, cannot contribute to the history.  On exam there is 5 cm erythematous wound to the right hip.     Differential includes cellulitis, pressure ulcer, osteomyelitis, UTI.  Plan for labs, urinalysis, CT of the area.  Continue to monitor.    Amount and/or Complexity of Data Reviewed  Labs: ordered. Decision-making details documented in ED Course.  Radiology: ordered.    Risk  Prescription drug management.  Decision regarding hospitalization.        REASSESSMENT     ED Course as of 01/30/25 2216   Thu Jan 30, 2025 2117 Creatinine(!): 1.54  At baseline [AS]      ED Course User Index  [AS] Job Herzog MD

## 2025-01-31 NOTE — PROGRESS NOTES
Pharmacist Note - Vancomycin Dosing    Consult provided for this 74 y.o. male for indication of rt hip wound.  Antibiotic regimen(s): ceftriaxone  Patient on vancomycin PTA? NO   Pregnancy status: N/A    Recent Labs     25   WBC 8.9   CREATININE 1.54*   BUN 24*     Frequency of BMP: qd through   Height: 172.7 cm  Weight: 53.9 kg  Est CrCl: 31 ml/min; UO: n/a ml/kg/hr  Temp (24hrs), Av °F (36.7 °C), Min:97.3 °F (36.3 °C), Max:98.4 °F (36.9 °C)    Cultures:  pending    MRSA Swab ordered (if applicable)? N/A    The plan below is expected to result in a target range of AUC/RALF 400-600    Therapy was initiated with a loading dose of 1250 mg IV x 1 and will be followed by a maintenance dose of 750 mg IV every 24 hours.  Pharmacy to follow patient daily and order levels / make dose adjustments as appropriate.    *Vancomycin has been dosed used Bayesian kinetics software to target an AUC/RALF of 400-600, which provides adequate exposure for an assumed infection due to MRSA with an RALF of 1 or less while reducing the risk of nephrotoxicity as seen with traditional trough based dosing goals.

## 2025-01-31 NOTE — WOUND CARE
WOCN Note:     New consult for right ischial wound.   Seen in ER08/08 with his wife and RN.    74 y.o. y/o male admitted on 1/30/2025   Admitted for  Acute infective cystitis [N30.00]      Past Medical History:   Diagnosis Date    Back pain     Backache, unspecified 2/8/2010    Bladder cancer ([facility]) 7/14/2020    He is s/p TURBT on 6/25/2020. No obvious residual tumor, but bruce and induration at the base. Pathology reads invasive papillary urothelial carcinoma, high grade (bladder) and invasive high grade urothelial carcinoma pT2 (bladder base). 07- visit He has high grade muscle invasive bladder cancer. I thought it was grossly resected but pathology reveals high grade disease. He is at high risk o    Cancer ([facility]) 2013    prostate    CHF (congestive heart failure) ([facility]) 1999    Chronic obstructive pulmonary disease ([facility])     Coronary artery disease     3 stents    Coronary atherosclerosis of native coronary artery 2/8/2010    COVID     10/2021, antibody infusion 10/2021    DDD (degenerative disc disease)     Diabetes ([facility])     patient states no longer has diabetes    Essential hypertension, benign 2/8/2010    Heart attack ([facility])     1999    Heart block     95%    Hematuria, unspecified 7/14/2020    He had gross hematuria and clots in his Depends. He notes not pain in the bladder or flank. CT 4/20/20 without renal concerns. Tumor found on cystsocopy on 5/29/2020.    HTN (hypertension), benign     patient states no longer has htn    Hypercholesterolemia     Ill-defined condition 1999 & early 2000s    Mycardial Infarction    Ill-defined condition 1996    concussion with subdural hematoma    Ill-defined condition     limited vision R eye    Ill-defined condition     urinary incontinence    Ill-defined condition     short term memory loss since SDH    Impotence 7/14/2020    He has ED. He and his partner are not active. Viagra was no longer helpful.    Lumbago 2/8/2010    Malignant neoplasm of prostate ([facility])

## 2025-02-01 VITALS
WEIGHT: 118.83 LBS | DIASTOLIC BLOOD PRESSURE: 57 MMHG | RESPIRATION RATE: 18 BRPM | HEART RATE: 83 BPM | HEIGHT: 68 IN | SYSTOLIC BLOOD PRESSURE: 130 MMHG | BODY MASS INDEX: 18.01 KG/M2 | OXYGEN SATURATION: 100 % | TEMPERATURE: 98.8 F

## 2025-02-01 LAB
ANION GAP SERPL CALC-SCNC: 6 MMOL/L (ref 2–12)
BASOPHILS # BLD: 0.04 K/UL (ref 0–0.1)
BASOPHILS NFR BLD: 0.4 % (ref 0–1)
BUN SERPL-MCNC: 21 MG/DL (ref 6–20)
BUN/CREAT SERPL: 18 (ref 12–20)
CALCIUM SERPL-MCNC: 8.5 MG/DL (ref 8.5–10.1)
CHLORIDE SERPL-SCNC: 110 MMOL/L (ref 97–108)
CO2 SERPL-SCNC: 25 MMOL/L (ref 21–32)
CREAT SERPL-MCNC: 1.2 MG/DL (ref 0.7–1.3)
DIFFERENTIAL METHOD BLD: ABNORMAL
EOSINOPHIL # BLD: 0.15 K/UL (ref 0–0.4)
EOSINOPHIL NFR BLD: 1.6 % (ref 0–7)
ERYTHROCYTE [DISTWIDTH] IN BLOOD BY AUTOMATED COUNT: 16.6 % (ref 11.5–14.5)
GLUCOSE SERPL-MCNC: 77 MG/DL (ref 65–100)
HCT VFR BLD AUTO: 38.2 % (ref 36.6–50.3)
HGB BLD-MCNC: 11.9 G/DL (ref 12.1–17)
IMM GRANULOCYTES # BLD AUTO: 0.03 K/UL (ref 0–0.04)
IMM GRANULOCYTES NFR BLD AUTO: 0.3 % (ref 0–0.5)
LYMPHOCYTES # BLD: 1.15 K/UL (ref 0.8–3.5)
LYMPHOCYTES NFR BLD: 11.9 % (ref 12–49)
MCH RBC QN AUTO: 27.2 PG (ref 26–34)
MCHC RBC AUTO-ENTMCNC: 31.2 G/DL (ref 30–36.5)
MCV RBC AUTO: 87.2 FL (ref 80–99)
MONOCYTES # BLD: 0.81 K/UL (ref 0–1)
MONOCYTES NFR BLD: 8.4 % (ref 5–13)
NEUTS SEG # BLD: 7.48 K/UL (ref 1.8–8)
NEUTS SEG NFR BLD: 77.4 % (ref 32–75)
NRBC # BLD: 0 K/UL (ref 0–0.01)
NRBC BLD-RTO: 0 PER 100 WBC
PLATELET # BLD AUTO: 292 K/UL (ref 150–400)
PMV BLD AUTO: 10.9 FL (ref 8.9–12.9)
POTASSIUM SERPL-SCNC: 3.4 MMOL/L (ref 3.5–5.1)
PREALB SERPL-MCNC: 10.7 MG/DL (ref 20–40)
RBC # BLD AUTO: 4.38 M/UL (ref 4.1–5.7)
SODIUM SERPL-SCNC: 141 MMOL/L (ref 136–145)
WBC # BLD AUTO: 9.7 K/UL (ref 4.1–11.1)

## 2025-02-01 PROCEDURE — 80048 BASIC METABOLIC PNL TOTAL CA: CPT

## 2025-02-01 PROCEDURE — 84134 ASSAY OF PREALBUMIN: CPT

## 2025-02-01 PROCEDURE — 6360000002 HC RX W HCPCS

## 2025-02-01 PROCEDURE — 85025 COMPLETE CBC W/AUTO DIFF WBC: CPT

## 2025-02-01 PROCEDURE — 94640 AIRWAY INHALATION TREATMENT: CPT

## 2025-02-01 PROCEDURE — 6370000000 HC RX 637 (ALT 250 FOR IP): Performed by: NURSE PRACTITIONER

## 2025-02-01 PROCEDURE — 6360000002 HC RX W HCPCS: Performed by: NURSE PRACTITIONER

## 2025-02-01 PROCEDURE — 6370000000 HC RX 637 (ALT 250 FOR IP): Performed by: FAMILY MEDICINE

## 2025-02-01 PROCEDURE — 6370000000 HC RX 637 (ALT 250 FOR IP)

## 2025-02-01 RX ORDER — ZINC SULFATE 50(220)MG
50 CAPSULE ORAL DAILY
Qty: 30 CAPSULE | Refills: 0 | Status: SHIPPED | COMMUNITY
Start: 2025-02-02 | End: 2025-03-04

## 2025-02-01 RX ORDER — LACTOBACILLUS RHAMNOSUS GG 10B CELL
1 CAPSULE ORAL
Status: DISCONTINUED | OUTPATIENT
Start: 2025-02-02 | End: 2025-02-01 | Stop reason: HOSPADM

## 2025-02-01 RX ORDER — MORPHINE SULFATE 2 MG/ML
1 INJECTION, SOLUTION INTRAMUSCULAR; INTRAVENOUS
Status: COMPLETED | OUTPATIENT
Start: 2025-02-01 | End: 2025-02-01

## 2025-02-01 RX ORDER — ASCORBIC ACID 500 MG
500 TABLET ORAL DAILY
Qty: 30 TABLET | Refills: 0 | Status: SHIPPED | OUTPATIENT
Start: 2025-02-02 | End: 2025-03-04

## 2025-02-01 RX ORDER — LACTOBACILLUS RHAMNOSUS GG 10B CELL
1 CAPSULE ORAL
Qty: 30 CAPSULE | Refills: 0 | Status: ON HOLD | OUTPATIENT
Start: 2025-02-02 | End: 2025-03-06

## 2025-02-01 RX ORDER — CEPHALEXIN 500 MG/1
500 CAPSULE ORAL 4 TIMES DAILY
Qty: 20 CAPSULE | Refills: 0 | Status: SHIPPED | OUTPATIENT
Start: 2025-02-01 | End: 2025-02-06

## 2025-02-01 RX ORDER — MULTIVITAMIN WITH IRON
1 TABLET ORAL DAILY
Qty: 30 TABLET | Refills: 0 | Status: SHIPPED | OUTPATIENT
Start: 2025-02-02 | End: 2025-03-04

## 2025-02-01 RX ADMIN — CARBIDOPA AND LEVODOPA 1 TABLET: 25; 100 TABLET, EXTENDED RELEASE ORAL at 14:44

## 2025-02-01 RX ADMIN — ZINC SULFATE 220 MG (50 MG) CAPSULE 50 MG: CAPSULE at 10:05

## 2025-02-01 RX ADMIN — MEMANTINE 5 MG: 5 TABLET ORAL at 10:06

## 2025-02-01 RX ADMIN — BUDESONIDE 500 MCG: 0.5 INHALANT RESPIRATORY (INHALATION) at 07:52

## 2025-02-01 RX ADMIN — ARFORMOTEROL TARTRATE 15 MCG: 15 SOLUTION RESPIRATORY (INHALATION) at 07:52

## 2025-02-01 RX ADMIN — PRAVASTATIN SODIUM 40 MG: 40 TABLET ORAL at 10:08

## 2025-02-01 RX ADMIN — CARBIDOPA AND LEVODOPA 1 TABLET: 25; 100 TABLET, EXTENDED RELEASE ORAL at 10:05

## 2025-02-01 RX ADMIN — THERA TABS 1 TABLET: TAB at 10:05

## 2025-02-01 RX ADMIN — COLLAGENASE SANTYL: 250 OINTMENT TOPICAL at 14:42

## 2025-02-01 RX ADMIN — LEVOTHYROXINE SODIUM 88 MCG: 0.09 TABLET ORAL at 10:27

## 2025-02-01 RX ADMIN — POTASSIUM BICARBONATE 40 MEQ: 782 TABLET, EFFERVESCENT ORAL at 10:05

## 2025-02-01 RX ADMIN — OXYCODONE HYDROCHLORIDE AND ACETAMINOPHEN 500 MG: 500 TABLET ORAL at 10:05

## 2025-02-01 RX ADMIN — ENOXAPARIN SODIUM 40 MG: 100 INJECTION SUBCUTANEOUS at 10:05

## 2025-02-01 RX ADMIN — SODIUM BICARBONATE 650 MG: 650 TABLET ORAL at 10:04

## 2025-02-01 RX ADMIN — MORPHINE SULFATE 1 MG: 2 INJECTION, SOLUTION INTRAMUSCULAR; INTRAVENOUS at 02:01

## 2025-02-01 RX ADMIN — ASPIRIN 81 MG: 81 TABLET, COATED ORAL at 10:05

## 2025-02-01 ASSESSMENT — PAIN SCALES - GENERAL
PAINLEVEL_OUTOF10: 5
PAINLEVEL_OUTOF10: 8
PAINLEVEL_OUTOF10: 6

## 2025-02-01 ASSESSMENT — PAIN DESCRIPTION - LOCATION: LOCATION: HIP

## 2025-02-01 ASSESSMENT — PAIN DESCRIPTION - ORIENTATION: ORIENTATION: RIGHT

## 2025-02-01 NOTE — CARE COORDINATION
UPDATE: 2pm patient's family unable to transport due to patient immobile. CM spoke with patient's wife and she agrees with [facility] stretcher transport. [facility] arranged for 4pm today.    CM spoke with hospitalist and patient's wife regarding Home health request for SN to follow for wound care. Patient's wife has no preference on company used, CM sent multiple referrals   [facility] (formerly [facility] Phone: [phone] [address] [address] [address]   Is the only one that responded and accepted, AVS updated    Briana Irving RN, BSN, CM

## 2025-02-01 NOTE — DISCHARGE INSTRUCTIONS
Discharge Instructions       PATIENT ID: Filipe Real  MRN: [id number]   YOB: 1950    DATE OF ADMISSION: 1/30/2025   DATE OF DISCHARGE: 2/1/2025    PRIMARY CARE PROVIDER: Mian Alva     ATTENDING PHYSICIAN: David Kay MD   DISCHARGING PROVIDER: Chelle Morgan PA-C    To contact this individual call [phone] and ask the  to page.   If unavailable ask to be transferred the Adult Hospitalist Department.    DISCHARGE DIAGNOSES  Wound, unable to stage  Poor nutrition  Weight loss PTA, current BMI 18.07  -CT Pelvis (1/30): R buttock wound with involvement of the ischial bursa but no definite evidence for osteomyelitis at this time. Bilateral grade 3 femoreal head AVN  -Abx started for possible infection but has no fever or leukocytosis. Will continue Keflex x 5 days in outpatient setting.  -Wound care consulted: Believes wound care can be completed at home. See below for wound care instructions. Case management to set up home health with wound care.  -Discussed turning and repositioning (at home) for wound off loading to promote healing  -Continue MVI, Vit C, Zinc  -Nutrition evaluated: Teddy BID, Ensure Plus BID, continue probiotic, continue current vitamin/mineral supplementation (Vit C, Fe, MVI, Zn)      Acute Cystitis  -UA not reliable as from urostomy  -Keflex outpatient for above.  -Urine is clear and yellow     Hx Stroke  Hx Seizure  -Continue home medications     Hx CAD with Stents  Hx CHF  -Continue home medications     Hx COPD  -Continue home medications (nebs)     Hx bladder and prostate cancer  -Has urostomy    CONSULTATIONS: IP CONSULT TO PHARMACY  IP CONSULT TO DIETITIAN  IP CONSULT TO CASE MANAGEMENT    PROCEDURES/SURGERIES: * No surgery found *    PENDING TEST RESULTS:   At the time of discharge the following test results are still pending: None     FOLLOW UP APPOINTMENTS:    Follow-up Information         Follow up With Specialties Details Why Contact Info      Mian Alva, APRN - NP Nurse Practitioner Schedule an appointment as soon as possible for a visit in 1 week(s)   [address]   [place] [address] [phone]                ADDITIONAL CARE RECOMMENDATIONS:   Follow up and make an appointment with your primary care provider within 1-2 weeks of discharge for continued management of chronic conditions.   Follow up with prior wound care provider used previously as needed for continued management of chronic conditions.  Home health/wound care to be set up by case management.  Return precautions discussed with patient and wife (via phone) who express understanding and who are in agreement with the current plan.     DIET: regular diet     ACTIVITY: activity as tolerated     WOUND CARE:   1. POA unstageable pressure injury to right ischial  2.5 x 2 x 0 cm  100% devitalized tan tissue  No fluctuance or purulence   Periwound erythema but no significant induration  Tx: cleaned with saline, applied Santyl, covered with foam       Recommend:    Right ischial: clean with Vashe, apply Santyl and cover with foam.  Change daily     and Kenny Protocol:  Turn/reposition approximately every 2 hours  Offload heels with heels hanging off end of pillow at all times while in bed.  Sacral Preventive dressing: change as needed ~every 4 days. Discontinue if incontinence is frequently soiling dressing.     Z-guard cream to buttocks daily and as needed with incontinence care  Low Air Loss mattress: Use only flat sheet and one incontinence pad.       DISCHARGE MEDICATIONS:   See Medication Reconciliation Form    It is important that you take the medication exactly as they are prescribed.   Keep your medication in the bottles provided by the pharmacist and keep a list of the medication names, dosages, and times to be taken in your wallet.   Do not take other medications without consulting your doctor.       NOTIFY YOUR PHYSICIAN FOR ANY OF THE FOLLOWING:   Fever over 101 degrees

## 2025-02-01 NOTE — DISCHARGE SUMMARY
Discharge Summary       PATIENT ID: Filipe Real  MRN: [id number]   YOB: 1950    DATE OF ADMISSION: 1/30/2025  6:19 PM    DATE OF DISCHARGE: 2/1/2025   PRIMARY CARE PROVIDER: Mian Alva APRN - NP     ATTENDING PHYSICIAN: Dr. David Kay  DISCHARGING PROVIDER: Chelle Morgan PA-C    To contact this individual call [phone] and ask the  to page.  If unavailable ask to be transferred the Adult Hospitalist Department.    CONSULTATIONS: IP CONSULT TO PHARMACY  IP CONSULT TO DIETITIAN  IP CONSULT TO CASE MANAGEMENT    PROCEDURES/SURGERIES: * No surgery found *     ADMITTING DIAGNOSES & HOSPITAL COURSE:   Filipe Real is a 74 y.o. male with history of dementia, prior bladder cancer (urostomy), diabetes and previous stroke who [facility] from [facility]CP office[facility] on 1/30/2025 for wound concern.  Wife (and caregiver) report patient has had a wound over the right hip that has been present for several weeks but worsened over the past 4 to 5 days.  PCP reviewed them to ED for antibiotics and further evaluation. No reported fevers and no noted drainage. Patient is bedbound (does get up to chair with family assist), minimally conversant at baseline, cannot contribute to history.  Pt transferred to [facility] for admit/evaluation and treatment     Patient was seen and examined this morning, he was lying on ED stretcher in no acute distress, his wife was present at bedside.  Patient does not speak but does nod on occasion but seems to nod his head \"no\" to all questions asked.  He does follow some commands.  Difficult to obtain review of systems at this time.  Patient's wife was at bedside, she is his primary caregiver with some assistance from other family members in the home.  Patient is bedbound for the most part, they do get him up and transfer into a recliner chair for most of the day.  Reports development of a right ischial wound that has progressively worsened.  She was

## 2025-02-01 NOTE — ED NOTES
ED TO INPATIENT SBAR HANDOFF    Patient Name: Filipe Real   :  1950  74 y.o.   MRN:  [id number]  ED Room #:  ER08/08     Situation  Code Status: Full Code   Allergies: Lisinopril  Weight: Patient Vitals for the past 96 hrs (Last 3 readings):   Weight   25 0750 53.9 kg (118 lb 13.3 oz)   25 1815 53.1 kg (117 lb)       Arrived from: home    Chief Complaint:   Chief Complaint   Patient presents with    Wound Check       Hospital Problem/Diagnosis:  Principal Problem:    Acute infective cystitis  Resolved Problems:    * No resolved hospital problems. *      Mobility: limited transfer mobility   ED Fall Risk: No data recorded   Fell in ED or prior to admission: no   Restraints: no     Sitter: no   Family/Caregiver Present: yes    Neet to know social/safety information: wheelchair bound    Background  History:   Past Medical History:   Diagnosis Date    Back pain     Backache, unspecified 2010    Bladder cancer ([facility]) 2020    He is s/p TURBT on 2020. No obvious residual tumor, but bruce and induration at the base. Pathology reads invasive papillary urothelial carcinoma, high grade (bladder) and invasive high grade urothelial carcinoma pT2 (bladder base). 2020 visit He has high grade muscle invasive bladder cancer. I thought it was grossly resected but pathology reveals high grade disease. He is at high risk o    Cancer ([facility])     prostate    CHF (congestive heart failure) ([facility])     Chronic obstructive pulmonary disease ([facility])     Coronary artery disease     3 stents    Coronary atherosclerosis of native coronary artery 2010    COVID     10/2021, antibody infusion 10/2021    DDD (degenerative disc disease)     Diabetes ([facility])     patient states no longer has diabetes    Essential hypertension, benign 2010    Heart attack ([facility])         Heart block     95%    Hematuria, unspecified 2020    He had gross hematuria and clots in his Depends. He notes not pain in the

## 2025-02-01 NOTE — PROGRESS NOTES
Discharge paperwork reviewed with wife at bedside. No questions or concerns. Wound care education completed with wife. PIV removed. Pt transported home via [facility].

## 2025-02-02 LAB
BACTERIA SPEC CULT: ABNORMAL
BACTERIA SPEC CULT: ABNORMAL
BACTERIA SPEC CULT: NORMAL
BACTERIA SPEC CULT: NORMAL
CC UR VC: ABNORMAL
SERVICE CMNT-IMP: ABNORMAL
SERVICE CMNT-IMP: NORMAL
SERVICE CMNT-IMP: NORMAL

## 2025-02-05 LAB
BACTERIA SPEC CULT: NORMAL
BACTERIA SPEC CULT: NORMAL
SERVICE CMNT-IMP: NORMAL
SERVICE CMNT-IMP: NORMAL

## 2025-02-14 PROBLEM — L30.9 STOMA DERMATITIS: Status: RESOLVED | Noted: 2023-08-04 | Resolved: 2025-02-14

## 2025-02-27 PROBLEM — L03.90 CELLULITIS: Status: ACTIVE | Noted: 2025-02-27

## 2025-02-27 NOTE — ED PROVIDER NOTES
[facility] DEPARTMENT  EMERGENCY DEPARTMENT ENCOUNTER      Pt Name: Filipe Real  MRN: [id number]  Birthdate 1950  Date of evaluation: 2/27/2025  Provider: Kate Edgar DO    CHIEF COMPLAINT       Chief Complaint   Patient presents with    Skin Problem         HISTORY OF PRESENT ILLNESS   (Location/Symptom, Timing/Onset, Context/Setting, Quality, Duration, Modifying Factors, Severity)  Note limiting factors.   HPI      Review of External Medical Records:     Nursing Notes were reviewed.    REVIEW OF SYSTEMS    (2-9 systems for level 4, 10 or more for level 5)     Review of Systems    Except as noted above the remainder of the review of systems was reviewed and negative.       PAST MEDICAL HISTORY     Past Medical History:   Diagnosis Date    Back pain     Backache, unspecified 2/8/2010    Bladder cancer ([facility]) 7/14/2020    He is s/p TURBT on 6/25/2020. No obvious residual tumor, but bruce and induration at the base. Pathology reads invasive papillary urothelial carcinoma, high grade (bladder) and invasive high grade urothelial carcinoma pT2 (bladder base). 07- visit He has high grade muscle invasive bladder cancer. I thought it was grossly resected but pathology reveals high grade disease. He is at high risk o    Cancer ([facility]) 2013    prostate    CHF (congestive heart failure) ([facility]) 1999    Chronic obstructive pulmonary disease ([facility])     Coronary artery disease     3 stents    Coronary atherosclerosis of native coronary artery 2/8/2010    COVID     10/2021, antibody infusion 10/2021    DDD (degenerative disc disease)     Diabetes ([facility])     patient states no longer has diabetes    Essential hypertension, benign 2/8/2010    Heart attack ([facility])     1999    Heart block     95%    Hematuria, unspecified 7/14/2020    He had gross hematuria and clots in his Depends. He notes not pain in the bladder or flank. CT 4/20/20 without renal concerns. Tumor found on cystsocopy on 5/29/2020.    HTN    Procedures  74-year-old male with a history of Parkinson's disease and dementia presents emergency department chief complaint of worsening wound.  He was admitted the end of January for same.  He does have wound care following him at home and they reports that his wound is significantly worse.  He started to have a foul drainage within the last 24 hours.  No fever or chills.  Wound appears more substantial than when he was admitted.  There is some cellulitic changes and foul discharge with culture obtained.  Awaiting remainder of his labs but his white count was 12.8 thus far.  Awaiting x-ray report.  Will discuss with hospitalist as I think he needs to be admitted.  Wound care home health nurse had advised that it look like the wound needed to be debrided.  Will reach out to hospitalist      [facility] Consult for Admission  5:13 PM    ED Room Number: SER08/08  Patient Name and age:  Filipe Real 74 y.o.  male  Working Diagnosis:   1. Cellulitis, unspecified cellulitis site        COVID-19 Suspicion: No  Sepsis present:  No  Reassessment needed: No  Code Status:  Full Code  Readmission: No  Isolation Requirements: no  Recommended Level of Care: med/surg  Department: [facility] - [phone]  Consulting Provider:     Other:           FINAL IMPRESSION      1. Cellulitis, unspecified cellulitis site          DISPOSITION/PLAN   DISPOSITION Decision To Admit 02/27/2025 05:11:49 PM      PATIENT REFERRED TO:  No follow-up provider specified.    DISCHARGE MEDICATIONS:  New Prescriptions    No medications on file         (Please note that portions of this note were completed with a voice recognition program.  Efforts were made to edit the dictations but occasionally words are mis-transcribed.)    Kate Edgar DO (electronically signed)  Emergency Attending Physician / Physician Assistant / Nurse Practitioner             Kate Edgar DO  02/27/25 [id number]

## 2025-02-27 NOTE — ED TRIAGE NOTES
Patient arrived with wife for ulcer on his bottom. Patient was D/C on February 1st for infected ulcer, today went to his PCP for follow up and was sent to ED to be admitted. Wife stated they have wound nurse that comes 2x a week and she reported that ulcer in not improving on medications. No fever at home.

## 2025-02-28 ENCOUNTER — ANESTHESIA EVENT (OUTPATIENT)
Facility: HOSPITAL | Age: 75
End: 2025-02-28
Payer: MEDICARE

## 2025-02-28 PROBLEM — L89.319: Status: ACTIVE | Noted: 2025-02-28

## 2025-02-28 PROBLEM — L89.319 PRESSURE INJURY OF SKIN OF RIGHT BUTTOCK: Status: ACTIVE | Noted: 2025-02-28

## 2025-02-28 NOTE — ED NOTES
Patient resting comfortably without any needs or complaints.   Wife at bedside who requests a pepsi to give patient. Given can of pepsi per request and patient is taking sips.   Call bell in reach, will continue to monitor.

## 2025-02-28 NOTE — ED NOTES
TRANSFER - OUT REPORT:    Verbal report given to Erasmo RN on Filipe Real  being transferred to [facility] for routine progression of patient care       Report consisted of patient's Situation, Background, Assessment and   Recommendations(SBAR).     Information from the following report(s) Nurse Handoff Report, ED Encounter Summary, ED SBAR, Adult Overview, MAR, Recent Results, and Med Rec Status was reviewed with the receiving nurse.    [place] Fall Assessment:    Presents to emergency department  because of falls (Syncope, seizure, or loss of consciousness): No  Age > 70: Yes  Altered Mental Status, Intoxication with alcohol or substance confusion (Disorientation, impaired judgment, poor safety awaremess, or inability to follow instructions): Yes  Impaired Mobility: Ambulates or transfers with assistive devices or assistance; Unable to ambulate or transer.: Yes  Nursing Judgement: Yes          Lines:   Peripheral IV 02/27/25 Posterior;Right Hand (Active)        Opportunity for questions and clarification was provided.      Patient transported with:  Monitor and Tech Awaiting [facility] for transport.

## 2025-02-28 NOTE — PROGRESS NOTES
Pharmacist Note - Vancomycin Dosing    Consult provided for this 74 y.o. male for indication of Cellulitis/wound infection of right buttock/ischium.  Antibiotic regimen(s): vanc + cefepime  Patient on vancomycin PTA? NO   Pregnancy status: N/A    Recent Labs     25  1619 25  0437   WBC 12.8* 14.0*   CREATININE 1.31* 1.41*   BUN 32* 37*     Frequency of BMP: daily through 3/2  Height: 170.2 cm  Weight: 53.5 kg  Est CrCl: 34 ml/min;   Temp (24hrs), Av.9 °F (36.6 °C), Min:97.5 °F (36.4 °C), Max:98.2 °F (36.8 °C)    Cultures:   blood -NGTD    MRSA Swab ordered (if applicable)? N/A    The plan below is expected to result in a target range of AUC/RALF 400-600    Therapy will be initiated with a loading dose of 1000 mg IV x 1 to be followed by a maintenance dose of 750 mg IV every 24 hours for predicted AUC of 444. Plan to check random level with AM labs. Pharmacy to follow patient daily and order levels / make dose adjustments as appropriate.    *Vancomycin has been dosed used Bayesian kinetics software to target an AUC/RALF of 400-600, which provides adequate exposure for an assumed infection due to MRSA with an RALF of 1 or less while reducing the risk of nephrotoxicity as seen with traditional trough based dosing goals.

## 2025-02-28 NOTE — PROGRESS NOTES
[facility] Adult  Hospitalist Group                                                                                          Hospitalist Progress Note  Chelle Morgan PA-C  Answering service: [phone] OR [phone] from in house phone        Date of Service:  2025  NAME:  Filipe Real  :  1950  MRN:  [id number]       Admission Summary:   Filipe Real is a 74 y.o. male with history of chronic right buttock and right ischial wound/cellulitis, history of Parkinson's with dementia, history of CVA, CHF, COPD, CAD status post PCI, type 2 diabetes, hypertension, hypothyroidism, prostate cancer, iron deficiency anemia who presented to [place] ED with concerns of worsening cellulitis and wound of his right buttock/ischium on 2025. Patient is known to medicine service and was admitted to hospital from  to  for concerns of infected aforementioned wounds.  He was subsequently discharged and Keflex. History from patient is limited per chart review shows he was seen by his PCP () for follow-up and referred back to ED for concerns of recurrent infection.  Provided limited history but able to answer simple questions verbally.     The patient denied any fever, chills, chest or abdominal pain, nausea, vomiting, cough, congestion, recent illness, palpitations, or dysuria.  Remarkable vitals on ER Presentation: Vital signs stable  Labs Remarkable for: WBC 12.8, sodium 149, CR 1.31, ALB 2.0  ER Images: X-ray hip and right pelvis showed no acute process  ER Rx: Vancomycin  Interval history / Subjective:   Patient seen and examined on rounds.  Wife at bedside.  Patient able to answer simple questions.  Patient denied any pain and nodded yes when asked if he was doing well.  Wound care at bedside at time of evaluation and patient will withdraw to painful stimuli and wound care.  Started discussions about skilled nursing facility with wife at bedside due to multiple  VS reviewed since prior progress note. Most recent are:  Vitals:    02/28/25 1400   BP:    Pulse: 78   Resp:    Temp:    SpO2:          Intake/Output Summary (Last 24 hours) at 2/28/2025 1451  Last data filed at 2/28/2025 0100  Gross per 24 hour   Intake 250 ml   Output 200 ml   Net 50 ml        Physical Examination:     I had a face to face encounter with this patient and independently examined them on 2/28/2025 as outlined below:    General: Elderly, thin  male, cooperative and somewhat interactive with assessment.  HEENT: EOMI, moist mucus membranes  Neck: Trachea midline  Chest: Coarse breath sounds, cleared with cough  CVS: RRR, no murmurs noted  Abd: Soft, non-tender, non-distended, +bowel sounds  : Urostomy to right side of abdomen, urine is clear and yellow  Ext: Moves all extremities, no edema  Neuro/Psych: Pleasant mood and affect, difficult to assess musculoskeletal strength and sensory.  Alert.  Difficult to assess orientation. Nods to questions.  Pulses: Palpable  Skin: Wound dressing clean, dry and intact. Wound pictured (2/27).        Data Review:    Review and/or order of clinical lab test  Review and/or order of tests in the radiology section of CPT  Review and/or order of tests in the medicine section of CPT    I have personally and independently reviewed all pertinent labs, diagnostic studies, imaging, and have provided independent interpretation of the same.     Labs:     Recent Labs     02/27/25  1619 02/28/25 0437   WBC 12.8* 14.0*   HGB 14.8 14.2   HCT 48.2 46.4    271     Recent Labs     02/27/25  1619 02/28/25 0437   * 143   K 4.8 4.4   * 110*   CO2 22 25   BUN 32* 37*     Recent Labs     02/27/25  1619 02/28/25  0437   ALT <6* 10*   GLOB 3.3 3.1     Notes reviewed from all clinical/nonclinical/nursing services involved in patient's clinical care. Care coordination discussions were held with appropriate clinical/nonclinical/ nursing providers based on care  650 mg Rectal Q6H PRN    0.9 % sodium chloride infusion   IntraVENous Continuous    enoxaparin (LOVENOX) injection 40 mg  40 mg SubCUTAneous Daily    Vancomycin - Pharmacy to Dose  1 each Other RX Placeholder    vancomycin (VANCOCIN) 750 mg in sodium chloride 0.9 % 250 mL IVPB ([id number])  750 mg IntraVENous Q24H    [START ON 3/1/2025] Vancomycin Random Level 2/29@0600 :)  1 each Other Once    balsum peru-castor oil (VENELEX) ointment   Topical Q12H    metroNIDAZOLE (FLAGYL) 500 mg in 0.9% NaCl 100 mL IVPB premix  500 mg IntraVENous Q8H     ______________________________________________________________________  EXPECTED LENGTH OF STAY: Unable to retrieve estimated LOS  ACTUAL LENGTH OF STAY:          1                 Chelle Morgan PA-C

## 2025-02-28 NOTE — CONSULTS
Comprehensive Nutrition Assessment    Type and Reason for Visit: Initial, Consult, Wound    Nutrition Recommendations/Plan:   Continue Regular diet  Teddy BID, Ensure Plus TID  Consider addition of MVI, Vit C, and Zn    Obtain updated weight  Please record %PO intakes in I/Os    Psych consult - wife curious if decrease in PO intake is from depression  Consider appetite stimulant      Malnutrition Assessment:  Malnutrition Status:  Severe malnutrition (02/28/25 1423)    Context:  Chronic Illness     Findings of the 6 clinical characteristics of malnutrition:  Energy Intake:  75% or less estimated energy requirements for 1 month or longer  Weight Loss:  Greater than 10% over 6 months     Body Fat Loss:  Severe body fat loss Orbital, Buccal region   Muscle Mass Loss:  Severe muscle mass loss Temples (temporalis), Clavicles (pectoralis & deltoids)  Fluid Accumulation:  No fluid accumulation     Strength:  Not Performed     Nutrition Assessment:    73 yo male admitted for cellulitis, presented with concerns of worsening cellulitis and wound of R buttock/ischium. 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   Recent admission from 1/30-2/1 for concerns of infected wounds, was d/c on Keflex. RD familiar with pt from that admission.    2/28: RD consult for wounds and poor intake/appetite. WOCN following for unstageable PI to ischium. Spoke with pt's wife at bedside, she reports a decrease in his overall status since a UTI in early Dec. Reports decreased PO intake, he used to eat very well. Now his intake is variable, mentions that he will eat something he enjoys one day and then refuse it the next (example she used was [employer]). She is able to get him to drink 1-3 Wayne Breakfast drinks depending on his mood that day. Usually offers him small portions of the meal she makes and presents a      Recent Labs     02/28/25  0402   POCGLU 116     No results found for: \"LABA1C\", \"EAG\", \"[id number]\"    No results found for: \"TRIG\"    Last BM:      Wounds:   Wound Type: Pressure Injury, Unstageable    Current Nutrition Therapies:  Diet: Regular  Supplements: none  Meal Intake:   Patient Vitals for the past 168 hrs:   PO Meals Eaten (%)   02/28/25 0845 26 - 50%     Supplement Intake:  No data found.  Nutrition Support: none    Anthropometric Measures:  Height: 170.2 cm (5' 7\")  Ideal Body Weight (IBW): 148 lbs (67 kg)       Current Body Weight: 53.5 kg (118 lb), 79.7 % IBW. Weight Source: Stated  Current BMI (kg/m2): 18.5        Weight Adjustment For: No Adjustment                 BMI Categories: Underweight (BMI less than 22) age over 65    Wt Readings from Last 10 Encounters:   02/27/25 53.5 kg (118 lb)   01/31/25 53.9 kg (118 lb 13.3 oz)   08/13/24 64.9 kg (143 lb)   07/27/24 65.1 kg (143 lb 8.3 oz)   07/13/24 68.8 kg (151 lb 10.8 oz)   08/04/23 77.1 kg (170 lb)   01/24/23 77.1 kg (170 lb)   07/29/22 68.5 kg (151 lb)   04/29/22 64 kg (141 lb)   01/25/22 65.4 kg (144 lb 3.2 oz)     Nutrition Diagnosis:   Increased nutrient needs related to increase demand for energy/nutrients as evidenced by wounds  Severe malnutrition related to cognitive or neurological impairment, inadequate protein-energy intake as evidenced by criteria as identified in malnutrition assessment    Nutrition Interventions:   Food and/or Nutrient Delivery: Continue Current Diet, Start Oral Nutrition Supplement  Nutrition Education/Counseling: No recommendation at this time  Coordination of Nutrition Care: Continue to monitor while inpatient       Goals:     Goals: PO intake 50% or greater, by next RD assessment       Nutrition Monitoring and Evaluation:   Behavioral-Environmental Outcomes: None Identified  Food/Nutrient Intake Outcomes: Progression of Nutrition, Food and Nutrient Intake, Supplement Intake  Physical Signs/Symptoms Outcomes:

## 2025-02-28 NOTE — CONSULTS
Infectious Disease Consult    Today's Date: 2/28/2025   Admit Date: 2/27/2025    Date of Consultation:  February 28, 2025  Reason for consult:buttocks cellulitis  Referring Physician: HAZEL Morgan    HPI:  Patient is a 74 y.o. male with medical history of chronic right buttocks, right ischial wound/cellulitis, Parkinson's with dementia, CHF, COPD, hypertension, CAD, s/p PCI, past CVA, hypothyroidism, iron deficiency anemia, urothelial carcinoma, s/p cystectomy, ileal conduit, and type II DM presented to ER on 2/27 with worsening of right buttock and ischium wound.     Pt was hospitalized between 1/30 to 2/1 for worsening wound. CT of pelvis from 1/30 revealed Right buttock wound with involvement of the ischial bursa but no definite evidence for osteomyelitis at this time. Bilateral grade 3 femoral head AVN. Pt was treated with IV rocephin and vancomycin then discharged home with PO keflex.     In ER, temp was 98.2, wbc 12.8, CRP 8.47, blood cx with no growth. Pt was started on IV cefepime and vancomycin.     During visit, pt's spouse informed me that the wound appearance has been worse. No fever, chills, nausea, vomiting, sob, sanchez, chest pain, abd pain, or diarrhea.     ID team was consulted for evaluation and treatment recommendations regarding buttock's cellulitis.    Impression:   Right sacral ulcer  - afebrile, wbc 14    CRP 8.47    Blood cx (2/27) no growth so far    Urothelial carcinoma  S/p urostomy  Parkinson's  dementia; continue with Sinemet CR  Hypertension; continue with Coreg  CAD, s/p PCI; continue with ASA  Iron deficient anemia; continue with iron supplement  Hypothyroidism; continue with synthroid  - management per primary team    Plan:     - continue with IV cefepime, flagyl, and vancomycin    Pharmacy to dose per creatinine clearance.     General surgery team was consulted for debridement of wound    Please send anaerobe, aerobe, and fungal cx in the event of surgical intervention    Appreciate  SURGERY  2004    BACK SURGERY  2002    CARDIAC CATHETERIZATION  1999,2008    stents x3    CATARACT REMOVAL      with lens implant    CIRCUMCISION      COLONOSCOPY N/A 4/12/2018    COLONOSCOPY performed by Alex العراقي MD at [facility] ENDOSCOPY    IR EMBOLIZATION HEMORRHAGE  9/13/2021    IR EMBOLIZATION HEMORRHAGE 9/13/2021 [facility] RAD ANGIO IR    IR EMBOLIZATION HEMORRHAGE  9/13/2021    IR GUIDED NEPHROSTOMY CATH EXCHANGE  9/21/2021    IR GUIDED NEPHROSTOMY CATH PLACEMENT LEFT  9/7/2021    IR NEPHROSTOMY PERCUTANEOUS LEFT 9/7/2021 [facility] RAD ANGIO IR    IR GUIDED NEPHROSTOMY CATH PLACEMENT LEFT  9/14/2021    IR NEPHROSTOMY PERCUTANEOUS LEFT 9/14/2021 [facility] RAD ANGIO IR    IR GUIDED NEPHROSTOMY CATH PLACEMENT LEFT  9/14/2021    IR GUIDED NEPHROSTOMY CATH PLACEMENT LEFT  9/7/2021    IR REMOVAL NEPHROSTOMY TUBE W FLUORO  9/13/2021    NEUROLOGICAL SURGERY      implant neuro stimulator in back for neuropathy in feet. Pt no longer has this, it was removed.    NEUROLOGICAL SURGERY  1996    [facility]    PROSTATE SURGERY  10/18/2013    PELVIC LYMPH NODE DISSECTION     PROSTATE SURGERY  08/24/2020    PELVIC LYMPH NODE DISSECTION     PROSTATECTOMY  10/18/2013    TONSILLECTOMY      as young child    UROLOGICAL SURGERY  08/24/2020    ROBOTIC CYSTECTOMY    UROLOGICAL SURGERY  08/24/2020    ILEAL CONDUIT URINARY DIVERSION     UROLOGICAL SURGERY  06/25/2020    TURBT    UROLOGICAL SURGERY  06/25/2020    TURBT    UROLOGICAL SURGERY  12/07/2021    Left laparoscopic radical nephrectomy    UROLOGICAL SURGERY  08/26/2021    URETHRAL CATHERIZATION     UROLOGICAL SURGERY  08/26/2021    LOOPOGRAM    UROLOGICAL SURGERY  08/26/2021    LOOPSCOPY     UROLOGICAL SURGERY  05/29/2020    CYSTOSCOPY       Prior to Admission medications    Medication Sig Start Date End Date Taking? Authorizing Provider   budesonide (PULMICORT) 0.5 MG/2ML nebulizer suspension Take 2 mLs by nebulization 2 times daily   Yes Provider, MD Ilya   formoterol (PERFOROMIST) 20  7/10/2021. IV CONTRAST: 100 mL of Isovue-370. ORAL CONTRAST: None TECHNIQUE: Multislice helical CT of the pelvis was performed from the iliac crest to the symphysis pubis following intravenous contrast administration.  Coronal and sagittal reformations were generated.  CT dose reduction was achieved through use of a standardized protocol tailored for this examination and automatic exposure control for dose modulation.  FINDINGS: Bowel: Partially imaged, within normal limits. Large amount of stool in the rectal vault. Reproductive organs: Within normal limits. Soft tissues: No pelvic mass or lymphadenopathy. Fluid: No ascites or drainable fluid collection Bones: No fracture or aggressive bone lesion. Bilateral femoral head AVN without collapse. Miscellaneous: Soft tissue ulcer extends as a triangular macrolobulated 2.6 cm structure to the right ischium. There is no destruction of the ischium. Posterior fusion of the lumbar spine, incompletely evaluated.     Right buttock wound with involvement of the ischial bursa but no definite evidence for osteomyelitis at this time. Bilateral grade 3 femoral head AVN Electronically signed by Ashlee Willett      Thank you for the opportunity to participate in the care of this patient. Please contact with questions or concerns.     The above plan of care that has been discussed and agreed upon by Dr. Olmstead.    Signed By: BERNICE Rodriguez - NP     February 28, 2025      A total time of 60 minutes was spent on today's encounter.  Greater than 50% of the time was spent on the following:  Preparing for visit and chart review.  Obtaining and/or reviewing separately obtained history  Performing a medically appropriate exam and/or evaluation  Counseling and educating a patient/family/caregiver as noted above  Referring and communicating with other professionals (not separately reported)  Independently interpreting results (not separately reported) and communicating results to the

## 2025-02-28 NOTE — WOUND CARE
Wound Care Note:     New consult placed by physician request for right buttock/ischial; seen with HAZEL Vitale she will request debridement.      Chart shows:  Admitted for cellulitis with a history of back pain, bladder cancer, prostate cancer, CHF, COPD, CAD, DDD, DM, essential HTN, MI, heart block, hematuria, HTN, HLD, short term memory loss, Lumbago, neurogenic dysfunction of bladder, osteoarthrosis, seizures, stroke, thyroid disease, urgency of urination    WBC = 14.0 on 2/28/25  Admitted from home    Assessment:   Patient is alert, follows commands, incontinent with some assistance needed in repositioning.    Bed: Rossiter  Patient wearing briefs for incontinence    Diet: Adult diet regular  Patient moved away while cleaning wound.    Bilateral heels and buttocks skin intact and without erythema.    1. POA unstageable right ischial wound measures 3 cm x 3.3 cm x 1 cm, wound bed  covered with black slough, wound edges are open, jesús-wound with erythema, foul smell.  They have been using Santyl daily.  Vashe moist to dry dressing applied.      2.  POA sacral with a few superficially crusted wounds and several small erythema.  Venelex ointment to be ordered.      3.  POA erythema to spine.  Clear sacral dressings applied x 2.    4.  POA Urostomy- appliance is managed by wife, she has her [place] supplies.  Staff is asking for the connector so it can be connected to givens bag.  Will obtain one from our office.  She will advised wound care if there are any problems with urostomy.    Spoke with HAZEL Corbett, wound care orders obtained.    Patient repositioned on left side.  Patient working with PT.     Recommendations:    Right ischial- Debridement by general surgery- Daily VASHE moist to dry dressings until surgery.    Sacral and bilateral heels- Every 12 hours liberally apply Venelex ointment.      Skin Care & Pressure Prevention:  Minimize layers of linen/pads  under patient to optimize support surface.    Turn/reposition approximately every 2 hours and offload heels.  Manage incontinence / promote continence   Nourishing Skin Cream to dry skin, minimize use of briefs when able    Discussed above plan with patient & ASIA Castaneda    Transition of Care: Plan to follow as needed while admitted to hospital.    Maryjo \"Oriana\" OBEY Fernando, RN, CWON  Certified Wound and Ostomy Nurse  office [phone]  Best way to contact me is through [employer]

## 2025-02-28 NOTE — CONSULTS
[facility] at [facility]     General Surgery Consult    Subjective:      Surgical consultation was called on Filipe Real who is a 74 y.o. male Admitted to [facility] Hospitalist service  with PMHx signifiant for parkinson's Dz with Dementia, prostate and bladder CA with urostomy in place, hypothyroid, CAD with remote Hx MI, CVA, HTN, Malnutrition, chronic right hip wound        History of Presenting Illness:   Filipe Real is a 74 y.o. male with history of chronic right buttock and right ischial wound/cellulitis, history of Parkinson's with dementia, history of CVA, CHF, COPD, CAD status post PCI, type 2 diabetes, hypertension, hypothyroidism, prostate cancer, iron deficiency anemia who presented to  ED with concerns of worsening cellulitis and wound of his right buttock/ischium.  Patient is known to medicine service and was admitted to hospital from January 30 to February 1 for concerns of infected aforementioned wound.  He was subsequently discharged and Keflex with daily wound care of Vashe and packing.  His wife has been doing this daily and wound care by nursing BIW--wife tells me wound is looking much worse than last month     Wife tells me that pt does not eat much, limited appetite, has lost a lot of weight with medical issues .     Pt is incontinent and wears diapers       History from patient is limited as pt is not interactive and does not follow commands      No fever, chills, chest or abdominal pain, nausea, vomiting, cough, congestion, recent illness, palpitations, or dysuria.  Remarkable vitals on ER Presentation: Vital signs stable    ER Images: X-ray hip and right pelvis showed no acute process  ER Rx: Vancomycin        Past Medical History:   Diagnosis Date    Back pain     Backache, unspecified 2/8/2010    Bladder cancer (HCC) 7/14/2020    He is s/p TURBT on 6/25/2020. No obvious residual tumor, but bruce and induration at the base. Pathology reads invasive papillary  HIP    0 Result Notes  Details    Reading Physician Reading Date Result Priority   Mino Saez MD  [phone] 2/27/2025      Narrative & Impression  EXAM: XR HIP 2-3 VW W PELVIS RIGHT     INDICATION: please make at least two views: has ulcer over right hip, looking  for osteomyelitis.     COMPARISON: None.     FINDINGS: AP view of the pelvis and a frogleg lateral view of the right hip  demonstrate no fracture, dislocation or other acute abnormality. Lumbosacral  fusion hardware.     IMPRESSION:  No acute abnormality.     IMAGING:   XR HIP 2-3 VW W PELVIS RIGHT   Final Result   No acute abnormality.      Electronically signed by Mino Saez           ECG/ECHO:  [unfilled]     Assessment:     74 y.o. male who presents with chronic, necrotic right ischial ulcer- worsening despite daily wound care  Leukocytosis    Parkinsons with dementia  Malnutrition   Prostate and bladder CA with urostomy in place            Plan:   Pt was seen and evaluated with wound care, please see the pics from today   I am unable to debride at the bedside and pt likely requires surgical debridement   Diet:NPO   Antibiotics: vancomycin and Cefepime   Pt may benefit from PEG placement if family would like to pursue nutritional support options, defer to primary team and GI.    Further plan per Dr. Hannon pending her evaluation     Winifred Pulido PA-C  [facility]  [phone]    Signed By: HAZEL Rico     February 28, 2025

## 2025-02-28 NOTE — PLAN OF CARE
8  AM-PAC Inpatient T-Scale Score : 28.52     Cutoff score <=171,2,3 had higher odds of discharging home with home health or need of SNF/IPR.    1. Jolynn Diaz, Briana Modi, Sergio Brar, Edilma Morfin, Shane Chin, Akash Diaz.  Validity of the AM-PAC “6-Clicks” Inpatient Daily Activity and Basic Mobility Short Forms. Physical Therapy Mar 2014, [phone]; DOI: 10.2522/ptj.81546237  2. Jaspreet ABBASI, Dayan PERRY, Stevenson PERRY, Lázaro PERRY. Association of AM-PAC \"6-Clicks\" Basic Mobility and Daily Activity Scores With Discharge Destination. Phys Ther. 2021 Apr 4;101(4):zssg058. doi: 10.1093/ptj/xiry689. PMID: 58674883.  3. Oscar PERRY, Jordan D, Claudia S, Garret K, Rox S. Activity Measure for Post-Acute Care \"6-Clicks\" Basic Mobility Scores Predict Discharge Destination After Acute Care Hospitalization in Select Patient Groups: A Retrospective, Observational Study. Arch Rehabil Res Clin Transl. 2022 Jul 16;4(3):117773. doi: 10.1016/j.arrct.2022.383156. PMID: 24521955; PMCID: NOQ4881157.  4. Emily REZA, Lupe S, Catherine VARMA, Alice P. AM-PAC Short Forms Manual 4.0. Revised 2/2020.                                                                                                                                                                                                                               Pain Rating:  Pain with sacral debridement at bedside with wound care/ surgeon     Pain Intervention(s):   nursing notified    Activity Tolerance:   Good    After treatment:   Patient left in no apparent distress sitting up in chair, Call bell within reach, Bed/ chair alarm activated, and Caregiver / family present    COMMUNICATION/EDUCATION:   The patient's plan of care was discussed with: occupational therapist, registered nurse, and     Patient Education  Education Given To: Patient;Family  Education Provided: Transfer Training;Role of Therapy;Plan of Care;Mobility Training;Equipment;Family  Education;Fall Prevention Strategies  Education Provided Comments: family education re use of gait belt, patient's hand placement for safety during stand pivot transfers, discussed home equipment and possiblity of transitioning towards use of lift equipment as able pending CM ability to obtain for family  Education Method: Verbal;Demonstration  Barriers to Learning: Other (Comment) (patient's wife demonstrates good understanding; patient demonstrates limited understanding 2/2 impaired cognition)  Education Outcome: Verbalized understanding;Demonstrated understanding;Continued education needed    Thank you for this referral.  Lauren Oquendo, PT, DPT  Minutes: 43      Physical Therapy Evaluation Charge Determination   History Examination Presentation Decision-Making   MEDIUM  Complexity : 1-2 comorbidities / personal factors will impact the outcome/ POC  LOW Complexity : 1-2 Standardized tests and measures addressing body structure, function, activity limitation and / or participation in recreation  LOW Complexity : Stable, uncomplicated  AM-PAC  HIGH    Based on the above components, the patient evaluation is determined to be of the following complexity level: Low

## 2025-02-28 NOTE — PLAN OF CARE
Barthel Index:    Barthel Index Scale  Feeding: Needs help, i.e. for cutting  Bathing: Cannot perform activity  Grooming: Cannot perform activity  Dressing: Cannot perform activity  Bowel Control: Cannot perform activity  Bladder Control: Cannot perform activity  Toilet Transfers: Cannot perform activity  Chair/Bed Trannsfers: Cannot perform activity  Ambulation: Cannot perform activity  Stairs: Cannot perform activity  Total Barthel Index Score: 5       The Barthel ADL Index: Guidelines  1. The index should be used as a record of what a patient does, not as a record of what a patient could do.  2. The main aim is to establish degree of independence from any help, physical or verbal, however minor and for whatever reason.  3. The need for supervision renders the patient not independent.  4. A patient's performance should be established using the best available evidence. Asking the patient, friends/relatives and nurses are the usual sources, but direct observation and common sense are also important. However direct testing is not needed.  5. Usually the patient's performance over the preceding 24-48 hours is important, but occasionally longer periods will be relevant.  6. Middle categories imply that the patient supplies over 50 per cent of the effort.  7. Use of aids to be independent is allowed.    Score Interpretation (from Rosemary 1997)    Independent   60-79 Minimally independent   40-59 Partially dependent   20-39 Very dependent   <20 Totally dependent     -Jabari Gutierres., Barthel, D.W. (1965). Functional evaluation: the Barthel Index. Md State Med J (142.  -ROMIE Riley, PRATIK Monge (1997). The Barthel activities of daily living index: self-reporting versus actual performance in the old (> or = 75 years). Journal of American Geriatric Society 45(7), 832-836.   -WILLIAM Mccracken, PASHA Charlton., GALINA Palacio., Elias, A.J. (1999). Measuring the change in disability after inpatient

## 2025-02-28 NOTE — H&P
Automatic Reconciliation, Ar   sodium bicarbonate 650 MG tablet Take by mouth 2 times daily   Yes Automatic Reconciliation, Ar   traZODone (DESYREL) 100 MG tablet Take 1 tablet by mouth nightly   Yes Automatic Reconciliation, Ar   lactobacillus (CULTURELLE) capsule Take 1 capsule by mouth daily (with breakfast) 2/2/25 3/4/25  Chelle Morgan PA-C   ascorbic acid (VITAMIN C) 500 MG tablet Take 1 tablet by mouth daily 2/2/25 3/4/25  Chelle Morgan PA-ANGY   Multiple Vitamin (MULTIVITAMIN) TABS tablet Take 1 tablet by mouth daily 2/2/25 3/4/25  Chelle Morgan PA-C   zinc sulfate (ZINCATE) 220 (50 Zn)  mg capsule - elemental zinc Take 1 capsule by mouth daily 2/2/25 3/4/25  Chelle Morgan PA-C     Allergies   Allergen Reactions    Lisinopril      Other reaction(s): Unknown (comments)      Family History   Adopted: Yes   Problem Relation Age of Onset    No Known Problems Father     No Known Problems Mother         SOCIAL HISTORY:  Patient resides:  Independently    Assisted Living    SNF    With family care x      Smoking history:   None x   Former    Chronic      Alcohol history:   None x   Social    Chronic      Ambulates:   Independently x   w/cane    w/walker    w/wc    CODE STATUS:  DNR    Full x   Other      Objective:     Physical Exam:     /63   Pulse 80   Temp 98.2 °F (36.8 °C)   Resp 16   Ht 1.702 m (5' 7\")   Wt 53.5 kg (118 lb)   SpO2 95%   BMI 18.48 kg/m²         General:  Alert, cooperative, no distress, appears stated age.   Head:  Normocephalic, without obvious abnormality, atraumatic.   Eyes:  Conjunctivae/corneas clear. PERRL, EOMs intact.   Nose: Nares normal. Septum midline. Mucosa normal.        Neck: Supple, symmetrical, trachea midline.       Lungs:   Clear to auscultation bilaterally.   Chest wall:  No tenderness or deformity.   Heart:  Regular rate and rhythm, S1, S2 normal   Abdomen:   Soft, non-tender. Bowel sounds normal. No masses,  No organomegaly.   Extremities:  Extremities normal, atraumatic, no cyanosis or edema.   Pulses: 2+ and symmetric all extremities.   Skin: Skin color, texture, turgor normal. No rashes or lesions   Neurologic: CNII-XII grossly intact.      Data Review:     Recent Days:  Recent Labs     02/27/25  1619   WBC 12.8*   HGB 14.8   HCT 48.2        Recent Labs     02/27/25  1619   *   K 4.8   *   CO2 22   BUN 32*   ALT <6*     No results for input(s): \"PH\", \"PCO2\", \"PO2\", \"HCO3\", \"FIO2\" in the last 72 hours.    24 Hour Results:  Recent Results (from the past 24 hour(s))   Blood Culture 1    Collection Time: 02/27/25  4:19 PM    Specimen: Blood   Result Value Ref Range    Special Requests NO SPECIAL REQUESTS      Culture NO GROWTH <24 HRS     CBC with Auto Differential    Collection Time: 02/27/25  4:19 PM   Result Value Ref Range    WBC 12.8 (H) 4.1 - 11.1 K/uL    RBC 5.39 4.10 - 5.70 M/uL    Hemoglobin 14.8 12.1 - 17.0 g/dL    Hematocrit 48.2 36.6 - 50.3 %    MCV 89.4 80.0 - 99.0 FL    MCH 27.5 26.0 - 34.0 PG    MCHC 30.7 30.0 - 36.5 g/dL    RDW 16.1 (H) 11.5 - 14.5 %    Platelets 244 150 - 400 K/uL    MPV 10.6 8.9 - 12.9 FL    Nucleated RBCs 0.0 0  WBC    nRBC 0.00 0.00 - 0.01 K/uL    Neutrophils % 79.7 (H) 32.0 - 75.0 %    Lymphocytes % 11.9 (L) 12.0 - 49.0 %    Monocytes % 7.0 5.0 - 13.0 %    Eosinophils % 0.8 0.0 - 7.0 %    Basophils % 0.3 0.0 - 1.0 %    Immature Granulocytes % 0.3 0.0 - 0.5 %    Neutrophils Absolute 10.17 (H) 1.80 - 8.00 K/UL    Lymphocytes Absolute 1.52 0.80 - 3.50 K/UL    Monocytes Absolute 0.90 0.00 - 1.00 K/UL    Eosinophils Absolute 0.10 0.00 - 0.40 K/UL    Basophils Absolute 0.04 0.00 - 0.10 K/UL    Immature Granulocytes Absolute 0.04 0.00 - 0.04 K/UL    Differential Type AUTOMATED     Comprehensive Metabolic Panel    Collection Time: 02/27/25  4:19 PM   Result Value Ref Range    Sodium 149 (H) 136 - 145 mmol/L    Potassium 4.8 3.5 - 5.1 mmol/L    Chloride 115 (H) 97 - 108 mmol/L    CO2 22 21 - 32  mmol/L    Anion Gap 12 2 - 12 mmol/L    Glucose 91 65 - 100 mg/dL    BUN 32 (H) 6 - 20 MG/DL    Creatinine 1.31 (H) 0.70 - 1.30 MG/DL    BUN/Creatinine Ratio 24 (H) 12 - 20      Est, Glom Filt Rate 56 (L) >60 ml/min/1.73m2    Calcium 7.7 (L) 8.5 - 10.1 MG/DL    Total Bilirubin 0.5 0.2 - 1.0 MG/DL    ALT <6 (L) 12 - 78 U/L    AST 25 15 - 37 U/L    Alk Phosphatase 74 45 - 117 U/L    Total Protein 5.3 (L) 6.4 - 8.2 g/dL    Albumin 2.0 (L) 3.5 - 5.0 g/dL    Globulin 3.3 2.0 - 4.0 g/dL    Albumin/Globulin Ratio 0.6 (L) 1.1 - 2.2           Imaging:     Assessment:     Filipe Real is a 74 y.o. male with history of chronic right buttock and right ischial wound/cellulitis, history of Parkinson's with dementia, history of CVA, CHF, COPD, CAD status post PCI, type 2 diabetes, hypertension, hypothyroidism, prostate cancer, iron deficiency anemia who is admitted for right buttock/ischium cellulitis/wound infection.       Plan:       Cellulitis/wound infection of right buttock/ischium   -No acute process on x-ray of hip/pelvis  -Check ESR, CRP  -Continue on Vanco and cefepime  -Wound care consult in a.m.  -Consider ID consult  -Follow blood cultures    Protein calorie malnutrition  -Likely affecting wound healing  -Nutrition consult in a.m.    Bilateral grade 3 femoral head AVN   -Consider Ortho input in a.m.    Hx Stroke  Hx Seizure  -Continue home medications     Hx CAD with Stents  Hx CHF  -Continue home medications     Hx COPD  -Continue home medications (nebs)     Hx bladder and prostate cancer  -Has urostomy        FEN/GI -  ns@75ml/hr  Activity - as tolerated  DVT prophylaxis - HEPARIN  GI prophylaxis -  none indicated  Disposition - home    CODE STATUS:   full code       Signed By: Elva Wilson MD     February 28, 2025

## 2025-02-28 NOTE — ED NOTES
Verbal shift change report given to ROBERTO Tobar (oncoming nurse) by ROBERTO Hayes (offgoing nurse). Report included the following information Nurse Handoff Report, ED Encounter Summary, ED SBAR, MAR, and Recent Results.      Patient and wife updated on plan of care: Admission to [facility] - awaiting room assignment by nursing supervisor. Understanding verbalized.     Patient remains in stretcher with side rails up x2, stretcher is in low and locked position, call bell within reach, family is at bedside. Patient remains on NIBP Q 1 hr with continuous SPO2 monitoring. No further needs/wants expressed by patient or family at this time.

## 2025-02-28 NOTE — CARE COORDINATION
Care Management Initial Assessment       RUR:17%  Readmission? Yes - pt was admitted to [facility] four weeks ago with Acute Cystitis  1st IM letter given? Yes -2/26/25  1st  letter given: No    Anticipate d/c with resumption of [facility] SN/PT/OT, HH Agency added to AVS;    Pt has a Urostomy    Janina lift ordered via [employer] website    BLS Transport, pt has ramp    -Cellulitis/wound infection to right buttock/ischium  -Protein calorie malnutrition  -history of CVA, Seizure, CHF, CAD with stents, COPD  -history of bladder and prostate cancer, has urostomy in place  -General surgery, Psychiatry, PT/OT, ID wound care consulted    CM met with patient/spouse at bedside to introduce self and role. Spouse stated pt resides with her and their Adult son who is disabled. Spouse and Son provide pt with total assistance at home alone with [facility]. Spouse stated that she wants pt to return home with HH resumption and declined SNF placement.    ADLs: total assistance  DME: RW, W/c, Tub Transfer Bench, BSC, Toilet Raiser, cane, nebulizer machine  PCP follow up: Mian Alva, f/u up as directed on AVS, last seen 2/28/25  Previous Home Health: [facility]   Previous Skilled Nursing Facility: denies  Previous Inpatient Rehab: denies  Insurance verified: Medicare A & B and [employer] supplemental  Pharmacy: [facility], no copay concerns  Emergency Contact: SpouseEdilma [phone]     CM will follow patient progress and assist as needed with ROMAINE plan.       02/28/25 8852   Service Assessment   Patient Orientation Unable to Assess   Cognition Dementia / Early Alzheimer's   History Provided By Spouse   Primary Caregiver Spouse  (and adult son who resides with patient)   Support Systems Spouse/Significant Other;Children   Patient's Healthcare Decision Maker is: Legal Next of Kin   PCP Verified by CM Yes   Last Visit to PCP Within last 3 months   Prior Functional  Level Assistance with the following:;Bathing;Toileting;Dressing;Feeding;Cooking;Housework;Shopping;Mobility   Current Functional Level Assistance with the following:;Bathing;Dressing;Toileting;Feeding;Cooking;Housework;Shopping;Mobility   Can patient return to prior living arrangement Yes   Ability to make needs known: Poor   Family able to assist with home care needs: Yes   Financial Resources Medicare   Social/Functional History   Lives With Spouse;Son   Type of Home House   Home Layout Able to Live on Main level with bedroom/bathroom   Home Access Ramped entrance   Bathroom Equipment Tub transfer bench;3-in-1 Commode;Toilet raiser   Home Equipment Cane;Wheelchair - Manual;Walker - Rolling  (nebulizer)   Receives Help From Family   Occupation Retired   Discharge Planning   Type of Residence House   Living Arrangements Spouse/Significant Other;Children   Current Services Prior To Admission Home Care   Potential Assistance Needed Home Care   DME Ordered? Other (comment)  (Janina lift)   Potential Assistance Purchasing Medications No   Type of Home Care Services OT;PT;Nursing Services   Patient expects to be discharged to: House   Services At/After Discharge   Transition of Care Consult (CM Consult) Home Health;Discharge Planning   Services At/After Discharge Home Health   Mode of Transport at Discharge BLS   Condition of Participation: Discharge Planning   The Plan for Transition of Care is related to the following treatment goals: HH   The Patient and/or Patient Representative was provided with a Choice of Provider? Patient Representative   Name of the Patient Representative who was provided with the Choice of Provider and agrees with the Discharge Plan?  Spouse, Edilma Real   The Patient and/Or Patient Representative agree with the Discharge Plan? Yes   Freedom of Choice list was provided with basic dialogue that supports the patient's individualized plan of care/goals, treatment preferences, and shares the

## 2025-03-01 ENCOUNTER — ANESTHESIA (OUTPATIENT)
Facility: HOSPITAL | Age: 75
End: 2025-03-01
Payer: MEDICARE

## 2025-03-01 PROCEDURE — 6360000002 HC RX W HCPCS

## 2025-03-01 PROCEDURE — 2580000003 HC RX 258

## 2025-03-01 PROCEDURE — P9045 ALBUMIN (HUMAN), 5%, 250 ML: HCPCS

## 2025-03-01 PROCEDURE — 2500000003 HC RX 250 WO HCPCS

## 2025-03-01 RX ORDER — ROCURONIUM BROMIDE 10 MG/ML
INJECTION, SOLUTION INTRAVENOUS
Status: DISCONTINUED | OUTPATIENT
Start: 2025-03-01 | End: 2025-03-01 | Stop reason: SDUPTHER

## 2025-03-01 RX ORDER — PHENYLEPHRINE HCL IN 0.9% NACL 0.4MG/10ML
SYRINGE (ML) INTRAVENOUS
Status: DISCONTINUED | OUTPATIENT
Start: 2025-03-01 | End: 2025-03-01 | Stop reason: SDUPTHER

## 2025-03-01 RX ORDER — PROPOFOL 10 MG/ML
INJECTION, EMULSION INTRAVENOUS
Status: DISCONTINUED | OUTPATIENT
Start: 2025-03-01 | End: 2025-03-01 | Stop reason: SDUPTHER

## 2025-03-01 RX ORDER — SODIUM CHLORIDE, SODIUM LACTATE, POTASSIUM CHLORIDE, CALCIUM CHLORIDE 600; 310; 30; 20 MG/100ML; MG/100ML; MG/100ML; MG/100ML
INJECTION, SOLUTION INTRAVENOUS
Status: DISCONTINUED | OUTPATIENT
Start: 2025-03-01 | End: 2025-03-01 | Stop reason: SDUPTHER

## 2025-03-01 RX ORDER — LIDOCAINE HYDROCHLORIDE 20 MG/ML
INJECTION, SOLUTION EPIDURAL; INFILTRATION; INTRACAUDAL; PERINEURAL
Status: DISCONTINUED | OUTPATIENT
Start: 2025-03-01 | End: 2025-03-01 | Stop reason: SDUPTHER

## 2025-03-01 RX ORDER — EPHEDRINE SULFATE 50 MG/ML
INJECTION INTRAVENOUS
Status: DISCONTINUED | OUTPATIENT
Start: 2025-03-01 | End: 2025-03-01 | Stop reason: SDUPTHER

## 2025-03-01 RX ORDER — ONDANSETRON 2 MG/ML
INJECTION INTRAMUSCULAR; INTRAVENOUS
Status: DISCONTINUED | OUTPATIENT
Start: 2025-03-01 | End: 2025-03-01 | Stop reason: SDUPTHER

## 2025-03-01 RX ORDER — ALBUMIN HUMAN 50 G/1000ML
SOLUTION INTRAVENOUS
Status: DISCONTINUED | OUTPATIENT
Start: 2025-03-01 | End: 2025-03-01 | Stop reason: SDUPTHER

## 2025-03-01 RX ORDER — FENTANYL CITRATE 50 UG/ML
INJECTION, SOLUTION INTRAMUSCULAR; INTRAVENOUS
Status: DISCONTINUED | OUTPATIENT
Start: 2025-03-01 | End: 2025-03-01 | Stop reason: SDUPTHER

## 2025-03-01 RX ORDER — DEXAMETHASONE SODIUM PHOSPHATE 4 MG/ML
INJECTION, SOLUTION INTRA-ARTICULAR; INTRALESIONAL; INTRAMUSCULAR; INTRAVENOUS; SOFT TISSUE
Status: DISCONTINUED | OUTPATIENT
Start: 2025-03-01 | End: 2025-03-01 | Stop reason: SDUPTHER

## 2025-03-01 RX ORDER — SUCCINYLCHOLINE/SOD CL,ISO/PF 200MG/10ML
SYRINGE (ML) INTRAVENOUS
Status: DISCONTINUED | OUTPATIENT
Start: 2025-03-01 | End: 2025-03-01 | Stop reason: SDUPTHER

## 2025-03-01 RX ADMIN — PROPOFOL 100 MG: 10 INJECTION, EMULSION INTRAVENOUS at 08:33

## 2025-03-01 RX ADMIN — Medication 80 MCG: at 09:05

## 2025-03-01 RX ADMIN — Medication 100 MG: at 08:33

## 2025-03-01 RX ADMIN — Medication 120 MCG: at 08:39

## 2025-03-01 RX ADMIN — Medication 120 MCG: at 08:38

## 2025-03-01 RX ADMIN — SODIUM CHLORIDE, SODIUM LACTATE, POTASSIUM CHLORIDE, AND CALCIUM CHLORIDE: 600; 310; 30; 20 INJECTION, SOLUTION INTRAVENOUS at 08:24

## 2025-03-01 RX ADMIN — EPHEDRINE SULFATE 10 MG: 50 INJECTION INTRAVENOUS at 08:41

## 2025-03-01 RX ADMIN — ROCURONIUM BROMIDE 5 MG: 10 INJECTION, SOLUTION INTRAVENOUS at 08:33

## 2025-03-01 RX ADMIN — LIDOCAINE HYDROCHLORIDE 60 MG: 20 INJECTION, SOLUTION EPIDURAL; INFILTRATION; INTRACAUDAL; PERINEURAL at 08:33

## 2025-03-01 RX ADMIN — Medication 80 MCG: at 08:33

## 2025-03-01 RX ADMIN — ONDANSETRON 4 MG: 2 INJECTION, SOLUTION INTRAMUSCULAR; INTRAVENOUS at 09:04

## 2025-03-01 RX ADMIN — FENTANYL CITRATE 25 MCG: 50 INJECTION INTRAMUSCULAR; INTRAVENOUS at 08:33

## 2025-03-01 RX ADMIN — FENTANYL CITRATE 25 MCG: 50 INJECTION INTRAMUSCULAR; INTRAVENOUS at 08:47

## 2025-03-01 RX ADMIN — Medication 80 MCG: at 08:41

## 2025-03-01 RX ADMIN — DEXAMETHASONE SODIUM PHOSPHATE 4 MG: 4 INJECTION INTRA-ARTICULAR; INTRALESIONAL; INTRAMUSCULAR; INTRAVENOUS; SOFT TISSUE at 08:33

## 2025-03-01 RX ADMIN — ALBUMIN (HUMAN) 12.5 G: 12.5 INJECTION, SOLUTION INTRAVENOUS at 09:01

## 2025-03-01 NOTE — BRIEF OP NOTE
Brief Postoperative Note      Patient: Filipe Real  YOB: 1950  MRN: [id number]    Date of Procedure: 3/1/2025    Pre-Op Diagnosis Codes:      * Pressure ulcer of right ischium, unspecified pressure ulcer stage [L89.319]    Post-Op Diagnosis: Same       Procedure(s):  RIGHT HIP WOUND DEBRIDEMENT    Surgeon(s):  Tigre Hannon MD    Assistant:  Surgical Assistant: Kenneth Bonilla    Anesthesia: General    Estimated Blood Loss (mL): Minimal    Complications: None    Specimens:   ID Type Source Tests Collected by Time Destination   A : RIGHT HIP WOUND Tissue Joint, Hip CULTURE, ANAEROBIC, CULTURE, TISSUE (WITH GRAM STAIN) Tigre Hannon MD 3/1/2025 0858        Implants:  * No implants in log *      Drains:   Urostomy Ureterostomy right RLQ (Active)   Stomal Appliance 1 piece 03/01/25 0929   Stoma  Assessment Pink;Moist 03/01/25 0929   Peristomal Assessment Clean;Intact 03/01/25 0929   Mucocutaneous Junction Intact 03/01/25 0929   Collection Container Standard 03/01/25 0929   Securement Method Securing device (Describe) 03/01/25 0929   Treatment Site care 03/01/25 0929   Urine Color Zo 03/01/25 0929   Urine Appearance Sediment 03/01/25 0929   Urine Odor Malodorous 03/01/25 0929   Output (ml) 400 ml 03/01/25 0453       Findings:  Infection Present At Time Of Surgery (PATOS) (choose all levels that have infection present):  Indeterminate. There is necrotic tissue without odor or purulence. No surrounding erythema/cellulitis.   Other Findings: necrotic stage IV ulcer of the right hip   Severe protein calorie malnutrition    Electronically signed by Tigre Hannon MD on 3/1/2025 at 10:20 AM

## 2025-03-01 NOTE — PROGRESS NOTES
[facility]                                                                                          Hospitalist Progress Note  HAZEL Rico  Answering service: [phone] OR [phone] from in house phone        Date of Service:  3/1/2025  NAME:  Filipe Real  :  1950  MRN:  [id number]       Admission Summary:   Filipe Real is a 74 y.o. male with history of chronic right buttock and right ischial wound/cellulitis, history of Parkinson's with dementia, history of CVA, CHF, COPD, CAD status post PCI, type 2 diabetes, hypertension, hypothyroidism, prostate cancer, iron deficiency anemia who presented to [facility] ED with concerns of worsening cellulitis and wound of his right buttock/ischium on 2025. Patient is known to medicine service and was admitted to hospital from  to  for concerns of infected aforementioned wounds.  He was subsequently discharged and Keflex. History from patient is limited per chart review shows he was seen by his PCP () for follow-up and referred back to ED for concerns of recurrent infection.  Provided limited history but able to answer simple questions verbally.     The patient denied any fever, chills, chest or abdominal pain, nausea, vomiting, cough, congestion, recent illness, palpitations, or dysuria.  Remarkable vitals on ER Presentation: Vital signs stable  Labs Remarkable for: WBC 12.8, sodium 149, CR 1.31, ALB 2.0  ER Images: X-ray hip and right pelvis showed no acute process  ER Rx: Vancomycin    Surgical debridement of right buttock wound 3/1/25      Interval history / Subjective:   Patient seen and examined on rounds.  Wife at bedside. She says he ate ok this afternoon after procedure. Asking about appetite stimulant.  Patient able to answer simple questions.  Patient denied any pain and nodded yes when asked if he was doing well.  Wound care at bedside at time of evaluation and patient will  mg Oral Q6H PRN    Or    acetaminophen (TYLENOL) suppository 650 mg  650 mg Rectal Q6H PRN    enoxaparin (LOVENOX) injection 40 mg  40 mg SubCUTAneous Daily    Vancomycin - Pharmacy to Dose  1 each Other RX Placeholder    balsum peru-castor oil (VENELEX) ointment   Topical Q12H    metroNIDAZOLE (FLAGYL) 500 mg in 0.9% NaCl 100 mL IVPB premix  500 mg IntraVENous Q8H    multivitamin 1 tablet  1 tablet Oral Daily    ascorbic acid (VITAMIN C) tablet 500 mg  500 mg Oral Daily    zinc sulfate (ZINCATE) 220 mg capsule - elemental zinc 50 mg  50 mg Oral Daily     ______________________________________________________________________  EXPECTED LENGTH OF STAY: Unable to retrieve estimated LOS  ACTUAL LENGTH OF STAY:          2                 HAZEL Rico

## 2025-03-01 NOTE — PROGRESS NOTES
Pharmacist Note - Vancomycin Dosing  Therapy day 3  Indication: Cellulitis/wound infection of right buttock/ischium   Current regimen: 750 mg IV Q24H    Recent Labs     02/27/25  1619 02/28/25  0437 03/01/25  0547   WBC 12.8* 14.0* 7.2   CREATININE 1.31* 1.41* 1.01   BUN 32* 37* 26*       A random vancomycin level of 13.1 mcg/mL was obtained and from this level, the patient's AUC24 is calculated to be 385 with the current regimen.     Goal target range AUC/RALF 400-500      Plan: Change to 1000 mg IV Q24H. Pharmacy will continue to monitor this patient daily for changes in clinical status and renal function.    *Random vancomycin levels are used to calculate AUC/RALF, this level should not be interpreted as a trough. Vancomycin has been dosed using Bayesian kinetics software to target an AUC24:RALF of 400-600, which provides adequate exposure for as assumed infection due to MRSA with an RALF of 1 or less while reducing the risk of nephrotoxicity as seen with traditional trough based dosing goals.

## 2025-03-01 NOTE — PERIOP NOTE
TRANSFER - IN REPORT:    Verbal report received from ROBERTO Delgado on Filipe Real  being received from 5S for ordered procedure      Report consisted of patient's Situation, Background, Assessment and   Recommendations(SBAR).     Information from the following report(s) Nurse Handoff Report was reviewed with the receiving nurse.    Opportunity for questions and clarification was provided.      Assessment completed upon patient's arrival to unit and care assumed.

## 2025-03-01 NOTE — ANESTHESIA POSTPROCEDURE EVALUATION
Department of Anesthesiology  Postprocedure Note    Patient: Filipe Real  MRN: [id number]  YOB: 1950  Date of evaluation: 3/1/2025    Procedure Summary       Date: 03/01/25 Room / Location: [facility] MAIN OR [address] [place] [address] MAIN OR    Anesthesia Start: 0824 Anesthesia Stop: 0930    Procedure: RIGHT HIP WOUND DEBRIDEMENT (Right: Hip) Diagnosis:       Pressure ulcer of right ischium, unspecified pressure ulcer stage      (Pressure ulcer of right ischium, unspecified pressure ulcer stage [L89.319])    Providers: Tigre Hannon MD Responsible Provider: Meet Dockery MD    Anesthesia Type: General ASA Status: 3            Anesthesia Type: General    Vivian Phase I: Vivian Score: 9    Vivian Phase II:      Anesthesia Post Evaluation    Patient location during evaluation: PACU  Patient participation: complete - patient participated  Level of consciousness: sleepy but conscious and responsive to verbal stimuli  Airway patency: patent  Nausea & Vomiting: no vomiting and no nausea  Cardiovascular status: blood pressure returned to baseline and hemodynamically stable  Respiratory status: acceptable  Hydration status: stable  Pain management: adequate    No notable events documented.

## 2025-03-01 NOTE — PROGRESS NOTES
Hospitalist service note:     Pt off the floor to OR for hip debridement with Dr. Hannon   Will see him post op .  HAZEL Rico

## 2025-03-01 NOTE — PERIOP NOTE
TRANSFER - OUT REPORT:    Verbal report given to RN(name) on Filipe Real  being transferred to (unit) for ordered procedure       Report consisted of patient’s Situation, Background, Assessment and   Recommendations(SBAR).     Time Pre op antibiotic given: flagyl 0930  Anesthesia Stop time: 0930    Information from the following report(s) SBAR, OR Summary, Procedure Summary, Intake/Output, and Recent Results was reviewed with the receiving nurse.    Opportunity for questions and clarification was provided.     Is the patient on 02? No    Is the patient on a monitor? No    Is the nurse transporting with the patient? No    At transfer, are there Patient Belongings with the patient?  If Yes, please note/list:    Surgical Waiting Area notified of patient's transfer from PACU? Yes- wife updated on patient status and return to room

## 2025-03-01 NOTE — BSMART NOTE
[facility]     Section I - Integrated Summary    Reason for consult is: decreased PO intake and possible depression. .    LOS:  2     Presenting problem/Summary:  Pt came to ED 2/27/25 from home per recommendation from PCP c/o ulcer. Pt has HH wound care 2x week who report ulcer not improving. Psychiatry was consulted 2/28/25 d/t concerns decreased PO intake and possible depression.     Liaison met with Pt face to face on medical. Pt was resting in bed free from signs and symptoms of distress. Pt woke up to his name being called. He was lethargic d/t medications from surgery earlier in the day. He was oriented and calm. He spoke in a soft voice and was only able to answer a few assessment questions. He denied anxiety, depression or fear but was to tired to continue assessment. Wife reported Pt has no significant MH Hx, Dx or Tx. She shared Pt had similar Sx (decreased PO intake and depression) 3 years ago; his PCP prescribed medication that improved his appetite, but Pt has been off that medications for the past year. Wife could not recall name of medication. Main concern is decreased PO intake and depressed mood as Pt appears to be withdrawn and talking less. 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 D/t multiple health issues over the years (falls, UTIs, Parkinson Dx) his independence declined and his mental health appears to be suffering as a result. Wife denied HX of BHU admission, SI, HI and AVH. There are no current safety concerns. Liaison provided active listening, educated on psychiatric support during hospital admission and ongoing support visits from liaison team. Wife verbalized understanding. No further MH questions or concerns at this time.     Precipitant Factors are parkinson's with dementia, multiple medical needs. .    The information is given by the patient, spouse/SO, and past medical records.  Current

## 2025-03-01 NOTE — ANESTHESIA PRE PROCEDURE
problems with back surgery 2004, 1/2016, 12/2017 and 2/2018. He had a spinal stimulator which was removed. He has had thi   • Urgency of urination 7/14/2020    He has occasional urgency. He wears depends for a precaution. He does have an Acticuff clamp. He had urgency and episodic incontinence prior to prostate surgery. He does not leak with cough or sneeze. He drinks diet Pepsi all day long and less often coffee.  He has chronic back problems with back surgery 2004, 1/2016, 12/2017 and 2/2018. He had a spinal stimulator which was removed. He has had thi       Past Surgical History:        Procedure Laterality Date   • APPENDECTOMY      age 4   • BACK SURGERY  2004   • BACK SURGERY  2002   • CARDIAC CATHETERIZATION  1999,2008    stents x3   • CATARACT REMOVAL      with lens implant   • CIRCUMCISION     • COLONOSCOPY N/A 4/12/2018    COLONOSCOPY performed by Alex العراقي MD at [facility] END[facility]COPY   • IR EMBOLIZATION HEMORRHAGE  9/13/2021    IR EMBOLIZATION HEMORRHAGE 9/13/2021 [facility] IR   • IR EMBOLIZATION HEMORRHAGE  9/13/2021   • IR GUIDED NEPHROSTOMY CATH EXCHANGE  9/21/2021   • IR GUIDED NEPHROSTOMY CATH PLACEMENT LEFT  9/7/2021    IR NEPHROSTOMY PERCUTANEOUS LEFT 9/7/2021 [facility] IR   • IR GUIDED NEPHROSTOMY CATH PLACEMENT LEFT  9/14/2021    IR NEPHROSTOMY PERCUTANEOUS LEFT 9/14/2021 [facility] IR   • IR GUIDED NEPHROSTOMY CATH PLACEMENT LEFT  9/14/2021   • IR GUIDED NEPHROSTOMY CATH PLACEMENT LEFT  9/7/2021   • IR REMOVAL NEPHROSTOMY TUBE W FLUORO  9/13/2021   • NEUROLOGICAL SURGERY      implant neuro stimulator in back for neuropathy in feet. Pt no longer has this, it was removed.   • NEUROLOGICAL SURGERY  1996    [place] SDH   • PROSTATE SURGERY  10/18/2013    PELVIC LYMPH NODE DISSECTION    • PROSTATE SURGERY  08/24/2020    PELVIC LYMPH NODE DISSECTION    • PROSTATECTOMY  10/18/2013   • TONSILLECTOMY      as young child   • UROLOGICAL SURGERY  08/24/2020    ROBOTIC CYSTECTOMY   • UROLOGICAL

## 2025-03-02 NOTE — PROGRESS NOTES
[facility] Adult  Hospitalist Group                                                                                          Hospitalist Progress Note  BERNICE Lyons NP  Answering service: [phone] OR [phone] from in house phone        Date of Service:  3/2/2025  NAME:  Filipe Real  :  1950  MRN:  [id number]       Admission Summary:   Filipe Real is a 74 y.o. male with history of chronic right buttock and right ischial wound/cellulitis, history of Parkinson's with dementia, history of CVA, CHF, COPD, CAD status post PCI, type 2 diabetes, hypertension, hypothyroidism, prostate cancer, iron deficiency anemia who presented to [place] ED with concerns of worsening cellulitis and wound of his right buttock/ischium on 2025. Patient is known to medicine service and was admitted to hospital from  to  for concerns of infected aforementioned wounds.  He was subsequently discharged and Keflex. History from patient is limited per chart review shows he was seen by his PCP () for follow-up and referred back to ED for concerns of recurrent infection.  Provided limited history but able to answer simple questions verbally.     The patient denied any fever, chills, chest or abdominal pain, nausea, vomiting, cough, congestion, recent illness, palpitations, or dysuria.  Remarkable vitals on ER Presentation: Vital signs stable  Labs Remarkable for: WBC 12.8, sodium 149, CR 1.31, ALB 2.0  ER Images: X-ray hip and right pelvis showed no acute process  ER Rx: Vancomycin    Surgical debridement of right buttock wound 3/1/25      Interval history / Subjective:   Pt started on appetite stimulant yesterday per wife request, will increase to 5 mg bid  SNF conversation started yesterday for wound care as pt has had multiple admissions and not improving at home. Wife has declined SNF placement  Wife at bedside  s/p right hip wound debridement on 3/1  Follow intra op    HOSPITALIST DAILY PROGRESS NOTE    Patient: Kirk Cordero Date: 2024   : 1950 Attending: Kathleen Templeton MD   74 year old male Current Hospital Day: Hospital Day: 6     Subjective  Patient seen and examined this morning.  Resting in bed.  CBI ongoing, urine clear.  He denies any shortness breath, chest pain, abdominal pain currently.  Discussed ongoing IV antibiotics with hopeful voiding trial later today with possible discharge in the morning.  Later notified patient with shortness of breath, chest x-ray reviewed with no acute findings, patient reported symptoms after given Lovenox with some shaking of the hands mildly and stated this was reminiscent of when he had a seizure in the past and received nitro.  Myself, charge RN, primary RN to closely monitor patient and symptoms have resolved with no focal deficits.  Lovenox was discontinued and changed to heparin for tomorrow.    Objective:      Vitals:   Vital Last Value 24 Hour Range   Temperature 98.1 °F (36.7 °C) (24 0523) Temp  Min: 97.5 °F (36.4 °C)  Max: 98.2 °F (36.8 °C)   Pulse 79 (24 0943) Pulse  Min: 59  Max: 85   Respiratory 17 (24 0943) Resp  Min: 16  Max: 19   Non-Invasive  Blood Pressure 122/53 (24 0943) BP  Min: 122/53  Max: 153/74   Pulse Oximetry 98 % (24 0943) SpO2  Min: 95 %  Max: 98 %      Physical Exam:  General Appearance: NAD, appears stated age, lying in bed, pleasant  Head: NC/AT, MMM, good dentition  Eyes: EOMI, conjunctiva/corneas clear  Neck: Supple  Heart: RRR, normal S1 & S2, no murmurs, no JVD  Chest wall: No tenderness  Lungs: CTAB, no crackles, respirations unlabored, on room air  Abdomen: Soft, ND, NT, + BS all quadrants, no masses  : CBI ongoing with cranberry red-colored urine  Extremities: No edema, cyanosis, or erythema.  Pulses: 2+ and symmetric all extremities  Skin: Warm, no rashes, no lesions  Neurologic: AO x 3, CN 2-12 grossly intact, no gross focal deficits  Psych: Calm,  cooperative, mood congruent with affect    Laboratory/Culture/Imaging Results:     Recent Labs   Lab 07/04/24  0704 07/02/24  0558 07/01/24  0605   WBC 8.7 5.8 6.3   RBC 3.60* 3.49* 3.25*   HGB 11.7* 11.4* 10.7*   HCT 35.1* 34.1* 32.7*    201 194     Recent Labs   Lab 07/04/24  0704 07/02/24  0558 07/01/24  0605   SODIUM 138 138 138   POTASSIUM 3.7 4.1 4.2   CHLORIDE 105 105 108   CO2 25 25 21   ANIONGAP 12 12 13   BUN 8 8 12   CREATININE 0.63* 0.68 0.64*   CALCIUM 8.4 8.6 8.2*   GLUCOSE 94 89 93     Recent Labs   Lab 07/02/24  0558 07/01/24  0605 06/30/24  0536   BILIRUBIN 0.3 0.3 0.5   ALBUMIN 2.8* 2.4* 2.5*   ALKPT 70 68 58   GPT 18 12 12   AST 19 15 12     Recent Labs   Lab 07/04/24  0704 06/30/24  0536   MG 1.8 1.8   PHOS 2.0* 3.0     No results found  No results found  No results found for: \"HGBA1C\"  No results found for: \"PHARTERIAL\"     Microbiology Results       None            Recent Labs   Lab 06/29/24  1852   COL Yellow   UAPP Turbid   UGLU Negative   USPG 1.013   URBC Moderate*   UPH 6.5   UPROT 100*   UNITR Positive*   UWBC Large*   SEPT 1 to 5       XR CHEST AP OR PA   Final Result   No focal infiltrate.         Electronically Signed by: MELISSA ZAPATA MD    Signed on: 7/4/2024 10:05 AM    Workstation ID: [id number]      CT ABDOMEN PELVIS W CONTRAST - IV contrast only   Final Result   Moderate bladder distention with diffuse irregular circumferential bladder   wall thickening and multiple scattered small diverticula. There is also   moderate right hydroureteronephrosis. These constellation of findings are   consistent with a chronic bladder outlet obstruction. The prostate is also   mildly enlarged bulging into the base of the bladder. Several large bladder   calculi are also seen layering within the bladder      Mild enhancement of the distal ureter with periureteral stranding suggest   either a recently passed stone or presence of an ascending tract infection.   Would recommend  correlation with UA.      Additional findings, as detailed above.               Electronically Signed by: RADHA SARAVIA M.D.    Signed on: 6/29/2024 7:12 PM    Workstation ID: [id number]           I personally reviewed the patient's labs & imaging and agree with the radiology/imaging report(s) above.     Assessment and Plan:    Active Hospital Problems    Diagnosis     CVA (cerebral vascular accident)  (CMD)     Urinary tract infection without hematuria     Hydronephrosis     Bladder stone     Benign prostatic hyperplasia     Acute urinary retention     Therapy failure due to antibiotic resistance        Scheduled meds:   potassium phosphate/sodium phosphate (K PHOS NEUTRAL) tablet 250 mg Oral Once    And    potassium phosphate/sodium phosphate (K PHOS NEUTRAL) tablet 250 mg Oral Once    potassium CHLORIDE (KLOR-CON M) diana ER tablet 40 mEq Oral Once    [START ON 7/5/2024] heparin (porcine) injection 5,000 Units Subcutaneous 3 times per day    docusate sodium (COLACE) capsule 100 mg Oral Daily    nicotine (NICODERM) 21 MG/24HR patch 1 patch Transdermal Daily    polyethylene glycol (MIRALAX) packet 17 g Oral Daily    finasteride (PROSCAR) tablet 5 mg Oral Daily    traZODone (DESYREL) tablet 100 mg Oral Nightly    pantoprazole (PROTONIX) EC tablet 40 mg Oral QAM AC    cefepime (MAXIPIME) 1,000 mg in sodium chloride 0.9 % 100 mL IVPB Intravenous 3 times per day    Potassium Standard Replacement Protocol (Levels 3.5 and lower) Does not apply See Admin Instructions    Potassium Replacement (Levels 3.6 - 4) Does not apply See Admin Instructions    Magnesium Standard Replacement Protocol Does not apply See Admin Instructions    Phosphorus Standard Replacement Protocol Does not apply See Admin Instructions    sertraline (ZOLOFT) tablet 200 mg Oral Daily    tamsulosin (FLOMAX) capsule 0.4 mg Oral BID     PRN Meds:   LORazepam (ATIVAN) injection 1 mg  1 mg Intravenous Q4H PRN    lidocaine (ANECREAM/LMX) 4 % cream 1  application.  1 application. Topical PRN    lidocaine 1 % injection 5 mg  5 mg Subcutaneous PRN    dextrose (GLUTOSE) 40 % gel 30 g  30 g Oral Once PRN    dextrose 50 % injection 25 g  25 g Intravenous PRN    insulin lispro (ADMELOG, HumaLOG) sliding scale injection 0-8 Units  0-8 Units Subcutaneous Once PRN    dextrose 5 % infusion   Intravenous Continuous PRN    hydrALAZINE (APRESOLINE) injection 10 mg  10 mg Intravenous Q6H PRN    metoPROLOL (LOPRESSOR) injection 5 mg  5 mg Intravenous Q6H PRN    CARBOXYMethylcellulose (REFRESH TEARS) 0.5 % ophthalmic solution 1 drop  1 drop Both Eyes TID PRN    HYDROmorphone (DILAUDID) injection 0.5 mg  0.5 mg Intravenous Q2H PRN    oxyCODONE-acetaminophen (PERCOCET) 5-325 MG tablet 1 tablet  1 tablet Oral Q4H PRN    oxyCODONE-acetaminophen (PERCOCET) 5-325 MG tablet 2 tablet  2 tablet Oral Q4H PRN    docusate sodium-sennosides (SENOKOT S) 50-8.6 MG 1 tablet  1 tablet Oral BID PRN    magnesium hydroxide (MILK OF MAGNESIA) 400 MG/5ML suspension 30 mL  30 mL Oral Daily PRN    bisacodyl (DULCOLAX) suppository 10 mg  10 mg Rectal Daily PRN    melatonin tablet 6 mg  6 mg Oral QHS PRN    lidocaine 2% urethral (UROJET) 2 % jelly 10 mL  10 mL Transurethral Once PRN    acetaminophen (TYLENOL) tablet 650 mg  650 mg Oral Q6H PRN    ondansetron (ZOFRAN) injection 4 mg  4 mg Intravenous Q6H PRN    sodium chloride 0.9 % flush bag 25 mL  25 mL Intravenous PRN     74M w/PMH Barretts esophagus, BPH, bladder stones, CVA with residual left-sided weakness, who presented to [facility] with worsening generalized malaise suprapubic discomfort and pus in his urine.      # Complicated UTI secondary to Pseudomonas present on admission  # Anemia, mild no signs of bleeding  # Hyponatremia, resolved  # Acute urinary retention, Granado placed in ER  # BPH  # Bladder stones  # Moderate right hydroureteronephrosis  # History of CVA with residual left-sided weakness  # Reported hx barretts esophagus   #  Macrocytic anemia      -Urine culture growing Pseudomonas.  Antibiotic sensitivities noted with sensitivities to cefepime and oral options being Levaquin and ciprofloxacin.  Anticipate discharge on oral antibiotics given no bacteremia  -Empiric IV cefepime ongoing  -Urology consulted, appreciate recs.  N.p.o. for planned cystoscopy tomorrow.  Lovenox to be addressed by their service.  -Remains with Granado catheter, management per urology expertise  -Cardiology consulted for cardiac clearance, patient reports history of stroke 10 years ago with general anesthesia.  No further cardiac testing recommended, patient is intermediate risk for low to intermediate risk procedure.  -7/3: Taken to the OR for bladder stone lithotripsy, holmium laser enucleation of the prostate, Granado catheter placement for CBI  -7/4: CBI clamped, Granado discontinued around noon, voiding trial ongoing.  IV antibiotics ongoing.  Will check labs and hopeful discharge tomorrow on oral antibiotics  -Status post IV fluids  -Lovenox discontinued and changed to heparin subcu tomorrow  -SCDs order in place  -Continuing with home finasteride tamsulosin trazodone Zoloft  -B12 folate noted to be normal, otherwise outpatient follow-up for his macrocytic anemia  -Repeat labs in the morning tomorrow  -Anticipated discharge on Friday tomorrow      #Nutrition status: Does Not Meet Criteria for Malnutrition     F/E/N: Diet:   Dietary Orders (From admission, onward)       Start     Ordered    07/03/24 1117  Regular Diet  DIET EFFECTIVE NOW        Question:  Diet Modifiers  Answer:  Regular    07/03/24 1116                   DVT:   Current Active Medications for DVT Prophylaxis (From admission, onward)           Stop     heparin (porcine) injection 5,000 Units  5,000 Units,   Subcutaneous,   3 times per day         --                      Tentative Discharge/Disposition:       Code Status    Code Status: Full Resuscitation    I have discussed and educated the  patient regarding treatment plan. I have discussed with RN and other consultants at length regarding treatment plan.     Total time spent today on this visit is 51 minutes which includes time spent reviewing data labs and imaging, direct patient care, collaboration with care team.     Kathleen Templeton MD  [facility]

## 2025-03-03 PROBLEM — E43 SEVERE PROTEIN-ENERGY MALNUTRITION: Status: ACTIVE | Noted: 2025-03-03

## 2025-03-03 NOTE — CONSULTS
[address]  [address]                              CONSULTATION      PATIENT NAME: AGNIESZKA ROWE               : 1950  MED REC NO: [id number]                       ROOM: 573  ACCOUNT NO: [id number]                       ADMIT DATE: 2025  PROVIDER: Martinez Whatley MD    DATE OF SERVICE:  2025    ATTENDING PHYSICIAN:  OBEY VALERIO    Thank you for the opportunity to participate in the care of the patient.  As you know, this is a 74-year-old male with a history of Parkinson's with dementia.  He has a number of other medical problems.  He was accompanied by his wife, who provided most of the history.  The patient would nod in response to some inquiries, but otherwise was nonverbal.  There was minimal psychomotor activity.  He seemed to be at least to some extent attending to the television.  His wife reports that his condition has deteriorated significantly over the past several months.  Behaviorally, he has been increasingly inactive and persistently has struggled to maintain his weight and has been having very poor p.o. intake.  The patient does have a history of depression and has been treated with Lexapro and Abilify in the past.  His wife states that these medications did improve his appetite, but otherwise they were not particularly helpful and so were discontinued several months ago.  She reports that he generally sleeps well.  No aggression or violence.  No thoughts of suicide.  She reports that there are no known suicide attempts for the patient.  Behaviorally, again, he has been calm and really passive at this point.  We discussed some options for treating depression in the elderly, including those with memory problems.    MENTAL STATUS EXAM:  This is an adult male, we are not able to assess his orientation due to being nonverbal.  His mood appears somewhat dysphoric and mildly anxious.  There is no spontaneous

## 2025-03-03 NOTE — PROGRESS NOTES
Infectious Disease Services Note    Chart and labs reviewed    Intra-op cx Enterococcus species and ESBL E.coli sensitive to Zosyn, meropenem    Stopped cefepime, flagyl IV. Started meropenem. Continue vancomycin pending Enterococcus sensitivities.       Virginie Joiner APRN-NP

## 2025-03-03 NOTE — CONSULTS
[address]  [address]                              CONSULTATION      PATIENT NAME: AGNIESZKA ROWE               : 1950  MED REC NO: [id number]                       ROOM: 573  ACCOUNT NO: [id number]                       ADMIT DATE: 2025  PROVIDER: Martinez Whatley MD    DATE OF SERVICE:  2025    ATTENDING PHYSICIAN:  OBEY VALERIO    Thank you for the opportunity to participate in the care of the patient.  As you know, this is a 74-year-old male with a history of Parkinson's with dementia.  He has a number of other medical problems.  He was accompanied by his wife, who provided most of the history.  The patient would nod in response to some inquiries, but otherwise was nonverbal.  There was minimal psychomotor activity.  He seemed to be at least to some extent attending to the television.  His wife reports that his condition has deteriorated significantly over the past several months.  Behaviorally, he has been increasingly inactive and persistently has struggled to maintain his weight and has been having very poor p.o. intake.  The patient does have a history of depression and has been treated with Lexapro and Abilify in the past.  His wife states that these medications did improve his appetite, but otherwise they were not particularly helpful and so were discontinued several months ago.  She reports that he generally sleeps well.  No aggression or violence.  No thoughts of suicide.  She reports that there are no known suicide attempts for the patient.  Behaviorally, again, he has been calm and really passive at this point.  We discussed some options for treating depression in the elderly, including those with memory problems.    3/3/25  Mr. Rowe is sleeping at the time I attempted to meet with his this afternoon. Allowed him to rest as he was previously seen by Dr. Whatley.     MENTAL STATUS EXAM:  This is an adult

## 2025-03-03 NOTE — PLAN OF CARE
Problem: Chronic Conditions and Co-morbidities  Goal: Patient's chronic conditions and co-morbidity symptoms are monitored and maintained or improved  Outcome: Progressing     Problem: Discharge Planning  Goal: Discharge to home or other facility with appropriate resources  Outcome: Progressing     Problem: Pain  Goal: Verbalizes/displays adequate comfort level or baseline comfort level  Outcome: Progressing     Problem: Safety - Adult  Goal: Free from fall injury  Outcome: Progressing     Problem: Skin/Tissue Integrity  Goal: Skin integrity remains intact  Outcome: Progressing     Problem: Occupational Therapy - Adult  Goal: By Discharge: Performs self-care activities at highest level of function for planned discharge setting.  See evaluation for individualized goals.  3/3/2025 1549 by Ekaterina Whitaker, STACIE  Outcome: Progressing     Problem: Nutrition Deficit:  Goal: Optimize nutritional status  Outcome: Progressing

## 2025-03-03 NOTE — PLAN OF CARE
Problem: Occupational Therapy - Adult  Goal: By Discharge: Performs self-care activities at highest level of function for planned discharge setting.  See evaluation for individualized goals.  Description: FUNCTIONAL STATUS PRIOR TO ADMISSION:  Patient was not ambulatory for about 1 year PTA. Pt with bed that lifts head and legs (does not elevate up/down like hospital bed). Spouse and son assist with transfers to w/c, recliner, and shower bench.     Receives Help From: Family, Prior Level of Assist for ADLs: Needs assistance, Toileting: Needs assistance, Prior Level of Assist for Homemaking: Needs assistance,  , Prior Level of Assist for Transfers: Needs assistance, Active : No     HOME SUPPORT: Patient lived with spouse and son who provided assistance with all functional mobility and ADLs. Transferred to shower bench for bathing, however, with increased inability to sit upright and spouse considering sponge bathing at bedside in future. Pt does some self-feeding, spouse provides assistance PRN.     Occupational Therapy Goals:  Initiated 2/28/2025  1.  Patient will perform self-feeding with Minimal Assist within 7 day(s).  2.  Patient will perform simple grooming task in seated supported position with Contact Guard Assist within 7 day(s).  3.  Patient will perform bathing from anterior neck to thigh with Moderate Assist within 7 day(s).  4.  Patient will roll to assist with toileting hygiene with Contact Guard Assist  within 7 day(s).    Outcome: Progressing   OCCUPATIONAL THERAPY TREATMENT: WEEKLY REASSESSMENT    Patient: Filipe Real (74 y.o. male)  Date: 3/3/2025  Primary Diagnosis: Cellulitis [L03.90]  Cellulitis, unspecified cellulitis site [L03.90]  Procedure(s) (LRB):  RIGHT HIP WOUND DEBRIDEMENT (Right) 2 Days Post-Op   Precautions: Fall Risk                Chart, occupational therapy assessment, plan of care, and goals were reviewed.    ASSESSMENT  Patient continues to benefit from skilled OT

## 2025-03-03 NOTE — PROGRESS NOTES
Day #5 of Vancomycin  Indication:  Cellulitis/wound infection of right buttock/ischium   Current regimen:  1000 mg IV Q24H  Abx regimen:  vancomycin + cefepime + metronidazole   ID Following: YES  Concomitant nephrotoxic drugs: None  Frequency of BMP?: daily through 3/5    Recent Labs     25  0547 25  0220 25  0501   WBC 7.2 5.6  --    CREATININE 1.01 0.88 0.90   BUN 26* 21* 22*     Est CrCl: 54 ml/min; UO: 0.6 ml/kg/hr  Temp (24hrs), Av.9 °F (36.6 °C), Min:97.5 °F (36.4 °C), Max:98.6 °F (37 °C)    Cultures:    blood - NG, preliminary   3/1 tissue [hip] - light GNR, preliminary     MRSA Swab ordered? N/A    Goal target range AUC/RALF 400-600    Recent level history:  Date/Time Dose & Interval Measured Level (mcg/mL) Associated AUC/RALF Dose Adjustment    3/1  750 mg Q24H 13.1 mcg/mL  385 1000 mg Q24H   3/3 1000 mg Q24H 19.8 mcg/ml (~11 hr level) 508 Same dose                                    Plan: Continue current regimen for a predicated AUC/RALF = 508 at steady state. ID following. Follow tissue cultures, currently growing GNR. May be able to d/c vanc if no MRSA present.

## 2025-03-04 PROBLEM — Z71.89 DNR (DO NOT RESUSCITATE) DISCUSSION: Status: ACTIVE | Noted: 2025-01-01

## 2025-03-04 PROBLEM — Z51.5 PALLIATIVE CARE BY SPECIALIST: Status: ACTIVE | Noted: 2025-01-01

## 2025-03-04 PROBLEM — Z71.89 GOALS OF CARE, COUNSELING/DISCUSSION: Status: ACTIVE | Noted: 2025-03-04

## 2025-03-04 NOTE — WOUND CARE
Wound Care Note:     Re-consulted by nurse request for right ischial post debridement.      Chart shows:  Admitted for cellulitis s/p right hip wound debridement 3/1/25 with a history of back pain, bladder cancer, prostate cancer, CHF, COPD, CAD, DDD, DM, essential HTN, MI, heart block, hematuria, HTN, HLD, short term memory loss, Lumbago, neurogenic dysfunction of bladder, osteoarthrosis, seizures, stroke, thyroid disease, urgency of urination     Wound Culture obtained on 3/1/25 with results light escherichia coli, rare staphylococcus aureus, scant enterococcus faecalis and light mixed anaerobic gram negative rods  WBC = 5.6 on 3/2/25  Admitted from home    Assessment:   Patient is alert, follows commands, incontinent with some assistance needed in repositioning.    Bed: [place]  Patient has a urostomy.    Diet: Adult diet regular with nutritional supplements  Patient with slight movement with wound care.      1. POA right ischial wound measures 3 cm x 3 cm x 1.3 cm, wound bed is 80% pink and 20% slough at distal end, small sero/sang drainage, wound edges are open, jesús-wound intact with improved erythema.  VASHE moist to dry dressing applied.  Santyl ointment to be ordered.      2.  POA urostomy with small wound at 11 o'clock, approximately 0.3 cm x 0.3 cm x 0.1 cm, jesús-stoma skin with light maceration, [place] applied to wound, \"C\" strips placed over wound and an additional piece creating a Venetie IRA, wife stated it protects him from taped edge, new appliance applied with protective ring.    Spoke with MADELAINE Barragan, wound care orders obtained.    Patient repositioned on left side.  Pillow placed between knees.       Recommendations:    Right ischial- Daily cleanse with VASHE, apply Santyl ointment to necrotic tissue at distal end, loosely fill with VASHE moistened gauze and cover.    Skin Care & Pressure Prevention:  Minimize layers of linen/pads under patient  to optimize support surface.    Turn/reposition approximately every 2 hours and offload heels.  Manage incontinence / promote continence   Nourishing Skin Cream to dry skin, minimize use of briefs when able    Discussed above plan with patient & ROBERTO Muñoz    Transition of Care: Plan to follow as needed while admitted to hospital.    Maryjo \"Oriana\" OBEY Fernando, RN, CWON  Certified Wound and Ostomy Nurse  office [phone]  Best way to contact me is through [employer]

## 2025-03-04 NOTE — PLAN OF CARE
Problem: Chronic Conditions and Co-morbidities  Goal: Patient's chronic conditions and co-morbidity symptoms are monitored and maintained or improved  3/3/2025 2312 by Hailee Del Cid LPN  Outcome: Progressing  3/3/2025 1613 by Isabela Santos RN  Outcome: Progressing  Flowsheets (Taken 3/3/2025 0842)  Care Plan - Patient's Chronic Conditions and Co-Morbidity Symptoms are Monitored and Maintained or Improved: Monitor and assess patient's chronic conditions and comorbid symptoms for stability, deterioration, or improvement     Problem: Discharge Planning  Goal: Discharge to home or other facility with appropriate resources  3/3/2025 2312 by Hailee Del Cid LPN  Outcome: Progressing  3/3/2025 1613 by Isabela Santos RN  Outcome: Progressing  Flowsheets (Taken 3/3/2025 0842)  Discharge to home or other facility with appropriate resources: Identify barriers to discharge with patient and caregiver     Problem: Pain  Goal: Verbalizes/displays adequate comfort level or baseline comfort level  3/3/2025 2312 by Hailee Del Cid LPN  Outcome: Progressing  3/3/2025 1613 by Isabela Santos RN  Outcome: Progressing  Flowsheets (Taken 3/3/2025 0842)  Verbalizes/displays adequate comfort level or baseline comfort level: Assess pain using appropriate pain scale     Problem: Safety - Adult  Goal: Free from fall injury  3/3/2025 2312 by Hailee Del Cid LPN  Outcome: Progressing  3/3/2025 1613 by Isabela Santos RN  Outcome: Progressing  Flowsheets (Taken 3/3/2025 0842)  Free From Fall Injury: Instruct family/caregiver on patient safety     Problem: Skin/Tissue Integrity  Goal: Skin integrity remains intact  Description: 1.  Monitor for areas of redness and/or skin breakdown  2.  Assess vascular access sites hourly  3.  Every 4-6 hours minimum:  Change oxygen saturation probe site  4.  Every 4-6 hours:  If on nasal continuous positive airway pressure, respiratory therapy assess nares and determine need for appliance

## 2025-03-04 NOTE — PROGRESS NOTES
Comprehensive Nutrition Assessment    Type and Reason for Visit: Reassess    Nutrition Recommendations/Plan:   Continue Regular diet  Ensure Plus TID ; d/c Teddy BID    Continue MVI, Vit C, and Zn  D/c Zn Sulfate after a total of 10 days (has been on for 5)    Obtain updated weight  Please record %PO intakes in I/Os    Continue Marinol      Malnutrition Assessment:  Malnutrition Status:  Severe malnutrition (02/28/25 1423)    Context:  Chronic Illness     Findings of the 6 clinical characteristics of malnutrition:  Energy Intake:  75% or less estimated energy requirements for 1 month or longer  Weight Loss:  Greater than 10% over 6 months     Body Fat Loss:  Severe body fat loss Orbital, Buccal region   Muscle Mass Loss:  Severe muscle mass loss Temples (temporalis), Clavicles (pectoralis & deltoids)  Fluid Accumulation:  No fluid accumulation     Strength:  Not Performed     Nutrition Assessment:    73 yo male admitted for cellulitis, presented with concerns of worsening cellulitis and wound of R buttock/ischium. 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   Recent admission from 1/30-2/1 for concerns of infected wounds, was d/c on Keflex. RD familiar with pt from that admission.    3/4: f/u. Vit C, Zn Sulfate, MVI added 2/28, Marinol added 3/2. S/p R hip wound debridement 3/1. Hospitalist team recommending SNF, wife declined. Psych examined patient, stated he may benefit from additional medication for depression, could consider mirtazapine that may help with appetite as well. Given that pt ate a hamburger and milkshake for dinner on 3/2 and 75% of breakfast, hospitalist holding off on adding mirtazapine.   Spoke with wife at bedside, pt awake and eating rice krispies. Pt ate all of his rice krispies for breakfast today and yesterday. Had a [employer] baked potato and frosty for lunch  order meals from the diet office based on his last admission.   No N/V/D recently. Ate 2 bites of sausage, 2 bites of English toast for breakfast.   She reports him being 150# sometime last year, very challenging for her to weigh patient. Difficult to assess whether weight has been decreasing gradually within the past ~6 mo or if it decreased drastically within the past few months. Either way, significant weight loss of ~20-30#. CBW is stated at 118#, this was his bed scale weight last admission. Obtain updated scaled weight. Pt meets criteria for severe malnutrition, RD to continue to monitor.   She is wondering if his loss of appetite is d/t some form of depression, consider psych consult. ?consider addition of appetite stimulant.     Nutritionally Significant Medications:  Vitamin C 500 mg daily, Sinemet, Marinol, Ferrous Sulfate, Levothyroxine, Namenda, Merrem, MVI, Vancomycin, Vit B12, Zinc Sulfate 50 mg, LR @ 75 ml/hr    Estimated Daily Nutrient Needs:  Energy Requirements Based On: Kcal/kg  Weight Used for Energy Requirements: Current  Energy (kcal/day): 3162-3714 (30-35 kcal/kg)  Weight Used for Protein Requirements: Current  Protein (g/day): 80-96 (1.5-1.8 g/kg)  Method Used for Fluid Requirements: 1 ml/kcal  Fluid (ml/day): 9344-8579 ml    Nutrition Related Findings:   Edema: None                    Recent Labs     03/02/25  0220 03/03/25  0501 03/04/25  0501   GLUCOSE 100 90 87   BUN 21* 22* 19   CREATININE 0.88 0.90 0.90    140 138   K 4.2 4.1 4.0    110* 108   CO2 22 24 25   CALCIUM 8.1* 8.1* 8.2*     No results for input(s): \"POCGLU\" in the last 72 hours.    No results found for: \"LABA1C\", \"EAG\", \"LAC2LBTW\"    No results found for: \"TRIG\"    Last BM: 02/28/25    Wounds:   Wound Type: Pressure Injury, Unstageable    Current Nutrition Therapies:  Diet: Regular  Supplements: Ensure Plus and Teddy with meals and AM/PM snack (re-ordered after NPO)  Meal Intake:   Patient Vitals for the past 168  hrs:   PO Meals Eaten (%)   02/28/25 0845 26 - 50%     Supplement Intake:  No data found.  Nutrition Support: none    Anthropometric Measures:  Height: 170.2 cm (5' 7\")  Ideal Body Weight (IBW): 148 lbs (67 kg)       Current Body Weight: 53.5 kg (118 lb), 79.7 % IBW. Weight Source: Stated  Current BMI (kg/m2): 18.5        Weight Adjustment For: No Adjustment                 BMI Categories: Underweight (BMI less than 22) age over 65    Wt Readings from Last 10 Encounters:   02/27/25 53.5 kg (118 lb)   01/31/25 53.9 kg (118 lb 13.3 oz)   08/13/24 64.9 kg (143 lb)   07/27/24 65.1 kg (143 lb 8.3 oz)   07/13/24 68.8 kg (151 lb 10.8 oz)   08/04/23 77.1 kg (170 lb)   01/24/23 77.1 kg (170 lb)   07/29/22 68.5 kg (151 lb)   04/29/22 64 kg (141 lb)   01/25/22 65.4 kg (144 lb 3.2 oz)     Nutrition Diagnosis:   Increased nutrient needs related to increase demand for energy/nutrients as evidenced by wounds  Severe malnutrition related to cognitive or neurological impairment, inadequate protein-energy intake as evidenced by criteria as identified in malnutrition assessment    Nutrition Interventions:   Food and/or Nutrient Delivery: Continue Current Diet, Mineral Supplement, Vitamin Supplement, Modify Oral Nutrition Supplement  Nutrition Education/Counseling: No recommendation at this time  Coordination of Nutrition Care: Continue to monitor while inpatient       Goals:  Previous Goal Met: Goal(s) Achieved  Goals: PO intake 50% or greater, by next RD assessment       Nutrition Monitoring and Evaluation:   Behavioral-Environmental Outcomes: None Identified  Food/Nutrient Intake Outcomes: Diet Advancement/Tolerance, Food and Nutrient Intake, Vitamin/Mineral Intake  Physical Signs/Symptoms Outcomes: Biochemical Data, GI Status, Meal Time Behavior, Weight, Nutrition Focused Physical Findings    Discharge Planning:    Continue current diet, Continue Oral Nutrition Supplement     Tiffany Main RD  Available via MyCheck

## 2025-03-04 NOTE — PROGRESS NOTES
Spiritual Health History and Assessment/Progress Note  [facility]    Palliative Care,  , Adjustment to illness, Life Adjustments,      Name: Filipe Real MRN: [id number]    Age: 74 y.o.     Sex: male   Language: English   Latter day: Other   Cellulitis     Date: 3/4/2025            Total Time Calculated: 20 min              Spiritual Assessment began in [facility] 5S1 ORTHO [facility]        Referral/Consult From: Palliative Care   Encounter Overview/Reason: Palliative Care  Service Provided For: Patient, Significant other    Keila, Belief, Meaning:   Patient identifies as spiritual  Family/Friends identify as spiritual      Importance and Influence:  Patient has no beliefs influential to healthcare decision-making identified during this visit  Family/Friends have no beliefs influential to healthcare decision-making identified during this visit    Community:  Patient feels well-supported. Support system includes: Spouse/Partner and Children  Family/Friends feel well-supported. Support system includes: Children    Assessment and Plan of Care:     Patient Interventions include: Facilitated expression of thoughts and feelings  Family/Friends Interventions include: Facilitated expression of thoughts and feelings and Affirmed coping skills/support systems    Patient Plan of Care: Spiritual Care available upon further referral  Family/Friends Plan of Care: Spiritual Care available upon further referral    I visited Filipe Real for a palliative initial spiritual health assessment.     The patient resides with his wife, Edilma and their adult son who is disabled. Spouse and Son provide pt with total assistance at home.    The patient was able to join the conversation in a limited manner, with little actual verbal responses on his part. Edilma spoke of their kendal marriage and her hopes of returning home with him when safe to do so.     Electronically signed by SHAD GONSALEZ on 3/4/2025 at 10:59 AM

## 2025-03-04 NOTE — PROGRESS NOTES
given.      Patient was sleeping but arousable to tactile stimuli.  Doesn't speak much but smiles. Wife reports a slight increase in his oral intake.      Assessment & Plan:     Cellulitis/wound infection of right buttock/ischium   - X-Ray Hip/Pelvis (2/27): No acute abnormality  - ESR, CRP elevated  - ID following: Continue with IV cefepime, flagyl, and vancomycin.   - Wound care following  - 3/1/2025 s/p right ischial debridement  - Blood culture (2/27): NGTD     Protein calorie malnutrition  - Likely affecting wound healing  - Nutrition following: Continue Regular diet, Teddy BID, Ensure Plus TID, MVI, Vit C, and Zn supplementation  - Please record %PO intakes in I/Os   - Discussed w/ GI, stated likely patient would not be candidate for PEG tube given that patient able to swallow, but just not doing so. Recommended psych consult as well to determine if any underlying medical issues that could assist with PO intake.  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 Wife agreed.    Hx Stroke  Hx Seizure  - Continue home medications     Hx CAD with Stents  Hx CHF  - Continue home medications     Hx COPD  - Continue home medications (nebs)     Hx bladder and prostate cancer  - Has urostomy     Code status: FULL  Prophylaxis: Lovenox  Care Plan discussed with: Patient, nursing, attending  Anticipated  available resources at the time of admission. Route is PO if not otherwise noted.    Medications Reviewed:     Current Facility-Administered Medications   Medication Dose Route Frequency    sodium bicarbonate tablet 650 mg  650 mg Oral BID    [START ON 3/5/2025] collagenase ointment   Topical Daily    meropenem (MERREM) 1,000 mg in sodium chloride 0.9 % 100 mL IVPB (addEASE)  1,000 mg IntraVENous Q8H    droNABinol (MARINOL) capsule 5 mg  5 mg Oral BID    vancomycin (VANCOCIN) 1,000 mg in sodium chloride 0.9 % 250 mL IVPB ([id number])  1,000 mg IntraVENous Q24H    lactated ringers infusion   IntraVENous Continuous    aspirin EC tablet 81 mg  81 mg Oral Daily    carbidopa-levodopa (SINEMET CR)  MG per extended release tablet 1 tablet  1 tablet Oral TID    carvedilol (COREG) tablet 6.25 mg  6.25 mg Oral Daily    vitamin B-12 (CYANOCOBALAMIN) tablet 1,000 mcg  1,000 mcg Oral BID    ferrous sulfate (IRON 325) tablet 325 mg  325 mg Oral Once per day on Monday Wednesday Friday    levothyroxine (SYNTHROID) tablet 88 mcg  88 mcg Oral Daily    memantine (NAMENDA) tablet 5 mg  5 mg Oral BID    traZODone (DESYREL) tablet 100 mg  100 mg Oral Nightly    sodium chloride flush 0.9 % injection 5-40 mL  5-40 mL IntraVENous 2 times per day    sodium chloride flush 0.9 % injection 5-40 mL  5-40 mL IntraVENous PRN    0.9 % sodium chloride infusion   IntraVENous PRN    ondansetron (ZOFRAN-ODT) disintegrating tablet 4 mg  4 mg Oral Q8H PRN    Or    ondansetron (ZOFRAN) injection 4 mg  4 mg IntraVENous Q6H PRN    polyethylene glycol (GLYCOLAX) packet 17 g  17 g Oral Daily PRN    acetaminophen (TYLENOL) tablet 650 mg  650 mg Oral Q6H PRN    Or    acetaminophen (TYLENOL) suppository 650 mg  650 mg Rectal Q6H PRN    enoxaparin (LOVENOX) injection 40 mg  40 mg SubCUTAneous Daily    Vancomycin - Pharmacy to Dose  1 each Other RX Placeholder    balsum peru-castor oil (VENELEX) ointment   Topical Q12H    multivitamin 1 tablet  1 tablet Oral  Daily    ascorbic acid (VITAMIN C) tablet 500 mg  500 mg Oral Daily    zinc sulfate (ZINCATE) 220 mg capsule - elemental zinc 50 mg  50 mg Oral Daily     ______________________________________________________________________  EXPECTED LENGTH OF STAY: Unable to retrieve estimated LOS  ACTUAL LENGTH OF STAY:          5                 Laurel Young, APRN - CNP

## 2025-03-04 NOTE — OP NOTE
Operative Note      Patient: Filipe Real  YOB: 1950  MRN: [id number]    Date of Procedure: 3/1/2025    Pre-Op Diagnosis Codes:      * Pressure ulcer of right ischium, unspecified pressure ulcer stage [L89.319]    Post-Op Diagnosis: Same       Procedure(s):  RIGHT HIP WOUND DEBRIDEMENT    Surgeon(s):  Tigre Hannon MD    Assistant:   Surgical Assistant: eKnneth Bonilla    Anesthesia: General    Estimated Blood Loss (mL): Minimal    Complications: None    Specimens:   ID Type Source Tests Collected by Time Destination   A : RIGHT HIP WOUND Tissue Joint, Hip CULTURE, ANAEROBIC, CULTURE, TISSUE (WITH GRAM STAIN) Tigre Hannon MD 3/1/2025 0858        Implants:  * No implants in log *      Drains:   Urostomy Ureterostomy right RLQ (Active)   Stomal Appliance 1 piece 03/03/25 0842   Stoma  Assessment Pink;Moist 03/03/25 0842   Peristomal Assessment Clean, dry & intact 03/03/25 0842   Mucocutaneous Junction Intact 03/03/25 0842   Collection Container Standard 03/03/25 0842   Securement Method Securing device (Describe) 03/03/25 0842   Treatment Site care 03/01/25 0929   Urine Color Yellow 03/03/25 0842   Urine Appearance Clear 03/03/25 0842   Urine Odor Malodorous 03/01/25 0929   Output (ml) 400 ml 03/02/25 1914       Findings:  Infection Present At Time Of Surgery (PATOS) (choose all levels that have infection present):  No infection present  Other Findings: necrotic nonviable tissue within wound base    Detailed Description of Procedure:   The patient was seen again in preop holding. His wife was consented to proceed. He was taken to the OR and intubated/sedated. Turned lateral decubitus. Betadine was used to prep the area and it was draped in sterile fashion. After a time out the wound was debrided using a 15 blade and forceps sharp debridement proceeded to remove a small rim of skin and subcutaneous tissue over the right ischium. This wound extends deep almost to bone due to the lack of

## 2025-03-04 NOTE — CARE COORDINATION
Transition of Care Plan:    RUR: 18%  Prior Level of Functioning: Assistance with all ADLs.  Disposition: Home with spouse and [employer].  LIVAN: 3/6  If SNF or IPR: Date FOC offered:   Date FOC received:   Accepting facility:   Date authorization started with reference number:   Date authorization received and expires:   Follow up appointments: PCP/Specialst  DME needed: None  Transportation at discharge:   IM/IMM Medicare/ letter given: 2/26  Is patient a [place] and connected with [facility]?    If yes, was  transfer form completed and [facility] notified?   Caregiver Contact: Spouse, Edilma Real [phone]   Discharge Caregiver contacted prior to discharge?   Care Conference needed? No  Barriers to discharge:  Medical    Noris Harry RN/CRM  [phone]

## 2025-03-04 NOTE — PLAN OF CARE
Problem: Physical Therapy - Adult  Goal: By Discharge: Performs mobility at highest level of function for planned discharge setting.  See evaluation for individualized goals.  Description: FUNCTIONAL STATUS PRIOR TO ADMISSION: The patient has hx Parkinson's disease, Parkinson's dementia, and CVA with now baseline cognitive impairment and limited ability to verbalize. PLOF information provided by patient's supportive wife. The patient was functional at the wheelchair level and required total A for transfers to the chair via stand pivot. Patient's supportive wife and adult son assist patient for stand pivot transfers and ADLs. Patient's wife states patient usually \"wraps his arms around my sons neck and he stands him up while I bring up the chair.\" Hx falls in the home setting.     HOME SUPPORT PRIOR TO ADMISSION: The patient lived with wife, adult son and required total assistance or used a mechanical device for self care and transfers.    Physical Therapy Goals  Initiated 2/28/2025  1.  Patient will roll side to side in bed with contact guard assist within 7 day(s).    2.  Patient will sit supported at EOB with minimal assistance for 3 minutes within 7 day(s).  3.  Patient will perform sit to stand with maximal assistance within 7 day(s).  3.  Patient will transfer from bed to chair and chair to bed with maximal assistance using the least restrictive device within 7 day(s).  Outcome: Progressing   PHYSICAL THERAPY TREATMENT    Patient: Filipe Real (74 y.o. male)  Date: 3/4/2025  Diagnosis: Cellulitis [L03.90]  Cellulitis, unspecified cellulitis site [L03.90] Cellulitis  Procedure(s) (LRB):  RIGHT HIP WOUND DEBRIDEMENT (Right) 3 Days Post-Op  Precautions: Restrictions/Precautions  Restrictions/Precautions: Fall Risk            ASSESSMENT:  Patient continues to benefit from skilled PT services and is progressing towards goals. Patient continues to present with decreased activity tolerance, balance deficits and  weakness. Pt required assistance to maintain seated balance. Performed 2 trials of sit<>stand using rolling walker. Pt presented with buckling of knees requiring to return to side of bed. Able to progress to stand/pivot transfer to chair requiring additional transfers to sit in optimal position. Performed demonstration of Sharon Stedy for sit<>Stand for pt's wife as she inquired about equipment. Anticipate need for increased physical assistance and supervision upon discharge.          PLAN:  Patient continues to benefit from skilled intervention to address the above impairments.  Continue treatment per established plan of care.    Recommendations for staff mobility and toileting assistance:  Recommend that staff completes patient mobility with assist x2 using Sharon Stedy.      Recommendation for discharge: (in order for the patient to meet his/her long term goals):   Moderate intensity short-term skilled physical therapy up to 5x/week, yet family requesting to return home with provided assistance. Recommend HHPT and 2 person assist for all levels of mobility. Per wife, plans to obtain edi lift.     Other factors to consider for discharge: poor safety awareness, impaired cognition, and high risk for falls    IF patient discharges home will need the following DME: mechanical lift       SUBJECTIVE:   Patient stated, \"Good.\"    OBJECTIVE DATA SUMMARY:   Critical Behavior:  Orientation  Overall Orientation Status: Impaired  Cognition  Overall Cognitive Status: Exceptions  Arousal/Alertness: Delayed responses to stimuli  Following Commands: Follows one step commands with repetition  Attention Span: Attends with cues to redirect  Memory: Impaired  Safety Judgement: Impaired  Problem Solving: Impaired  Insights: Not aware of deficits  Initiation: Requires cues for some  Sequencing: Appears intact    Functional Mobility Training:  Bed Mobility:  Bed Mobility Training  Bed Mobility Training: Yes  Sit to Supine:  Partial/Moderate assistance  Transfers:  Transfer Training  Transfer Training: Yes  Sit to Stand: Substantial/Maximal assistance  Stand to Sit: Substantial/Maximal assistance  Bed to Chair: Substantial/Maximal assistance  Balance:  Balance  Sitting: Impaired  Sitting - Static: Poor (constant support)  Sitting - Dynamic: Poor (constant support)  Standing: Impaired  Standing - Static: Constant support;Poor  Standing - Dynamic: Poor;Constant support     Activity Tolerance:   Fair     After treatment:   Patient left in no apparent distress sitting up in chair, Call bell within reach, Bed/ chair alarm activated, and Caregiver / family present      COMMUNICATION/EDUCATION:   The patient's plan of care was discussed with: registered nurse    Patient Education  Education Given To: Patient;Family  Education Provided: Transfer Training;Role of Therapy;Plan of Care;Mobility Training;Equipment;Family Education;Fall Prevention Strategies  Education Method: Verbal;Demonstration  Barriers to Learning: Cognition  Education Outcome: Continued education needed;Demonstrated understanding      Camila Jesus, PT  Minutes: 26

## 2025-03-04 NOTE — PROGRESS NOTES
I attended the Palliative Care family meeting where patient care was discussed with patient and family. Questions were asked and answered by the patient's spouse, Edilma. After the meeting, I spent some time talking with Edilma and Filipe who is verbally unresponsive, except that he would often get a smile in his eyes when I spoke to him.      The Interdisciplinary team is aware of  availability and were encouraged to request Spiritual Health support as needed.      The  on-call can be reached at ([phone]).     Rev. Gustavo Gonzalez MDiv  Chaplain Resident   1-2 drinks

## 2025-03-04 NOTE — PROGRESS NOTES
I participated in Palliative Rounds today where the patient's care was discussed.     The Interdisciplinary team is aware of  availability and were encouraged to request Spiritual Health support as needed.      The  on-call can be reached at [phone]).     Rev. Gustavo Gonzalez MDiv  Chaplain Resident

## 2025-03-04 NOTE — PLAN OF CARE
Problem: Chronic Conditions and Co-morbidities  Goal: Patient's chronic conditions and co-morbidity symptoms are monitored and maintained or improved  Outcome: Progressing     Problem: Discharge Planning  Goal: Discharge to home or other facility with appropriate resources  Outcome: Progressing     Problem: Pain  Goal: Verbalizes/displays adequate comfort level or baseline comfort level  Outcome: Progressing     Problem: Safety - Adult  Goal: Free from fall injury  Outcome: Progressing     Problem: Skin/Tissue Integrity  Goal: Skin integrity remains intact  Outcome: Progressing

## 2025-03-04 NOTE — CONSULTS
Palliative Medicine  Patient Name: Filipe Real  YOB: 1950  MRN: [id number]  Age: 74 y.o.  Gender: male    Date of Initial Consult: 3/2/2025  Date of Service: 3/4/2025  Time: 3:09 PM  Provider: Gregoria Lombardi MD  Hospital Day: 6  Admit Date: 2/27/2025  Referring Provider: Vee Moreira NP       Reasons for Consultation:  Goals of Care    HISTORY OF PRESENT ILLNESS (HPI):   Filipe Real is a 74 y.o. male with a past medical history of chronic right buttock and right ischial wound, parkinson's, dementia, CVA, CHF, COPD, CAD DM, HTN, who was admitted on 2/27/2025 from home with a diagnosis of wound infection.     Upon arrival to the ED he was found to have stable vitals. He was seen by surgery and underwent surgical debridement of the right buttock wound 3/1/25. Wound cultures are growing enterococcus and ESBL.    Psychosocial: pt lives at home with his wife and son. Son and son's girlfriend live in the basement and provide a lot of support to wife in caring for pt. He is no longer able to walk and is essentially wheelchair bound. His appetite hs been very poor since a UTI. Prior to this illness he enjoyed hunting and fishing. 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      PALLIATIVE DIAGNOSES:    Palliative care encounter  Goals of care  DNR discussion  Malnutrition  Debility, buttocks wound  Dementia, parkinsons    ASSESSMENT AND PLAN:   Chart reviewed including labs, notes, imaging. Along with Gustavo Gonzalez met pt at bedside, he currently does not have capacity to participate in MDM  Discussed plan of car with wife Edilma  Wife shares that pt has really declined since he was treated for a UTI. His appetite has been very poor and his mobility is poor as well. Son helps a lot with daily care and transfers but pt has needed increasing support and it has been harder to complete transfers/toileting at home  Review medical condition pt with malnutrition, dementia, parkinsons. Now with a wound grown

## 2025-03-04 NOTE — PROGRESS NOTES
Day #5 of Vancomycin  Indication:  Cellulitis/wound infection of right buttock/ischium   Current regimen:  1000 mg IV Q24H  Abx regimen:  vancomycin + meropenem   ID Following: YES  Concomitant nephrotoxic drugs: None  Frequency of BMP?: daily through 3/9    Recent Labs     25  0220 25  0501 25  0501   WBC 5.6  --   --    CREATININE 0.88 0.90 0.90   BUN 21* 22* 19     Est CrCl: 53 ml/min  Temp (24hrs), Av.3 °F (36.8 °C), Min:97.7 °F (36.5 °C), Max:98.6 °F (37 °C)    Cultures:    blood - NG, preliminary   3/1 tissue [hip] - light ESBL e. Coli, rare probable s. Aureus, scant enterococcus, light mixed GNR    MRSA Swab ordered? N/A    Goal target range AUC/RALF 400-600    Recent level history:  Date/Time Dose & Interval Measured Level (mcg/mL) Associated AUC/RALF Dose Adjustment    3/1  750 mg Q24H 13.1 mcg/mL  385 1000 mg Q24H   3/3 1000 mg Q24H 19.8 mcg/ml (~11 hr level) 508 Same dose                                    Plan: Continue current regimen for a predicated AUC/RALF = 511 at steady state. ID following. Follow tissue cultures, currently growing ESBL e.coli, enterococcus and probable s. aureus. May be able to d/c vanc pending enterococcus sensitivities.

## 2025-03-05 NOTE — PROGRESS NOTES
Score: Best appetite  Dyspnea Score: No shortness of breath  Wellbeing Score: Best feeling of wellbeing    Clinical Pain Assessment (nonverbal scale for severity on nonverbal patients):   Clinical Pain Assessment  Severity: 0       NVPS:  Adult Nonverbal Pain Scale (NVPS)  Face: No particular expression or smile  Activity (Movement): Laid quietly, normal position  Guarding: Lying quietly, no positioning of hands over areas of bod  Physiology (Vital Signs): Stable vital signs  Respiratory: Baseline RR/SpO2 compliant with ventilator  NVPS Score : 0    RDOS:         Vital Signs: Blood pressure 111/60, pulse 80, temperature 97.7 °F (36.5 °C), resp. rate 16, height 1.702 m (5' 7\"), weight 53.5 kg (118 lb), SpO2 97%.    PHYSICAL ASSESSMENT:   General: [] Oriented x3  [] Well appearing  [] Intubated  [x]Ill appearing  []Other:  Mental Status: [] Normal mental status exam  [] Drowsy  [x] Confused  []Other:  Cardiovascular: [] Regular rate/rhythm  [] Arrhythmia  [] Other:  Chest: [x] Effort normal  []Lungs clear  [] Respiratory distress  []Tachypnea  [] Other:  Abdomen: [x] Soft/non-tender  [] Normal appearance  [] Distended  [] Ascites  [] Other:  Neurological: [] Normal speech  [] Normal sensation  []Deficits present:  Extremity: [x] Normal skin color/temp  [] Clubbing/cyanosis  [] No edema  [] Other:    Wt Readings from Last 15 Encounters:   02/27/25 53.5 kg (118 lb)   01/31/25 53.9 kg (118 lb 13.3 oz)   08/13/24 64.9 kg (143 lb)   07/27/24 65.1 kg (143 lb 8.3 oz)   07/13/24 68.8 kg (151 lb 10.8 oz)   08/04/23 77.1 kg (170 lb)   01/24/23 77.1 kg (170 lb)   07/29/22 68.5 kg (151 lb)   04/29/22 64 kg (141 lb)   01/25/22 65.4 kg (144 lb 3.2 oz)        Current Diet: ADULT DIET; Regular  ADULT ORAL NUTRITION SUPPLEMENT; Breakfast, Lunch, Dinner; Standard High Calorie/High Protein Oral Supplement       PSYCHOSOCIAL/SPIRITUAL SCREENING:   Palliative IDT has assessed this patient for cultural preferences / practices and a

## 2025-03-05 NOTE — PROGRESS NOTES
ID  Chart  reviewed.  IntraOp cultures reviewed.  Tissue culture 3/1+ for light E. coli ESBL, rare MSSA, scant E. Faecalis  Light mixed anaerobic GNR.  For antimicrobial therapy x 2 weeks.    Antimicrobial orders for discharge  -Meropenem 1 gram  IV every 8 hours end date 3/17  -Vancomycin 1 gram IV daily end date 3/15  -Pull line at end of therapy.    -Weekly CBC, CMP, vancomycin trough-  -Fax reports to [phone], call with critical labs  at [phone]  -Please call if persistent rise in creatinine is noted  -Encourage adequate fluids, daily probiotic/yogurt  -If line malfunction occurs and home health cannot reposition  please send patient to ED immediately  -ID follow-up -no follow-up required  - If persistent side effects occur stop antibiotic and call-ID/PCP.

## 2025-03-05 NOTE — CARE COORDINATION
Transition of Care Plan:     RUR: 16%  Prior Level of Functioning: Assistance with all ADLs.  Disposition: Home with spouse HH.  [facility] referral sent to [facility]  via [employer]  Patient's spouse requested an agency that also has Hospice.    [facility]  accepted.  AVS updated.  LIVAN: 3/6  If SNF or IPR: Date FOC offered:   Date FOC received:   Accepting facility:   Date authorization started with reference number:   Date authorization received and expires:   Follow up appointments: PCP/Specialst  DME needed: None  Transportation at discharge:   [facility] Medicare/[facility] letter given: 2/26  Is patient a  and connected with VA?               If yes, was  transfer form completed and VA notified?   Caregiver Contact: Spouse, Edilma Real [phone]   Discharge Caregiver contacted prior to discharge?   Care Conference needed? No  Barriers to discharge:  Medical     Noris Harry RN/CRM  [phone]

## 2025-03-05 NOTE — PROGRESS NOTES
Music Therapy Assessment  [facility]    Filipe Real [id number]     1950  74 y.o.  male    Patient Telephone Number: [phone] (home)   Sabianism Affiliation: Other   Language: English   Patient Active Problem List    Diagnosis Date Noted    Palliative care by specialist 03/04/2025    Goals of care, counseling/discussion 03/04/2025    DNR (do not resuscitate) discussion 03/04/2025    Severe protein-energy malnutrition 03/03/2025    Right ischial pressure sore, unspecified pressure ulcer stage 02/28/2025    Pressure injury of skin of right buttock 02/28/2025    Cellulitis 02/27/2025    Acute infective cystitis 01/30/2025    History of ileal conduit 08/13/2024    Bilateral inguinal hernia without obstruction or gangrene 08/04/2023    CKD (chronic kidney disease) stage 4, GFR 15-29 ml/min ([facility]) 07/29/2022    Elevated serum creatinine 04/29/2022    Solitary kidney, acquired 01/12/2022    Chronic UTI (urinary tract infection) 12/07/2021    Anemia due to stage 3a chronic kidney disease ([facility]) 09/28/2021    Bladder cancer ([facility]) 07/14/2020    Malignant neoplasm of prostate ([facility]) 07/14/2020    Spondylolisthesis at L2-L3 level 01/25/2016    Lumbar stenosis 01/25/2016    Lumbago 02/08/2010    Essential hypertension, benign 02/08/2010    Coronary atherosclerosis of native coronary artery 02/08/2010    Hereditary and idiopathic peripheral neuropathy 02/08/2010        Date: 3/5/2025            Total Time Calculated: 25 min          [facility] 5S1 ORTHO JOINT    Mental Status:   [x] Alert [  ] Forgetful [  ]  Confused  [  ] Minimally responsive  [  ] Sleeping    Communication Status: [  ] Impaired Speech [  ] Nonverbal -N/A    Physical Status:   [  ] Oxygen in use  [  ] Hard of Hearing [  ] Vision Impaired  [  ] Ambulatory  [  ] Ambulatory with assistance [  ] Non-ambulatory -N/A    Music Preferences, Background: Pt confirmed enjoying Country and Rock n' Roll music    Clinical Problem addressed: Support to  listening and a supportive presence in response to her sharing and then provided information on music therapy services. When offered a visit pt declined by shaking his head \"no\", though welcomed a f/up on a different day as this MT is able. MT expressed understanding and asked if they had any additional needs at this time. Pt declined and pt's spouse thanked this MT for the offered support.     RICCARDO Rico (Music Therapist, Board Certified)  Spiritual Health Department  Referral-Based Services

## 2025-03-05 NOTE — PROGRESS NOTES
[facility]                                                                                          Hospitalist Progress Note  Laurel Young, BERNICE - CNP  Answering service: [phone] OR [phone] from in house phone        Date of Service:  3/5/2025  NAME:  Filipe Real  :  1950  MRN:  [id number]       Admission Summary:   Filipe Real is a 74 y.o. male with history of chronic right buttock and right ischial wound/cellulitis, history of Parkinson's with dementia, history of CVA, CHF, COPD, CAD status post PCI, type 2 diabetes, hypertension, hypothyroidism, prostate cancer, iron deficiency anemia who presented to [place] ED with concerns of worsening cellulitis and wound of his right buttock/ischium on 2025. Patient is known to medicine service and was admitted to hospital from  to  for concerns of infected aforementioned wounds.  He was subsequently discharged and Keflex. History from patient is limited per chart review shows he was seen by his PCP () for follow-up and referred back to ED for concerns of recurrent infection.  Provided limited history but able to answer simple questions verbally.     The patient denied any fever, chills, chest or abdominal pain, nausea, vomiting, cough, congestion, recent illness, palpitations, or dysuria.  Remarkable vitals on ER Presentation: Vital signs stable  Labs Remarkable for: WBC 12.8, sodium 149, CR 1.31, ALB 2.0  ER Images: X-ray hip and right pelvis showed no acute process  ER Rx: Vancomycin    Surgical debridement of right buttock wound 3/1/25      Interval history / Subjective:   Hx of TBI, dementia and Parkinsonism    Patient seen with wife at bedside and nurse together for dyad rounding, palliative came in towards the end of my assessment.  Still awaiting sensitivities for final ID recs.      Patient was alert and smiled when talked to today but still not speaking much.  Wife  Q12H    multivitamin 1 tablet  1 tablet Oral Daily    ascorbic acid (VITAMIN C) tablet 500 mg  500 mg Oral Daily    zinc sulfate (ZINCATE) 220 mg capsule - elemental zinc 50 mg  50 mg Oral Daily     ______________________________________________________________________  EXPECTED LENGTH OF STAY: Unable to retrieve estimated LOS  ACTUAL LENGTH OF STAY:          6                 Laurel Young, APRN - CNP

## 2025-03-05 NOTE — PLAN OF CARE
Problem: Chronic Conditions and Co-morbidities  Goal: Patient's chronic conditions and co-morbidity symptoms are monitored and maintained or improved  3/4/2025 2321 by Leo Clayton RN  Outcome: Progressing  3/4/2025 1851 by Isabela Santos RN  Outcome: Progressing  Flowsheets (Taken 3/4/2025 0821)  Care Plan - Patient's Chronic Conditions and Co-Morbidity Symptoms are Monitored and Maintained or Improved: Monitor and assess patient's chronic conditions and comorbid symptoms for stability, deterioration, or improvement     Problem: Discharge Planning  Goal: Discharge to home or other facility with appropriate resources  3/4/2025 2321 by Leo Clayton RN  Outcome: Progressing  3/4/2025 1851 by Isabela Santos RN  Outcome: Progressing  Flowsheets (Taken 3/4/2025 0821)  Discharge to home or other facility with appropriate resources: Identify barriers to discharge with patient and caregiver     Problem: Pain  Goal: Verbalizes/displays adequate comfort level or baseline comfort level  3/4/2025 2321 by Leo Clayton RN  Outcome: Progressing  3/4/2025 1851 by Isabela Santos RN  Outcome: Progressing  Flowsheets (Taken 3/4/2025 0821)  Verbalizes/displays adequate comfort level or baseline comfort level: Assess pain using appropriate pain scale     Problem: Safety - Adult  Goal: Free from fall injury  3/4/2025 1851 by Isabela Santos RN  Outcome: Progressing  Flowsheets (Taken 3/4/2025 0821)  Free From Fall Injury: Instruct family/caregiver on patient safety

## 2025-03-05 NOTE — TELEPHONE ENCOUNTER
2025    Horacio Garcia,   [address] [place] [address]    Patient: Devin Gonzalez   YOB: 1957   Date of Visit: 3/5/2025     Encounter Diagnosis     ICD-10-CM    1. Adhesive capsulitis of right shoulder  M75.01           Dear Dr. Garcia:    Thank you for your recent referral of Devin Gonzalez. Please review the attached evaluation summary from Devin's recent visit.     Please verify that you agree with the plan of care by signing the attached order.     If you have any questions or concerns, please do not hesitate to call.     I sincerely appreciate the opportunity to share in the care of one of your patients and hope to have another opportunity to work with you in the near future.       Sincerely,    Matthew Mann, PT      Referring Provider:      I certify that I have read the below Plan of Care and certify the need for these services furnished under this plan of treatment while under my care.                    Horacio Garcia DO  [address] [place] [address]  Via Fax: [phone]          PT Evaluation     Today's date: 3/5/2025  Patient name: Devin Gonzalez  : 1957  MRN: [id number]  Referring provider: Horacio Garcia, *  Dx:   Encounter Diagnosis     ICD-10-CM    1. Adhesive capsulitis of right shoulder  M75.01                      Assessment  Impairments: abnormal coordination, abnormal muscle firing, abnormal or restricted ROM, activity intolerance, impaired physical strength, lacks appropriate home exercise program, pain with function and safety issue  Functional limitations: Diffculty with reaching at and above shoulder height.  Symptom irritability: moderate    Assessment details: Devin Gonzalez is a 67 y.o. male who presents to outpatient PT with a  Adhesive capsulitis of right shoulder  (primary encounter diagnosis).  No further referral appears necessary at this time based upon examination results. Pt presents with decreased  patient needs lab order please.  let me know when completed and I will contact patient strength, ROM, balance, functional activity tolerance, and pain with movement in his right shoulder  which is  limiting his ability to perform the aforementioned functional activities.  Etiologic factors include repetitive poor body mechanics. Prognosis is good given HEP compliance and PT 2-3/wk.  Please contact me if you have any questions or recommendations.  Thank you for the opportunity to share in  Devin care.     Understanding of Dx/Px/POC: good     Prognosis: good    Goals  STG to be achieved in 4 weeks     1. Pt will reduce subjective pain rating by at least 50 percent the help facilitate return to PLOF  2. Pt will improve right shoulder AROM by at least 10 degrees to help promote improved functional activity tolerance   3. Pt will improve right shoulder MMT scores by at least 1/2 grade to promote improved functional activity tolerance        LTG to be achieved in 6-8 weeks   1. Pt will be complaint with HEP   2. Pt will improve right shoulder AROM to WFL, to help facilitate independence with ADL's, IADL's, and functional activities   3. Pt will improve right shoulder Strength to WFL to help facilitate independence with ADL's, IADL's, and functional activities   4. Pt will have no limitations with ambulation to help facilitate independence at home and in the community.   5. Pt will have no limitations with stair negotiation to help facilitate independence at home and in the community           Plan  Patient would benefit from: skilled physical therapy    Planned therapy interventions: ADL training, balance, balance/weight bearing training, flexibility, functional ROM exercises, gait training, graded activity, graded exercise, graded motor, home exercise program, joint mobilization, manual therapy, neuromuscular re-education, patient education, postural training, strengthening, stretching, therapeutic activities and therapeutic exercise    Frequency: 2x week  Duration in weeks: 12  Plan of Care beginning  date: 3/5/2025  Plan of Care expiration date: 2025  Treatment plan discussed with: patient, PTA and referring physician        Subjective Evaluation    History of Present Illness  Mechanism of injury: The patient is a 67 year old male who presents with CC of right shoulder pain  for the past three month. He reports new onset of decreased ER IR and abduction ROM. Consistent with adhesive capsulitis of right shoulder. He notes difficulty with walking his dog while using his. Notes increasing difficulty with any types of activities that involve the use of his right ar  Patient Goals  Patient goals for therapy: increased strength, decreased pain and increased motion  Patient goal: to increase strength in thr right arm.  Pain  Current pain ratin  At best pain ratin  At worst pain rating: 10  Quality: sharp and throbbing  Aggravating factors: lifting  Progression: improved          Objective     Active Range of Motion     Right Shoulder   Flexion: 85 degrees   Abduction: 90 degrees   External rotation BTH: Active external rotation behind the head: reaches to occiput.   Internal rotation BTB: Active internal rotation behind the back: reaches to sacrum.     Strength/Myotome Testing     Right Shoulder     Planes of Motion   Flexion: 2   Abduction: 2   External rotation at 0°: 2   Internal rotation at 0°: 2              Precautions: Defibrillator, Hx of ischemic cardiomyopathy       Manuals 3/5            PROM to right shoulder  RR                                                   Neuro Re-Ed             MTP LTP              TB ER IR              Compass             Supine cane              UBE for postural correction.   120                                      Ther Ex             Pulley  5''20x flex scap            Finger ladder  5''10x flex scap                                                                                          Ther Activity                                       Gait Training                                        Modalities

## 2025-03-06 NOTE — PROGRESS NOTES
Infectious Disease progress      Impression    Infected pressure ulcer right ischial  CT abdomen/pelvis+ for R/buttock wound with involvement of ischial bursa  No definite evidence for OM   S/p wound debridement 3/1  IntraOp tissue culture + for light ESBL E. coli, rare MSSA, scant E.faecalis  Light  mixed anaerobic GNR  Negative blood cultures      Parkinsonism  Dementia  H/o TBI, brain hemorrhage    H/o CVA  Seizure disorder  On home meds    CAD s/p stenting  CHF  Continue home meds    COPD  Not in exacerbation    H/o bladder and prostate CA  S/p urostomy.      Plan  Continue vancomycin and meropenem IV  Plan is for 2 weeks of antimicrobial therapy  If patient is discharged home for ertapenem IV  Pharmacy to give 1 dose of ertapenem prior to discharge    May DC from ID standpoint    Antimicrobial orders for discharge  -Ertapenem 1 gram  IV daily end date 3/17  -Vancomycin 1 gram IV daily end date 3/15  -Pull line at end of therapy.    -Weekly CBC, CMP, vancomycin trough-  -Fax reports to [phone], call with critical labs  at [phone]  -Please call if persistent rise in creatinine is noted  -Encourage adequate fluids, daily probiotic/yogurt  -If line malfunction occurs and home health cannot reposition  please send patient to ED immediately  -ID follow-up -no follow-up required  - If persistent side effects occur stop antibiotic and call-ID/PCP.    Possible adverse effects of long term antibiotics are inclusive of but not limited to following  BM suppression, neutropenia , cytopenias , aplastic anemia hemorrhage liver & renal dysfunction/ liver , renal failure  , GI dysfunction- N, V  Diarrhea,C.difficile disease, rash , allergy , anaphylaxis.toxic epidermal necrolysis  Neuro toxicity , seizure disorder  Side effects tend to be more pronounced in the elderly      Abx  Meropenem-3/3    Extensive review of chart notes, labs, imaging, cultures done  Additionally review of done: Recent reports-Labs, cultures,  hip  demonstrate no fracture, dislocation or other acute abnormality. Lumbosacral  fusion hardware.    Impression  No acute abnormality.    Electronically signed by Mino Saez      XR HIP 2-3 VW W PELVIS RIGHT    Result Date: 2/27/2025  EXAM: XR HIP 2-3 VW W PELVIS RIGHT INDICATION: please make at least two views: has ulcer over right hip, looking for osteomyelitis. COMPARISON: None. FINDINGS: AP view of the pelvis and a frogleg lateral view of the right hip demonstrate no fracture, dislocation or other acute abnormality. Lumbosacral fusion hardware.     No acute abnormality. Electronically signed by Mino Saez      Greater than 50% of the time was spent on the following:  Preparing for visit and chart review.  Obtaining and/or reviewing separately obtained history  Performing a medically appropriate exam and/or evaluation  Counseling and educating a patient/family/caregiver as noted above  Placing relevant orders  Referring and communicating with other professionals (not separately reported)  Independently interpreting results (not separately reported) and communicating results to the patient/family/caregiver  Care coordination (not separately reported) as noted above  Documenting clinical information in the electronic health records (e.g. problem list, visit note) on the day of the encounter       Jolynn Mireles MD FACP

## 2025-03-06 NOTE — PROGRESS NOTES
Music Therapy Assessment  [facility]    Filipe Real [id number]     1950  74 y.o.  male    Patient Telephone Number: [phone] (home)   Mandaeism Affiliation: Other   Language: English   Patient Active Problem List    Diagnosis Date Noted    Palliative care by specialist 03/04/2025    Goals of care, counseling/discussion 03/04/2025    DNR (do not resuscitate) discussion 03/04/2025    Severe protein-energy malnutrition 03/03/2025    Right ischial pressure sore, unspecified pressure ulcer stage 02/28/2025    Pressure injury of skin of right buttock 02/28/2025    Cellulitis 02/27/2025    Acute infective cystitis 01/30/2025    History of ileal conduit 08/13/2024    Bilateral inguinal hernia without obstruction or gangrene 08/04/2023    CKD (chronic kidney disease) stage 4, GFR 15-29 ml/min (Formerly [facility]) 07/29/2022    Elevated serum creatinine 04/29/2022    Solitary kidney, acquired 01/12/2022    Chronic UTI (urinary tract infection) 12/07/2021    Anemia due to stage 3a chronic kidney disease ([facility]) 09/28/2021    Bladder cancer ([facility]) 07/14/2020    Malignant neoplasm of prostate ([facility]) 07/14/2020    Spondylolisthesis at L2-L3 level 01/25/2016    Lumbar stenosis 01/25/2016    Lumbago 02/08/2010    Essential hypertension, benign 02/08/2010    Coronary atherosclerosis of native coronary artery 02/08/2010    Hereditary and idiopathic peripheral neuropathy 02/08/2010        Date: 3/6/2025            Total Time Calculated: 50 min          [facility] 5S1 ORTHO JOINT    Mental Status:   [ x] Alert [  ] Forgetful [x]  Confused  [  ] Minimally responsive  [  ] Sleeping    Communication Status: [  ] Impaired Speech [  ] Nonverbal -N/A    Physical Status:   [  ] Oxygen in use  [  ] Hard of Hearing [  ] Vision Impaired  [  ] Ambulatory  [  ] Ambulatory with assistance [  ] Non-ambulatory -N/A    Music Preferences, Background: Pt confirmed enjoying rock n' roll and country music     Clinical Problem addressed: Increased positive  she'd like answered before this. MT offered words of validation in response to this and encouraged her to ask all the questions she needs to feel comfortable with the transition home. Pt's spouse agreed with this statement. When offered a music therapy visit directly pt appeared to look at this MT and nod \"yes\". When asked if he'd like the music to be relaxing or upbeat, pt selected the ladder. MT validated his music preferences and request for upbeat songs by singing and playing the following songs with guitar; Dillan Araiza's Blue Suede Shoes, Hound Dog, Frank Bolanos' Hey Good Lookin', & Sandro Willoughby's [place] by Morning. While providing these selections MT offered moments of additional engagement by asking the pt questions that related to the songs (I.e. \"if you had a pair of suede shoes, what color would they be?\" & \"what's your favorite recipe Edilma makes?\"). MT also provided pauses in the music to further encourage the pt to participate, or \"fill-in-the-blank\", with the music. Pt increased self-expression in response to the music as evidenced by (AEB) singing along when prompted, maintaining eye contact with this MT, and answering questions asked by this MT (\"I'd own bright red suede shoes\"). As the music concluded pt mouthed \"thank you\" while pt's spouse expressed gratitude for the visit. When asked if they had any additional needs at this time pt's spouse declined this. MT thanked them for their time and concluded the visit.     RICCARDO Rico (Music Therapist, Board Certified)  Spiritual Health Department  Referral-Based Services

## 2025-03-06 NOTE — DISCHARGE SUMMARY
Discharge Summary       PATIENT ID: Filipe Real  MRN: [id number]   YOB: 1950    DATE OF ADMISSION: 2/27/2025  3:23 PM    DATE OF DISCHARGE: 3/6/2025   PRIMARY CARE PROVIDER: Mian Alva APRN - NP     ATTENDING PHYSICIAN: Trisha Cage MD   DISCHARGING PROVIDER: BERNICE Lakhani CNP    To contact this individual call [phone] and ask the  to page.  If unavailable ask to be transferred the Adult Hospitalist Department.    CONSULTATIONS: IP CONSULT TO HOSPITALIST  IP CONSULT TO PHARMACY  IP CONSULT TO DIETITIAN  IP CONSULT TO INFECTIOUS DISEASES  IP CONSULT TO GENERAL SURGERY  IP CONSULT TO CASE MANAGEMENT  IP CONSULT TO PSYCHIATRY  IP CONSULT TO PALLIATIVE CARE  IP CONSULT TO CASE MANAGEMENT  IP CONSULT TO CASE MANAGEMENT  IP CONSULT TO CASE MANAGEMENT  IP CONSULT TO VASCULAR ACCESS TEAM    PROCEDURES/SURGERIES: Procedure(s):  RIGHT HIP WOUND DEBRIDEMENT    ADMITTING DIAGNOSES & HOSPITAL COURSE:   Filipe Real is a 74 y.o. male with history of chronic right buttock and right ischial wound/cellulitis, history of Parkinson's with dementia, history of CVA, CHF, COPD, CAD status post PCI, type 2 diabetes, hypertension, hypothyroidism, prostate cancer, iron deficiency anemia who presented to [place] ED with concerns of worsening cellulitis and wound of his right buttock/ischium on 2/27/2025. Patient is known to medicine service and was admitted to hospital from January 30 to February 1 for concerns of infected aforementioned wounds.  He was subsequently discharged and Keflex. History from patient is limited per chart review shows he was seen by his PCP (2/27) for follow-up and referred back to ED for concerns of recurrent infection.  Provided limited history but able to answer simple questions verbally.     The patient denied any fever, chills, chest or  abdominal pain, nausea, vomiting, cough, congestion, recent illness, palpitations, or dysuria.  Remarkable vitals on ER Presentation: Vital signs stable  Labs Remarkable for: WBC 12.8, sodium 149, CR 1.31, ALB 2.0  ER Images: X-ray hip and right pelvis showed no acute process  ER Rx: Vancomycin     Surgical debridement of right buttock wound 3/1/25.  He has been receiving wound care with improvement in the wound.  Appetite improved with addition of Marinol to his regimen.  Infectious disease saw patient and their recommendations have been made for home antibiotics.  Palliative care has seen patient and his wife regarding his progressive decline.  He has been deemed medically stable for discharge with outpatient follow up having achieved the maximum benefit of his inpatient hospitalization.          DISCHARGE DIAGNOSES / PLAN:      Cellulitis/wound infection of right buttock/ischium   - X-Ray Hip/Pelvis (2/27): No acute abnormality  - ESR, CRP elevated  - ID following: Continue with IV cefepime, flagyl, and vancomycin.   - Wound care following  - 3/1/2025 s/p right ischial debridement  - Blood culture (2/27): NGTD     Protein calorie malnutrition  - Likely affecting wound healing  - Nutrition following: Continue Regular diet, Teddy BID, Ensure Plus TID, MVI, Vit C, and Zn supplementation  - Please record %PO intakes in I/Os   - Discussed w/ GI, stated likely patient would not be candidate for PEG tube given that patient able to swallow, but just not doing so. Recommended psych consult as well to determine if any underlying medical issues that could assist with PO intake.  - Psych consulted : input appreciated   - 3/1 Trial of marinol for appetite stimulation  - 3/2 increase marinol  - 3/3 appetite improved, hold off on adding mirtazepine  - 3/5 patient ate entire cereal for breakfast being fed by his wife  - 3/6 sitting up in chair, ate cereal and half a banana for breakfast      Moderate Dementia  Parkinsonism  Hx

## 2025-03-06 NOTE — CARE COORDINATION
Transition of Care Plan: anticipate d/c home with [facility] and [employer] for home IV ABX, HH providers added to AVS;  ID plan noted, -Ertapenum 1g IV Daily end date 3/17 & Vancomycin 1 gram IV daily end date 3/15    Picc line in place    [facility] to deliver Janina lift to pt's home    [facility] BLS transport scheduled for 5pm     RUR: 16%  Prior Level of Functioning: Assistance with all ADLs.  Disposition: Home with spouse HH.  [facility]  Patient's spouse requested an agency that also has Hospice.    [facility] accepted.  AVS updated.  LIVAN: 3/6  If SNF or IPR: Date FOC offered:   Date FOC received:   Accepting facility:   Date authorization started with reference number:   Date authorization received and expires:   Follow up appointments: PCP/Specialst  DME needed: None  Transportation at discharge:   IM/IMM Medicare/ letter given: 2/26, 3/6/25  Is patient a  and connected with [facility]?               If yes, was [place] transfer form completed and [facility] notified?   Caregiver Contact: Spouse, Edilma Real [phone]   Discharge Caregiver contacted prior to discharge?   Care Conference needed? No  Barriers to discharge:  Medical  -1000-CM reviewed pt chart and confirmed pt's need for home IV ABX with Attending. CM spoke with spouse about home iv abx and she is agreeable advising that she and son can assist with home iv abx administration. CM informed spouse also to contact [facility] to arrange Janina lift delivery.   1400-CM noted Ivette of [employer] has visited pt's room to do IV ABX administration teaching with spouse.   Mariajose Ash RN BSN [facility]

## 2025-03-06 NOTE — PLAN OF CARE
Problem: Chronic Conditions and Co-morbidities  Goal: Patient's chronic conditions and co-morbidity symptoms are monitored and maintained or improved  3/6/2025 1215 by Lacey Stokes LPN  Outcome: Progressing  3/6/2025 0233 by Erasmo Portillo RN  Outcome: Progressing  Flowsheets (Taken 3/5/2025 2110)  Care Plan - Patient's Chronic Conditions and Co-Morbidity Symptoms are Monitored and Maintained or Improved: Monitor and assess patient's chronic conditions and comorbid symptoms for stability, deterioration, or improvement     Problem: Discharge Planning  Goal: Discharge to home or other facility with appropriate resources  3/6/2025 0233 by Erasmo Portillo RN  Outcome: Progressing  Flowsheets (Taken 3/5/2025 2110)  Discharge to home or other facility with appropriate resources: Identify barriers to discharge with patient and caregiver     Problem: Pain  Goal: Verbalizes/displays adequate comfort level or baseline comfort level  3/6/2025 0233 by Erasmo Portillo RN  Outcome: Progressing  Flowsheets (Taken 3/5/2025 2110)  Verbalizes/displays adequate comfort level or baseline comfort level:   Assess pain using appropriate pain scale   Implement non-pharmacological measures as appropriate and evaluate response     Problem: Safety - Adult  Goal: Free from fall injury  3/6/2025 0233 by Erasmo Portillo RN  Outcome: Progressing     Problem: Skin/Tissue Integrity  Goal: Skin integrity remains intact  Description: 1.  Monitor for areas of redness and/or skin breakdown  2.  Assess vascular access sites hourly  3.  Every 4-6 hours minimum:  Change oxygen saturation probe site  4.  Every 4-6 hours:  If on nasal continuous positive airway pressure, respiratory therapy assess nares and determine need for appliance change or resting period  3/6/2025 0233 by Erasmo Portillo RN  Outcome: Progressing  Flowsheets (Taken 3/5/2025 2110)  Skin Integrity Remains Intact: Monitor for areas of redness and/or skin  breakdown     Problem: Nutrition Deficit:  Goal: Optimize nutritional status  3/6/2025 0233 by Erasmo Portillo, RN  Outcome: Progressing

## 2025-03-06 NOTE — PROGRESS NOTES
INFECTIOUS DISEASE discharge plan:    Diagnosis: Right ischial/buttock wound    Antibiotic: Ertapenem 1 g IV daily end date 3/17 & vancomycin 1 g  daily end date 3/15    PICC line insertion for outpatient antibiotic.  Will take at end of therapy    Routine PICC/ Marilee/ Portacath Care including PRN catheter flow management    Weekly labs with results fax to # [phone]. Call critical lab values to [phone].   [x] CBC w/diff  [x] BUN/Creatinine  [] AST, ALT  [] CPK  [] CRP, ESR  [x] Vancomycin trough level goal 15-20 drawn every Monday and Thursday. Pharm D consult for Vancomycin dosing.     Home Health to pull PICC line after last dose or send to IR for Marilee removal    May send to IR for line evaluation or replacement PRN Phone # [phone]    ID follow-up not required.

## 2025-03-06 NOTE — PALLIATIVE CARE DISCHARGE
Goals of Care/Treatment Preferences    The Palliative Medicine team was consulted as part of your/your loved one's care in the hospital. Our team is a supportive service; we strive to relieve suffering and improve quality of life.    We reviewed advance care planning information, which includes the following:    Primary Decision Maker: Edilma Real - Spouse - [phone]  Patient's Healthcare Decision Maker is:: Legal Next of Kin  Confirm Advance Directive: Yes, on file      We reviewed / discussed your code status as:   Code Status: DNR     “Full Code” means perform CPR in the event of cardiac arrest.      “DNR” means do NOT perform CPR in the event of cardiac arrest.      “Partial Code” means you have specific preferences, please discuss with your healthcare team.      “No Order” means this issue was not addressed / resolved during your stay    We met with you and your family while you were here in the hospital. Right now your goals are to try to recover from your acute illnesses, continue things like IV antibiotics and therapies. We did discuss in the future hospice care if at anytime you would like a comfort focused approach to care. You are not ready for this right now, but may consider one day in the future.           Because of the importance of this information, we are providing you with a printed copy to share with other healthcare providers after this hospitalization is complete.

## 2025-03-06 NOTE — PLAN OF CARE
Problem: Physical Therapy - Adult  Goal: By Discharge: Performs mobility at highest level of function for planned discharge setting.  See evaluation for individualized goals.  Description: FUNCTIONAL STATUS PRIOR TO ADMISSION: The patient has hx Parkinson's disease, Parkinson's dementia, and CVA with now baseline cognitive impairment and limited ability to verbalize. PLOF information provided by patient's supportive wife. The patient was functional at the wheelchair level and required total A for transfers to the chair via stand pivot. Patient's supportive wife and adult son assist patient for stand pivot transfers and ADLs. Patient's wife states patient usually \"wraps his arms around my sons neck and he stands him up while I bring up the chair.\" Hx falls in the home setting.     HOME SUPPORT PRIOR TO ADMISSION: The patient lived with wife, adult son and required total assistance or used a mechanical device for self care and transfers.    Physical Therapy Goals  Initiated 2/28/2025  1.  Patient will roll side to side in bed with contact guard assist within 7 day(s).    2.  Patient will sit supported at EOB with minimal assistance for 3 minutes within 7 day(s).  3.  Patient will perform sit to stand with maximal assistance within 7 day(s).  3.  Patient will transfer from bed to chair and chair to bed with maximal assistance using the least restrictive device within 7 day(s).  Outcome: Progressing    PHYSICAL THERAPY TREATMENT    Patient: Filipe Real (74 y.o. male)  Date: 3/6/2025  Diagnosis: Cellulitis [L03.90]  Cellulitis, unspecified cellulitis site [L03.90] Cellulitis  Procedure(s) (LRB):  RIGHT HIP WOUND DEBRIDEMENT (Right) 5 Days Post-Op  Precautions: Restrictions/Precautions  Restrictions/Precautions: Fall Risk            ASSESSMENT:  Patient continues to benefit from skilled PT services and is slowly progressing towards goals. Today -  pt more alert and following simple commands.  Pt did well with Sharon

## 2025-03-06 NOTE — PLAN OF CARE
Problem: Occupational Therapy - Adult  Goal: By Discharge: Performs self-care activities at highest level of function for planned discharge setting.  See evaluation for individualized goals.  Description: FUNCTIONAL STATUS PRIOR TO ADMISSION:  Patient was not ambulatory for about 1 year PTA. Pt with bed that lifts head and legs (does not elevate up/down like hospital bed). Spouse and son assist with transfers to w/c, recliner, and shower bench.     Receives Help From: Family, Prior Level of Assist for ADLs: Needs assistance, Toileting: Needs assistance, Prior Level of Assist for Homemaking: Needs assistance,  , Prior Level of Assist for Transfers: Needs assistance, Active : No     HOME SUPPORT: Patient lived with spouse and son who provided assistance with all functional mobility and ADLs. Transferred to shower bench for bathing, however, with increased inability to sit upright and spouse considering sponge bathing at bedside in future. Pt does some self-feeding, spouse provides assistance PRN.     Occupational Therapy Goals:  Initiated 2/28/2025  1.  Patient will perform self-feeding with Minimal Assist within 7 day(s).  2.  Patient will perform simple grooming task in seated supported position with Contact Guard Assist within 7 day(s).  3.  Patient will perform bathing from anterior neck to thigh with Moderate Assist within 7 day(s).  4.  Patient will roll to assist with toileting hygiene with Contact Guard Assist  within 7 day(s).    Outcome: Progressing    OCCUPATIONAL THERAPY TREATMENT  Patient: Filipe Real (74 y.o. male)  Date: 3/6/2025  Primary Diagnosis: Cellulitis [L03.90]  Cellulitis, unspecified cellulitis site [L03.90]  Procedure(s) (LRB):  RIGHT HIP WOUND DEBRIDEMENT (Right) 5 Days Post-Op   Precautions: Fall Risk                Chart, occupational therapy assessment, plan of care, and goals were reviewed.    ASSESSMENT  Patient continues to benefit from skilled OT services and is slowly  standing with use of yefri steady)  Stand Pivot Transfers: Dependent/Total (use of yefri steady to transfer from EOB to armchair)  Bed to Chair: Dependent/Total (use of yefri steady to transfer from EOB to armchair)           Balance:     Balance  Sitting: Impaired  Sitting - Static: Fair (occasional)  Sitting - Dynamic: Poor (constant support)  Standing: Impaired  Standing - Static: Constant support;Fair;Poor  Standing - Dynamic: Constant support;Poor      ADL Intervention:  LE Dressing: Dependent/Total  LE Dressing Skilled Clinical Factors: to luli socks at bed level    Functional Mobility: Dependent/Total    Activity Tolerance:   Poor and requires frequent rest breaks  Please refer to the flowsheet for vital signs taken during this treatment.    After treatment:   Patient left in no apparent distress sitting up in chair, Call bell within reach, Bed/ chair alarm activated, and Caregiver / family present    COMMUNICATION/EDUCATION:   The patient's plan of care was discussed with: physical therapist, registered nurse, and          Thank you for this referral.  Matthew Kerley, OT  Minutes: 24

## 2025-03-06 NOTE — PLAN OF CARE
Problem: Chronic Conditions and Co-morbidities  Goal: Patient's chronic conditions and co-morbidity symptoms are monitored and maintained or improved  3/6/2025 0233 by Erasmo Portillo RN  Outcome: Progressing  Flowsheets (Taken 3/5/2025 2110)  Care Plan - Patient's Chronic Conditions and Co-Morbidity Symptoms are Monitored and Maintained or Improved: Monitor and assess patient's chronic conditions and comorbid symptoms for stability, deterioration, or improvement     Problem: Discharge Planning  Goal: Discharge to home or other facility with appropriate resources  Outcome: Progressing  Flowsheets (Taken 3/5/2025 2110)  Discharge to home or other facility with appropriate resources: Identify barriers to discharge with patient and caregiver     Problem: Pain  Goal: Verbalizes/displays adequate comfort level or baseline comfort level  Outcome: Progressing  Flowsheets (Taken 3/5/2025 2110)  Verbalizes/displays adequate comfort level or baseline comfort level:   Assess pain using appropriate pain scale   Implement non-pharmacological measures as appropriate and evaluate response     Problem: Safety - Adult  Goal: Free from fall injury  Outcome: Progressing     Problem: Skin/Tissue Integrity  Goal: Skin integrity remains intact  Description: 1.  Monitor for areas of redness and/or skin breakdown  2.  Assess vascular access sites hourly  3.  Every 4-6 hours minimum:  Change oxygen saturation probe site  4.  Every 4-6 hours:  If on nasal continuous positive airway pressure, respiratory therapy assess nares and determine need for appliance change or resting period  Outcome: Progressing  Flowsheets (Taken 3/5/2025 2110)  Skin Integrity Remains Intact: Monitor for areas of redness and/or skin breakdown

## 2025-03-06 NOTE — PROCEDURES
PROCEDURE NOTE  Date: 3/6/2025   Name: Filipe Real  YOB: 1950    Procedures:  Ultrasound-guided bedside PICC placement with Bard Sherlock 3CG TCS    Consent obtained from pts spouse, after risks of  possible DVT, air embolism, infection, arterial puncture and catheter malposition are explained and all questions answered.    PRE-PROCEDURE VERIFICATION  Correct Procedure: yes  Correct Site: yes  Temperature: Temp: 98.4 °F (36.9 °C), Temperature Source:    Recent Labs     03/06/25  0338   BUN 19      WBC 5.3       Allergies: Lisinopril  Education materials, including PICC Booklet, for PICC Care given to patient: yes.   See Patient Education activity for further details.    PROCEDURE DETAIL  PICC placed using modified Seldinger method.  A double lumen PICC line was started for antibiotic therapy. The following documentation is in addition to the PICC properties in the lines/airways flowsheet :  Lot #: [id number]  Was xylocaine 1% used intradermally: yes  Catheter to vein ratio: 41%  Arm Circumference 10 cm above AC: 22.5 (cm)  Total Catheter Length: 36 (cm)  External Catheter Length: 0 (cm)  Vein Selection for PICC: right brachial  Central Line Bundle followed: yes  Complication Related to Insertion: none    The placement was verified by ECG  technology: The tip location is in the superior vena cava. See ECG results for PICC tip placement.    Report given to nurse Alejandra RN        Line is okay to use.    ABBY DUPREE RN, [facility]-BC

## 2025-03-07 PROBLEM — A49.8: Status: ACTIVE | Noted: 2025-03-07

## 2025-03-07 PROBLEM — F03.911 DEMENTIA WITH AGITATION (HCC): Status: ACTIVE | Noted: 2025-01-01

## 2025-03-07 PROBLEM — A49.1 ENTEROCOCCAL INFECTION: Status: ACTIVE | Noted: 2025-03-07

## 2025-03-07 PROBLEM — Z86.73 HISTORY OF CEREBROVASCULAR ACCIDENT (CVA) IN ADULTHOOD: Status: ACTIVE | Noted: 2025-01-01

## 2025-03-07 PROBLEM — A49.8 INFECTION DUE TO ESBL-PRODUCING ESCHERICHIA COLI: Status: ACTIVE | Noted: 2025-01-01

## 2025-03-07 PROBLEM — E44.0 MODERATE PROTEIN-CALORIE MALNUTRITION: Status: ACTIVE | Noted: 2025-01-01

## 2025-03-07 PROBLEM — Z16.12 INFECTION DUE TO ESBL-PRODUCING ESCHERICHIA COLI: Status: ACTIVE | Noted: 2025-03-07

## 2025-03-07 PROBLEM — A49.01 STAPH AUREUS INFECTION: Status: ACTIVE | Noted: 2025-03-07

## 2025-03-07 PROBLEM — J44.9 CHRONIC OBSTRUCTIVE PULMONARY DISEASE (HCC): Status: ACTIVE | Noted: 2025-01-01

## (undated) DEVICE — 3M™ IOBAN™ 2 ANTIMICROBIAL INCISE DRAPE 6650EZ: Brand: IOBAN™ 2

## (undated) DEVICE — GOWN,PREVENTION PLUS,XLN/XL,ST,24/CS: Brand: MEDLINE

## (undated) DEVICE — SUTURE PDS II SZ 0 L60IN ABSRB VLT L48MM CTX 1/2 CIR Z990G

## (undated) DEVICE — AIRLIFE™ U/CONNECT-IT OXYGEN TUBING 7 FEET (2.1 M) CRUSH-RESISTANT OXYGEN TUBING, VINYL TIPPED: Brand: AIRLIFE™

## (undated) DEVICE — TROCAR: Brand: KII FIOS FIRST ENTRY

## (undated) DEVICE — EXTREMITY - SMH: Brand: MEDLINE INDUSTRIES, INC.

## (undated) DEVICE — CATHETER URETH 20FR BLLN 5CC STD LTX HYDRGEL 2 W F BARDX

## (undated) DEVICE — NEONATAL-ADULT SPO2 SENSOR: Brand: NELLCOR

## (undated) DEVICE — CUTTER ENDOSCP L340MM LIN ARTC SGL STROKE FIRING ENDOPATH

## (undated) DEVICE — Device

## (undated) DEVICE — BAG SPEC BIOHZD LF 2MIL 6X10IN -- CONVERT TO ITEM 357326

## (undated) DEVICE — Z DISCONTINUED NO SUB IDED SET EXTN W/ 4 W STPCOCK M SPIN LOK 36IN

## (undated) DEVICE — SUTURE MONOCRYL + SZ 4-0 L27IN ABSRB UD L19MM PS-2 3/8 CIR MCP426H

## (undated) DEVICE — SYR LR LCK 1ML GRAD NSAF 30ML --

## (undated) DEVICE — DRAIN JACKSON-PRT 19FR W/TROC: Brand: CARDINAL HEALTH

## (undated) DEVICE — RELOAD STPL H1-2.5X45MM VASC THN TISS WHT 6 ROW B FRM SGL

## (undated) DEVICE — Device: Brand: JELCO

## (undated) DEVICE — PREP SKN CHLRAPRP APL 26ML STR --

## (undated) DEVICE — 9165 UNIVERSAL PATIENT PLATE: Brand: 3M™

## (undated) DEVICE — TOWEL SURG W17XL27IN STD BLU COT NONFENESTRATED PREWASHED

## (undated) DEVICE — LAPAROSCOPIC CHOLE PACK: Brand: MEDLINE INDUSTRIES, INC.

## (undated) DEVICE — SUT MONOCRYL PLUS UD 4-0 --

## (undated) DEVICE — SOUTHSIDE TURNOVER: Brand: MEDLINE INDUSTRIES, INC.

## (undated) DEVICE — SUTURE PROL SZ 1 L30IN NONABSORBABLE BLU L40MM CT 1/2 CIR 8435H

## (undated) DEVICE — DRAPE,REIN 53X77,STERILE: Brand: MEDLINE

## (undated) DEVICE — SHEARS ENDOSCP L36CM DIA5MM ULTRASONIC CRV TIP HARM

## (undated) DEVICE — LAMINECTOMY ARM CRADLE FOAM POSITIONER: Brand: CARDINAL HEALTH

## (undated) DEVICE — CONTAINER SPEC 20 ML LID NEUT BUFF FORMALIN 10 % POLYPR STS

## (undated) DEVICE — NEEDLE HYPO 18GA L1.5IN PNK S STL HUB POLYPR SHLD REG BVL

## (undated) DEVICE — Z DISCONTINUED USE 2751540 TUBING IRRIG L10IN DISP PMP ENDOGATOR

## (undated) DEVICE — ENDO CARRY-ON PROCEDURE KIT INCLUDES ENZYMATIC SPONGE, GAUZE, BIOHAZARD LABEL, TRAY, LUBRICANT, DIRTY SCOPE LABEL, WATER LABEL, TRAY, DRAWSTRING PAD, AND DEFENDO 4-PIECE KIT.: Brand: ENDO CARRY-ON PROCEDURE KIT

## (undated) DEVICE — SPONGE LAP 18X18IN STRL -- 5/PK

## (undated) DEVICE — TAPE,CLOTH/SILK,CURAD,3"X10YD,LF,40/CS: Brand: CURAD

## (undated) DEVICE — 2, DISPOSABLE SUCTION/IRRIGATOR WITHOUT DISPOSABLE TIP: Brand: STRYKEFLOW

## (undated) DEVICE — SOLUTION IRRIG 500ML 0.9% SOD CHL USP POUR PLAS BTL

## (undated) DEVICE — ACCESS PLATFORM FOR MINIMALLY INVASIVE SURGERY.: Brand: GELPORT® LAPAROSCOPIC  SYSTEM

## (undated) DEVICE — KIT IV STRT W CHLORAPREP PD 1ML

## (undated) DEVICE — REM POLYHESIVE ADULT PATIENT RETURN ELECTRODE: Brand: VALLEYLAB

## (undated) DEVICE — KIT INCLUDES:GTB14, ALEXIS CONTAINED EXTRACTION SYSTEMC0R47, 12X100 KII BALLOON BLUNT TIP TROCAR: Brand: ALEXIS CONTAINED EXTRACTION SYSTEM WITH KII BALLOON BLUNT TIP SYSTEM

## (undated) DEVICE — SOLIDIFIER FLUID 3000 CC ABSORB

## (undated) DEVICE — INTENDED FOR TISSUE SEPARATION, AND OTHER PROCEDURES THAT REQUIRE A SHARP SURGICAL BLADE TO PUNCTURE OR CUT.: Brand: BARD-PARKER ® CARBON RIB-BACK BLADES

## (undated) DEVICE — SYRINGE MED 20ML STD CLR PLAS LUERLOCK TIP N CTRL DISP

## (undated) DEVICE — 1200 GUARD II KIT W/5MM TUBE W/O VAC TUBE: Brand: GUARDIAN

## (undated) DEVICE — SNARE ENDOSCP M L240CM W27MM SHTH DIA2.4MM CHN 2.8MM HEX

## (undated) DEVICE — BASIC SINGLE BASIN-LF: Brand: MEDLINE INDUSTRIES, INC.

## (undated) DEVICE — GARMENT,MEDLINE,DVT,INT,CALF,MED, GEN2: Brand: MEDLINE

## (undated) DEVICE — SWAB CULT DBL W/O CHAR RAYON TIP AMIES GEL CLMN FOR COLL

## (undated) DEVICE — SUT ETHLN 2-0 18IN FS BLK --

## (undated) DEVICE — SOL INJ SOD CL 0.9% 1000ML BG --

## (undated) DEVICE — TUBING HYDR IRR --

## (undated) DEVICE — KENDALL RADIOLUCENT FOAM MONITORING ELECTRODE -RECTANGULAR SHAPE: Brand: KENDALL

## (undated) DEVICE — TRAY URIN CATH PED 16FR BLLN 5CC INDWL STR TIP INF CTRL

## (undated) DEVICE — BW-412T DISP COMBO CLEANING BRUSH: Brand: SINGLE USE COMBINATION CLEANING BRUSH

## (undated) DEVICE — SET ADMIN 16ML TBNG L100IN 2 Y INJ SITE IV PIGGY BK DISP

## (undated) DEVICE — LAPAROSCOPIC SCISSORS: Brand: EPIX LAPAROSCOPIC SCISSORS

## (undated) DEVICE — CATH IV AUTOGRD BC BLU 22GA 25 -- INSYTE

## (undated) DEVICE — GLOVE SURG SZ 7.5 L11.73IN FNGR THK9.8MIL STRW LTX POLYMER

## (undated) DEVICE — GLOVE SURG SZ 75 L12IN FNGR THK79MIL GRN LTX FREE

## (undated) DEVICE — 4-PORT MANIFOLD: Brand: NEPTUNE 2

## (undated) DEVICE — CLIP INT L POLYMER LOK LIG HEM O LOK

## (undated) DEVICE — TRAP SURG QUAD PARABOLA SLOT DSGN SFTY SCRN TRAPEASE

## (undated) DEVICE — SUTURE VICRYL + SZ 2-0 L27IN ABSRB WHT SH 1/2 CIR TAPERCUT VCP417H

## (undated) DEVICE — TUBING INSUFFLATOR HEAT HUMIDIFIED SMK EVAC SET PNEUMOCLEAR

## (undated) DEVICE — BAG BELONG PT PERS CLEAR HANDL

## (undated) DEVICE — QUILTED PREMIUM COMFORT UNDERPAD,EXTRA HEAVY: Brand: WINGS

## (undated) DEVICE — CONNECTOR TBNG AUX H2O JET DISP FOR OLY 160/180 SER

## (undated) DEVICE — DERMABOND SKIN ADH 0.7ML -- DERMABOND ADVANCED 12/BX

## (undated) DEVICE — VISUALIZATION SYSTEM: Brand: CLEARIFY